# Patient Record
Sex: FEMALE | Race: WHITE | Employment: OTHER | URBAN - METROPOLITAN AREA
[De-identification: names, ages, dates, MRNs, and addresses within clinical notes are randomized per-mention and may not be internally consistent; named-entity substitution may affect disease eponyms.]

---

## 2018-06-23 ENCOUNTER — HOSPITAL ENCOUNTER (INPATIENT)
Age: 73
LOS: 3 days | Discharge: HOME | DRG: 243 | End: 2018-06-26
Attending: EMERGENCY MEDICINE | Admitting: INTERNAL MEDICINE
Payer: MEDICARE

## 2018-06-23 DIAGNOSIS — R00.1 SYMPTOMATIC BRADYCARDIA: Primary | ICD-10-CM

## 2018-06-23 PROBLEM — I49.5 SSS (SICK SINUS SYNDROME) (HCC): Status: ACTIVE | Noted: 2018-06-23

## 2018-06-23 LAB
GLUCOSE BLD STRIP.AUTO-MCNC: 46 MG/DL (ref 65–100)
GLUCOSE BLD STRIP.AUTO-MCNC: 46 MG/DL (ref 65–100)
GLUCOSE BLD STRIP.AUTO-MCNC: 48 MG/DL (ref 65–100)
GLUCOSE BLD STRIP.AUTO-MCNC: 95 MG/DL (ref 65–100)
SERVICE CMNT-IMP: ABNORMAL
SERVICE CMNT-IMP: NORMAL

## 2018-06-23 PROCEDURE — 99285 EMERGENCY DEPT VISIT HI MDM: CPT

## 2018-06-23 PROCEDURE — 82962 GLUCOSE BLOOD TEST: CPT

## 2018-06-23 PROCEDURE — 65660000000 HC RM CCU STEPDOWN

## 2018-06-23 PROCEDURE — 74011250637 HC RX REV CODE- 250/637: Performed by: INTERNAL MEDICINE

## 2018-06-23 RX ORDER — MAGNESIUM SULFATE 100 %
CRYSTALS MISCELLANEOUS
Status: DISPENSED
Start: 2018-06-23 | End: 2018-06-24

## 2018-06-23 RX ORDER — INSULIN LISPRO 100 [IU]/ML
INJECTION, SOLUTION INTRAVENOUS; SUBCUTANEOUS
Status: DISCONTINUED | OUTPATIENT
Start: 2018-06-23 | End: 2018-06-26 | Stop reason: HOSPADM

## 2018-06-23 RX ORDER — AZELASTINE 1 MG/ML
2 SPRAY, METERED NASAL
Status: DISCONTINUED | OUTPATIENT
Start: 2018-06-23 | End: 2018-06-26 | Stop reason: HOSPADM

## 2018-06-23 RX ORDER — MAGNESIUM SULFATE 100 %
4 CRYSTALS MISCELLANEOUS AS NEEDED
Status: DISCONTINUED | OUTPATIENT
Start: 2018-06-23 | End: 2018-06-26 | Stop reason: HOSPADM

## 2018-06-23 RX ORDER — PREGABALIN 100 MG/1
100 CAPSULE ORAL 2 TIMES DAILY
Status: DISCONTINUED | OUTPATIENT
Start: 2018-06-24 | End: 2018-06-24

## 2018-06-23 RX ORDER — ACETAMINOPHEN 325 MG/1
650 TABLET ORAL
Status: DISCONTINUED | OUTPATIENT
Start: 2018-06-23 | End: 2018-06-26 | Stop reason: HOSPADM

## 2018-06-23 RX ORDER — PREGABALIN 75 MG/1
300 CAPSULE ORAL
Status: COMPLETED | OUTPATIENT
Start: 2018-06-23 | End: 2018-06-24

## 2018-06-23 RX ORDER — DEXTROSE 50 % IN WATER (D50W) INTRAVENOUS SYRINGE
12.5-25 AS NEEDED
Status: DISCONTINUED | OUTPATIENT
Start: 2018-06-23 | End: 2018-06-26 | Stop reason: HOSPADM

## 2018-06-23 RX ORDER — ATORVASTATIN CALCIUM 10 MG/1
10 TABLET, FILM COATED ORAL DAILY
Status: DISCONTINUED | OUTPATIENT
Start: 2018-06-24 | End: 2018-06-26 | Stop reason: HOSPADM

## 2018-06-23 RX ORDER — FUROSEMIDE 40 MG/1
40 TABLET ORAL DAILY
Status: DISCONTINUED | OUTPATIENT
Start: 2018-06-24 | End: 2018-06-24

## 2018-06-23 RX ORDER — SODIUM CHLORIDE 0.9 % (FLUSH) 0.9 %
5-10 SYRINGE (ML) INJECTION AS NEEDED
Status: DISCONTINUED | OUTPATIENT
Start: 2018-06-23 | End: 2018-06-26 | Stop reason: HOSPADM

## 2018-06-23 RX ORDER — PANTOPRAZOLE SODIUM 40 MG/1
40 TABLET, DELAYED RELEASE ORAL
Status: DISCONTINUED | OUTPATIENT
Start: 2018-06-24 | End: 2018-06-23

## 2018-06-23 RX ORDER — SODIUM CHLORIDE 0.9 % (FLUSH) 0.9 %
5-10 SYRINGE (ML) INJECTION EVERY 8 HOURS
Status: DISCONTINUED | OUTPATIENT
Start: 2018-06-23 | End: 2018-06-26 | Stop reason: HOSPADM

## 2018-06-23 RX ORDER — SOTALOL HYDROCHLORIDE 80 MG/1
40 TABLET ORAL EVERY 12 HOURS
Status: DISCONTINUED | OUTPATIENT
Start: 2018-06-24 | End: 2018-06-26 | Stop reason: HOSPADM

## 2018-06-23 RX ORDER — PANTOPRAZOLE SODIUM 40 MG/1
40 TABLET, DELAYED RELEASE ORAL
Status: DISCONTINUED | OUTPATIENT
Start: 2018-06-24 | End: 2018-06-26 | Stop reason: HOSPADM

## 2018-06-23 RX ADMIN — Medication 10 ML: at 22:19

## 2018-06-23 NOTE — IP AVS SNAPSHOT
[address] [facility] 
[phone] Patient: Geno Velez MRN: [id number] A check lexi indicates which time of day the medication should be taken. My Medications START taking these medications Instructions Each Dose to Equal  
 Morning Noon Evening Bedtime  
 clindamycin 300 mg capsule Commonly known as:  CLEOCIN Your last dose was: Your next dose is: Take 1 Cap by mouth three (3) times daily for 3 days. 300 mg CONTINUE taking these medications Instructions Each Dose to Equal  
 Morning Noon Evening Bedtime  
 apixaban 5 mg tablet Commonly known as:  Chris Pierson Start taking on:  2018 Your last dose was: Your next dose is: Take 1 Tab by mouth two (2) times a day. 5 mg ASK your doctor about these medications Instructions Each Dose to Equal  
 Morning Noon Evening Bedtime  
 aspirin delayed-release 81 mg tablet Your last dose was: Your next dose is: Take 81 mg by mouth daily. 81 mg  
    
   
   
   
  
 atorvastatin 10 mg tablet Commonly known as:  LIPITOR Your last dose was: Your next dose is: Take 10 mg by mouth daily. 10 mg  
    
   
   
   
  
 azelastine 137 mcg (0.1 %) nasal spray Commonly known as:  ASTELIN Your last dose was: Your next dose is: 2 Sprays by Both Nostrils route nightly. Use in each nostril as directed 2 Spray LANTUS U-100 INSULIN 100 unit/mL injection Generic drug:  insulin glargine Your last dose was: Your next dose is:    
   
   
 22 Units by SubCUTAneous route two (2) times a day. 22 Units LASIX 80 mg tablet Generic drug:  furosemide Your last dose was: Your next dose is: Take 40 mg by mouth daily. Indications: Edema, patient alternates her dose from 40 to 80mg 40 mg  
    
   
   
   
  
 * LYRICA 100 mg capsule Generic drug:  pregabalin Your last dose was: Your next dose is: Take 100 mg by mouth daily. Pregabalin Dosing Instructions: 100 mg  mg QHS  Indications: one pill inthe am and x3 at hs  
 100 mg  
    
   
   
   
  
 * pregabalin 100 mg capsule Commonly known as:  Angeline Simmonds Your last dose was: Your next dose is: Take 300 mg by mouth nightly. Pregabalin Dosing Instructions: 100 mg  mg QHS  
 300 mg NovoLOG Flexpen U-100 Insulin 100 unit/mL Inpn Generic drug:  insulin aspart U-100 Your last dose was: Your next dose is:    
   
   
 1 Units by SubCUTAneous route Before breakfast, lunch, and dinner. Indications: sliging scale 150-200 =1 unit. 1 unit for every 50 on BSFS  
 1 Units  
    
   
   
   
  
 omega 3-DHA-EPA-fish oil 1,000 mg (120 mg-180 mg) capsule Your last dose was: Your next dose is: Take 1 Cap by mouth daily. 1 Cap PriLOSEC 40 mg capsule Generic drug:  omeprazole Your last dose was: Your next dose is: Take 40 mg by mouth daily. 40 mg  
    
   
   
   
  
 sotalol 80 mg tablet Commonly known as:  Sammy Whitley Your last dose was: Your next dose is: Take 40 mg by mouth two (2) times a day. 40 mg  
    
   
   
   
  
 * Notice: This list has 2 medication(s) that are the same as other medications prescribed for you. Read the directions carefully, and ask your doctor or other care provider to review them with you. Where to Get Your Medications These medications were sent to [facility]/pharmacy #[id number]- [place], [address] [facility] [address] Phone:  [phone] apixaban 5 mg tablet Information on where to get these meds will be given to you by the nurse or doctor. ! Ask your nurse or doctor about these medications  
  clindamycin 300 mg capsule

## 2018-06-23 NOTE — IP AVS SNAPSHOT
[address] [facility] 
[phone] Patient: Emanuel Fuentes MRN: [id number] KNR:[id number] About your hospitalization You were admitted on:  June 23, 2018 You last received care in the:  MR[facility] 2 INTRVNTNL CARDIO You were discharged on:  June 26, 2018 Why you were hospitalized Your primary diagnosis was:  Not on File Your diagnoses also included:  Sss (Sick Sinus Syndrome) ([facility]) Follow-up Information Follow up With Details Comments Contact Info Juan F Franks MD   [address] [place] [address] [id number] [phone] Chris Lomas MD Schedule an appointment as soon as possible for a visit in 2 weeks  [id number] [facility] 
[phone] Your Scheduled Appointments Tuesday June 26, 2018  2:30 PM EDT  
[facility] REGINA [address] with [facility] MAMMO 1 [facility] Mammography SONOMA [facility]) [address] [id number] [phone] EXAM INSTRUCTIONS Do not wear deodorant, lotion, or powders to your appointment. GENERAL INSTRUCTIONS - Bring a list of all medications you are currently taking, including over the counter medications. - Only patients will be allowed in the exam room. This includes children. - Children under the age of 15 may not be left unattended. - [facility] patients must have an armband and be accompanied by a caregiver or family member. - If you have a hand-written prescription for this exam, you must bring it with you on the day of your exam. - Bring all relevant prior images from any facility outside of [facility] with you on the day of your exam.  Failure to provide images may delay reading by Radiologist.  If you have questions or need to reschedule or cancel your appointment, call [phone].   
  
    
ARRIVAL INSTRUCTIONS  Check into the Registration Department Desk at the front of the hospital. [address] [address] [address]  Please arrive 30 minutes prior to your scheduled appt. time. Tuesday June 26, 2018  3:00 PM EDT  
DEXA BONE DENS STUDY LINDEN with [facility] DEXA 1 [facility] RADIOLOGY [facility] CENTER) [address] [address] [phone] DIET RESTRICTIONS - DO NOT TAKE CALCIUM or MULTIVITAMIN SUPPLEMENTS 24 hours prior to your exam.  [address] will be required to change into a gown if you have metal in your clothing. - Only patients will be allowed in the exam room. This includes children. - Children under the age of 15 may not be left unattended. - [facility] patients must have an armband and be accompanied by a caregiver or family member. - If you have a hand-written prescription for this exam, you must bring it with you on the day of your exam. - Bring all relevant prior images from any facility outside of [facility] with you on the day of your exam.  Failure to provide images may delay reading by Radiologist.  If you have questions or need to reschedule or cancel your appointment, call [phone]. ARRIVAL INSTRUCTIONS  Check into registration at the main entrance of the hospital.  [address] [place] [address] Way  Please arrive 30 minutes prior to your scheduled appointment time. Thursday July 26, 2018  3:30 PM EDT New Patient with Yan Hewitt MD  
[address] ([address]) [address] [phone] Discharge Orders None A check lexi indicates which time of day the medication should be taken. My Medications START taking these medications Instructions Each Dose to Equal  
 Morning Noon Evening Bedtime  
 clindamycin 300 mg capsule Commonly known as:  CLEOCIN Your last dose was: Your next dose is: Take 1 Cap by mouth three (3) times daily for 3 days. 300 mg CONTINUE taking these medications Instructions Each Dose to Equal  
 Morning Noon Evening Bedtime  
 apixaban 5 mg tablet Commonly known as:  Latoya Khanna Start taking on:  6/28/2018 Your last dose was: Your next dose is: Take 1 Tab by mouth two (2) times a day. 5 mg ASK your doctor about these medications Instructions Each Dose to Equal  
 Morning Noon Evening Bedtime  
 aspirin delayed-release 81 mg tablet Your last dose was: Your next dose is: Take 81 mg by mouth daily. 81 mg  
    
   
   
   
  
 atorvastatin 10 mg tablet Commonly known as:  LIPITOR Your last dose was: Your next dose is: Take 10 mg by mouth daily. 10 mg  
    
   
   
   
  
 azelastine 137 mcg (0.1 %) nasal spray Commonly known as:  ASTELIN Your last dose was: Your next dose is: 2 Sprays by Both Nostrils route nightly. Use in each nostril as directed 2 Spray LANTUS U-100 INSULIN 100 unit/mL injection Generic drug:  insulin glargine Your last dose was: Your next dose is:    
   
   
 22 Units by SubCUTAneous route two (2) times a day. 22 Units LASIX 80 mg tablet Generic drug:  furosemide Your last dose was: Your next dose is: Take 40 mg by mouth daily. Indications: Edema, patient alternates her dose from 40 to 80mg 40 mg  
    
   
   
   
  
 * LYRICA 100 mg capsule Generic drug:  pregabalin Your last dose was: Your next dose is: Take 100 mg by mouth daily. Pregabalin Dosing Instructions: 100 mg  mg QHS  Indications: one pill inthe am and x3 at hs  
 100 mg  
    
   
   
   
  
 * pregabalin 100 mg capsule Commonly known as:  Jovita Catalan Your last dose was: Your next dose is: Take 300 mg by mouth nightly. Pregabalin Dosing Instructions: 100 mg  mg QHS  
 300 mg NovoLOG Flexpen U-100 Insulin 100 unit/mL Inpn Generic drug:  insulin aspart U-100 Your last dose was: Your next dose is:    
   
   
 1 Units by SubCUTAneous route Before breakfast, lunch, and dinner. Indications: sliging scale 150-200 =1 unit. 1 unit for every 50 on BSFS  
 1 Units  
    
   
   
   
  
 omega 3-DHA-EPA-fish oil 1,000 mg (120 mg-180 mg) capsule Your last dose was: Your next dose is: Take 1 Cap by mouth daily. 1 Cap PriLOSEC 40 mg capsule Generic drug:  omeprazole Your last dose was: Your next dose is: Take 40 mg by mouth daily. 40 mg  
    
   
   
   
  
 sotalol 80 mg tablet Commonly known as:  Bette Chandra Your last dose was: Your next dose is: Take 40 mg by mouth two (2) times a day. 40 mg  
    
   
   
   
  
 * Notice: This list has 2 medication(s) that are the same as other medications prescribed for you. Read the directions carefully, and ask your doctor or other care provider to review them with you. Where to Get Your Medications These medications were sent to [facility]/pharmacy #[id number] [address], [address] [address] Phone:  [phone]  
  apixaban 5 mg tablet Information on where to get these meds will be given to you by the nurse or doctor. ! Ask your nurse or doctor about these medications  
  clindamycin 300 mg capsule Discharge Instructions Cardiology Discharge Summary Patient ID: 
Johnathan Oliva 
[id number] 
12 y.o. 
1945 Admit Date: 6/23/2018 Discharge Date: 6/26/2018 Admitting Physician: Geneva Franz MD  
 
Discharge Physician: Geneva Franz MD 
 
 Admission Diagnoses: SSS (sick sinus syndrome) ([facility] [address] Patient Instructions:  
 
Resume Eliquis at prior dose on Thursday PM  
 
 
Referenced discharge instructions provided by nursing for diet and activity. Follow-up with  Dr Maik Santoyo 10-14 days for pacer check [phone] Signed: 
Ryne Berrios MD 
6/26/2018 
10:02 AM 
DISCHARGE INSTRUCTIONS FOR PATIENTS WITH PACEMAKERS 1. Remember to call for an appointment in 2  weeks [phone] to check healing and implant programming. 2. Medic Alert Bracelets are available from your pharmacist to wear at all times if you choose to wear one. 3. Carry your ID card for pacemaker with you at all times. This card will be given to you in the hospital or mailed to you. 4. The pacemaker will bulge slightly under your skin. The bulge will decrease in size over the next few weeks. Please notify the doctor's office if you notice any of the following around your site: A.  A bruise that does not go away. B.  Soreness or yellow, green, or brown drainage from the site. C. Any swelling from the site. D. If you have a fever of 100 degrees or higher that lasts for a few days. INCISION CARE 1.  Leave Steri Strips  over your site until it starts to fall off, usually in a few weeks. 2.  You may shower after 3 days as long as your incision isnt submerged or directly sprayed upon until well healed. 3.  For comfort, wear loose fitting clothing. 4.  Report any signs of infection, fever, pain, swelling, redness, oozing, or heat at site especially if these symptoms increase after the first 3 to 4 days. ACTIVITY PRECAUTIONS 1. Avoid rough contact with the implant site. 2. No driving for 14 days. 3. Avoid lifting your arm over your head, carrying anything on the affected side, or lifting over 10 pounds for 30 days. For the first 2 days only bend your arm at the elbow.  
4. Any extreme activity such as golf, weight lifting or exercise biking should be restricted for 60 days. 5. Do not carry objects by holding them against your implant site. 6.  No shooting rifles or any type of gun with the affected shoulder permanently. SPECIAL PRECAUTIONS 1. You should avoid all strong magnetic fields, such as arc welding, large transformers, large motors. 2.  You may not have an MRI which uses a strong magnet to take pictures. 3.  Treatments or surgery that requires diathermy or electrocautery should be discussed with your doctor before scheduled. 4. Avoid radio frequency transmitters, including radar. 5. Advise dentist or other medical personnel you see that you have a pacemaker. 6.  Cell phones and microwave oven use is okay. 7.  If you plan to move or take a trip to a new area, the doctor's office will give you a name of a doctor to contact for any problems. ANTIBIOTIC THERAPY During the first 8 weeks after your pacemaker insertion, you may need antibiotics before any dental work or certain tests or operations. Let the dentist or doctor who is caring for you know that you have had an implanted device. You will be given a prescription for a prophylactic antibiotic called clindamycin  to take for a few days after your procedure. [employer] Announcement We are excited to announce that we are making your provider's discharge notes available to you in [employer]. You will see these notes when they are completed and signed by the physician that discharged you from your recent hospital stay. If you have any questions or concerns about any information you see in [employer], please call the Health Information Department where you were seen or reach out to your Primary Care Provider for more information about your plan of care. Introducing \[facility]\"" & HEALTH SERVICES! [employer] introduces [employer] patient portal. Now you can access parts of your medical record, email your doctor's office, and request medication refills online. 1. In your internet browser, go to [email or web address] 2. Click on the First Time User? Click Here link in the Sign In box. You will see the New Member Sign Up page. 3. Enter your [employer] Access Code exactly as it appears below. You will not need to use this code after youve completed the sign-up process. If you do not sign up before the expiration date, you must request a new code. · [employer] Access Code: [email or web address] Expires: 8/29/2018  2:42 PM 
 
4. Enter the last four digits of your Social Security Number (xxxx) and Date of Birth (mm/dd/yyyy) as indicated and click Submit. You will be taken to the next sign-up page. 5. Create a Greatistt ID. This will be your [employer] login ID and cannot be changed, so think of one that is secure and easy to remember. 6. Create a [employer] password. You can change your password at any time. 7. Enter your Password Reset Question and Answer. This can be used at a later time if you forget your password. 8. Enter your e-mail address. You will receive e-mail notification when new information is available in [address] 9. Click Sign Up. You can now view and download portions of your medical record. 10. Click the Download Summary menu link to download a portable copy of your medical information. If you have questions, please visit the Frequently Asked Questions section of the [employer] website. Remember, [employer] is NOT to be used for urgent needs. For medical emergencies, dial 911. Now available from your iPhone and Android! Introducing Abiel Cooper As a [employer] patient, I wanted to make you aware of our electronic visit tool called Abiel Cooper. So [address] 24/7 allows you to connect within minutes with a medical provider 24 hours a day, seven days a week via a mobile device or tablet or logging into a secure website from your computer. You can access Abiel Cooper from anywhere in the [place]. A virtual visit might be right for you when you have a simple condition and feel like you just dont want to get out of bed, or cant get away from work for an appointment, when your [facility] provider is not available (evenings, weekends or holidays), or when youre out of town and need minor care. Electronic visits cost only $49 and if the [facility] 24/7 provider determines a prescription is needed to treat your condition, one can be electronically transmitted to a nearby pharmacy*. Please take a moment to enroll today if you have not already done so. The enrollment process is free and takes just a few minutes. To enroll, please download the Romayne Duster 24/7 phil to your tablet or phone, or visit [email or web address] to enroll on your computer. And, as an [address] [place] [address] patient with a [employer] account, the results of your visits will be scanned into your electronic medical record and your primary care provider will be able to view the scanned results. We urge you to continue to see your regular Romayne Duster provider for your ongoing medical care. And while your primary care provider may not be the one available when you seek a Store[employer][email or web address] virtual visit, the peace of mind you get from getting a real diagnosis real time can be priceless. For more information on [facility], view our Frequently Asked Questions (FAQs) at [email or web address]. Sincerely, 
 
Porsche Redmond MD 
Chief Medical Officer [facility] Tarah Fernando *:  certain medications cannot be prescribed via StoreFront.[email or web address] Unresulted tests-please follow up with your PCP on these results Procedure/Test Authorizing Provider  2D ECHO COMPLETE ADULT (TTE) W OR WO CONTR Jay Chaudhary MD  
 EKG, 12 LEAD, INITIAL Jay Chaudhary MD  
 MAGNESIUM MD Wesley Conway MD Chris Man, MD  
 METABOLIC PANEL, BASIC Elige Cooks, MD  
 METABOLIC PANEL, Saud Azar MD  
 METABOLIC PANEL, MD Linette Mcmillan MD  
 XR Dejon Machado MD  
  
Providers Seen During Your Hospitalization Provider Specialty Primary office phone Yang Stephenson MD Emergency Medicine [phone] Elige Cooks, MD Cardiology [phone] Your Primary Care Physician (PCP) Primary Care Physician Office Phone Office Fax [facility], [address] [place] [address] [phone] You are allergic to the following Allergen Reactions Amoxicillin Other (comments) Bactrim (Sulfamethoprim) Other (comments) Clindamycin Other (comments) Recent Documentation Height Weight BMI OB Status Smoking Status 1.638 m 96.6 kg 36 kg/m2 Postmenopausal Former Smoker Emergency Contacts Name Discharge Info Relation Home Work Mobile [employer] [facility] DISCHARGE CAREGIVER [3] Spouse [3] [phone] Patient Belongings The following personal items are in your possession at time of discharge: 
  Dental Appliances: None  Visual Aid: Glasses      Home Medications: None   Jewelry: None  Clothing: None    Other Valuables: Purse Please provide this summary of care documentation to your next provider. Signatures-by signing, you are acknowledging that this After Visit Summary has been reviewed with you and you have received a copy. Patient Signature:  ____________________________________________________________ Date:  ____________________________________________________________  
  
Pedro Guo Provider Signature:  ____________________________________________________________ Date:  ____________________________________________________________

## 2018-06-24 LAB
ANION GAP SERPL CALC-SCNC: 6 MMOL/L (ref 5–15)
BUN SERPL-MCNC: 15 MG/DL (ref 6–20)
BUN/CREAT SERPL: 15 (ref 12–20)
CALCIUM SERPL-MCNC: 8.2 MG/DL (ref 8.5–10.1)
CHLORIDE SERPL-SCNC: 107 MMOL/L (ref 97–108)
CO2 SERPL-SCNC: 28 MMOL/L (ref 21–32)
CREAT SERPL-MCNC: 1.01 MG/DL (ref 0.55–1.02)
GLUCOSE BLD STRIP.AUTO-MCNC: 132 MG/DL (ref 65–100)
GLUCOSE BLD STRIP.AUTO-MCNC: 175 MG/DL (ref 65–100)
GLUCOSE BLD STRIP.AUTO-MCNC: 225 MG/DL (ref 65–100)
GLUCOSE BLD STRIP.AUTO-MCNC: 242 MG/DL (ref 65–100)
GLUCOSE SERPL-MCNC: 309 MG/DL (ref 65–100)
MAGNESIUM SERPL-MCNC: 2 MG/DL (ref 1.6–2.4)
PHOSPHATE SERPL-MCNC: 3.3 MG/DL (ref 2.6–4.7)
POTASSIUM SERPL-SCNC: 4.7 MMOL/L (ref 3.5–5.1)
SERVICE CMNT-IMP: ABNORMAL
SODIUM SERPL-SCNC: 141 MMOL/L (ref 136–145)

## 2018-06-24 PROCEDURE — 83735 ASSAY OF MAGNESIUM: CPT | Performed by: INTERNAL MEDICINE

## 2018-06-24 PROCEDURE — 74011250636 HC RX REV CODE- 250/636: Performed by: INTERNAL MEDICINE

## 2018-06-24 PROCEDURE — 36415 COLL VENOUS BLD VENIPUNCTURE: CPT | Performed by: INTERNAL MEDICINE

## 2018-06-24 PROCEDURE — 74011250637 HC RX REV CODE- 250/637: Performed by: INTERNAL MEDICINE

## 2018-06-24 PROCEDURE — C8929 TTE W OR WO FOL WCON,DOPPLER: HCPCS

## 2018-06-24 PROCEDURE — 82962 GLUCOSE BLOOD TEST: CPT

## 2018-06-24 PROCEDURE — 65660000000 HC RM CCU STEPDOWN

## 2018-06-24 PROCEDURE — 80048 BASIC METABOLIC PNL TOTAL CA: CPT | Performed by: INTERNAL MEDICINE

## 2018-06-24 PROCEDURE — 84100 ASSAY OF PHOSPHORUS: CPT | Performed by: INTERNAL MEDICINE

## 2018-06-24 PROCEDURE — 74011636637 HC RX REV CODE- 636/637: Performed by: INTERNAL MEDICINE

## 2018-06-24 RX ORDER — PREGABALIN 100 MG/1
300 CAPSULE ORAL
Status: DISCONTINUED | OUTPATIENT
Start: 2018-06-24 | End: 2018-06-26 | Stop reason: HOSPADM

## 2018-06-24 RX ORDER — PREGABALIN 100 MG/1
100 CAPSULE ORAL DAILY
Status: DISCONTINUED | OUTPATIENT
Start: 2018-06-25 | End: 2018-06-26 | Stop reason: HOSPADM

## 2018-06-24 RX ORDER — PREGABALIN 100 MG/1
300 CAPSULE ORAL
COMMUNITY
End: 2022-04-27

## 2018-06-24 RX ORDER — SODIUM CHLORIDE 9 MG/ML
100 INJECTION, SOLUTION INTRAVENOUS CONTINUOUS
Status: DISCONTINUED | OUTPATIENT
Start: 2018-06-25 | End: 2018-06-26 | Stop reason: HOSPADM

## 2018-06-24 RX ADMIN — PERFLUTREN 2 ML: 6.52 INJECTION, SUSPENSION INTRAVENOUS at 11:07

## 2018-06-24 RX ADMIN — SOTALOL HYDROCHLORIDE 40 MG: 80 TABLET ORAL at 09:38

## 2018-06-24 RX ADMIN — INSULIN LISPRO 2 UNITS: 100 INJECTION, SOLUTION INTRAVENOUS; SUBCUTANEOUS at 12:33

## 2018-06-24 RX ADMIN — PREGABALIN 100 MG: 100 CAPSULE ORAL at 09:37

## 2018-06-24 RX ADMIN — ATORVASTATIN CALCIUM 10 MG: 10 TABLET, FILM COATED ORAL at 09:37

## 2018-06-24 RX ADMIN — AZELASTINE HYDROCHLORIDE 2 SPRAY: 137 SPRAY, METERED NASAL at 20:59

## 2018-06-24 RX ADMIN — PANTOPRAZOLE SODIUM 40 MG: 40 TABLET, DELAYED RELEASE ORAL at 09:37

## 2018-06-24 RX ADMIN — FUROSEMIDE 40 MG: 40 TABLET ORAL at 09:37

## 2018-06-24 RX ADMIN — PREGABALIN 300 MG: 100 CAPSULE ORAL at 20:58

## 2018-06-24 RX ADMIN — AZELASTINE HYDROCHLORIDE 2 SPRAY: 137 SPRAY, METERED NASAL at 00:11

## 2018-06-24 RX ADMIN — PREGABALIN 300 MG: 75 CAPSULE ORAL at 00:10

## 2018-06-24 RX ADMIN — INSULIN LISPRO 3 UNITS: 100 INJECTION, SOLUTION INTRAVENOUS; SUBCUTANEOUS at 09:34

## 2018-06-24 RX ADMIN — Medication 10 ML: at 14:11

## 2018-06-24 RX ADMIN — INSULIN LISPRO 2 UNITS: 100 INJECTION, SOLUTION INTRAVENOUS; SUBCUTANEOUS at 21:27

## 2018-06-24 RX ADMIN — SOTALOL HYDROCHLORIDE 40 MG: 80 TABLET ORAL at 20:57

## 2018-06-24 NOTE — ED NOTES
TRANSFER - OUT REPORT:    Verbal report given to Fran Gunter RN (name) on Danny Armendariz  [facility] of care       Report consisted of patients Situation, Background, Assessment and   Recommendations(SBAR). Information from the following report(s) SBAR, Kardex, ED Summary, Intake/Output, MAR, Accordion and Recent Results was reviewed with the receiving nurse. Lines:   Peripheral IV 06/23/18 Left Hand (Active)   Site Assessment Clean, dry, & intact 6/23/2018 11:00 PM   Phlebitis Assessment 0 6/23/2018 11:00 PM   Infiltration Assessment 0 6/23/2018 11:00 PM   Dressing Status Clean, dry, & intact 6/23/2018 11:00 PM   Dressing Type Tape;Transparent 6/23/2018 11:00 PM   Hub Color/Line Status Pink;Flushed;Patent 6/23/2018 11:00 PM        Opportunity for questions and clarification was provided.       Patient transported with:   Monitor  Registered Nurse   Patient has frida pack  Chart

## 2018-06-24 NOTE — ED PROVIDER NOTES
EMERGENCY DEPARTMENT HISTORY AND PHYSICAL EXAM      Date: 6/23/2018  Patient Name: July Sorenson    History of Presenting Illness     Chief Complaint   Patient presents with    Slow Heart Rate    Other     \"possible pacemaker placement\"       History Provided By: Patient and EMS    HPI: July Sorenson, 68 y.o. female with PMHx significant for CAD, arthritis, DM, heart failure, presents via EMS from [facility] to the ED with cc of severe, intermittent dizziness and lightheadedness, along with associated SOB x today. She describes feeling her symptoms on setting this morning which then ceased as the morning progressed; she then adds her symptoms on setting again this afternoon with no relief. She reports driving herself to the ED, however felt severely dizzy and pulled over to the side of the road. The police then stopped for her and helped her call EMS. On scene, pt was awake and alert with a heart rate in the 30s. Per EMS, the pt was shown to be in profound bradycardia with her SBP dropping to the 90s. She was given a fluid bolus in route which increased her heart rate to the 50s at [facility] ED. Of note, she is on Sotalol for her afib and sees a Cardiologist in [address] [place] [address]. she specifically denies any chest pain, or recent illnesses. There are no other complaints, changes, or physical findings at this time.     PCP: Teodoro Mckeon MD    Current Facility-Administered Medications   Medication Dose Route Frequency Provider Last Rate Last Dose    glucose 4 gram chewable tablet             [START ON 6/24/2018] atorvastatin (LIPITOR) tablet 10 mg  10 mg Oral DAILY Ryne Berrios MD        azelastine (ASTELIN) 137mcg/spray nasal spray  2 Spray Both Nostrils QHS Ryne Berrios MD        [START ON 6/24/2018] furosemide (LASIX) tablet 40 mg  40 mg Oral DAILY Ryne Berrios MD        [START ON 6/24/2018] pregabalin (LYRICA) capsule 100 mg  100 mg Oral BID Ryne Berrios MD        [START ON 6/24/2018] sotalol (BETAPACE) tablet 40 mg  40 mg Oral Q12H Bud Silva MD        sodium chloride (NS) flush 5-10 mL  5-10 mL IntraVENous Theo Thornton MD   10 mL at 06/23/18 2219    sodium chloride (NS) flush 5-10 mL  5-10 mL IntraVENous PRDB Silva MD        acetaminophen (TYLENOL) tablet 650 mg  650 mg Oral Q4H HALI Silva MD        insulin lispro (HUMALOG) injection   SubCUTAneous AC&HS Bud Silva MD   Stopped at 06/23/18 2200    glucose chewable tablet 16 g  4 Tab Oral PRDB Silva MD        dextrose (D50W) injection syrg 12.5-25 g  12.5-25 g IntraVENous HALI Silva MD        glucagon (GLUCAGEN) injection 1 mg  1 mg IntraMUSCular HALI Silav MD        [START ON 6/24/2018] pantoprazole (PROTONIX) tablet 40 mg  40 mg Oral ACB Bud Silva MD        pregabalin (LYRICA) capsule 300 mg  300 mg Oral NOW Bud Silva MD         Current Outpatient Prescriptions   Medication Sig Dispense Refill    atorvastatin (LIPITOR) 10 mg tablet Take 10 mg by mouth daily.  sotalol (BETAPACE) 80 mg tablet Take 40 mg by mouth two (2) times a day.  aspirin delayed-release 81 mg tablet Take 81 mg by mouth daily.  apixaban (ELIQUIS) 5 mg tablet Take 5 mg by mouth two (2) times a day.  insulin aspart U-100 (NOVOLOG FLEXPEN U-100 INSULIN) 100 unit/mL inpn 1 Units by SubCUTAneous route Before breakfast, lunch, and dinner. Indications: sliging scale 150-200 =1 unit. 1 unit for every 50 on BSFS      insulin glargine (LANTUS U-100 INSULIN) 100 unit/mL injection 22 Units by SubCUTAneous route two (2) times a day.  omeprazole (PRILOSEC) 40 mg capsule Take 40 mg by mouth daily.  pregabalin (LYRICA) 100 mg capsule Take 100 mg by mouth two (2) times a day. Indications: one pill inthe am and x3 at hs      omega 3-DHA-EPA-fish oil 1,000 mg (120 mg-180 mg) capsule Take 1 Cap by mouth daily.       furosemide (LASIX) 80 mg tablet Take 40 mg by mouth daily. Indications: Edema, patient alternates her dose from 40 to 80mg      azelastine (ASTELIN) 137 mcg (0.1 %) nasal spray 2 Sprays by Both Nostrils route nightly. Use in each nostril as directed         Past History     Past Medical History:  Past Medical History:   Diagnosis Date    Arthritis     CAD (coronary artery disease)     afib and chf    Diabetes (HCC)     type 2    Gastrointestinal disorder     reflux and gastro paresis    Heart failure (HCC)     Infectious disease     MRSA? in right big toe    Psychiatric disorder     PTSD from 911       Past Surgical History:  Past Surgical History:   Procedure Laterality Date    HX APPENDECTOMY      HX GI      colonscopy and endo    HX GYN      cyst on right ovary removed    HX HEENT      T&A Bilateral Cataract Surgery with coptic lenses       Family History:  History reviewed. No pertinent family history. Social History:  Social History   Substance Use Topics    Smoking status: Former Smoker    Smokeless tobacco: Never Used    Alcohol use 0.6 oz/week     1 Glasses of wine per week      Comment: occ       Allergies: Allergies   Allergen Reactions    Amoxicillin Other (comments)    Bactrim [Sulfamethoprim] Other (comments)    Clindamycin Other (comments)         Review of Systems   Review of Systems   Constitutional: Negative for chills and fever. HENT: Negative for congestion, ear pain, rhinorrhea, sore throat and trouble swallowing. Eyes: Negative for visual disturbance. Respiratory: Positive for shortness of breath. Negative for cough and chest tightness. Cardiovascular: Negative for chest pain and palpitations. Gastrointestinal: Negative for abdominal pain, blood in stool, constipation, diarrhea, nausea and vomiting. Genitourinary: Negative for decreased urine volume, difficulty urinating, dysuria and frequency. Musculoskeletal: Negative for back pain and neck pain. Skin: Negative for color change and rash. Neurological: Positive for dizziness and light-headedness. Negative for weakness and headaches. Physical Exam   Physical Exam   Constitutional: She is oriented to person, place, and time. Vital signs are normal. She appears well-developed and well-nourished. She does not appear ill. No distress. HENT:   Mouth/Throat: Oropharynx is clear and moist.   Eyes: Conjunctivae are normal.   Neck: Neck supple. Cardiovascular: Regular rhythm. Bradycardia present. Pulmonary/Chest: Effort normal and breath sounds normal. No accessory muscle usage. No respiratory distress. Abdominal: Soft. She exhibits no distension. There is no tenderness. Lymphadenopathy:     She has no cervical adenopathy. Neurological: She is alert and oriented to person, place, and time. She has normal strength. No cranial nerve deficit or sensory deficit. Skin: Skin is warm and dry. Nursing note and vitals reviewed. Diagnostic Study Results     Labs -     Recent Results (from the past 12 hour(s))   CBC WITH AUTOMATED DIFF    Collection Time: 06/23/18  2:00 PM   Result Value Ref Range    WBC 6.6 3.6 - 11.0 K/uL    RBC 3.85 3.80 - 5.20 M/uL    HGB 11.3 (L) 11.5 - 16.0 g/dL    HCT 33.8 (L) 35.0 - 47.0 %    MCV 87.8 80.0 - 99.0 FL    MCH 29.4 26.0 - 34.0 PG    MCHC 33.4 30.0 - 36.5 g/dL    RDW 13.4 11.5 - 14.5 %    PLATELET 067 728 - 223 K/uL    MPV 11.0 8.9 - 12.9 FL    NRBC 0.0 0  WBC    ABSOLUTE NRBC 0.00 0.00 - 0.01 K/uL    NEUTROPHILS 59 32 - 75 %    LYMPHOCYTES 27 12 - 49 %    MONOCYTES 11 5 - 13 %    EOSINOPHILS 2 0 - 7 %    BASOPHILS 1 0 - 1 %    IMMATURE GRANULOCYTES 0 0.0 - 0.5 %    ABS. NEUTROPHILS 3.9 1.8 - 8.0 K/UL    ABS. LYMPHOCYTES 1.8 0.8 - 3.5 K/UL    ABS. MONOCYTES 0.7 0.0 - 1.0 K/UL    ABS. EOSINOPHILS 0.1 0.0 - 0.4 K/UL    ABS. BASOPHILS 0.1 0.0 - 0.1 K/UL    ABS. IMM.  GRANS. 0.0 0.00 - 0.04 K/UL    DF AUTOMATED     METABOLIC PANEL, COMPREHENSIVE    Collection Time: 06/23/18  2:00 PM   Result Value Ref Range    Sodium 139 136 - 145 mmol/L    Potassium 5.0 3.5 - 5.1 mmol/L    Chloride 105 97 - 108 mmol/L    CO2 26 21 - 32 mmol/L    Anion gap 8 5 - 15 mmol/L    Glucose 181 (H) 65 - 100 mg/dL    BUN 21 (H) 7.0 - 18.0 MG/DL    Creatinine 1.10 (H) 0.55 - 1.02 MG/DL    BUN/Creatinine ratio 19 12 - 20      GFR est AA 59 (L) >60 ml/min/1.73m2    GFR est non-AA 49 (L) >60 ml/min/1.73m2    Calcium 8.2 (L) 8.5 - 10.1 MG/DL    Bilirubin, total 0.3 0.2 - 1.0 MG/DL    ALT (SGPT) 60 14 - 63 U/L    AST (SGOT) 78 (H) 15 - 37 U/L    Alk. phosphatase 107 45 - 117 U/L    Protein, total 7.0 6.4 - 8.2 g/dL    Albumin 3.1 (L) 3.5 - 5.0 g/dL    Globulin 3.9 2.0 - 4.0 g/dL    A-G Ratio 0.8 (L) 1.1 - 2.2     CK W/ REFLX CKMB    Collection Time: 06/23/18  2:00 PM   Result Value Ref Range    CK 67 26 - 192 U/L   TROPONIN I    Collection Time: 06/23/18  2:00 PM   Result Value Ref Range    Troponin-I, Qt. <0.04 <0.08 ng/mL   PTT    Collection Time: 06/23/18  2:00 PM   Result Value Ref Range    aPTT 24.7 22.1 - 32.0 sec    aPTT, therapeutic range     58.0 - 77.0 SECS   GLUCOSE, POC    Collection Time: 06/23/18  9:30 PM   Result Value Ref Range    Glucose (POC) 46 (LL) 65 - 100 mg/dL    Performed by Shanae Hernández, POC    Collection Time: 06/23/18  9:33 PM   Result Value Ref Range    Glucose (POC) 46 (LL) 65 - 100 mg/dL    Performed by Shanae Hernández, POC    Collection Time: 06/23/18  9:50 PM   Result Value Ref Range    Glucose (POC) 48 (LL) 65 - 100 mg/dL    Performed by Umm Rubi    GLUCOSE, POC    Collection Time: 06/23/18 10:14 PM   Result Value Ref Range    Glucose (POC) 95 65 - 100 mg/dL    Performed by Umm Rubi        Radiologic Studies -   CXR Results  (Last 48 hours)               06/23/18 1412  XR CHEST PORT Final result    Impression:  IMPRESSION: Normal chest.           Narrative:  EXAM:  XR CHEST PORT. INDICATION: Chest pain. COMPARISON: None.        FINDINGS:    A portable AP radiograph of the chest was obtained at 1407 hours. Lines and tubes: The patient is on a cardiac monitor. Lungs: The lungs are clear. Pleura: There is no pneumothorax or pleural effusion. Mediastinum: The cardiac and mediastinal contours and pulmonary vascularity are   normal.   Bones and soft tissues: The bones and soft tissues are grossly within normal   limits. Medical Decision Making   I am the first provider for this patient. I reviewed the vital signs, available nursing notes, past medical history, past surgical history, family history and social history. Vital Signs-Reviewed the patient's vital signs. Patient Vitals for the past 12 hrs:   Temp Pulse Resp BP SpO2   06/23/18 2200 - (!) 51 14 135/48 96 %   06/23/18 2100 - (!) 55 15 154/57 95 %   06/23/18 2012 - (!) 56 15 - 99 %   06/23/18 2010 - - - 173/54 -   06/23/18 2005 97.9 °F (36.6 °C) (!) 56 16 173/54 99 %     Records Reviewed: Old Medical Records    Provider Notes (Medical Decision Making):     MDM: pt transferred from [facility] for asymptomatic bradycardia. Refer to the ED note from [facility] for further detail. Stable on arrival. She is a diabetic and has not eaten. Feeling lightheaded and blood sugar was found to be low; was treated with PO carbohydrates and simple sugars. Further management per cardiology is expecting the patient. ED Course:   Initial assessment performed. The patients presenting problems have been discussed, and they are in agreement with the care plan formulated and outlined with them. I have encouraged them to ask questions as they arise throughout their visit. Consult Note:  8:33 Dela Romberg, MD spoke with Dr. Hanna Sparrow,  Specialty: Cardiology  Discussed pt's hx, disposition, and available diagnostic and imaging results. Reviewed care plans. Consultant agrees with plans as outlined. Dr. Hanna Sparrow will evaluate the pt.    8:43 PM  Dr. Hanna Sparrow will admit the pt.   Written by Donte [email or web address], as dictated by Sahil Garduno MD    Disposition:  Admit Note:  8:43 PM  Pt is being admitted by Dr. Preston Alexander, Cardiology. The results of their tests and reason(s) for their admission have been discussed with pt and/or available family. They convey agreement and understanding for the need to be admitted and for admission diagnosis. Diagnosis     Clinical Impression:   1. Symptomatic bradycardia        Attestations: This note is prepared by Chantale Lucio, acting as Scribe for Sahil Garduno MD.      The scribe's documentation has been prepared under my direction and personally reviewed by me in its entirety. I confirm that the note above accurately reflects all work, treatment, procedures, and medical decision making performed by me. Sahil Garduno MD    This note will not be viewable in [address]

## 2018-06-24 NOTE — PROGRESS NOTES
SHIFT SUMMARY:    4653: Patient c/o weakness, diaphoresis, and dizziness, denied chest pain, n/v, or sob, vital signs and blood sugar wnl, however was sinus asmita on the monitor in the 50's, cardiologist notified, nurse will continue to monitor patient. 1900: Bedside shift change report given to LARS Chambers (oncoming nurse) by Jacqueline Harrison (offgoing nurse). Report included the following information SBAR, Kardex, Intake/Output, MAR and Recent Results.

## 2018-06-24 NOTE — H&P
[address]  HISTORY AND PHYSICAL      Kym Rios  MR#: [id number]  : 1945  ACCOUNT #: [de-identified]   ADMIT DATE: 2018    CHIEF COMPLAINT:  Dizziness. HISTORY OF PRESENT ILLNESS:  The patient is a 71-year-old female with a history of paroxysmal atrial fibrillation, type 2 diabetes, and congestive heart failure with unknown ejection fraction. She has no prior history of coronary artery disease or myocardial infarction. She lives part of the year in the [facility] and part of the year in [place]. She is followed by a cardiologist in [place] and has been on sotalol for AFib. She states that she felt somewhat dizzy yesterday and became very dizzy earlier today. She tried to drive herself to the emergency room at [address] [place] [address], but had to stop and pull over to the side of the road. A  assisted to her and she was taken to the ER by ambulance and was found to have a heart rate in the 30s. The patient's heart rate increased after she got to the ER, and she has had no further episodes of bradycardia. We were contacted and the patient was transferred for possible permanent pacemaker placement. The patient states that she was admitted to a hospital in [place] in April of this year with congestive heart failure. Ejection fraction is unknown. She is not very physically active. She denies complete syncope. She denies chest pain. PAST MEDICAL HISTORY:  As noted above, DJD, gastroesophageal reflux, type 2 diabetes mellitus, dyslipidemia. PAST SURGICAL HISTORY:  Prior appendectomy, prior tonsillectomy and adenoidectomy. CURRENT MEDICATIONS:  Lipitor, sotalol, aspirin, Eliquis, Lantus, Prilosec, Lasix, Astelin. SOCIAL HISTORY:  The patient lives in [place] and part of the year in the [facility]. She is a former smoker, and occasionally drinks alcohol.     FAMILY HISTORY:  Negative for heart disease. REVIEW OF SYSTEMS:  As noted above, otherwise noncontributory. PHYSICAL EXAMINATION:  GENERAL:  Reveals an elderly white female in no acute distress. VITAL SIGNS:  Blood pressure 173/54, pulse 56, respirations 16, temperature 97.9. HEENT:  Pupils are equal.  Oropharynx showed moist oral mucosa. NECK:  Supple. No masses or thyromegaly. No cervical or supraclavicular adenopathy. No carotid bruits or JVD. CHEST:  Clear. BACK:  No scoliosis. SKIN:  Warm and dry. CARDIAC:  Regular rate and rhythm, normal S1, S2. No obvious murmurs or gallops. ABDOMEN:  Soft, nontender, no masses or organomegaly. Bowel sounds positive. EXTREMITIES:  No cyanosis or clubbing. Trace pedal edema. Distal pulses not well palpated. NEUROLOGIC:  No obvious gross motor deficits. LABORATORY DATA:  Hemoglobin 11.3. BUN 21, creatinine 1.1. Troponin negative. Chest x-ray negative. EKG showed sinus rhythm. No obvious ischemic changes, no Q waves seen. IMPRESSION:   1. Sick sinus syndrome with associated dizziness. 2.  History of paroxysmal atrial fibrillation, on sotalol. 3.  History of congestive heart failure with unknown ejection fraction. 4.  Insulin-requiring type 2 diabetes. 5.  Dyslipidemia. 6.  Degenerative joint disease of the back. PLAN:  The patient will be admitted to telemetry. We will hold Eliquis and continue low-dose sotalol for now as the patient's heart rate has come up into the 50s and 60s. We will continue to monitor closely. I think the patient needs a permanent pacemaker at this point, and she agrees. We will plan on this for Monday and we will hold Eliquis. We will also obtain an echocardiogram to get an assessment of ejection fraction. If ejection fraction is less than 35%, we will need to consider a defibrillator.       MD MILLI iRggs/PATRICIA  D: 06/23/2018 21:58     T: 06/23/2018 22:29  JOB #: [id number]  CC: Arlin Ibrahim MD

## 2018-06-24 NOTE — ED NOTES
HYPOGLYCEMIC EPISODE DOCUMENTATION    Patient with hypoglycemic episode(s) at 2130 (time) on 6/23/18(date). BG value(s) pre-treatment 55    Was patient symptomatic? [x] yes, [] no  Patient was treated with the following rescue medications/treatments: [] D50                [] Glucose tablets                [] Glucagon                [x] 4oz juice                [] 6oz reg soda                [] 8oz low fat milk  BG value post-treatment: 48 (given 4 oz.  Juice)    6/23/18 @85692 Blood Sugar is 95     Once BG treated and value greater than 80mg/dl, pt was provided with the following:  [] snack  [x] meal  Name of MD notified:Dr. Claudene Franks  The following orders were received: Treat with juice and then meal

## 2018-06-24 NOTE — ED TRIAGE NOTES
Patient arrives to ED via [facility] from [facility] with a report of dizziness since this morning and worse around noon; bradycardia (HR 20s at [facility]) and now in 46s. Per [facility] patient had history of CHF and A Fib. Patient reports that she took Eliquis this morning.

## 2018-06-24 NOTE — PROGRESS NOTES
Cardiology Progress Note      6/24/2018 12:09 PM    Admit Date: 6/23/2018          Subjective:  Stable overnight. No now c/o. Echo done- results pending          Visit Vitals    BP (!) 118/102 (BP 1 Location: Left arm, BP Patient Position: At rest)    Pulse (!) 58    Temp 98.1 °F (36.7 °C)    Resp 18    Ht 5' 4.5\" (1.638 m)    Wt 97.9 kg (215 lb 12.8 oz)    SpO2 99%    BMI 36.47 kg/m2              Objective:      Physical Exam:  VS as above  Chest CTA  Card RRR no gallop     Data Review:   Labs:    BUN 15  Creat 1.0     Telemetry: SB, one 3 sec pause. No afib       Assessment:     1. Sick sinus syndrome with associated dizziness. 2.  History of paroxysmal atrial fibrillation, on sotalol. 3.  History of congestive heart failure with unknown ejection fraction. 4.  Insulin-requiring type 2 diabetes. 5.  Dyslipidemia. 6.  Degenerative joint disease of the back. Plan: Will review echo. NPO and hydrate for pacer aarti

## 2018-06-24 NOTE — PROGRESS NOTES
Pharmacy Clarification of Prior to Admission Pregabalin Regimen     Clarified with Nurse/Patient that patient takes pregabalin 100 mg orally QAm and 300 mg QHS PTA. Pharmacy updated this regimen only on the PTA med list and changed currently ordered dose to reflect this.        Thank you,    Farzad Dang, PharmD, [address] [facility] Pharm[facility] Specialist

## 2018-06-25 ENCOUNTER — APPOINTMENT (OUTPATIENT)
Dept: GENERAL RADIOLOGY | Age: 73
DRG: 243 | End: 2018-06-25
Attending: INTERNAL MEDICINE
Payer: MEDICARE

## 2018-06-25 LAB
ANION GAP SERPL CALC-SCNC: 8 MMOL/L (ref 5–15)
BUN SERPL-MCNC: 20 MG/DL (ref 6–20)
BUN/CREAT SERPL: 21 (ref 12–20)
CALCIUM SERPL-MCNC: 9.1 MG/DL (ref 8.5–10.1)
CHLORIDE SERPL-SCNC: 109 MMOL/L (ref 97–108)
CO2 SERPL-SCNC: 26 MMOL/L (ref 21–32)
CREAT SERPL-MCNC: 0.96 MG/DL (ref 0.55–1.02)
GLUCOSE BLD STRIP.AUTO-MCNC: 141 MG/DL (ref 65–100)
GLUCOSE BLD STRIP.AUTO-MCNC: 161 MG/DL (ref 65–100)
GLUCOSE BLD STRIP.AUTO-MCNC: 168 MG/DL (ref 65–100)
GLUCOSE BLD STRIP.AUTO-MCNC: 201 MG/DL (ref 65–100)
GLUCOSE SERPL-MCNC: 146 MG/DL (ref 65–100)
MAGNESIUM SERPL-MCNC: 2 MG/DL (ref 1.6–2.4)
POTASSIUM SERPL-SCNC: 4.7 MMOL/L (ref 3.5–5.1)
SERVICE CMNT-IMP: ABNORMAL
SODIUM SERPL-SCNC: 143 MMOL/L (ref 136–145)

## 2018-06-25 PROCEDURE — C1894 INTRO/SHEATH, NON-LASER: HCPCS

## 2018-06-25 PROCEDURE — 77030018836 HC SOL IRR NACL ICUM -A

## 2018-06-25 PROCEDURE — C1898 LEAD, PMKR, OTHER THAN TRANS: HCPCS

## 2018-06-25 PROCEDURE — 77030031139 HC SUT VCRL2 J&J -A

## 2018-06-25 PROCEDURE — 74011636320 HC RX REV CODE- 636/320

## 2018-06-25 PROCEDURE — 74011636637 HC RX REV CODE- 636/637: Performed by: INTERNAL MEDICINE

## 2018-06-25 PROCEDURE — 02HK3JZ INSERTION OF PACEMAKER LEAD INTO RIGHT VENTRICLE, PERCUTANEOUS APPROACH: ICD-10-PCS | Performed by: INTERNAL MEDICINE

## 2018-06-25 PROCEDURE — 74011250636 HC RX REV CODE- 250/636

## 2018-06-25 PROCEDURE — 36415 COLL VENOUS BLD VENIPUNCTURE: CPT | Performed by: INTERNAL MEDICINE

## 2018-06-25 PROCEDURE — 77030002996 HC SUT SLK J&J -A

## 2018-06-25 PROCEDURE — 74011000250 HC RX REV CODE- 250

## 2018-06-25 PROCEDURE — 83735 ASSAY OF MAGNESIUM: CPT | Performed by: INTERNAL MEDICINE

## 2018-06-25 PROCEDURE — 74011250636 HC RX REV CODE- 250/636: Performed by: INTERNAL MEDICINE

## 2018-06-25 PROCEDURE — 0JH606Z INSERTION OF PACEMAKER, DUAL CHAMBER INTO CHEST SUBCUTANEOUS TISSUE AND FASCIA, OPEN APPROACH: ICD-10-PCS | Performed by: INTERNAL MEDICINE

## 2018-06-25 PROCEDURE — C1785 PMKR, DUAL, RATE-RESP: HCPCS

## 2018-06-25 PROCEDURE — 65660000000 HC RM CCU STEPDOWN

## 2018-06-25 PROCEDURE — 80048 BASIC METABOLIC PNL TOTAL CA: CPT | Performed by: INTERNAL MEDICINE

## 2018-06-25 PROCEDURE — 74011250637 HC RX REV CODE- 250/637: Performed by: INTERNAL MEDICINE

## 2018-06-25 PROCEDURE — C1892 INTRO/SHEATH,FIXED,PEEL-AWAY: HCPCS

## 2018-06-25 PROCEDURE — 71045 X-RAY EXAM CHEST 1 VIEW: CPT

## 2018-06-25 PROCEDURE — B5171ZZ FLUOROSCOPY OF LEFT SUBCLAVIAN VEIN USING LOW OSMOLAR CONTRAST: ICD-10-PCS | Performed by: INTERNAL MEDICINE

## 2018-06-25 PROCEDURE — 02H63JZ INSERTION OF PACEMAKER LEAD INTO RIGHT ATRIUM, PERCUTANEOUS APPROACH: ICD-10-PCS | Performed by: INTERNAL MEDICINE

## 2018-06-25 PROCEDURE — A4565 SLINGS: HCPCS

## 2018-06-25 PROCEDURE — 77030018729 HC ELECTRD DEFIB PAD CARD -B

## 2018-06-25 PROCEDURE — 36005 INJECTION EXT VENOGRAPHY: CPT

## 2018-06-25 PROCEDURE — 82962 GLUCOSE BLOOD TEST: CPT

## 2018-06-25 PROCEDURE — 77030011640 HC PAD GRND REM COVD -A

## 2018-06-25 RX ORDER — SODIUM CHLORIDE 0.9 % (FLUSH) 0.9 %
5-10 SYRINGE (ML) INJECTION AS NEEDED
Status: DISCONTINUED | OUTPATIENT
Start: 2018-06-25 | End: 2018-06-26 | Stop reason: HOSPADM

## 2018-06-25 RX ORDER — FENTANYL CITRATE 50 UG/ML
25-50 INJECTION, SOLUTION INTRAMUSCULAR; INTRAVENOUS
Status: DISCONTINUED | OUTPATIENT
Start: 2018-06-25 | End: 2018-06-25 | Stop reason: HOSPADM

## 2018-06-25 RX ORDER — VANCOMYCIN HYDROCHLORIDE 1 G/20ML
INJECTION, POWDER, LYOPHILIZED, FOR SOLUTION INTRAVENOUS
Status: DISCONTINUED
Start: 2018-06-25 | End: 2018-06-26 | Stop reason: HOSPADM

## 2018-06-25 RX ORDER — ACETAMINOPHEN 325 MG/1
650 TABLET ORAL
Status: DISCONTINUED | OUTPATIENT
Start: 2018-06-25 | End: 2018-06-26 | Stop reason: HOSPADM

## 2018-06-25 RX ORDER — MIDAZOLAM HYDROCHLORIDE 1 MG/ML
INJECTION, SOLUTION INTRAMUSCULAR; INTRAVENOUS
Status: COMPLETED
Start: 2018-06-25 | End: 2018-06-25

## 2018-06-25 RX ORDER — BACITRACIN 50000 [IU]/1
INJECTION, POWDER, FOR SOLUTION INTRAMUSCULAR
Status: COMPLETED
Start: 2018-06-25 | End: 2018-06-25

## 2018-06-25 RX ORDER — HYDROCODONE BITARTRATE AND ACETAMINOPHEN 5; 325 MG/1; MG/1
1 TABLET ORAL
Status: DISCONTINUED | OUTPATIENT
Start: 2018-06-25 | End: 2018-06-26 | Stop reason: HOSPADM

## 2018-06-25 RX ORDER — SODIUM CHLORIDE 0.9 % (FLUSH) 0.9 %
5-10 SYRINGE (ML) INJECTION EVERY 8 HOURS
Status: DISCONTINUED | OUTPATIENT
Start: 2018-06-25 | End: 2018-06-26 | Stop reason: HOSPADM

## 2018-06-25 RX ORDER — SODIUM CHLORIDE 900 MG/100ML
INJECTION INTRAVENOUS
Status: DISCONTINUED
Start: 2018-06-25 | End: 2018-06-26 | Stop reason: HOSPADM

## 2018-06-25 RX ORDER — LIDOCAINE HYDROCHLORIDE AND EPINEPHRINE 10; 10 MG/ML; UG/ML
INJECTION, SOLUTION INFILTRATION; PERINEURAL
Status: DISCONTINUED
Start: 2018-06-25 | End: 2018-06-26 | Stop reason: HOSPADM

## 2018-06-25 RX ORDER — HEPARIN SODIUM 200 [USP'U]/100ML
500 INJECTION, SOLUTION INTRAVENOUS ONCE
Status: COMPLETED | OUTPATIENT
Start: 2018-06-25 | End: 2018-06-25

## 2018-06-25 RX ORDER — HEPARIN SODIUM 200 [USP'U]/100ML
INJECTION, SOLUTION INTRAVENOUS
Status: COMPLETED
Start: 2018-06-25 | End: 2018-06-25

## 2018-06-25 RX ORDER — MIDAZOLAM HYDROCHLORIDE 1 MG/ML
.5-2 INJECTION, SOLUTION INTRAMUSCULAR; INTRAVENOUS
Status: DISCONTINUED | OUTPATIENT
Start: 2018-06-25 | End: 2018-06-25 | Stop reason: HOSPADM

## 2018-06-25 RX ORDER — LIDOCAINE HYDROCHLORIDE AND EPINEPHRINE 10; 10 MG/ML; UG/ML
0-30 INJECTION, SOLUTION INFILTRATION; PERINEURAL
Status: DISCONTINUED | OUTPATIENT
Start: 2018-06-25 | End: 2018-06-26 | Stop reason: HOSPADM

## 2018-06-25 RX ORDER — LIDOCAINE HYDROCHLORIDE AND EPINEPHRINE 10; 10 MG/ML; UG/ML
INJECTION, SOLUTION INFILTRATION; PERINEURAL
Status: COMPLETED
Start: 2018-06-25 | End: 2018-06-25

## 2018-06-25 RX ORDER — LIDOCAINE HYDROCHLORIDE AND EPINEPHRINE 10; 10 MG/ML; UG/ML
0-20 INJECTION, SOLUTION INFILTRATION; PERINEURAL
Status: DISCONTINUED | OUTPATIENT
Start: 2018-06-25 | End: 2018-06-25

## 2018-06-25 RX ORDER — BACITRACIN 50000 [IU]/1
50000 INJECTION, POWDER, FOR SOLUTION INTRAMUSCULAR ONCE
Status: COMPLETED | OUTPATIENT
Start: 2018-06-25 | End: 2018-06-25

## 2018-06-25 RX ADMIN — VANCOMYCIN HYDROCHLORIDE 1000 MG: 1 INJECTION, POWDER, LYOPHILIZED, FOR SOLUTION INTRAVENOUS at 15:10

## 2018-06-25 RX ADMIN — ATORVASTATIN CALCIUM 10 MG: 10 TABLET, FILM COATED ORAL at 13:06

## 2018-06-25 RX ADMIN — FENTANYL CITRATE 25 MCG: 50 INJECTION, SOLUTION INTRAMUSCULAR; INTRAVENOUS at 16:00

## 2018-06-25 RX ADMIN — PREGABALIN 100 MG: 100 CAPSULE ORAL at 13:06

## 2018-06-25 RX ADMIN — PANTOPRAZOLE SODIUM 40 MG: 40 TABLET, DELAYED RELEASE ORAL at 13:10

## 2018-06-25 RX ADMIN — INSULIN LISPRO 2 UNITS: 100 INJECTION, SOLUTION INTRAVENOUS; SUBCUTANEOUS at 09:06

## 2018-06-25 RX ADMIN — INSULIN LISPRO 2 UNITS: 100 INJECTION, SOLUTION INTRAVENOUS; SUBCUTANEOUS at 22:05

## 2018-06-25 RX ADMIN — MIDAZOLAM HYDROCHLORIDE 1 MG: 1 INJECTION, SOLUTION INTRAMUSCULAR; INTRAVENOUS at 15:31

## 2018-06-25 RX ADMIN — Medication 10 ML: at 13:11

## 2018-06-25 RX ADMIN — INSULIN LISPRO 2 UNITS: 100 INJECTION, SOLUTION INTRAVENOUS; SUBCUTANEOUS at 18:36

## 2018-06-25 RX ADMIN — PREGABALIN 300 MG: 100 CAPSULE ORAL at 22:10

## 2018-06-25 RX ADMIN — MIDAZOLAM HYDROCHLORIDE 1 MG: 1 INJECTION, SOLUTION INTRAMUSCULAR; INTRAVENOUS at 16:12

## 2018-06-25 RX ADMIN — FENTANYL CITRATE 25 MCG: 50 INJECTION, SOLUTION INTRAMUSCULAR; INTRAVENOUS at 15:51

## 2018-06-25 RX ADMIN — BACITRACIN 50000 UNITS: 5000 INJECTION, POWDER, FOR SOLUTION INTRAMUSCULAR at 16:28

## 2018-06-25 RX ADMIN — HEPARIN SODIUM 1000 UNITS: 200 INJECTION, SOLUTION INTRAVENOUS at 15:38

## 2018-06-25 RX ADMIN — LIDOCAINE HYDROCHLORIDE,EPINEPHRINE BITARTRATE 30 ML: 10; .01 INJECTION, SOLUTION INFILTRATION; PERINEURAL at 15:40

## 2018-06-25 RX ADMIN — SODIUM CHLORIDE 100 ML/HR: 900 INJECTION, SOLUTION INTRAVENOUS at 09:08

## 2018-06-25 RX ADMIN — FENTANYL CITRATE 50 MCG: 50 INJECTION, SOLUTION INTRAMUSCULAR; INTRAVENOUS at 15:31

## 2018-06-25 RX ADMIN — HYDROCODONE BITARTRATE AND ACETAMINOPHEN 1 TABLET: 5; 325 TABLET ORAL at 23:05

## 2018-06-25 RX ADMIN — Medication 10 ML: at 22:07

## 2018-06-25 RX ADMIN — AZELASTINE HYDROCHLORIDE 2 SPRAY: 137 SPRAY, METERED NASAL at 21:56

## 2018-06-25 RX ADMIN — SOTALOL HYDROCHLORIDE 40 MG: 80 TABLET ORAL at 13:06

## 2018-06-25 RX ADMIN — ACETAMINOPHEN 650 MG: 325 TABLET ORAL at 18:14

## 2018-06-25 RX ADMIN — IOPAMIDOL 15 ML: 755 INJECTION, SOLUTION INTRAVENOUS at 15:35

## 2018-06-25 RX ADMIN — MIDAZOLAM 1 MG: 1 INJECTION INTRAMUSCULAR; INTRAVENOUS at 15:31

## 2018-06-25 RX ADMIN — LIDOCAINE HYDROCHLORIDE AND EPINEPHRINE 30 ML: 10; 10 INJECTION, SOLUTION INFILTRATION; PERINEURAL at 15:40

## 2018-06-25 RX ADMIN — SOTALOL HYDROCHLORIDE 40 MG: 80 TABLET ORAL at 22:09

## 2018-06-25 RX ADMIN — FENTANYL CITRATE 25 MCG: 50 INJECTION, SOLUTION INTRAMUSCULAR; INTRAVENOUS at 16:13

## 2018-06-25 RX ADMIN — MIDAZOLAM HYDROCHLORIDE 1 MG: 1 INJECTION, SOLUTION INTRAMUSCULAR; INTRAVENOUS at 15:52

## 2018-06-25 RX ADMIN — BACITRACIN 50000 UNITS: 50000 INJECTION, POWDER, FOR SOLUTION INTRAMUSCULAR at 16:28

## 2018-06-25 NOTE — CDMP QUERY
Patient is noted to have a BMI of  36  Please clarify if this patient is: The [address] [place] [address] has issued a statement indicating that, \"Individuals who are overweight, obese, or morbidly obese are at an increased risk for certain medical conditions when compared to persons of normal weight. Therefore, these conditions are always clinically significant and reportable when documented by the provider. \"    =>Obesity (BMI of 30-39.9)  => Morbid Obesity  (BMI 40 or greater)  =>Overweight (BMI 25-29.9)  => Other weight status (specify  status)  => Unable to determine        Presentation: ht 5' 4\" Wt 223lbs BMI 36          Please clarify and document your clinical opinion in the progress notes and discharge summary, including the definitive and or presumptive diagnosis, (suspected or probable), related to the above clinical findings. Please include clinical findings supporting your diagnosis.    Thank [address] [place] [address], [address] [place] [address] Rd     [phone]

## 2018-06-25 NOTE — PROGRESS NOTES
Bedside shift change report given to Lashanda Curiel RN (oncoming nurse) by Ryan Martínez RN (offgoing nurse). Report included the following information SBAR, Kardex, OR Summary, Procedure Summary, Intake/Output, MAR, Accordion, Recent Results, Med Rec Status, Cardiac Rhythm Paced and Alarm Parameters .

## 2018-06-25 NOTE — PROGRESS NOTES
0700: Bedside shift change report given to Cathleen Negron RN (oncoming nurse) by Robert Sofia RN (offgoing nurse). Report included the following information SBAR, Kardex, ED Summary, Intake/Output, MAR and Recent Results. 0920: Dr. Philip Medina pageyolie in regards to pt medication administration      [phone]: Consent for pacemaker placement reviewed with patient. Patient has not questions at this time. Signed consent placed on patient chart. 1400:TRANSFER - OUT REPORT:    Verbal report given on Olam Houston  being transferred to [facility] for scheduled procedure. Report consisted of patients Situation, Background, Assessment and Recommendations(SBAR). Information from the following report(s) SBAR, Kardex, ED Summary, Procedure Summary, Intake/Output, MAR and Recent Results was reviewed with the receiving nurse. Lines:   Peripheral IV 06/23/18 Left Hand (Active)   Site Assessment Clean, dry, & intact 6/25/2018  7:06 AM   Phlebitis Assessment 0 6/25/2018  7:06 AM   Infiltration Assessment 0 6/25/2018  7:06 AM   Dressing Status Clean, dry, & intact 6/25/2018  7:06 AM   Dressing Type Transparent;Tape 6/25/2018  7:06 AM   Hub Color/Line Status Pink;Patent 6/25/2018  7:06 AM   Action Taken Open ports on tubing capped 6/25/2018  7:06 AM       Peripheral IV 06/25/18 Left; Lower Forearm (Active)   Site Assessment Clean, dry, & intact 6/25/2018  7:13 AM   Phlebitis Assessment 0 6/25/2018  7:13 AM   Infiltration Assessment 0 6/25/2018  7:13 AM   Dressing Status Clean, dry, & intact 6/25/2018  7:13 AM   Dressing Type Tape;Transparent 6/25/2018  7:13 AM   Hub Color/Line Status Pink;Flushed 6/25/2018  7:13 AM     Opportunity for questions and clarification was provided. Patient transported with: Monitor  Registered Nurse  Tech      Assessment completed upon patients arrival to unit and care assumed.

## 2018-06-25 NOTE — PROGRESS NOTES
Problem: Falls - Risk of  Goal: *Absence of Falls  Document Chantel Fall Risk and appropriate interventions in the flowsheet. Outcome: Progressing Towards Goal  Fall Risk Interventions:  Mobility Interventions: Assess mobility with egress test, Patient to call before getting OOB, Communicate number of staff needed for ambulation/transfer, Utilize walker, cane, or other assitive device         Medication Interventions: Assess postural VS orthostatic hypotension, Evaluate medications/consider consulting pharmacy, Patient to call before getting OOB    Elimination Interventions: Call light in reach, Patient to call for help with toileting needs, Toileting schedule/hourly rounds             Problem: Pressure Injury - Risk of  Goal: *Prevention of pressure injury  Document Dayo Scale and appropriate interventions in the flowsheet. Outcome: Progressing Towards Goal  Pressure Injury Interventions:             Activity Interventions: Increase time out of bed, Pressure redistribution bed/mattress(bed type)    Mobility Interventions: Assess need for specialty bed, Pressure redistribution bed/mattress (bed type), Trapeze to reposition    Nutrition Interventions: Document food/fluid/supplement intake

## 2018-06-25 NOTE — PROGRESS NOTES
Cardiology Progress Note      6/25/2018 2:57 PM    Admit Date: 6/23/2018          Subjective: Stable overnight. No new c/o. Echo showed nl LVEF but with signif pulm htn          Visit Vitals    /77 (BP 1 Location: Right arm, BP Patient Position: At rest)    Pulse (!) 59    Temp 97.6 °F (36.4 °C)    Resp 20    Ht 5' 4.5\" (1.638 m)    Wt 96.6 kg (213 lb)    SpO2 100%    BMI 36 kg/m2     06/23 1901 - 06/25 0700  In: 440 [P.O.:440]  Out: -         Objective:      Physical Exam:  VS as above  Chest CTA  Card RRR no gallop     Data Review:   Labs:    BUN 20  Creat 1.0     Telemetry: SR/ sinus asmita       Assessment:           1.  Sick sinus syndrome with associated dizziness. Nl LVEF by echo   2.  History of paroxysmal atrial fibrillation, on sotalol. 3.  Diastolic  congestive heart failure   4.  Insulin-requiring type 2 diabetes. 5.  Dyslipidemia. 6.  Degenerative joint disease of the back. 7. Severe pulm htn by echo     Plan: For pacer later today.  Otherwise cont current Rx

## 2018-06-26 VITALS
TEMPERATURE: 97.5 F | BODY MASS INDEX: 35.49 KG/M2 | RESPIRATION RATE: 14 BRPM | WEIGHT: 213 LBS | OXYGEN SATURATION: 99 % | SYSTOLIC BLOOD PRESSURE: 104 MMHG | HEART RATE: 60 BPM | DIASTOLIC BLOOD PRESSURE: 56 MMHG | HEIGHT: 65 IN

## 2018-06-26 LAB
ANION GAP SERPL CALC-SCNC: 9 MMOL/L (ref 5–15)
ATRIAL RATE: 61 BPM
BUN SERPL-MCNC: 19 MG/DL (ref 6–20)
BUN/CREAT SERPL: 21 (ref 12–20)
CALCIUM SERPL-MCNC: 8.7 MG/DL (ref 8.5–10.1)
CALCULATED P AXIS, ECG09: 111 DEGREES
CALCULATED R AXIS, ECG10: -2 DEGREES
CALCULATED T AXIS, ECG11: 68 DEGREES
CHLORIDE SERPL-SCNC: 107 MMOL/L (ref 97–108)
CO2 SERPL-SCNC: 24 MMOL/L (ref 21–32)
CREAT SERPL-MCNC: 0.9 MG/DL (ref 0.55–1.02)
DIAGNOSIS, 93000: NORMAL
GLUCOSE BLD STRIP.AUTO-MCNC: 224 MG/DL (ref 65–100)
GLUCOSE SERPL-MCNC: 205 MG/DL (ref 65–100)
MAGNESIUM SERPL-MCNC: 1.9 MG/DL (ref 1.6–2.4)
P-R INTERVAL, ECG05: 200 MS
POTASSIUM SERPL-SCNC: 4.2 MMOL/L (ref 3.5–5.1)
Q-T INTERVAL, ECG07: 462 MS
QRS DURATION, ECG06: 72 MS
QTC CALCULATION (BEZET), ECG08: 465 MS
SERVICE CMNT-IMP: ABNORMAL
SODIUM SERPL-SCNC: 140 MMOL/L (ref 136–145)
VENTRICULAR RATE, ECG03: 61 BPM

## 2018-06-26 PROCEDURE — 74011250636 HC RX REV CODE- 250/636: Performed by: INTERNAL MEDICINE

## 2018-06-26 PROCEDURE — 74011636637 HC RX REV CODE- 636/637: Performed by: INTERNAL MEDICINE

## 2018-06-26 PROCEDURE — 83735 ASSAY OF MAGNESIUM: CPT | Performed by: INTERNAL MEDICINE

## 2018-06-26 PROCEDURE — 74011250637 HC RX REV CODE- 250/637: Performed by: INTERNAL MEDICINE

## 2018-06-26 PROCEDURE — 36415 COLL VENOUS BLD VENIPUNCTURE: CPT | Performed by: INTERNAL MEDICINE

## 2018-06-26 PROCEDURE — 93005 ELECTROCARDIOGRAM TRACING: CPT

## 2018-06-26 PROCEDURE — 82962 GLUCOSE BLOOD TEST: CPT

## 2018-06-26 PROCEDURE — 80048 BASIC METABOLIC PNL TOTAL CA: CPT | Performed by: INTERNAL MEDICINE

## 2018-06-26 RX ORDER — CLINDAMYCIN HYDROCHLORIDE 300 MG/1
300 CAPSULE ORAL 3 TIMES DAILY
Qty: 9 CAP | Refills: 0 | OUTPATIENT
Start: 2018-06-26 | End: 2018-06-29

## 2018-06-26 RX ADMIN — INSULIN LISPRO 3 UNITS: 100 INJECTION, SOLUTION INTRAVENOUS; SUBCUTANEOUS at 08:07

## 2018-06-26 RX ADMIN — VANCOMYCIN HYDROCHLORIDE 1000 MG: 1 INJECTION, POWDER, LYOPHILIZED, FOR SOLUTION INTRAVENOUS at 03:52

## 2018-06-26 RX ADMIN — PREGABALIN 100 MG: 100 CAPSULE ORAL at 08:07

## 2018-06-26 RX ADMIN — ATORVASTATIN CALCIUM 10 MG: 10 TABLET, FILM COATED ORAL at 08:07

## 2018-06-26 RX ADMIN — SOTALOL HYDROCHLORIDE 40 MG: 80 TABLET ORAL at 08:07

## 2018-06-26 RX ADMIN — Medication 10 ML: at 03:53

## 2018-06-26 RX ADMIN — PANTOPRAZOLE SODIUM 40 MG: 40 TABLET, DELAYED RELEASE ORAL at 08:07

## 2018-06-26 RX ADMIN — ACETAMINOPHEN 650 MG: 325 TABLET ORAL at 03:51

## 2018-06-26 NOTE — ROUTINE PROCESS
1900:  Bedside and Verbal shift change report received from [place], RN (offgoing nurse) to Colby Coronado RN (oncoming nurse). Report included the following information SBAR, Kardex, Intake/Output, MAR, Recent Results and Cardiac Rhythm Afib. Pt resting in bed. Appears to be in no distress at this time. Call bell within reach. Zone phone number given to pt. Pt instructed to call zone phone or call bell if needed. Pt instructed to call for assistance before ambulating.  at bedside and sternly stated that his wife, \"will NOT be discharged tomorrow that Administration told me that is a mistake! \"    7713:  Pt requested to stop BIPAP for the night. PCP ESTELLA apt scheduled with Dr. Troy Haider on 7/2/18 at 2:30PM.  Eual  added to Bradly Thakur Craig [facility]ila Specialist

## 2018-06-26 NOTE — ROUTINE PROCESS
Pt and  educated on discharge info. Opportunity for questions provided. Both verbalized understanding. Pt has ambulated and site is [facility].

## 2018-06-26 NOTE — PROGRESS NOTES
The patient was assisted with the bedpan, chang care done, bed pads and gown changed, ice replenished, sling in place and she was tucked in again. Family member in room      2130  The patient ambulated in the hallway without complaints.     2305  Patient requested pain med for incisional pain

## 2018-06-26 NOTE — ROUTINE PROCESS
Pt called around 1420. States she is allergic to clindamycin and wants to know if she should take another abx. I called Dr Aminta Vargas who said no abx was needed. I called pt back to inform her.

## 2018-06-26 NOTE — PROGRESS NOTES
Per patients request. CM provided pt with a list private [facility] agencies in the area.     Mercedes Zarate  Ext [phone]

## 2018-06-26 NOTE — DISCHARGE SUMMARY
[facility] [address]    Veronica Hses  MR#: [id number]  : 1945  ACCOUNT #: [de-identified]   ADMIT DATE: 2018  DISCHARGE DATE: 2018    DISCHARGE DIAGNOSES:  1. Sick sinus syndrome with associated dizziness, status post dual-chamber pacemaker placement on 2018. 2.  History of paroxysmal atrial fibrillation on sotalol, normal EF by echo. 3.  History of diastolic congestive heart failure, compensated. 4.  Insulin-requiring type 2 diabetes. 5.  Dyslipidemia. 6.  DJD of the back. 7.  Pulmonary hypertension on echocardiogram.      ADMISSION HISTORY AND PHYSICAL:  Please see the separately dictated note by Dr. Faye Brown. HOSPITAL COURSE:  The patient presented to the emergency room at [facility] after being seen at the [facility] ER. The patient was noted to be markedly bradycardic and was felt to have symptomatic sick sinus syndrome causing her symptoms. She was transferred for possible pacemaker placement. On arrival, the patient was clinically and hemodynamically stable. She was seen and admitted to telemetry. Eliquis was held and the patient was continued on the prior dose of sotalol. Blood pressure remained reasonably well controlled. Laboratories were otherwise basically unremarkable. Troponin at [facility] was normal, as was renal function. The patient remained clinically stable. She had no further episodes of symptomatic bradycardia. An echocardiogram was done which revealed normal LV systolic function with an EF of 50% to 55%. Mild left atrial enlargement and estimated PA systolic pressures of around 80-90. There was moderate tricuspid regurgitation. The pulmonary hypertension was felt to probably be an overestimate of actual pulmonary pressure and probably due to underlying diastolic dysfunction. The patient remained clinically stable without evidence of heart failure.   On  she was taken to the catheterization lab where dual-chamber permanent pacemaker placement was performed without complications. The patient received a [employer] device with adequate testing. Post-procedure the patient did well. The following day her activity level was increased and she was felt to be ready for discharge with close outpatient followup arranged. DISCHARGE MEDICATIONS:  Atorvastatin 10 mg daily, sotalol 40 mg twice daily, aspirin 81 mg daily, NovoLog insulin FlexPen as directed, glargine insulin 22 units daily, Prilosec 40 mg daily, Lyrica at the prior dose, furosemide 40 mg daily, Astelin nasal spray, clindamycin 300 mg t.i.d. for 3 days, and Eliquis at the prior dose. Followup was to be in Dr. Debby Hogan office in 10-14 days for pacemaker check and general followup.     DISPOSITION:  MD MILLI Ramirez/DIALLO  D: 06/26/2018 10:11     T: 06/26/2018 17:42  JOB #: [id number]  CC: Marc Borja MD  CC: Qian Mesa MD

## 2018-06-26 NOTE — DISCHARGE INSTRUCTIONS
Cardiology Discharge Summary     Patient ID:  Nicholas Olivarez  [id number]  67 y.o.  1945    Admit Date: 6/23/2018    Discharge Date: 6/26/2018     Admitting Physician: Yanira Ann MD     Discharge Physician: Yanira Ann MD    Admission Diagnoses: SSS (sick sinus syndrome) [facility])        Patient Instructions:     Resume Eliquis at prior dose on Thursday PM       Referenced discharge instructions provided by nursing for diet and activity. Follow-up with  Dr Catherine Chong 10-14 days for pacer check [phone]     Signed:  Yanira Ann MD  6/26/2018  10:02 AM  DISCHARGE INSTRUCTIONS FOR PATIENTS WITH PACEMAKERS    1. Remember to call for an appointment in 2  weeks [phone] to check healing and implant programming. 2. Medic Alert Bracelets are available from your pharmacist to wear at all times if you choose to wear one. 3. Carry your ID card for pacemaker with you at all times. This card will be given to you in the hospital or mailed to you. 4. The pacemaker will bulge slightly under your skin. The bulge will decrease in size over the next few weeks. Please notify the doctor's office if you notice any of the following around your site:   A.  A bruise that does not go away. B.  Soreness or yellow, green, or brown drainage from the site. C. Any swelling from the site. D. If you have a fever of 100 degrees or higher that lasts for a few days. INCISION CARE       1.  Leave Steri Strips  over your site until it starts to fall off, usually in a few weeks. 2.  You may shower after 3 days as long as your incision isnt submerged or directly sprayed upon until well healed. 3.  For comfort, wear loose fitting clothing. 4.  Report any signs of infection, fever, pain, swelling, redness, oozing, or heat at site especially if these symptoms increase after the first 3 to 4 days. ACTIVITY PRECAUTIONS     1. Avoid rough contact with the implant site. 2. No driving for 14 days.   3. Avoid lifting your arm over your head, carrying anything on the affected side, or lifting over 10 pounds for 30 days. For the first 2 days only bend your arm at the elbow. 4. Any extreme activity such as golf, weight lifting or exercise biking should be restricted for 60 days. 5. Do not carry objects by holding them against your implant site. 6.  No shooting rifles or any type of gun with the affected shoulder permanently. SPECIAL PRECAUTIONS     1. You should avoid all strong magnetic fields, such as arc welding, large transformers, large motors. 2.  You may not have an MRI which uses a strong magnet to take pictures. 3.  Treatments or surgery that requires diathermy or electrocautery should be discussed with your doctor before scheduled. 4. Avoid radio frequency transmitters, including radar. 5. Advise dentist or other medical personnel you see that you have a pacemaker. 6.  Cell phones and microwave oven use is okay. 7.  If you plan to move or take a trip to a new area, the doctor's office will give you a name of a doctor to contact for any problems. ANTIBIOTIC THERAPY    During the first 8 weeks after your pacemaker insertion, you may need antibiotics before any dental work or certain tests or operations. Let the dentist or doctor who is caring for you know that you have had an implanted device. You will be given a prescription for a prophylactic antibiotic called clindamycin  to take for a few days after your procedure.

## 2018-06-26 NOTE — PROGRESS NOTES
Spiritual Care Partner Volunteer visited patient in [place] on 6/26/18. Documented by:  To Pineda M.Div.    Paging Service [phone])

## 2018-06-27 NOTE — OP NOTES
[facility]  OPERATIVE REPORT    Paul Espinosa  MR#: [id number]  : 1945  ACCOUNT #: [de-identified]   DATE OF SERVICE: 2018        PROCEDURES:  1. Left subclavian venogram.  2.  Dual-chamber permanent pacemaker placement. SURGEON:  Giselle Akhtar MD    ESTIMATED BLOOD LOSS:  < 20 cc     SPECIMENS REMOVED: none     COMPLICATIONS:  None     IMPLANTS:  Right ventricular lead, [employer] Tendril MRI, model [id number], 58 cm lead, serial number [id number]. Atrial lead, Tendril MRI, model [id number], 52 cm lead, serial number [id number]. Pulse generator, [employer] Assurity MRI, model [id number] device, serial number [id number]. INDICATION:  Nonreversible symptomatic sick sinus syndrome and AV node disease. DESCRIPTION OF PROCEDURE:  The patient was brought to the cardiac catheterization lab in a fasting state and after informed consent was obtained. Sedation was performed by the nurse from [phone] until [phone] under the constant supervision of the attending physician. Lidocaine 1% with epinephrine was used to anesthetize the left chest wall implant site. The pocket was formed in the usual fashion and axillary venous access was obtained using 2 micropuncture needles. Two 8-[place] Peel-Away sheaths were placed in the left subclavian vein. The right ventricular lead was subsequently advanced to the right ventricular apex under fluoroscopic guidance. After appropriate parameters were obtained, the lead was screwed in place in the usual fashion. This was a [address] [place], [place] [address] [facility] MRI, model [id number], 58 cm lead, serial number [id number]. Parameters for this lead included an R-wave of 10.6 millivolts, impedance of 1200 ohms, and pacing threshold of 1.0 volts at 0.4 milliseconds pulse width. Following this, the atrial lead was placed. It was advanced and placed in a similar fashion in the area of the lateral right atrial wall.   This was a Tendril MRI, model [id number], 52 cm lead, serial number [id number]. Parameters for this lead included a P-wave of 1.5 millivolts, impedance of 601 ohms, and pacing threshold of 1.0 volts at 0.4 milliseconds pulse width. The leads were subsequently anchored to the pocket floor using 2-0 silk sutures at each anchor sleeve. The pulse generator was then connected to the leads and placed in the pocket after hemostasis was obtained. This was a [employer] MRI, model [id number] device, serial number [id number]. Vigorous irrigation with antibiotic solution was then performed in the pacemaker pocket and the pocket was closed using 2 rows of running 2-0 Vicryl followed by a more superficial layer of running 4-0 Vicryl in a subcuticular fashion. Final fluoroscopic check revealed adequate redundancy of the leads and absence of a pneumothorax. Final settings were in the DDDR mode with a lower rate limit of 60 and an upper rate limit of 130 beats per minute. The patient was to have a followup portable chest x-ray immediately postprocedure and a pacemaker check the morning after the procedure. The patient was also to receive IV antibiotics post-procedure. Post-discharge followup was to be in approximately 10 days for routine wound and device check.       MD Lissa Perez / Amberly Harris  D: 06/27/2018 10:40     T: 06/27/2018 14:34  JOB #: [id number]  CC: Carolynn Goins MD

## 2018-07-26 ENCOUNTER — OFFICE VISIT (OUTPATIENT)
Dept: SURGERY | Age: 73
End: 2018-07-26

## 2018-07-26 VITALS
WEIGHT: 213.6 LBS | BODY MASS INDEX: 35.59 KG/M2 | HEIGHT: 65 IN | DIASTOLIC BLOOD PRESSURE: 68 MMHG | SYSTOLIC BLOOD PRESSURE: 138 MMHG | HEART RATE: 70 BPM

## 2018-07-26 DIAGNOSIS — K22.70 BARRETT'S ESOPHAGUS WITHOUT DYSPLASIA: ICD-10-CM

## 2018-07-26 DIAGNOSIS — K92.1 HEMATOCHEZIA: Primary | ICD-10-CM

## 2018-07-26 RX ORDER — POLYETHYLENE GLYCOL 3350, SODIUM CHLORIDE, SODIUM BICARBONATE, POTASSIUM CHLORIDE 420; 11.2; 5.72; 1.48 G/4L; G/4L; G/4L; G/4L
POWDER, FOR SOLUTION ORAL
Qty: 1 BOTTLE | Refills: 0 | Status: SHIPPED | OUTPATIENT
Start: 2018-07-26 | End: 2018-08-20

## 2018-07-26 RX ORDER — SODIUM CHLORIDE, SODIUM LACTATE, POTASSIUM CHLORIDE, CALCIUM CHLORIDE 600; 310; 30; 20 MG/100ML; MG/100ML; MG/100ML; MG/100ML
200 INJECTION, SOLUTION INTRAVENOUS CONTINUOUS
Status: CANCELLED | OUTPATIENT
Start: 2018-07-26 | End: 2018-07-27

## 2018-07-26 RX ORDER — BISACODYL 5 MG
TABLET, DELAYED RELEASE (ENTERIC COATED) ORAL
Qty: 1 TAB | Refills: 0 | Status: SHIPPED | OUTPATIENT
Start: 2018-07-26 | End: 2018-08-20

## 2018-07-26 NOTE — PROGRESS NOTES
[address] [facility]  Phone: [phone] Fax: [phone]                                              HISTORY AND PHYSICAL EXAM    NAME: Harvey Teague  : 1945    Chief Complaint:   Hematochezia  History of Wilson's esophagitis    History: Harvey Teague is a 68 y.o. WHITE OR  female referred for EGD and colonoscopy. This is  the patient's second colonoscopy. The previous one was normal.  The last colonoscopy was in . The bowel movements are changing in size and vary from dry to loose. She does take fiber on a regular basis for her bowels. She does see blood in stools both with diarrhea and hard stools. Family history is negative for colon cancer or colon polyps. She has GERD that is controlled on omeprazole. She has proven gastroparesis and gets full easily and vomits at least once a week. This is not associated with any food or time of day. Her last EGD was 5 years ago and she has a history of Wilson's esophagitis. Past Medical History:   Diagnosis Date    Arthritis     CAD (coronary artery disease)     afib and chf    Congestive heart failure (HCC)     Depression     Diabetes (HCC)     type 2    Fibromyalgia     Gastrointestinal disorder     reflux and gastro paresis    Heart failure (HCC)     Infectious disease     MRSA?  in right big toe    Psychiatric disorder     PTSD from 911     Past Surgical History:   Procedure Laterality Date    HX APPENDECTOMY      HX GI      colonscopy and endo    HX GYN      cyst on right ovary removed    HX HEENT      T&A Bilateral Cataract Surgery with coptic lenses        Current Outpatient Prescriptions   Medication Sig Dispense Refill    peg-electrolyte soln (GAVILYTE-N) 420 gram solution Take as directed  Indications: BOWEL EVACUATION 1 Bottle 0    bisacodyl (DULCOLAX, BISACODYL,) 5 mg EC tablet Take as directed  Indications: BOWEL EVACUATION 1 Tab 0    apixaban (ELIQUIS) 5 mg tablet Take 1 Tab by mouth two (2) times a day. 60 Tab 6    pregabalin (LYRICA) 100 mg capsule Take 300 mg by mouth nightly. Pregabalin Dosing Instructions:  100 mg QAM  300 mg QHS      atorvastatin (LIPITOR) 10 mg tablet Take 10 mg by mouth daily.  sotalol (BETAPACE) 80 mg tablet Take 40 mg by mouth two (2) times a day.  aspirin delayed-release 81 mg tablet Take 81 mg by mouth daily.  insulin aspart U-100 (NOVOLOG FLEXPEN U-100 INSULIN) 100 unit/mL inpn 1 Units by SubCUTAneous route Before breakfast, lunch, and dinner. Indications: sliging scale 150-200 =1 unit. 1 unit for every 50 on BSFS      insulin glargine (LANTUS U-100 INSULIN) 100 unit/mL injection 22 Units by SubCUTAneous route two (2) times a day.  omeprazole (PRILOSEC) 40 mg capsule Take 40 mg by mouth daily.  pregabalin (LYRICA) 100 mg capsule Take 100 mg by mouth daily. Pregabalin Dosing Instructions:  100 mg QAM  300 mg QHS  Indications: one pill inthe am and x3 at hs      omega 3-DHA-EPA-fish oil 1,000 mg (120 mg-180 mg) capsule Take 1 Cap by mouth daily.  furosemide (LASIX) 80 mg tablet Take 40 mg by mouth daily. Indications: Edema, patient alternates her dose from 40 to 80mg      azelastine (ASTELIN) 137 mcg (0.1 %) nasal spray 2 Sprays by Both Nostrils route nightly. Use in each nostril as directed       Allergies   Allergen Reactions    Amoxicillin Other (comments)    Bactrim [Sulfamethoprim] Other (comments)    Clindamycin Other (comments)     Social History     Social History    Marital status:      Spouse name: N/A    Number of children: N/A    Years of education: N/A     Occupational History    Not on file.      Social History Main Topics    Smoking status: Former Smoker    Smokeless tobacco: Never Used    Alcohol use 0.6 oz/week     1 Glasses of wine per week      Comment: occ    Drug use: No    Sexual activity: Not on file     Other Topics Concern    Not on file     Social History Narrative     Family History   Problem Relation Age of Onset    No Known Problems Mother        Patient Active Problem List   Diagnosis Code    SSS (sick sinus syndrome) ([facility] I49.5       Review of Systems:  Review of Systems   Constitutional: Negative for chills, fever, malaise/fatigue and weight loss. HENT: Positive for hearing loss. Negative for congestion, ear discharge, ear pain, nosebleeds, sore throat and tinnitus. Eyes: Negative for blurred vision, double vision, pain, discharge and redness. Respiratory: Negative for cough, sputum production, shortness of breath and wheezing. Cardiovascular: Positive for palpitations, claudication and leg swelling. Negative for chest pain. Gastrointestinal: Positive for blood in stool, constipation, diarrhea, heartburn, nausea and vomiting. Negative for abdominal pain and melena. Genitourinary: Positive for frequency. Negative for hematuria and urgency. Musculoskeletal: Positive for back pain. Negative for joint pain and neck pain. Skin: Negative for itching and rash. Neurological: Negative for dizziness, tremors, focal weakness, seizures, weakness and headaches. Endo/Heme/Allergies: Does not bruise/bleed easily. Psychiatric/Behavioral: Positive for depression. Negative for memory loss. The patient is not nervous/anxious and does not have insomnia. Physical Exam:  Visit Vitals    /68 (BP 1 Location: Left arm, BP Patient Position: Sitting)    Pulse 70    Ht 5' 4.5\" (1.638 m)    Wt 213 lb 9.6 oz (96.9 kg)    BMI 36.1 kg/m2       Physical Exam   Constitutional: She is oriented to person, place, and time. She appears well-developed and well-nourished. HENT:   Head: Normocephalic and atraumatic. Right Ear: External ear normal.   Left Ear: External ear normal.   Nose: Nose normal.   Mouth/Throat: Oropharynx is clear and moist. No oropharyngeal exudate. Eyes: Conjunctivae and EOM are normal. Pupils are equal, round, and reactive to light.  Right eye exhibits no discharge. Left eye exhibits no discharge. No scleral icterus. Neck: Neck supple. No JVD present. No tracheal deviation present. No thyromegaly present. Cardiovascular: Normal rate, regular rhythm and normal heart sounds. Exam reveals no gallop and no friction rub. No murmur heard. Pulmonary/Chest: Effort normal and breath sounds normal. She has no wheezes. She has no rales. Abdominal: Soft. Bowel sounds are normal. She exhibits no distension and no mass. There is no tenderness. There is no rebound. Musculoskeletal: Normal range of motion. Lymphadenopathy:     She has no cervical adenopathy. Neurological: She is alert and oriented to person, place, and time. Skin: Skin is warm and dry. No rash noted. No erythema. Psychiatric: She has a normal mood and affect. Her behavior is normal. Judgment and thought content normal.       Impression:  Hematochezia  History of Wilson's esophagitis    Plan:  EGD/colonoscopy on 6/54/38  Risks and complications were explained to patient and they voiced understanding.     Taylor Anderson M.D.

## 2018-08-20 PROBLEM — K57.30 DIVERTICULA OF COLON: Status: ACTIVE | Noted: 2018-08-20

## 2018-08-20 PROBLEM — K63.5 COLON POLYP: Status: RESOLVED | Noted: 2018-08-20 | Resolved: 2018-08-20

## 2018-08-20 PROBLEM — K63.5 COLON POLYP: Status: ACTIVE | Noted: 2018-08-20

## 2018-08-20 PROBLEM — Z87.19 HISTORY OF BARRETT'S ESOPHAGUS: Status: ACTIVE | Noted: 2018-08-20

## 2018-08-20 PROBLEM — K29.70 GASTRITIS: Status: ACTIVE | Noted: 2018-08-20

## 2018-08-20 PROBLEM — K92.1 HEMATOCHEZIA: Status: ACTIVE | Noted: 2018-08-20

## 2018-08-20 PROBLEM — K92.1 HEMATOCHEZIA: Status: RESOLVED | Noted: 2018-08-20 | Resolved: 2018-08-20

## 2018-08-28 ENCOUNTER — OFFICE VISIT (OUTPATIENT)
Dept: SURGERY | Age: 73
End: 2018-08-28

## 2018-08-28 VITALS
HEART RATE: 78 BPM | HEIGHT: 65 IN | BODY MASS INDEX: 35.49 KG/M2 | SYSTOLIC BLOOD PRESSURE: 140 MMHG | DIASTOLIC BLOOD PRESSURE: 52 MMHG | WEIGHT: 213 LBS

## 2018-08-28 DIAGNOSIS — K29.90 GASTRITIS AND DUODENITIS: ICD-10-CM

## 2018-08-28 DIAGNOSIS — K57.30 DIVERTICULA OF COLON: ICD-10-CM

## 2018-08-28 DIAGNOSIS — D12.3 ADENOMATOUS POLYP OF TRANSVERSE COLON: ICD-10-CM

## 2018-08-28 DIAGNOSIS — K22.70 BARRETT'S ESOPHAGUS WITHOUT DYSPLASIA: Primary | ICD-10-CM

## 2018-08-28 DIAGNOSIS — D12.2 ADENOMATOUS POLYP OF ASCENDING COLON: ICD-10-CM

## 2018-08-28 NOTE — PROGRESS NOTES
Follow up from colonoscopy/EGD  No c/o    EGD: mild gastritis and duodenitis, no hiatal hernia  Wilson's esophagitis without dysplasia  Pathology:  Adenomatous polyp x 2  diverticula    Visit Vitals    /52 (BP 1 Location: Left arm, BP Patient Position: Sitting)    Pulse 78    Ht 5' 4.5\" (1.638 m)    Wt 213 lb (96.6 kg)    BMI 36 kg/m2     WDWN patient in no acute distress. Pathophysiology and malignancy potential discussed. Pictures viewed and questions answered. High fiber diet suggested. Continue Omeprazole as long as it controls symptoms  Benefiber    Time spent 15 minutes in discussion, over half the visit was in care coordination and patient counseling. Repeat upper and lower scopes in 3 years.

## 2021-12-21 ENCOUNTER — TRANSCRIBE ORDER (OUTPATIENT)
Dept: SCHEDULING | Age: 76
End: 2021-12-21

## 2021-12-21 DIAGNOSIS — Z78.0 MENOPAUSE: Primary | ICD-10-CM

## 2021-12-21 DIAGNOSIS — Z00.00 ROUTINE GENERAL MEDICAL EXAMINATION AT A HEALTH CARE FACILITY: Primary | ICD-10-CM

## 2021-12-21 DIAGNOSIS — Z12.31 VISIT FOR SCREENING MAMMOGRAM: ICD-10-CM

## 2022-01-18 ENCOUNTER — HOSPITAL ENCOUNTER (OUTPATIENT)
Dept: GENERAL RADIOLOGY | Age: 77
Discharge: HOME | End: 2022-01-18
Attending: INTERNAL MEDICINE
Payer: COMMERCIAL

## 2022-01-18 ENCOUNTER — HOSPITAL ENCOUNTER (OUTPATIENT)
Dept: MAMMOGRAPHY | Age: 77
Discharge: HOME | End: 2022-01-18
Attending: INTERNAL MEDICINE
Payer: COMMERCIAL

## 2022-01-18 VITALS — BODY MASS INDEX: 37.93 KG/M2 | WEIGHT: 221 LBS

## 2022-01-18 DIAGNOSIS — Z78.0 MENOPAUSE: ICD-10-CM

## 2022-01-18 DIAGNOSIS — Z12.31 VISIT FOR SCREENING MAMMOGRAM: ICD-10-CM

## 2022-01-18 PROCEDURE — 77080 DXA BONE DENSITY AXIAL: CPT

## 2022-01-18 PROCEDURE — 77067 SCR MAMMO BI INCL CAD: CPT

## 2022-02-21 ENCOUNTER — OFFICE VISIT (OUTPATIENT)
Dept: SURGERY | Age: 77
End: 2022-02-21
Payer: OTHER GOVERNMENT

## 2022-02-21 VITALS
BODY MASS INDEX: 40.4 KG/M2 | HEART RATE: 63 BPM | WEIGHT: 228 LBS | HEIGHT: 63 IN | DIASTOLIC BLOOD PRESSURE: 56 MMHG | SYSTOLIC BLOOD PRESSURE: 134 MMHG

## 2022-02-21 DIAGNOSIS — K22.70 BARRETT'S ESOPHAGUS WITHOUT DYSPLASIA: Primary | ICD-10-CM

## 2022-02-21 PROCEDURE — 99203 OFFICE O/P NEW LOW 30 MIN: CPT | Performed by: SURGERY

## 2022-02-21 NOTE — LETTER
2/21/2022    Patient: Saul Grey   YOB: 1945   Date of Visit: 2/21/2022     Giselle Blount MD  [facility] [phone]  Via Fax: [phone]    Dear Giselle Blount MD,      Thank you for referring Ms. Catarino Ritter to [address] [place] [address] for evaluation. My notes for this consultation are attached. If you have questions, please do not hesitate to call me. I look forward to following your patient along with you.       Sincerely,    Donnie Sofia MD

## 2022-02-21 NOTE — PROGRESS NOTES
Javier Harvey is a 68 y.o. female who presents today with the following:  Chief Complaint   Patient presents with    New Patient     EGD       HPI    41-year-old female who presents to me as a referral by Dr. Miguel Bruner for possible surveillance upper endoscopy for personal history of Wilson's esophagus. She believes about 8 years ago she was diagnosed with Wilson's esophagus and gastroparesis. Her last EGD for surveillance was performed in 2018 by Dr. Christina De La Paz.  Wilson's esophagus was identified without dysplasia. She has been on twice daily famotidine. About a year ago she was still taking a PPI but she discontinued this when she had heard about the possible links associated with cancer. She has had no breakthrough episodes of reflux. She denies any abdominal pain. She does have a history of A. fib and CHF. She is on twice daily Eliquis. She follows up with her cardiologist and has an appointment in early March. She states that she has had no problems discontinuing her Eliquis for surgical procedures. She has significant degenerative spine disease for which she is in a wheelchair and has very limited ambulation. She is a diabetic who states that her last A1c is somewhere in the 6.9-7 range. She has had an appendectomy and a right ovarian cyst removed. She has been vaccinated for Covid and has not contracted Covid. Past Medical History:   Diagnosis Date    Arthritis     CAD (coronary artery disease)     afib and chf    Congestive heart failure (HCC)     Depression     Diabetes (HCC)     type 2    Fibromyalgia     Gastrointestinal disorder     reflux and gastro paresis    Heart failure ([facility] [address]     Infectious disease 2010    MRSA?  in right big toe    Menopause     Psychiatric disorder     PTSD from 911       Past Surgical History:   Procedure Laterality Date    COLONOSCOPY N/A 8/20/2018    COLONOSCOPY performed by Zachery Rodriguez MD at [facility] [address]27 HX APPENDECTOMY      HX BREAST BIOPSY Left     STbb  benign    HX COLONOSCOPY  08/20/2018    polyps    HX GI      colonscopy and endo    HX GYN      cyst on right ovary removed    HX HEENT      T&A Bilateral Cataract Surgery with coptic lenses       Social History     Socioeconomic History    Marital status:      Spouse name: Not on file    Number of children: Not on file    Years of education: Not on file    Highest education level: Not on file   Occupational History    Not on file   Tobacco Use    Smoking status: Former Smoker    Smokeless tobacco: Never Used   Substance and Sexual Activity    Alcohol use: Yes     Alcohol/week: 1.0 standard drink     Types: 1 Glasses of wine per week     Comment: occ    Drug use: No    Sexual activity: Not on file   Other Topics Concern    Not on file   Social History Narrative    Not on file     Social Determinants of Health     Financial Resource Strain:     Difficulty of Paying Living Expenses: Not on file   Food Insecurity:     Worried About Running Out of Food in the Last Year: Not on file    Phani of Food in the Last Year: Not on file   Transportation Needs:     Lack of Transportation (Medical): Not on file    Lack of Transportation (Non-Medical):  Not on file   Physical Activity:     Days of Exercise per Week: Not on file    Minutes of Exercise per Session: Not on file   Stress:     Feeling of Stress : Not on file   Social Connections:     Frequency of Communication with Friends and Family: Not on file    Frequency of Social Gatherings with Friends and Family: Not on file    Attends Mormonism Services: Not on file    Active Member of Clubs or Organizations: Not on file    Attends Club or Organization Meetings: Not on file    Marital Status: Not on file   Intimate Partner Violence:     Fear of Current or Ex-Partner: Not on file    Emotionally Abused: Not on file    Physically Abused: Not on file    Sexually Abused: Not on file   Housing Stability:     Unable to Pay for Housing in the Last Year: Not on file    Number of Places Lived in the Last Year: Not on file    Unstable Housing in the Last Year: Not on file       Family History   Problem Relation Age of Onset    No Known Problems Mother        Allergies   Allergen Reactions    Amoxicillin Other (comments)    Augmentin [Amoxicillin-Pot Clavulanate] Other (comments)    Bactrim [Sulfamethoprim] Other (comments)    Clindamycin Other (comments)       Current Outpatient Medications   Medication Sig    insulin glargine (LANTUS,BASAGLAR) 100 unit/mL (3 mL) inpn 22 Units by SubCUTAneous route two (2) times a day. Indications: type 2 diabetes mellitus    apixaban (ELIQUIS) 5 mg tablet Take 1 Tab by mouth two (2) times a day.  pregabalin (LYRICA) 100 mg capsule Take 300 mg by mouth nightly. Pregabalin Dosing Instructions:  100 mg QAM  300 mg QHS    atorvastatin (LIPITOR) 10 mg tablet Take 10 mg by mouth daily.  sotalol (BETAPACE) 80 mg tablet Take 40 mg by mouth two (2) times a day.  aspirin delayed-release 81 mg tablet Take 81 mg by mouth daily.  insulin aspart U-100 (NOVOLOG FLEXPEN U-100 INSULIN) 100 unit/mL inpn 1 Units by SubCUTAneous route Before breakfast, lunch, and dinner. Indications: sliging scale 150-200 =1 unit. 1 unit for every 50 on BSFS    pregabalin (LYRICA) 100 mg capsule Take 100 mg by mouth daily. Pregabalin Dosing Instructions:  100 mg QAM  300 mg QHS  Indications: one pill inthe am and x3 at hs    omega 3-DHA-EPA-fish oil 1,000 mg (120 mg-180 mg) capsule Take 1 Cap by mouth daily.  furosemide (LASIX) 80 mg tablet Take 40 mg by mouth daily. Indications: Edema, patient alternates her dose from 40 to 80mg    azelastine (ASTELIN) 137 mcg (0.1 %) nasal spray 2 Sprays by Both Nostrils route nightly. Use in each nostril as directed    omeprazole (PRILOSEC) 40 mg capsule Take 40 mg by mouth daily.  (Patient not taking: Reported on 2/21/2022)     No current facility-administered medications for this visit. The above histories, medications and allergies have been reviewed. Review of Systems   HENT: Positive for hearing loss. Cardiovascular: Positive for leg swelling. Gastrointestinal: Positive for constipation. Musculoskeletal: Positive for back pain. Psychiatric/Behavioral: Positive for depression. The patient is nervous/anxious. Visit Vitals  BP (!) 134/56   Pulse 63   Ht 5' 2.5\" (1.588 m)   Wt 228 lb (103.4 kg)   BMI 41.04 kg/m²     Physical Exam  Constitutional:       Comments: Wheelchair-bound   Cardiovascular:      Rate and Rhythm: Rhythm irregular. Pulmonary:      Breath sounds: Normal breath sounds. Abdominal:      General: There is no distension. Palpations: Abdomen is soft. There is no mass. Tenderness: There is no abdominal tenderness. 1. Wilson's esophagus without dysplasia  Recommend EGD. Risks of the procedure were shared with the patient including the risks of aspiration or esophageal or gastric perforation. All questions were answered to the patient's satisfaction. We will schedule. The patient was counseled at length about the risks of aria Covid-19 during their perioperative period and any recovery window from their procedure. The patient was made aware that aria Covid-19  may worsen their prognosis for recovering from their procedure and lend to a higher morbidity and/or mortality risk. All material risks, benefits, and reasonable alternatives including postponing the procedure were discussed. The patient does  wish to proceed with the procedure at this time. Follow-up and Dispositions    · Return for post procedure.          Evelio Erwin MD

## 2022-02-21 NOTE — PATIENT INSTRUCTIONS
Upper GI Endoscopy: Before Your Procedure  What is an upper GI endoscopy? An upper gastrointestinal (or GI) endoscopy is a test that allows your doctor to look at the inside of your esophagus, stomach, and the first part of your small intestine, called the duodenum. The esophagus is the tube that carries food to your stomach. The doctor uses a thin, lighted tube that bends. It is called an endoscope, or scope. The doctor puts the tip of the scope in your mouth and gently moves it down your throat. The scope is a flexible video camera. The doctor looks at a monitor (like a TV set or a computer screen) as he or she moves the scope. A doctor may do this procedure to look for ulcers, tumors, infection, or bleeding. It also can be used to look for signs of acid backing up into your esophagus. This is called gastroesophageal reflux disease, or GERD. The doctor can use the scope to take a sample of tissue for study (a biopsy). The doctor also can use the scope to take out growths or stop bleeding. Follow-up care is a key part of your treatment and safety. Be sure to make and go to all appointments, and call your doctor if you are having problems. It's also a good idea to know your test results and keep a list of the medicines you take. How do you prepare for the procedure? Procedures can be stressful. This information will help you understand what you can expect. And it will help you safely prepare for your procedure. Preparing for the procedure    · Do not eat or drink anything for 6 to 8 hours before the test. An empty stomach helps your doctor see your stomach clearly during the test. It also reduces your chances of vomiting. If you vomit, there is a small risk that the vomit could enter your lungs.  (This is called aspiration.) If the test is done in an emergency, a tube may be inserted through your nose or mouth to empty your stomach.     · Do not take sucralfate (Carafate) or antacids on the day of the test. These medicines can make it hard for your doctor to see your upper GI tract.     · If your doctor tells you to, stop taking iron supplements 7 to 14 days before the test.     · Be sure you have someone to take you home. Anesthesia and pain medicine will make it unsafe for you to drive or get home on your own.     · Understand exactly what procedure is planned, along with the risks, benefits, and other options. · Tell your doctor ALL the medicines, vitamins, supplements, and herbal remedies you take. Some may increase the risk of problems during your procedure. Your doctor will tell you if you should stop taking any of them before the procedure and how soon to do it.     · If you take aspirin or some other blood thinner, ask your doctor if you should stop taking it before your procedure. Make sure that you understand exactly what your doctor wants you to do. These medicines increase the risk of bleeding.     · Make sure your doctor and the hospital have a copy of your advance directive. If you don't have one, you may want to prepare one. It lets others know your health care wishes. It's a good thing to have before any type of surgery or procedure. What happens on the day of the procedure? · Follow the instructions exactly about when to stop eating and drinking. If you don't, your procedure may be canceled. If your doctor told you to take your medicines on the day of the procedure, take them with only a sip of water.     · Take a bath or shower before you come in for your procedure. Do not apply lotions, perfumes, deodorants, or nail polish.     · Take off all jewelry and piercings. And take out contact lenses, if you wear them. At the hospital or surgery center   · Bring a picture ID.     · The test may take 15 to 30 minutes.     · The doctor may spray medicine on the back of your throat to numb it. You also will get medicine to prevent pain and to relax you.     · You will lie on your left side.  The doctor will put the scope in your mouth and toward the back of your throat. The doctor will tell you when to swallow. This helps the scope move down your throat. You will be able to breathe normally. The doctor will move the scope down your esophagus into your stomach. The doctor also may look at the duodenum.     · If your doctor wants to take a sample of tissue for a biopsy, he or she may use small surgical tools, which are put into the scope, to cut off some tissue. You will not feel a biopsy, if one is taken. The doctor also can use the tools to stop bleeding or to do other treatments, if needed.     · You will stay at the hospital or surgery center for 1 to 2 hours until the medicine you were given wears off. What happens after an upper GI endoscopy? · After the test, you may belch and feel bloated for a while.     · You may have a tickling, dry throat or mouth. You may feel a bit hoarse, and you may have a mild sore throat. These symptoms may last several days. Throat lozenges and warm saltwater gargles can help relieve the throat symptoms.     · Don't drive or operate machinery for 12 hours after the test.     · Your doctor will tell you when you can go back to your usual diet and activities.     · Don't drink alcohol for 12 to 24 hours after the test.   When should you call your doctor? · You have questions or concerns.     · You don't understand how to prepare for your procedure.     · You become ill before the procedure (such as fever, flu, or a cold).     · You need to reschedule or have changed your mind about having the procedure. Where can you learn more? Go to http://www.gray.com/  Enter P790 in the search box to learn more about \"Upper GI Endoscopy: Before Your Procedure. \"  Current as of: February 10, 2021               Content Version: 13.0  © 6236-9138 [employer].    Care instructions adapted under license by [employer] (which disclaims liability or warranty for this information). If you have questions about a medical condition or this instruction, always ask your healthcare professional. [place] [address] any warranty or liability for your use of this information.

## 2022-03-19 PROBLEM — Z87.19 HISTORY OF BARRETT'S ESOPHAGUS: Status: ACTIVE | Noted: 2018-08-20

## 2022-03-19 PROBLEM — I49.5 SSS (SICK SINUS SYNDROME) (HCC): Status: ACTIVE | Noted: 2018-06-23

## 2022-03-19 PROBLEM — K57.30 DIVERTICULA OF COLON: Status: ACTIVE | Noted: 2018-08-20

## 2022-03-19 PROBLEM — K29.70 GASTRITIS: Status: ACTIVE | Noted: 2018-08-20

## 2022-03-22 ENCOUNTER — TRANSCRIBE ORDER (OUTPATIENT)
Dept: SCHEDULING | Age: 77
End: 2022-03-22

## 2022-03-22 DIAGNOSIS — I65.23 BILATERAL CAROTID ARTERY OCCLUSION: Primary | ICD-10-CM

## 2022-04-14 ENCOUNTER — PREPPED CHART (OUTPATIENT)
Dept: URBAN - METROPOLITAN AREA CLINIC 98 | Facility: CLINIC | Age: 77
End: 2022-04-14

## 2022-04-14 ASSESSMENT — TONOMETRY
OD_IOP_MMHG: 15
OS_IOP_MMHG: 15

## 2022-04-14 ASSESSMENT — VISUAL ACUITY
OD_CC: 20/25-1
OS_CC: 20/100

## 2022-04-20 ENCOUNTER — ANESTHESIA EVENT (OUTPATIENT)
Dept: SURGERY | Age: 77
End: 2022-04-20
Payer: COMMERCIAL

## 2022-04-20 ENCOUNTER — FOLLOW UP (OUTPATIENT)
Dept: URBAN - METROPOLITAN AREA CLINIC 98 | Facility: CLINIC | Age: 77
End: 2022-04-20

## 2022-04-20 DIAGNOSIS — E10.3513: ICD-10-CM

## 2022-04-20 PROCEDURE — 92014 COMPRE OPH EXAM EST PT 1/>: CPT

## 2022-04-20 PROCEDURE — 92134 CPTRZ OPH DX IMG PST SGM RTA: CPT

## 2022-04-20 PROCEDURE — 92202 OPSCPY EXTND ON/MAC DRAW: CPT

## 2022-04-20 ASSESSMENT — VISUAL ACUITY
OS_SC: 20/60-1
OD_PH: 20/30+2
OD_SC: 20/40+2

## 2022-04-20 ASSESSMENT — TONOMETRY
OD_IOP_MMHG: 18
OS_IOP_MMHG: 16

## 2022-04-20 NOTE — ANESTHESIA PREPROCEDURE EVALUATION
Relevant Problems   CARDIOVASCULAR   (+) SSS (sick sinus syndrome) (HCC)       Anesthetic History   No history of anesthetic complications            Review of Systems / Medical History  Patient summary reviewed, nursing notes reviewed and pertinent labs reviewed    Pulmonary  Within defined limits                 Neuro/Psych         Psychiatric history (depression)     Cardiovascular            Dysrhythmias (SSS) : atrial fibrillation  Pacemaker and CAD         GI/Hepatic/Renal     GERD (Wilson's)           Endo/Other    Diabetes: type 2         Other Findings            Physical Exam    Airway  Mallampati: II  TM Distance: 4 - 6 cm  Neck ROM: decreased range of motion   Mouth opening: Normal     Cardiovascular    Rhythm: regular  Rate: normal         Dental    Dentition: Lower partial plate     Pulmonary  Breath sounds clear to auscultation               Abdominal  GI exam deferred      Comments: Extensive dental caps Other Findings            Anesthetic Plan    ASA: 3  Anesthesia type: MAC          Induction: Intravenous  Anesthetic plan and risks discussed with: Patient

## 2022-04-27 RX ORDER — FAMOTIDINE 40 MG/1
40 TABLET, FILM COATED ORAL DAILY
COMMUNITY

## 2022-04-27 RX ORDER — INSULIN DEGLUDEC INJECTION 100 U/ML
34 INJECTION, SOLUTION SUBCUTANEOUS
COMMUNITY

## 2022-04-27 RX ORDER — CETIRIZINE HYDROCHLORIDE 10 MG/1
CAPSULE, LIQUID FILLED ORAL
COMMUNITY

## 2022-04-27 NOTE — H&P
History and Physical  HPI    72-year-old female who presents to me as a referral by Dr. Stephanie De Anda for possible surveillance upper endoscopy for personal history of Wilson's esophagus. She believes about 8 years ago she was diagnosed with Wilson's esophagus and gastroparesis. Her last EGD for surveillance was performed in 2018 by Dr. Maxi Dorado.  Wilson's esophagus was identified without dysplasia. She has been on twice daily famotidine. About a year ago she was still taking a PPI but she discontinued this when she had heard about the possible links associated with cancer. She has had no breakthrough episodes of reflux. She denies any abdominal pain. She does have a history of A. fib and CHF. She is on twice daily Eliquis. She follows up with her cardiologist and has an appointment in early March. She states that she has had no problems discontinuing her Eliquis for surgical procedures. She has significant degenerative spine disease for which she is in a wheelchair and has very limited ambulation. She is a diabetic who states that her last A1c is somewhere in the 6.9-7 range. She has had an appendectomy and a right ovarian cyst removed. She has been vaccinated for Covid and has not contracted Covid. Past Medical History:   Diagnosis Date    Arthritis     CAD (coronary artery disease)     afib and chf    Congestive heart failure (HCC)     Depression     Diabetes (HCC)     type 2    Fibromyalgia     Gastrointestinal disorder     reflux and gastro paresis    Heart failure ([facility] [address]     Infectious disease 2010    MRSA?  in right big toe    Menopause     Psychiatric disorder     PTSD from 911       Past Surgical History:   Procedure Laterality Date    COLONOSCOPY N/A 8/20/2018    COLONOSCOPY performed by Mitchell Ochoa MD at \[facility]"" 1827 HX APPENDECTOMY      HX BREAST BIOPSY Left     STbb  benign    HX COLONOSCOPY  08/20/2018    polyps    HX GI      colonscopy and endo    HX GYN cyst on right ovary removed    HX HEENT      T&A Bilateral Cataract Surgery with coptic lenses       Social History     Socioeconomic History    Marital status:      Spouse name: Not on file    Number of children: Not on file    Years of education: Not on file    Highest education level: Not on file   Occupational History    Not on file   Tobacco Use    Smoking status: Former Smoker    Smokeless tobacco: Never Used   Substance and Sexual Activity    Alcohol use: Yes     Alcohol/week: 1.0 standard drink     Types: 1 Glasses of wine per week     Comment: occ    Drug use: No    Sexual activity: Not on file   Other Topics Concern    Not on file   Social History Narrative    Not on file     Social Determinants of Health     Financial Resource Strain:     Difficulty of Paying Living Expenses: Not on file   Food Insecurity:     Worried About Running Out of Food in the Last Year: Not on file    Phani of Food in the Last Year: Not on file   Transportation Needs:     Lack of Transportation (Medical): Not on file    Lack of Transportation (Non-Medical):  Not on file   Physical Activity:     Days of Exercise per Week: Not on file    Minutes of Exercise per Session: Not on file   Stress:     Feeling of Stress : Not on file   Social Connections:     Frequency of Communication with Friends and Family: Not on file    Frequency of Social Gatherings with Friends and Family: Not on file    Attends Spiritism Services: Not on file    Active Member of Clubs or Organizations: Not on file    Attends Club or Organization Meetings: Not on file    Marital Status: Not on file   Intimate Partner Violence:     Fear of Current or Ex-Partner: Not on file    Emotionally Abused: Not on file    Physically Abused: Not on file    Sexually Abused: Not on file   Housing Stability:     Unable to Pay for Housing in the Last Year: Not on file    Number of Places Lived in the Last Year: Not on file    Unstable Housing in the Last Year: Not on file       Family History   Problem Relation Age of Onset    No Known Problems Mother        Allergies   Allergen Reactions    Amoxicillin Other (comments)    Augmentin [Amoxicillin-Pot Clavulanate] Other (comments)    Bactrim [Sulfamethoprim] Other (comments)    Clindamycin Other (comments)       No current facility-administered medications for this encounter. Current Outpatient Medications   Medication Sig    insulin glargine (LANTUS,BASAGLAR) 100 unit/mL (3 mL) inpn 22 Units by SubCUTAneous route two (2) times a day. Indications: type 2 diabetes mellitus    apixaban (ELIQUIS) 5 mg tablet Take 1 Tab by mouth two (2) times a day.  pregabalin (LYRICA) 100 mg capsule Take 300 mg by mouth nightly. Pregabalin Dosing Instructions:  100 mg QAM  300 mg QHS    atorvastatin (LIPITOR) 10 mg tablet Take 10 mg by mouth daily.  sotalol (BETAPACE) 80 mg tablet Take 40 mg by mouth two (2) times a day.  insulin aspart U-100 (NOVOLOG FLEXPEN U-100 INSULIN) 100 unit/mL inpn 1 Units by SubCUTAneous route Before breakfast, lunch, and dinner. Indications: sliging scale 150-200 =1 unit. 1 unit for every 50 on BSFS    omeprazole (PRILOSEC) 40 mg capsule Take 40 mg by mouth daily. (Patient not taking: Reported on 2/21/2022)    pregabalin (LYRICA) 100 mg capsule Take 100 mg by mouth daily. Pregabalin Dosing Instructions:  100 mg QAM  300 mg QHS  Indications: one pill inthe am and x3 at hs    omega 3-DHA-EPA-fish oil 1,000 mg (120 mg-180 mg) capsule Take 1 Cap by mouth daily.  furosemide (LASIX) 80 mg tablet Take 40 mg by mouth daily. Indications: Edema, patient alternates her dose from 40 to 80mg    azelastine (ASTELIN) 137 mcg (0.1 %) nasal spray 2 Sprays by Both Nostrils route nightly. Use in each nostril as directed       The above histories, medications and allergies have been reviewed. Review of Systems   HENT: Positive for hearing loss.     Cardiovascular: Positive for leg swelling. Gastrointestinal: Positive for constipation. Musculoskeletal: Positive for back pain. Psychiatric/Behavioral: Positive for depression. The patient is nervous/anxious. There were no vitals taken for this visit. Physical Exam  Constitutional:       Comments: Wheelchair-bound   Cardiovascular:      Rate and Rhythm: Rhythm irregular. Pulmonary:      Breath sounds: Normal breath sounds. Abdominal:      General: There is no distension. Palpations: Abdomen is soft. There is no mass. Tenderness: There is no abdominal tenderness. 1. Wilson's esophagus without dysplasia  Recommend EGD. Risks of the procedure were shared with the patient including the risks of aspiration or esophageal or gastric perforation. All questions were answered to the patient's satisfaction. We will schedule. The patient was counseled at length about the risks of aria Covid-19 during their perioperative period and any recovery window from their procedure. The patient was made aware that aria Covid-19  may worsen their prognosis for recovering from their procedure and lend to a higher morbidity and/or mortality risk. All material risks, benefits, and reasonable alternatives including postponing the procedure were discussed. The patient does  wish to proceed with the procedure at this time.               Zhao Bunch MD

## 2022-04-27 NOTE — PERIOP NOTES
[address] [place] [address]  SURGICAL PRE-ADMISSION INSTRUCTIONS    ARRIVAL  · You will be called the day before your surgery with your expected arrival time. · Sign in at the  of the hospital.  You will be directed to the Surgical Waiting Room. · Please arrive at your scheduled appointment time. You have been scheduled to arrive for your procedure one or two hours prior to the expected start time of your procedure. · Every effort will be made to minimize your wait but please be aware that unforeseen circumstances may affect our schedule. EATING  · DO NOT EAT OR DRINK ANYTHING AFTER MIDNIGHT ON THE EVENING BEFORE YOUR SURGERY OR ON THE DAY OF YOUR SURGERY except for your medications (as instructed) with a sip of water. · Do not use gum, mints or lozenges on the morning of your surgery. · Please do not smoke or chew tobacco before your surgery. MEDICATIONS   · Take the following medications on the morning of your surgery with the smallest amount of water possible : Sotalol, Famotidine    STOP THESE MEDICATIONS AT THE TIMES LISTED BELOW  Blood thinner(s) : Eliquis ; Consult with medical doctor     DRIVING/TRANSPORATION  · Have a responsible adult to drive you home from the hospital and to stay with you over night. Please have them plan to remain in the hospital during your surgery. Your surgery will not be done if you do not have a responsible adult to take you home and to stay with you. · If you have arranged for public transport, you must have a responsible adult to ride with you (who is not the ). · You may not drive for 24 hours after anesthesia. PREPARATION  · If you have a Living WiIl/Advance Directive, please bring a copy with you to scan into your chart. · Please DO NOT wear makeup or nail polish  · Please leave valuables at home,  DO NOT wear jewelry. · Wear loose, comfortable clothing that is large enough to cover a bulky dressing.     SPECIAL INSTRUCTIONS:  · none    Reviewed above preoperative instructions and answered questions by phone interview    Patient:  Prem AlexanderZuleyka   Date:     April 27, 2022  Time:   10:08 AM    RN:  Chris Cervantes RN    Date:     April 27, 2022  Time:   10:08 AM

## 2022-04-28 ENCOUNTER — HOSPITAL ENCOUNTER (OUTPATIENT)
Age: 77
Setting detail: OUTPATIENT SURGERY
Discharge: HOME | End: 2022-04-28
Attending: SURGERY | Admitting: SURGERY
Payer: COMMERCIAL

## 2022-04-28 ENCOUNTER — ANESTHESIA (OUTPATIENT)
Dept: SURGERY | Age: 77
End: 2022-04-28
Payer: COMMERCIAL

## 2022-04-28 VITALS
DIASTOLIC BLOOD PRESSURE: 76 MMHG | BODY MASS INDEX: 39.16 KG/M2 | TEMPERATURE: 97.7 F | SYSTOLIC BLOOD PRESSURE: 119 MMHG | OXYGEN SATURATION: 99 % | HEIGHT: 63 IN | WEIGHT: 221 LBS | RESPIRATION RATE: 16 BRPM | HEART RATE: 60 BPM

## 2022-04-28 DIAGNOSIS — Z87.19 HISTORY OF BARRETT'S ESOPHAGUS: ICD-10-CM

## 2022-04-28 LAB
GLUCOSE BLD STRIP.AUTO-MCNC: 103 MG/DL (ref 65–117)
GLUCOSE BLD STRIP.AUTO-MCNC: 63 MG/DL (ref 65–117)
SERVICE CMNT-IMP: ABNORMAL
SERVICE CMNT-IMP: NORMAL

## 2022-04-28 PROCEDURE — 82962 GLUCOSE BLOOD TEST: CPT

## 2022-04-28 PROCEDURE — 74011000250 HC RX REV CODE- 250: Performed by: ANESTHESIOLOGY

## 2022-04-28 PROCEDURE — 74011250636 HC RX REV CODE- 250/636: Performed by: SURGERY

## 2022-04-28 PROCEDURE — 76210000063 HC OR PH I REC FIRST 0.5 HR: Performed by: SURGERY

## 2022-04-28 PROCEDURE — 74011000250 HC RX REV CODE- 250: Performed by: SURGERY

## 2022-04-28 PROCEDURE — 43239 EGD BIOPSY SINGLE/MULTIPLE: CPT | Performed by: SURGERY

## 2022-04-28 PROCEDURE — 76010000154 HC OR TIME FIRST 0.5 HR: Performed by: SURGERY

## 2022-04-28 PROCEDURE — 88342 IMHCHEM/IMCYTCHM 1ST ANTB: CPT

## 2022-04-28 PROCEDURE — 88305 TISSUE EXAM BY PATHOLOGIST: CPT

## 2022-04-28 PROCEDURE — 74011250636 HC RX REV CODE- 250/636: Performed by: ANESTHESIOLOGY

## 2022-04-28 PROCEDURE — 76060000031 HC ANESTHESIA FIRST 0.5 HR: Performed by: SURGERY

## 2022-04-28 PROCEDURE — 77030037041 HC FCPS HOT BIOP ENDOSC RAD JAW DISP BSC -B: Performed by: SURGERY

## 2022-04-28 PROCEDURE — 2709999900 HC NON-CHARGEABLE SUPPLY: Performed by: SURGERY

## 2022-04-28 RX ORDER — FLUMAZENIL 0.1 MG/ML
INJECTION INTRAVENOUS AS NEEDED
Status: DISCONTINUED | OUTPATIENT
Start: 2022-04-28 | End: 2022-04-28 | Stop reason: HOSPADM

## 2022-04-28 RX ORDER — MIDAZOLAM HYDROCHLORIDE 1 MG/ML
INJECTION, SOLUTION INTRAMUSCULAR; INTRAVENOUS AS NEEDED
Status: DISCONTINUED | OUTPATIENT
Start: 2022-04-28 | End: 2022-04-28 | Stop reason: HOSPADM

## 2022-04-28 RX ORDER — PROPOFOL 10 MG/ML
INJECTION, EMULSION INTRAVENOUS AS NEEDED
Status: DISCONTINUED | OUTPATIENT
Start: 2022-04-28 | End: 2022-04-28

## 2022-04-28 RX ORDER — DEXTROSE MONOHYDRATE 50 MG/ML
125 INJECTION, SOLUTION INTRAVENOUS CONTINUOUS
Status: DISCONTINUED | OUTPATIENT
Start: 2022-04-28 | End: 2022-04-28 | Stop reason: HOSPADM

## 2022-04-28 RX ORDER — SODIUM CHLORIDE 0.9 % (FLUSH) 0.9 %
5-40 SYRINGE (ML) INJECTION EVERY 8 HOURS
Status: DISCONTINUED | OUTPATIENT
Start: 2022-04-28 | End: 2022-04-28 | Stop reason: HOSPADM

## 2022-04-28 RX ORDER — GLYCOPYRROLATE 0.2 MG/ML
INJECTION INTRAMUSCULAR; INTRAVENOUS AS NEEDED
Status: DISCONTINUED | OUTPATIENT
Start: 2022-04-28 | End: 2022-04-28 | Stop reason: HOSPADM

## 2022-04-28 RX ORDER — SODIUM CHLORIDE 0.9 % (FLUSH) 0.9 %
5-40 SYRINGE (ML) INJECTION AS NEEDED
Status: DISCONTINUED | OUTPATIENT
Start: 2022-04-28 | End: 2022-04-28 | Stop reason: HOSPADM

## 2022-04-28 RX ORDER — LIDOCAINE HYDROCHLORIDE 20 MG/ML
INJECTION, SOLUTION EPIDURAL; INFILTRATION; INTRACAUDAL; PERINEURAL AS NEEDED
Status: DISCONTINUED | OUTPATIENT
Start: 2022-04-28 | End: 2022-04-28 | Stop reason: HOSPADM

## 2022-04-28 RX ORDER — PROPOFOL 10 MG/ML
INJECTION, EMULSION INTRAVENOUS AS NEEDED
Status: DISCONTINUED | OUTPATIENT
Start: 2022-04-28 | End: 2022-04-28 | Stop reason: HOSPADM

## 2022-04-28 RX ORDER — SODIUM CHLORIDE, SODIUM LACTATE, POTASSIUM CHLORIDE, CALCIUM CHLORIDE 600; 310; 30; 20 MG/100ML; MG/100ML; MG/100ML; MG/100ML
125 INJECTION, SOLUTION INTRAVENOUS CONTINUOUS
Status: DISCONTINUED | OUTPATIENT
Start: 2022-04-28 | End: 2022-04-28 | Stop reason: HOSPADM

## 2022-04-28 RX ADMIN — DEXTROSE MONOHYDRATE: 50 INJECTION, SOLUTION INTRAVENOUS at 07:35

## 2022-04-28 RX ADMIN — GLYCOPYRROLATE 0.2 MG: 0.2 INJECTION, SOLUTION INTRAMUSCULAR; INTRAVENOUS at 07:36

## 2022-04-28 RX ADMIN — LIDOCAINE HYDROCHLORIDE 20 MG: 20 INJECTION, SOLUTION EPIDURAL; INFILTRATION; INTRACAUDAL; PERINEURAL at 07:47

## 2022-04-28 RX ADMIN — BENZOCAINE, BUTAMBEN, AND TETRACAINE HYDROCHLORIDE 1 SPRAY: .028; .004; .004 AEROSOL, SPRAY TOPICAL at 07:46

## 2022-04-28 RX ADMIN — PROPOFOL 30 MG: 10 INJECTION, EMULSION INTRAVENOUS at 07:50

## 2022-04-28 RX ADMIN — MIDAZOLAM 2 MG: 1 INJECTION INTRAMUSCULAR; INTRAVENOUS at 07:46

## 2022-04-28 RX ADMIN — SODIUM CHLORIDE, POTASSIUM CHLORIDE, SODIUM LACTATE AND CALCIUM CHLORIDE 125 ML/HR: 600; 310; 30; 20 INJECTION, SOLUTION INTRAVENOUS at 07:28

## 2022-04-28 RX ADMIN — FLUMAZENIL 0.3 MG: 0.1 INJECTION INTRAVENOUS at 07:55

## 2022-04-28 RX ADMIN — PROPOFOL 30 MG: 10 INJECTION, EMULSION INTRAVENOUS at 07:47

## 2022-04-28 NOTE — OP NOTES
[facility]  OPERATIVE REPORT    Name:  Kelsy Liao  MR#:  [id number]  :  1945  ACCOUNT #:  [de-identified]  DATE OF SERVICE:  2022    PREOPERATIVE DIAGNOSIS:  Personal history of Wilson's esophagus. POSTOPERATIVE DIAGNOSIS:  Personal history of Wilson's esophagus. PROCEDURE PERFORMED:  EGD with biopsies. SURGEON:  Coco Plummer MD    ASSISTANT: None. ANESTHESIA:  MAC.    COMPLICATIONS:  None. SPECIMENS REMOVED:  None. IMPLANTS:  None. DRAINS:  None. ESTIMATED BLOOD LOSS:  Minimal.    FINDINGS:  1.  Large amount of retained food in the stomach. 2.  Antral gastritis. 3.  Gastroparesis. 4.  EG junction at 39 cm with minimal changes. DISPOSITION:  Stable. PROCEDURE:  The patient was brought to the operative theater. Monitoring devices and nasal cannula O2 were placed per Anesthesia and the patient was placed in the left side down decubitus position after her posterior pharynx had been sprayed with Cetacaine spray per Anesthesia and a bite block had been inserted in her mouth. Once adequate sedation was administered, a time-out was performed. A well-lubricated Olympus gastroscope was introduced through the bite block down the posterior pharynx, through the EG junction and into the stomach. Immediately, we identified a large amount of retained food consistent with her history of known gastroparesis. This did limit our exam.  The scope was advanced to the pylorus and the duodenum was intubated. No large masses were identified, but again, there was a large amount of retained food and the exam was significantly limited. As the scope was pulled back into the antral region, there was moderate antral gastritis. This was photographed and then biopsied. The scope was retroflexed. We did limit insufflation of the stomach because of the amount of food that was present and the concern of the risks of aspiration.   Within these limitations, I did not identify any significant lesions. The scope was then straightened out and pulled back through the EG junction, which was at 39 cm. There was mild changes for approximately a centimeter near the EG junction that would be consistent with a history of Wilson's esophagus. Representative biopsy was obtained. There was no suspicious ulcerations and no islands of abnormal cells above the EG junction, which was at 39 cm. The stomach was further desufflated, and the scope was withdrawn. The length of the esophagus without any additional abnormalities noted. The patient tolerated the procedure well and was transferred to the PACU in stable condition.       Mena Villalpando MD      [id number]  D:  04/28/2022 8:01  T:  04/28/2022 9:19  JOB #:  [id number]  CC:  Frank David MD

## 2022-04-28 NOTE — BRIEF OP NOTE
Brief Postoperative Note    Patient: Demetria Espinoza  YOB: 1945  MRN: [id number]    Date of Procedure: 4/28/2022     Pre-Op Diagnosis: PERSONAL HISTORY OF BARRETS ESHOPAGUS WITHOUT DYSPLASIA    Post-Op Diagnosis: Same as preoperative diagnosis. Procedure(s):  ESOPHAGOGASTRODUODENOSCOPY (EGD)    Surgeon(s):  Luis Doherty MD    Surgical Assistant: None    Anesthesia: MAC     Estimated Blood Loss (mL): Minimal    Complications: None    Specimens: * No specimens in log *     Implants: * No implants in log *    Drains: * No LDAs found *    Findings: 1. Large amount of retained food 2. Antral gastritis  3. Gastroparesis  4.  EG junction at 39 cm     Electronically Signed by Ar Linares MD on 4/28/2022 at 7:56 AM

## 2022-04-28 NOTE — DISCHARGE INSTRUCTIONS
Patient Education        Upper GI Endoscopy: What to Expect at [address] had an upper GI endoscopy. Your doctor used a thin, lighted tube that bends to look at the inside of your esophagus, your stomach, and the first part of the small intestine, called the duodenum. After you have an endoscopy, you will stay at the hospital or clinic for 1 to 2 hours. This will allow the medicine to wear off. You will be able to go home after your doctor or nurse checks to make sure that you're not having any problems. You may have to stay overnight if you had treatment during the test. You may have a sore throat for a day or two after the test.  This care sheet gives you a general idea about what to expect after the test.  How can you care for yourself at home? Activity   · Rest as much as you need to after you go home. · You should be able to go back to your usual activities the day after the test.  Diet   · Follow your doctor's directions for eating after the test.  · Drink plenty of fluids (unless your doctor has told you not to). Medications   · If you have a sore throat the day after the test, use an over-the-counter spray to numb your throat. Follow-up care is a key part of your treatment and safety. Be sure to make and go to all appointments, and call your doctor if you are having problems. It's also a good idea to know your test results and keep a list of the medicines you take. When should you call for help? Call 911 anytime you think you may need emergency care. For example, call if:    · You passed out (lost consciousness).     · You have trouble breathing.     · You pass maroon or bloody stools.    Call your doctor now or seek immediate medical care if:    · You have pain that does not get better after your take pain medicine.     · You have new or worse belly pain.     · You have blood in your stools.     · You are sick to your stomach and cannot keep fluids down.     · You have a fever.     · You cannot pass stools or gas. Watch closely for changes in your health, and be sure to contact your doctor if:    · Your throat still hurts after a day or two.     · You do not get better as expected. Where can you learn more? Go to http://www.cannon.com/  Enter J454 in the search box to learn more about \"Upper GI Endoscopy: What to Expect at Home. \"  Current as of: September 8, 2021               Content Version: 13.2  © 2006-2022 [employer] Care instructions adapted under license by [employer] (which disclaims liability or warranty for this information). If you have questions about a medical condition or this instruction, always ask your healthcare professional. [address] any warranty or liability for your use of this information. May restart blood thinners in 2 days.

## 2022-04-28 NOTE — INTERVAL H&P NOTE
Update History & Physical    The Patient's History and Physical of April 27, 2022 was reviewed with the patient and I examined the patient. There was no change. The surgical site was confirmed by the patient and me. Plan:  The risk, benefits, expected outcome, and alternative to the recommended procedure have been discussed with the patient. Patient understands and wants to proceed with the procedure.     Electronically signed by Isadora La MD on 4/28/2022 at 7:30 AM

## 2022-04-28 NOTE — ANESTHESIA POSTPROCEDURE EVALUATION
Procedure(s):  ESOPHAGOGASTRODUODENOSCOPY (EGD) WITH COLD FORCEP BIOPSIES X 2. MAC    Anesthesia Post Evaluation      Multimodal analgesia: multimodal analgesia not used between 6 hours prior to anesthesia start to PACU discharge  Patient location during evaluation: bedside  Patient participation: complete - patient participated  Level of consciousness: awake and alert  Pain score: 0  Pain management: adequate  Airway patency: patent  Anesthetic complications: no  Cardiovascular status: acceptable  Respiratory status: acceptable  Hydration status: acceptable  Post anesthesia nausea and vomiting:  none  Final Post Anesthesia Temperature Assessment:  Normothermia (36.0-37.5 degrees C)      INITIAL Post-op Vital signs:   Vitals Value Taken Time   /76 04/28/22 0815   Temp 36.5 °C (97.7 °F) 04/28/22 0758   Pulse 0 04/28/22 0817   Resp 20 04/28/22 0817   SpO2 97 % 04/28/22 0817   Vitals shown include unvalidated device data.

## 2022-05-06 ENCOUNTER — TELEPHONE (OUTPATIENT)
Dept: SURGERY | Age: 77
End: 2022-05-06

## 2022-05-09 ENCOUNTER — OFFICE VISIT (OUTPATIENT)
Dept: SURGERY | Age: 77
End: 2022-05-09
Payer: OTHER GOVERNMENT

## 2022-05-09 VITALS
BODY MASS INDEX: 39.69 KG/M2 | WEIGHT: 224 LBS | HEART RATE: 78 BPM | SYSTOLIC BLOOD PRESSURE: 104 MMHG | DIASTOLIC BLOOD PRESSURE: 50 MMHG | HEIGHT: 63 IN

## 2022-05-09 DIAGNOSIS — Z87.19 HISTORY OF BARRETT'S ESOPHAGUS: Primary | ICD-10-CM

## 2022-05-09 DIAGNOSIS — K31.84 GASTROPARESIS: ICD-10-CM

## 2022-05-09 PROCEDURE — 99213 OFFICE O/P EST LOW 20 MIN: CPT | Performed by: SURGERY

## 2022-05-09 NOTE — PROGRESS NOTES
[id number] [facility] Surgery      Clinic Note - Follow up    Subjective     Belinda Guillory returns for scheduled follow up today. She is status post EGD that was performed back on April 28. She has been doing well. She has had no new problems. She shares that she knows she has a diagnosis of gastroparesis and has known so for about 8 years. EGD biopsies showed mild inflammation but no intestinal metaplasia or dysplasia. Objective     There were no vitals taken for this visit. PE  GEN - Awake, alert, communicating appropriately. NAD      Assessment     Belinda Guillory is a 68 y. o.yr old female status post surveillance EGD for history of Wilson's esophagus. No evidence of Wilson's esophagus at time of this exam.    Plan     Recommend she continue to take the famotidine. Repeat EGD in 2 years if she remains in reasonable health.     Linden Nieto MD    CC: Dr. Gabo Berrios

## 2022-05-09 NOTE — PATIENT INSTRUCTIONS
Wilson's Esophagus: Care Instructions  Your Care Instructions     The esophagus is the tube that connects the throat to the stomach. Food and liquids go through this tube. In Wilson's esophagus, the cells that line the tube change. This is usually because of gastroesophageal reflux disease (GERD). GERD causes acid from your stomach to back up into the esophagus. When you have Wilson's esophagus, you are slightly more likely to get cancer of the esophagus. So regular testing is important to watch for signs of this cancer. You can treat GERD to control your symptoms and feel better. Follow-up care is a key part of your treatment and safety. Be sure to make and go to all appointments, and call your doctor if you are having problems. It's also a good idea to know your test results and keep a list of the medicines you take. How can you care for yourself at home? · Take your medicines exactly as prescribed. Call your doctor if you think you are having a problem with your medicine. · If you take over-the-counter medicine, such as antacids or acid reducers, follow all instructions on the label. If you use these medicines often, talk with your doctor. Be careful when you take over-the-counter antacid medicines. Many of these medicines have aspirin in them. Read the label to make sure that you are not taking more than the recommended dose. Too much aspirin can be harmful. · Do not smoke or chew tobacco. Smoking can make GERD worse. If you need help quitting, talk to your doctor about stop-smoking programs and medicines. These can increase your chances of quitting for good. · Avoid foods that make your symptoms worse. These may include chocolate, mint, alcohol, pepper, spicy foods, high-fat foods, or drinks with caffeine in them, such as tea, coffee, milton, or energy drinks. If your symptoms are worse after you eat a certain food, you may want to stop eating it to see if your symptoms get better.   · Eat smaller meals, and more often. After eating, wait 2 to 3 hours before you lie down. · Raise the head of your bed 6 to 8 inches by putting blocks under the frame or a foam wedge under the head of the mattress. · Do not wear tight clothing around your midsection. · If you are overweight, lose weight. Even losing 5 to 10 pounds can help. When should you call for help? Call your doctor now or seek immediate medical care if:    · You have new or worse belly pain.     · You are vomiting. Watch closely for changes in your health, and be sure to contact your doctor if:    · You have any pain or difficulty swallowing.     · You are losing weight.     · You have new or worse symptoms, such as heartburn.     · You do not get better as expected. Where can you learn more? Go to http://www.gray.com/  Enter L146 in the search box to learn more about \"Wilson's Esophagus: Care Instructions. \"  Current as of: September 8, 2021               Content Version: 13.2  © 2006-2022 [employer]. Care instructions adapted under license by [employer] (which disclaims liability or warranty for this information). If you have questions about a medical condition or this instruction, always ask your healthcare professional. [address] any warranty or liability for your use of this information.

## 2022-05-09 NOTE — LETTER
5/9/2022    Patient: Suleiman Roberts   YOB: 1945   Date of Visit: 5/9/2022     Hernandez Sheridan MD  [facility] [id number]  Via Fax: [phone]    Dear Hernandez Sheridan MD,      Thank you for referring Ms. Beau Harris to [address] [place] [address] [employer] [facility] for evaluation. My notes for this consultation are attached. If you have questions, please do not hesitate to call me. I look forward to following your patient along with you.       Sincerely,    Isamar Gentile MD

## 2022-11-04 ENCOUNTER — PROBLEM (OUTPATIENT)
Dept: URBAN - METROPOLITAN AREA CLINIC 98 | Facility: CLINIC | Age: 77
End: 2022-11-04

## 2022-11-04 DIAGNOSIS — H43.11: ICD-10-CM

## 2022-11-04 DIAGNOSIS — E10.3513: ICD-10-CM

## 2022-11-04 PROCEDURE — 92202 OPSCPY EXTND ON/MAC DRAW: CPT

## 2022-11-04 PROCEDURE — 92014 COMPRE OPH EXAM EST PT 1/>: CPT

## 2022-11-04 PROCEDURE — 92134 CPTRZ OPH DX IMG PST SGM RTA: CPT

## 2022-11-04 ASSESSMENT — TONOMETRY
OS_IOP_MMHG: 17
OD_IOP_MMHG: 19

## 2022-11-04 ASSESSMENT — VISUAL ACUITY
OD_CC: 20/50-2
OS_CC: 20/150

## 2022-11-15 ENCOUNTER — FOLLOW UP (OUTPATIENT)
Dept: URBAN - METROPOLITAN AREA CLINIC 98 | Facility: CLINIC | Age: 77
End: 2022-11-15

## 2022-11-15 DIAGNOSIS — E11.3513: ICD-10-CM

## 2022-11-15 DIAGNOSIS — H43.11: ICD-10-CM

## 2022-11-15 DIAGNOSIS — Z79.4: ICD-10-CM

## 2022-11-15 PROCEDURE — 92014 COMPRE OPH EXAM EST PT 1/>: CPT | Mod: 25

## 2022-11-15 PROCEDURE — 67028 INJECTION EYE DRUG: CPT

## 2022-11-15 PROCEDURE — 92202 OPSCPY EXTND ON/MAC DRAW: CPT | Mod: 59

## 2022-11-15 PROCEDURE — 92134 CPTRZ OPH DX IMG PST SGM RTA: CPT

## 2022-11-15 ASSESSMENT — TONOMETRY
OD_IOP_MMHG: 20
OS_IOP_MMHG: 17

## 2022-11-15 ASSESSMENT — VISUAL ACUITY
OS_CC: 20/80
OD_SC: 20/50
OS_SC: 20/100+2

## 2022-12-13 ENCOUNTER — FOLLOW UP (OUTPATIENT)
Dept: URBAN - METROPOLITAN AREA CLINIC 98 | Facility: CLINIC | Age: 77
End: 2022-12-13

## 2022-12-13 DIAGNOSIS — H43.11: ICD-10-CM

## 2022-12-13 DIAGNOSIS — E11.3513: ICD-10-CM

## 2022-12-13 DIAGNOSIS — Z79.4: ICD-10-CM

## 2022-12-13 PROCEDURE — 92134 CPTRZ OPH DX IMG PST SGM RTA: CPT

## 2022-12-13 PROCEDURE — 92014 COMPRE OPH EXAM EST PT 1/>: CPT | Mod: 25

## 2022-12-13 PROCEDURE — 92202 OPSCPY EXTND ON/MAC DRAW: CPT | Mod: 59

## 2022-12-13 PROCEDURE — 67028 INJECTION EYE DRUG: CPT

## 2022-12-13 ASSESSMENT — TONOMETRY
OS_IOP_MMHG: 20
OD_IOP_MMHG: 20

## 2022-12-13 ASSESSMENT — VISUAL ACUITY
OD_SC: 20/40-1
OS_SC: 20/100-1
OS_PH: 20/80

## 2023-01-18 ENCOUNTER — FOLLOW UP (OUTPATIENT)
Dept: URBAN - METROPOLITAN AREA CLINIC 98 | Facility: CLINIC | Age: 78
End: 2023-01-18

## 2023-01-18 DIAGNOSIS — H43.11: ICD-10-CM

## 2023-01-18 DIAGNOSIS — Z79.4: ICD-10-CM

## 2023-01-18 DIAGNOSIS — E11.3513: ICD-10-CM

## 2023-01-18 PROCEDURE — 92134 CPTRZ OPH DX IMG PST SGM RTA: CPT

## 2023-01-18 PROCEDURE — 92014 COMPRE OPH EXAM EST PT 1/>: CPT

## 2023-01-18 PROCEDURE — 92202 OPSCPY EXTND ON/MAC DRAW: CPT

## 2023-01-18 ASSESSMENT — VISUAL ACUITY
OS_PH: 20/80-2
OD_SC: 20/40-1
OS_SC: 20/100-1

## 2023-01-18 ASSESSMENT — TONOMETRY
OD_IOP_MMHG: 21
OS_IOP_MMHG: 15

## 2023-03-01 ENCOUNTER — FOLLOW UP (OUTPATIENT)
Dept: URBAN - METROPOLITAN AREA CLINIC 98 | Facility: CLINIC | Age: 78
End: 2023-03-01

## 2023-03-01 DIAGNOSIS — E11.3513: ICD-10-CM

## 2023-03-01 DIAGNOSIS — Z79.4: ICD-10-CM

## 2023-03-01 DIAGNOSIS — H43.11: ICD-10-CM

## 2023-03-01 PROCEDURE — 92134 CPTRZ OPH DX IMG PST SGM RTA: CPT

## 2023-03-01 PROCEDURE — 92202 OPSCPY EXTND ON/MAC DRAW: CPT

## 2023-03-01 PROCEDURE — 92014 COMPRE OPH EXAM EST PT 1/>: CPT

## 2023-03-01 ASSESSMENT — VISUAL ACUITY
OD_SC: 20/40-2
OS_PH: 20/80-1
OS_SC: 20/150

## 2023-03-01 ASSESSMENT — TONOMETRY
OD_IOP_MMHG: 20
OS_IOP_MMHG: 20

## 2023-03-27 ENCOUNTER — TRANSCRIBE ORDER (OUTPATIENT)
Dept: SCHEDULING | Age: 78
End: 2023-03-27

## 2023-03-27 DIAGNOSIS — I50.32 CHRONIC DIASTOLIC HEART FAILURE (HCC): ICD-10-CM

## 2023-03-27 DIAGNOSIS — I48.0 PAROXYSMAL ATRIAL FIBRILLATION (HCC): Primary | ICD-10-CM

## 2023-04-05 ENCOUNTER — PROBLEM (OUTPATIENT)
Dept: URBAN - METROPOLITAN AREA CLINIC 98 | Facility: CLINIC | Age: 78
End: 2023-04-05

## 2023-04-05 DIAGNOSIS — H43.812: ICD-10-CM

## 2023-04-05 DIAGNOSIS — E11.3513: ICD-10-CM

## 2023-04-05 DIAGNOSIS — H43.13: ICD-10-CM

## 2023-04-05 PROCEDURE — 92201 OPSCPY EXTND RTA DRAW UNI/BI: CPT | Mod: NC

## 2023-04-05 PROCEDURE — 92014 COMPRE OPH EXAM EST PT 1/>: CPT | Mod: 25

## 2023-04-05 PROCEDURE — 67028 INJECTION EYE DRUG: CPT

## 2023-04-05 ASSESSMENT — VISUAL ACUITY
OS_CC: 20/150
OD_CC: 20/50+1
OD_PH: 20/40

## 2023-04-05 ASSESSMENT — TONOMETRY
OD_IOP_MMHG: 16
OS_IOP_MMHG: 15

## 2023-04-20 ENCOUNTER — HOSPITAL ENCOUNTER (EMERGENCY)
Age: 78
Discharge: HOME | End: 2023-04-20
Attending: FAMILY MEDICINE
Payer: COMMERCIAL

## 2023-04-20 ENCOUNTER — APPOINTMENT (OUTPATIENT)
Dept: CT IMAGING | Age: 78
End: 2023-04-20
Attending: FAMILY MEDICINE
Payer: COMMERCIAL

## 2023-04-20 VITALS
OXYGEN SATURATION: 97 % | BODY MASS INDEX: 41.12 KG/M2 | RESPIRATION RATE: 17 BRPM | TEMPERATURE: 97.5 F | HEART RATE: 64 BPM | HEIGHT: 63 IN | SYSTOLIC BLOOD PRESSURE: 124 MMHG | DIASTOLIC BLOOD PRESSURE: 70 MMHG | WEIGHT: 232.1 LBS

## 2023-04-20 DIAGNOSIS — S51.811A SKIN TEAR OF FOREARM WITHOUT COMPLICATION, RIGHT, INITIAL ENCOUNTER: ICD-10-CM

## 2023-04-20 DIAGNOSIS — S09.90XA CLOSED HEAD INJURY, INITIAL ENCOUNTER: Primary | ICD-10-CM

## 2023-04-20 DIAGNOSIS — S02.2XXB FRACTURE OF NASAL BONES, INITIAL ENCOUNTER FOR OPEN FRACTURE: ICD-10-CM

## 2023-04-20 PROCEDURE — 74011250636 HC RX REV CODE- 250/636: Performed by: FAMILY MEDICINE

## 2023-04-20 PROCEDURE — 72125 CT NECK SPINE W/O DYE: CPT

## 2023-04-20 PROCEDURE — 74011000250 HC RX REV CODE- 250: Performed by: FAMILY MEDICINE

## 2023-04-20 PROCEDURE — 99284 EMERGENCY DEPT VISIT MOD MDM: CPT

## 2023-04-20 PROCEDURE — 90715 TDAP VACCINE 7 YRS/> IM: CPT | Performed by: FAMILY MEDICINE

## 2023-04-20 PROCEDURE — 75810000293 HC SIMP/SUPERF WND  RPR

## 2023-04-20 PROCEDURE — 70450 CT HEAD/BRAIN W/O DYE: CPT

## 2023-04-20 PROCEDURE — 74011250637 HC RX REV CODE- 250/637: Performed by: FAMILY MEDICINE

## 2023-04-20 PROCEDURE — 90471 IMMUNIZATION ADMIN: CPT

## 2023-04-20 PROCEDURE — 77030018842 HC SOL IRR SOD CL 9% BAXT -A

## 2023-04-20 PROCEDURE — 70486 CT MAXILLOFACIAL W/O DYE: CPT

## 2023-04-20 PROCEDURE — 77030010507 HC ADH SKN DERMBND J&J -B

## 2023-04-20 RX ORDER — CEPHALEXIN 500 MG/1
500 CAPSULE ORAL 3 TIMES DAILY
Qty: 15 CAPSULE | Refills: 0 | Status: SHIPPED | OUTPATIENT
Start: 2023-04-20 | End: 2023-04-25

## 2023-04-20 RX ORDER — CEPHALEXIN 250 MG/1
500 CAPSULE ORAL
Status: COMPLETED | OUTPATIENT
Start: 2023-04-20 | End: 2023-04-20

## 2023-04-20 RX ORDER — BUPIVACAINE HYDROCHLORIDE 5 MG/ML
2 INJECTION, SOLUTION EPIDURAL; INTRACAUDAL ONCE
Status: COMPLETED | OUTPATIENT
Start: 2023-04-20 | End: 2023-04-20

## 2023-04-20 RX ADMIN — BUPIVACAINE HYDROCHLORIDE 10 MG: 5 INJECTION, SOLUTION EPIDURAL; INTRACAUDAL; PERINEURAL at 02:13

## 2023-04-20 RX ADMIN — TETANUS TOXOID, REDUCED DIPHTHERIA TOXOID AND ACELLULAR PERTUSSIS VACCINE, ADSORBED 0.5 ML: 5; 2.5; 8; 8; 2.5 SUSPENSION INTRAMUSCULAR at 02:06

## 2023-04-20 RX ADMIN — CEPHALEXIN 500 MG: 250 CAPSULE ORAL at 03:23

## 2023-04-20 NOTE — ED NOTES
Pt's parker draining cloudy dark blood with small clots. MD informed and to bedside. Urine specimen collected and pt then placed back on continuous bladder irrigation per MD. Pt remains painfree.

## 2023-04-20 NOTE — ED NOTES
Wound care:  Steri stripped skin tear to RFA dressing with a sterile nonadherent dressing and secured with Miraplex. Pt tolerated without incidence.

## 2023-04-20 NOTE — ED PROVIDER NOTES
[address] [place] [address]   EMERGENCY DEPARTMENT          Date: 4/20/2023  Patient: Tony Jeffrey MRN: [id number]  SSN: [id number]    YOB: 1945  Age: 68 y.o. Sex: female      PCP: Cheryle Rud, MD  The patient arrived by ambulance and is accompanied by spouse. History of Presenting Illness     Chief Complaint   Patient presents with    Fall       History Provided By: Patient and Patient's     HPI: Tony Jeffrey is a 68 y.o. female, pmhx A-fib, CHF, adult-onset diabetes, fibromyalgia, PTSD, who presents via ambulance to the ED c/o fall. Patient was at home tonight celebrating her 's birthday. She was getting ready to go to bed and was using a motorized scooter. She attempted to transfer from the scooter, tripped and fell with her head and face landing on a table. There was glass in years on the table which were broken. There is no loss of consciousness. She has had no bleeding or discharge in the years she did have bleeding to the left nostril which is resolved. She has pain in her face, bruising and superficial laceration over the bridge of her nose, pain over both maxillary bones left greater than right. No visual difficulty or eye pain noted. She is hard of hearing which is stable. She has no oral lesions noted. She denies pain in her neck. She has a superficial skin tear to her right forearm laterally. Patient's had no chest pain heaviness or pressure. She has pain in her upper extremities. She has chronic back pain which is unchanged. She has no abdominal pain. No pain in her hips knees or lower extremities. She complains of dry mouth which is been present several days since increasing her diuretics. She also has bilateral pedal edema from chronic ingestive heart failure which is unchanged.       Past History     Past Medical History:   Diagnosis Date    Arthritis     CAD (coronary artery disease)     afib and chf    Congestive heart failure ([facility]     Depression     Diabetes ([facility]     type 2    Fibromyalgia     Gastrointestinal disorder     reflux and gastro paresis    Heart failure ([facility] [address]     Infectious disease 2010    MRSA? in right big toe    Menopause     Psychiatric disorder     PTSD from 911       Past Surgical History:   Procedure Laterality Date    COLONOSCOPY N/A 8/20/2018    COLONOSCOPY performed by Santana Yeung MD at [facility]    HX APPENDECTOMY      HX BREAST BIOPSY Left     STbb  benign    HX COLONOSCOPY  08/20/2018    polyps    HX GI      colonscopy and endo    HX GYN      cyst on right ovary removed    HX HEENT      T&A Bilateral Cataract Surgery with coptic lenses       Family History   Problem Relation Age of Onset    No Known Problems Mother        Social History     Tobacco Use    Smoking status: Former    Smokeless tobacco: Never   Substance Use Topics    Alcohol use: Yes     Alcohol/week: 1.0 standard drink     Types: 1 Glasses of wine per week     Comment: occasionally    Drug use: No       Allergies   Allergen Reactions    Amoxicillin Other (comments)    Augmentin [Amoxicillin-Pot Clavulanate] Other (comments)    Bactrim [Sulfamethoprim] Other (comments)    Clindamycin Other (comments)       Current Outpatient Medications   Medication Sig Dispense Refill    cephALEXin (Keflex) 500 mg capsule Take 1 Capsule by mouth three (3) times daily for 5 days. 15 Capsule 0    insulin degludec Palmira Elza FlexTouch U-100) 100 unit/mL (3 mL) inpn 34 Units by SubCUTAneous route. Cetirizine (ZyrTEC) 10 mg cap Take  by mouth. famotidine (PEPCID) 40 mg tablet Take 40 mg by mouth daily. dapagliflozin propanediol (FARXIGA PO) Take  by mouth. apixaban (ELIQUIS) 5 mg tablet Take 1 Tab by mouth two (2) times a day. 60 Tab 6    atorvastatin (LIPITOR) 10 mg tablet Take 10 mg by mouth daily. sotalol (BETAPACE) 80 mg tablet Take 40 mg by mouth two (2) times a day.       insulin aspart U-100 (NOVOLOG FLEXPEN U-100 INSULIN) 100 unit/mL inpn 1 Units by SubCUTAneous route Before breakfast, lunch, and dinner. Indications: sliging scale 150-200 =1 unit. 1 unit for every 50 on BSFS      pregabalin (LYRICA) 100 mg capsule Take 150 mg by mouth two (2) times a day. Pregabalin Dosing Inst  Indications: one pill inthe am and x3 at hs      omega 3-DHA-EPA-fish oil 1,000 mg (120 mg-180 mg) capsule Take 1 Cap by mouth daily. furosemide (LASIX) 80 mg tablet Take 40 mg by mouth daily. Indications: Edema, patient alternates her dose from 40 to 80mg      azelastine (ASTELIN) 137 mcg (0.1 %) nasal spray 2 Sprays by Both Nostrils route nightly. Use in each nostril as directed (Patient not taking: Reported on 5/9/2022)           Review of Systems     Review of Systems   All other systems reviewed and are negative. Physical Exam     Physical Exam  Vitals and nursing note reviewed. Constitutional:       General: She is not in acute distress. Appearance: She is well-developed. She is obese. She is not ill-appearing, toxic-appearing or diaphoretic. Comments: Patient was uncomfortable appearing   HENT:      Head: Normocephalic. Comments: Nasal bridge with swollen with approximate 1 cm laceration over the bridge. This area is tender and bruised bleeding was controlled prior to arrival.  She had bruises over both axilla. No deformity was noted noted facial bones to palpation. Occlusion appeared to be normal     Right Ear: Tympanic membrane, ear canal and external ear normal.      Left Ear: Tympanic membrane, ear canal and external ear normal.      Nose: Nose normal.      Comments: Dried blood noted in left nostril, no active bleeding noted, no clear discharge noted no septal hematoma noted     Mouth/Throat:      Mouth: Mucous membranes are dry. Eyes:      Extraocular Movements: Extraocular movements intact. Conjunctiva/sclera: Conjunctivae normal.      Pupils: Pupils are equal, round, and reactive to light.    Neck:      Vascular: No JVD.   Cardiovascular:      Rate and Rhythm: Normal rate and regular rhythm. Heart sounds: Normal heart sounds. Heart sounds not distant. No systolic murmur is present. No diastolic murmur is present. Pulmonary:      Effort: Pulmonary effort is normal. No tachypnea, accessory muscle usage or respiratory distress. Breath sounds: Normal breath sounds. Abdominal:      General: Bowel sounds are normal.      Palpations: Abdomen is soft. There is no hepatomegaly or splenomegaly. Tenderness: There is no abdominal tenderness. Musculoskeletal:         General: No signs of injury. Normal range of motion. Cervical back: Normal range of motion and neck supple. No rigidity or tenderness. Right lower leg: No tenderness. Edema present. Left lower leg: No tenderness. Edema present. Comments: Pain to palpation of facial muscles was noted, shoulders good range of motion arms and range of motion elbows and hands and wrists chest wall nontender x-ray with some tenderness throughout patient states is chronic and unchanged, hips are stable and nontender knees are stable and nontender ankles and feet are stable nontender. long bones range of motion without problem. Bilateral 2+ pedal edema left greater than right was noted which patient states is stable. Approximately 3 cm skin tear noted to right forearm, bleeding controlled prior to arrival no underlying bony tenderness was noted. Skin:     General: Skin is warm and dry. Capillary Refill: Capillary refill takes less than 2 seconds. Coloration: Skin is not pale. Findings: No rash. Neurological:      General: No focal deficit present. Mental Status: She is alert and oriented to person, place, and time. Mental status is at baseline. Cranial Nerves: No cranial nerve deficit. Sensory: No sensory deficit. Motor: No weakness.       Coordination: Coordination normal.   Psychiatric:         Mood and Affect: Mood normal.         Behavior: Behavior normal.         Thought Content: Thought content normal.         Judgment: Judgment normal.         Diagnostic Study Results     Labs -   No results found for this or any previous visit (from the past 48 hour(s)). Radiologic Studies -   CT Results  (Last 48 hours)                 04/20/23 0127  CT HEAD WO CONT Final result    Impression:  No acute intracranial process. Mildly displaced nasal fracture. Narrative:  EXAM: CT HEAD WO CONT       INDICATION: fall, head injury, on eliquis       COMPARISON: None. CONTRAST: None. TECHNIQUE: Unenhanced CT of the head was performed using 5 mm images. Brain and   bone windows were generated. Coronal and sagittal reformats. CT dose reduction   was achieved through use of a standardized protocol tailored for this   examination and automatic exposure control for dose modulation. FINDINGS:   The ventricles and sulci are normal in size, shape and configuration. There is   no significant white matter disease. There is no intracranial hemorrhage,   extra-axial collection, or mass effect. The basilar cisterns are open. No CT   evidence of acute infarct. The bone windows demonstrate a mildly splays nasal fracture. . The visualized   portions of the paranasal sinuses and mastoid air cells are clear. 04/20/23 0127  CT SPINE CERV WO CONT Final result    Impression:  No acute fracture or subluxation. Multilevel degenerative changes. Narrative:  EXAM:  CT CERVICAL SPINE WITHOUT CONTRAST       INDICATION: fall, head injury. COMPARISON: None. CONTRAST:  None. TECHNIQUE: Multislice helical CT of the cervical spine was performed without   intravenous contrast administration. Sagittal and coronal reformats were   generated. CT dose reduction was achieved through use of a standardized   protocol tailored for this examination and automatic exposure control for dose   modulation. FINDINGS:       The alignment is within normal limits. There is no fracture or compression   deformity. The odontoid process is intact. The craniocervical junction is within   normal limits. There is multilevel bilateral facet arthropathy and mild   multilevel degenerative disc disease. No significant spinal canal stenosis is   evident. The incidentally imaged soft tissues are within normal limits. 04/20/23 0127  CT MAXILLOFACIAL WO CONT Final result    Impression:  Mildly displaced left nasal bone fracture with overlying soft tissue swelling. Narrative:  EXAM: CT MAXILLOFACIAL WO CONT       INDICATION: fall, nose and maxillary pain, bruising, swelling       COMPARISON: None. CONTRAST:   None. TECHNIQUE:  Multislice helical CT of the facial bones was performed in the axial   plane without intravenous contrast administration. Coronal and sagittal   reformations were generated. CT dose reduction was achieved through use of a   standardized protocol tailored for this examination and automatic exposure   control for dose modulation. FINDINGS:       Bones: There is a mildly displaced left nasal bone fracture with overlying soft   tissue swelling. No other maxillofacial fracture is seen. Paranasal sinuses: Minimal mucoperiosteal thickening. Orbits: The globes, optic nerves, and extraocular muscles are within normal   limits. Miscellaneous: N/A                  CT HEAD WO CONT   Final Result   No acute intracranial process. Mildly displaced nasal fracture. CT SPINE CERV WO CONT   Final Result   No acute fracture or subluxation. Multilevel degenerative changes. CT MAXILLOFACIAL WO CONT   Final Result   Mildly displaced left nasal bone fracture with overlying soft tissue swelling.             Procedures     Wound Closure by Adhesive    Date/Time: 4/20/2023 6:05 AM  Performed by: Maik Norton MD  Authorized by: Maik Norton MD     Consent: Consent obtained:  Verbal    Consent given by:  Patient    Risks, benefits, and alternatives were discussed: yes      Risks discussed:  Infection and pain  Universal protocol:     Procedure explained and questions answered to patient or proxy's satisfaction: yes      Imaging studies available: yes    Anesthesia:     Anesthesia method:  Local infiltration    Local anesthetic:  Bupivacaine 0.5% w/o epi  Laceration details:     Location:  Face    Face location:  Nose    Length (cm):  1    Depth (mm):  0.5  Pre-procedure details:     Preparation:  Imaging obtained to evaluate for foreign bodies and patient was prepped and draped in usual sterile fashion  Exploration:     Limited defect created (wound extended): no      Hemostasis achieved with:  Direct pressure    Imaging obtained: x-ray      Imaging outcome: foreign body not noted      Wound exploration: wound explored through full range of motion      Wound extent: underlying fracture      Wound extent: no foreign bodies/material noted, no nerve damage noted and no vascular damage noted      Contaminated: no    Treatment:     Area cleansed with:  Povidone-iodine    Amount of cleaning:  Standard    Irrigation solution:  Sterile saline    Irrigation volume:  100    Irrigation method:  Syringe    Debridement:  None    Undermining:  None    Scar revision: no    Skin repair:     Repair method:  Tissue adhesive  Approximation:     Approximation:  Close  Repair type:     Repair type:  Simple  Post-procedure details:     Dressing:  Open (no dressing)    Procedure completion:  Tolerated well, no immediate complications      Medical Decision Making     Records Reviewed: Prior medical records and Nursing notes    Vital Signs  Patient Vitals for the past 24 hrs:   Temp Pulse Resp BP SpO2   04/20/23 0345 -- 64 17 124/70 97 %   04/20/23 0224 -- -- 17 127/68 98 %   04/20/23 0040 -- -- -- -- 97 %   04/20/23 0032 97.5 °F (36.4 °C) 69 20 129/72 97 %       Pulse Oximetry Analysis - 97% on RA    I reviewed the vital signs, available nursing notes, past medical history, past surgical history, family history and social history, performed a physical exam and reviewed xrays from this visit    MDM  Number of Diagnoses or Management Options  Closed head injury, initial encounter: new, needed workup  Fracture of nasal bones, initial encounter for open fracture: new, needed workup  Skin tear of forearm without complication, right, initial encounter: new, needed workup     Amount and/or Complexity of Data Reviewed  Tests in the radiology section of CPT®: ordered and reviewed  Review and summarize past medical records: yes    Risk of Complications, Morbidity, and/or Mortality  Presenting problems: moderate  Diagnostic procedures: high  Management options: moderate  General comments: Patient is a 71-year-old female who had slipped and fallen while attempting to transfer from her scooter prior to arrival.  She struck a table with a glass on it and sustained laceration bruising to her face. She also sustained a skin tear to the right arm. On physical exam otherwise vital signs appear to be within normal limits lungs are clear card exam is normal neurologically she was diffusely weak which is her baseline. No focal new neurologic findings. CT of the maxillofacial bones head and neck were obtained revealing presence of DJD of the neck and a nasal fracture without foreign body. The nasal fracture at no point laceration subjectively was an open fracture, this was discussed in detail with the patient and the need for antibiotics were stressed. 2 lacerations were closed with adhesive. Differential included laceration, fracture, open fracture, sprain, strain, bruising, maxillary fracture, periorbital hematoma. Patient Progress  Patient progress: improved        ED Course     Initial assessment performed.  The patients presenting problems have been discussed, and they are in agreement with the care plan formulated and outlined with them. I have encouraged them to ask questions as they arise throughout their visit. ED Course as of 04/20/23 0610   Thu Apr 20, 2023   0147 Cranial CT reviewed results indicate that she has a mildly displaced nasal bone fracture but no intracranial injury or other fractures identified on cranial CT. [PS]   0230 Mildly displaced left nasal bone fracture with overlying soft tissue swelling noted on maxillofacial bones CT, cervical spine without acute fracture or subluxation, multilevel degenerative changes noted. [PS]   0603 Patient was comfortable after Marcaine injection of her nasal bone fracture. We discussed the possibility of infection of the bone as this is an open fracture and patient was placed on antibiotics. We discussed possibility of delayed intracranial bleeding patient is return for any subtle neurologic changes over the next few months. [PS]   0607 Skin tear right arm was cleaned with Betadine, rinsed with saline solution and edges were approximated using Steri-Strips [PS]      ED Course User Index  [PS] Yamilex Ruiz MD        Orders Placed This Encounter    WOUND CLOSURE BY ADHESIVE    CT HEAD WO CONT    CT SPINE CERV WO CONT    CT MAXILLOFACIAL WO CONT    diph,Pertuss(AC),Tet Vac-PF (BOOSTRIX) suspension 0.5 mL    BUPivacaine (PF) (MARCAINE) 0.5 % (5 mg/mL) injection 10 mg    cephALEXin (KEFLEX) capsule 500 mg    cephALEXin (Keflex) 500 mg capsule       MEDICATIONS GIVEN:    Medications   diph,Pertuss(AC),Tet Vac-PF (BOOSTRIX) suspension 0.5 mL (0.5 mL IntraMUSCular Given 4/20/23 0206)   BUPivacaine (PF) (MARCAINE) 0.5 % (5 mg/mL) injection 10 mg (10 mg SubCUTAneous Given by Provider 4/20/23 0213)   cephALEXin (KEFLEX) capsule 500 mg (500 mg Oral Given 4/20/23 0323)         Diagnosis     Clinical Impression:   1. Closed head injury, initial encounter    2. Fracture of nasal bones, initial encounter for open fracture    3.  Skin tear of forearm without complication, right, initial encounter            Disposition     Discharge note:    I have reviewed all pertinent and currently available diagnostic test results for this visit including, but not limited to, labs, xrays, and EKGs. I have reviewed all pertinent and currently available medical records. My plan of care and further evaluation and/or disposition is based on these results, as well as the initial, and subsequent, history and physical exam, as well as any additional complaints during the visit. Pt has been re-examined, appears to be stable states that they are feeling better and have no new complaints. Patient has nontoxic appearance and condition is stable for discharge. medications, x-rays, diagnosis, follow up plan and return instructions have been reviewed and discussed with the patient and/or family. Pt and/or family were instructed on symptoms that may arise after discharge requiring re-evaluation by a physician. Pt and/or family have had the opportunity to ask questions about their care. Patient and/or family verbalized understanding and agreement with care plan, follow up and return instructions. Patient and/or family agree to return if their symptoms are not improving or immediately if they have any change in their condition. I have also put together some discharge instructions for the patient that include: 1) educational information regarding their diagnosis, 2) how to care for their diagnosis at home, as well a 3) list of reasons why they would want to return to the ED prior to their follow-up appointment, should their condition change as well as instructions to return to the ED should sx worsen at any time. Vital signs stable for discharge. PLAN:   Discharge home in stable condition  2.    Discharge Medication List as of 4/20/2023  3:08 AM        START taking these medications    Details   cephALEXin (Keflex) 500 mg capsule Take 1 Capsule by mouth three (3) times daily for 5 days., Normal, Disp-15 Capsule, R-0           CONTINUE these medications which have NOT CHANGED    Details   insulin degludec Nargis Lean FlexTouch U-100) 100 unit/mL (3 mL) inpn 34 Units by SubCUTAneous route., Historical Med      Cetirizine (ZyrTEC) 10 mg cap Take  by mouth., Historical Med      famotidine (PEPCID) 40 mg tablet Take 40 mg by mouth daily. , Historical Med      dapagliflozin propanediol (FARXIGA PO) Take  by mouth., Historical Med      apixaban (ELIQUIS) 5 mg tablet Take 1 Tab by mouth two (2) times a day., Normal, Disp-60 Tab, R-6      atorvastatin (LIPITOR) 10 mg tablet Take 10 mg by mouth daily. , Historical Med      sotalol (BETAPACE) 80 mg tablet Take 40 mg by mouth two (2) times a day., Historical Med      insulin aspart U-100 (NOVOLOG FLEXPEN U-100 INSULIN) 100 unit/mL inpn 1 Units by SubCUTAneous route Before breakfast, lunch, and dinner. Indications: sliging scale 150-200 =1 unit. 1 unit for every 50 on BSFS, Historical Med      pregabalin (LYRICA) 100 mg capsule Take 150 mg by mouth two (2) times a day. Pregabalin Dosing Inst  Indications: one pill inthe am and x3 at hs, Historical Med      omega 3-DHA-EPA-fish oil 1,000 mg (120 mg-180 mg) capsule Take 1 Cap by mouth daily. , Historical Med      furosemide (LASIX) 80 mg tablet Take 40 mg by mouth daily. Indications: Edema, patient alternates her dose from 40 to 80mg, Historical Med      azelastine (ASTELIN) 137 mcg (0.1 %) nasal spray 2 Sprays by Both Nostrils route nightly. Use in each nostril as directed, Historical Med           3. Follow-up Information       Follow up With Specialties Details Why Contact Salvatore Herr MD Internal Medicine Physician In 2 days Follow up todays symptoms. [address] [facility]  [phone]            4. Discharged    Return to ED if worse    Please note, this dictation was completed with [employer] the [employer] voice recognition software.  Quite often unanticipated grammatical, syntax, homophones, and other interpretive errors are inadvertently transcribed by the computer software. Please disregard these errors. Please excuse any errors that have escaped final proof reading.       Archie Pineda MD

## 2023-04-20 NOTE — ED TRIAGE NOTES
Unwitnessed fall 1 hr PTA- pt attempting to transfer to toilet from [facility] (nonambulatory at baseline d/t back issue per pt), pt hit her head and cannot remember if she lost consciousness (hit wooden table, table had glass bottles, some of which broke). PERRL at 3mm. On Eliquis. Per EMS, nose bleed on arrival, hemostatic at this time. Nose is noted to be swollen, with small laceration to bridge, mild ecchymosis under right eye, skin tear to RFA, hemostatic. Per , pt had one glass of wine (if that) in celebration of his birthday this evening.      Unknown last tetanus

## 2023-04-20 NOTE — DISCHARGE INSTRUCTIONS
Ice to face 20 minutes the hour while awake for 1 day then heat, sleep with head of bed elevated. Return for problems as noted above, uncontrolled pain swelling redness or drainage from lacerations. Follow-up wound check with your primary doctor within the next 2 to 3 days. We discussed the fact that nasal bone fractures underneath the laceration is basically an open fracture which patient reports appears to be infections. She can still get infection even if on antibiotics so be wary without symptoms if they recur. There is any new neurologic symptoms within the next 6 months follow-up with ER or MD and discussed possible need for repeat head CT due to being on anticoagulant. Hold Eliquis for 1 day.

## 2023-04-25 ENCOUNTER — TRANSCRIBE ORDERS (OUTPATIENT)
Facility: HOSPITAL | Age: 78
End: 2023-04-25

## 2023-04-25 DIAGNOSIS — R55 SYNCOPE AND COLLAPSE: Primary | ICD-10-CM

## 2023-04-26 ENCOUNTER — HOSPITAL ENCOUNTER (OUTPATIENT)
Dept: ULTRASOUND IMAGING | Age: 78
Discharge: HOME | End: 2023-04-26
Attending: INTERNAL MEDICINE
Payer: COMMERCIAL

## 2023-04-26 DIAGNOSIS — R55 SYNCOPE AND COLLAPSE: ICD-10-CM

## 2023-04-26 PROCEDURE — 93880 EXTRACRANIAL BILAT STUDY: CPT

## 2023-04-27 LAB
LEFT CCA DIST DIAS: 14.4 CM/S
LEFT CCA DIST SYS: 64.4 CM/S
LEFT CCA PROX DIAS: 12.8 CM/S
LEFT CCA PROX SYS: 95.1 CM/S
LEFT ECA DIAS: 7.91 CM/S
LEFT ECA SYS: 122.5 CM/S
LEFT ICA DIST DIAS: 14.4 CM/S
LEFT ICA DIST SYS: 66 CM/S
LEFT ICA MID DIAS: 14.4 CM/S
LEFT ICA MID SYS: 75.7 CM/S
LEFT ICA PROX DIAS: 12.8 CM/S
LEFT ICA PROX SYS: 67.6 CM/S
LEFT ICA/CCA SYS: 1.18 NO UNITS
LEFT VERTEBRAL DIAS: 11.14 CM/S
LEFT VERTEBRAL SYS: 54.7 CM/S
RIGHT CCA DIST DIAS: 12.9 CM/S
RIGHT CCA DIST SYS: 59.9 CM/S
RIGHT CCA PROX DIAS: 9 CM/S
RIGHT CCA PROX SYS: 50.7 CM/S
RIGHT ECA DIAS: 4.68 CM/S
RIGHT ECA SYS: 93.5 CM/S
RIGHT ICA DIST DIAS: 20.8 CM/S
RIGHT ICA DIST SYS: 83.8 CM/S
RIGHT ICA MID DIAS: 17.6 CM/S
RIGHT ICA MID SYS: 67.7 CM/S
RIGHT ICA PROX DIAS: 12.8 CM/S
RIGHT ICA PROX SYS: 66.1 CM/S
RIGHT ICA/CCA SYS: 1.4 NO UNITS
RIGHT VERTEBRAL DIAS: 12.91 CM/S
RIGHT VERTEBRAL SYS: 62.5 CM/S

## 2023-05-03 ENCOUNTER — FOLLOW UP (OUTPATIENT)
Dept: URBAN - METROPOLITAN AREA CLINIC 98 | Facility: CLINIC | Age: 78
End: 2023-05-03

## 2023-05-03 DIAGNOSIS — H43.812: ICD-10-CM

## 2023-05-03 DIAGNOSIS — E11.3513: ICD-10-CM

## 2023-05-03 DIAGNOSIS — H43.13: ICD-10-CM

## 2023-05-03 PROCEDURE — 92134 CPTRZ OPH DX IMG PST SGM RTA: CPT

## 2023-05-03 PROCEDURE — 67028 INJECTION EYE DRUG: CPT

## 2023-05-03 PROCEDURE — 92202 OPSCPY EXTND ON/MAC DRAW: CPT | Mod: 59

## 2023-05-03 PROCEDURE — PF5 LUCENTIS PREFILLED SYRINGE

## 2023-05-03 PROCEDURE — 92014 COMPRE OPH EXAM EST PT 1/>: CPT | Mod: 25

## 2023-05-03 ASSESSMENT — VISUAL ACUITY
OS_SC: 20/100
OS_PH: 20/60-1
OD_SC: 20/30

## 2023-05-03 ASSESSMENT — TONOMETRY
OS_IOP_MMHG: 14
OD_IOP_MMHG: 17

## 2023-06-07 ENCOUNTER — FOLLOW UP (OUTPATIENT)
Dept: URBAN - METROPOLITAN AREA CLINIC 98 | Facility: CLINIC | Age: 78
End: 2023-06-07

## 2023-06-07 DIAGNOSIS — H43.13: ICD-10-CM

## 2023-06-07 DIAGNOSIS — Z79.4: ICD-10-CM

## 2023-06-07 DIAGNOSIS — H43.812: ICD-10-CM

## 2023-06-07 DIAGNOSIS — E11.3551: ICD-10-CM

## 2023-06-07 DIAGNOSIS — H35.373: ICD-10-CM

## 2023-06-07 DIAGNOSIS — E11.3512: ICD-10-CM

## 2023-06-07 DIAGNOSIS — E11.3511: ICD-10-CM

## 2023-06-07 PROCEDURE — 92134 CPTRZ OPH DX IMG PST SGM RTA: CPT

## 2023-06-07 PROCEDURE — 67028 INJECTION EYE DRUG: CPT

## 2023-06-07 PROCEDURE — 92014 COMPRE OPH EXAM EST PT 1/>: CPT | Mod: 25

## 2023-06-07 PROCEDURE — 92202 OPSCPY EXTND ON/MAC DRAW: CPT | Mod: NC

## 2023-06-07 ASSESSMENT — VISUAL ACUITY
OD_SC: 20/40
OS_PH: 20/80+2
OS_SC: 20/100

## 2023-06-07 ASSESSMENT — TONOMETRY
OS_IOP_MMHG: 15
OD_IOP_MMHG: 171

## 2023-07-13 ENCOUNTER — HOSPITAL ENCOUNTER (EMERGENCY)
Facility: HOSPITAL | Age: 78
Discharge: HOME | End: 2023-07-13
Attending: EMERGENCY MEDICINE
Payer: OTHER GOVERNMENT

## 2023-07-13 VITALS
DIASTOLIC BLOOD PRESSURE: 63 MMHG | BODY MASS INDEX: 40.48 KG/M2 | HEART RATE: 71 BPM | OXYGEN SATURATION: 97 % | SYSTOLIC BLOOD PRESSURE: 120 MMHG | WEIGHT: 220 LBS | TEMPERATURE: 98.4 F | HEIGHT: 62 IN | RESPIRATION RATE: 17 BRPM

## 2023-07-13 DIAGNOSIS — R73.9 HYPERGLYCEMIA: Primary | ICD-10-CM

## 2023-07-13 LAB
ALBUMIN SERPL-MCNC: 3.3 G/DL (ref 3.5–5)
ALBUMIN/GLOB SERPL: 0.8 (ref 1.1–2.2)
ALP SERPL-CCNC: 115 U/L (ref 45–117)
ALT SERPL-CCNC: 26 U/L (ref 12–78)
ANION GAP SERPL CALC-SCNC: 11 MMOL/L (ref 5–15)
APPEARANCE UR: CLEAR
AST SERPL-CCNC: 24 U/L (ref 15–37)
BACTERIA URNS QL MICRO: NEGATIVE /HPF
BASOPHILS # BLD: 0.1 K/UL (ref 0–0.1)
BASOPHILS NFR BLD: 1 % (ref 0–1)
BILIRUB SERPL-MCNC: 0.6 MG/DL (ref 0.2–1)
BILIRUB UR QL: NEGATIVE
BUN SERPL-MCNC: 26 MG/DL (ref 6–20)
BUN/CREAT SERPL: 17 (ref 12–20)
CALCIUM SERPL-MCNC: 9.1 MG/DL (ref 8.5–10.1)
CHLORIDE SERPL-SCNC: 100 MMOL/L (ref 97–108)
CO2 SERPL-SCNC: 29 MMOL/L (ref 21–32)
COLOR UR: ABNORMAL
CREAT SERPL-MCNC: 1.49 MG/DL (ref 0.55–1.02)
DIFFERENTIAL METHOD BLD: NORMAL
EOSINOPHIL # BLD: 0.1 K/UL (ref 0–0.4)
EOSINOPHIL NFR BLD: 2 % (ref 0–7)
EPITH CASTS URNS QL MICRO: ABNORMAL /LPF
ERYTHROCYTE [DISTWIDTH] IN BLOOD BY AUTOMATED COUNT: 13.9 % (ref 11.5–14.5)
GLOBULIN SER CALC-MCNC: 4.2 G/DL (ref 2–4)
GLUCOSE BLD STRIP.AUTO-MCNC: 209 MG/DL (ref 65–117)
GLUCOSE BLD STRIP.AUTO-MCNC: 412 MG/DL (ref 65–117)
GLUCOSE SERPL-MCNC: 395 MG/DL (ref 65–100)
GLUCOSE UR STRIP.AUTO-MCNC: >1000 MG/DL
HCT VFR BLD AUTO: 43.6 % (ref 35–47)
HGB BLD-MCNC: 14.4 G/DL (ref 11.5–16)
HGB UR QL STRIP: NEGATIVE
IMM GRANULOCYTES # BLD AUTO: 0 K/UL (ref 0–0.04)
IMM GRANULOCYTES NFR BLD AUTO: 0 % (ref 0–0.5)
KETONES UR QL STRIP.AUTO: NEGATIVE MG/DL
LEUKOCYTE ESTERASE UR QL STRIP.AUTO: NEGATIVE
LYMPHOCYTES # BLD: 1.5 K/UL (ref 0.8–3.5)
LYMPHOCYTES NFR BLD: 26 % (ref 12–49)
MCH RBC QN AUTO: 28.9 PG (ref 26–34)
MCHC RBC AUTO-ENTMCNC: 33 G/DL (ref 30–36.5)
MCV RBC AUTO: 87.4 FL (ref 80–99)
MONOCYTES # BLD: 0.8 K/UL (ref 0–1)
MONOCYTES NFR BLD: 13 % (ref 5–13)
NEUTS SEG # BLD: 3.3 K/UL (ref 1.8–8)
NEUTS SEG NFR BLD: 58 % (ref 32–75)
NITRITE UR QL STRIP.AUTO: NEGATIVE
NRBC # BLD: 0 K/UL (ref 0–0.01)
NRBC BLD-RTO: 0 PER 100 WBC
PH UR STRIP: 6.5 (ref 5–8)
PLATELET # BLD AUTO: 231 K/UL (ref 150–400)
PMV BLD AUTO: 11.1 FL (ref 8.9–12.9)
POTASSIUM SERPL-SCNC: 3.7 MMOL/L (ref 3.5–5.1)
PROT SERPL-MCNC: 7.5 G/DL (ref 6.4–8.2)
PROT UR STRIP-MCNC: NEGATIVE MG/DL
RBC # BLD AUTO: 4.99 M/UL (ref 3.8–5.2)
RBC #/AREA URNS HPF: ABNORMAL /HPF (ref 0–5)
SERVICE CMNT-IMP: ABNORMAL
SERVICE CMNT-IMP: ABNORMAL
SODIUM SERPL-SCNC: 140 MMOL/L (ref 136–145)
SP GR UR REFRACTOMETRY: 1.01 (ref 1–1.03)
URINE CULTURE IF INDICATED: ABNORMAL
UROBILINOGEN UR QL STRIP.AUTO: 0.2 EU/DL (ref 0.2–1)
WBC # BLD AUTO: 5.8 K/UL (ref 3.6–11)
WBC URNS QL MICRO: ABNORMAL /HPF (ref 0–4)

## 2023-07-13 PROCEDURE — 82962 GLUCOSE BLOOD TEST: CPT

## 2023-07-13 PROCEDURE — 81001 URINALYSIS AUTO W/SCOPE: CPT

## 2023-07-13 PROCEDURE — 36415 COLL VENOUS BLD VENIPUNCTURE: CPT

## 2023-07-13 PROCEDURE — 80053 COMPREHEN METABOLIC PANEL: CPT

## 2023-07-13 PROCEDURE — 6370000000 HC RX 637 (ALT 250 FOR IP): Performed by: EMERGENCY MEDICINE

## 2023-07-13 PROCEDURE — 99284 EMERGENCY DEPT VISIT MOD MDM: CPT

## 2023-07-13 PROCEDURE — 85025 COMPLETE CBC W/AUTO DIFF WBC: CPT

## 2023-07-13 RX ADMIN — INSULIN HUMAN 12 UNITS: 100 INJECTION, SOLUTION PARENTERAL at 13:12

## 2023-07-13 ASSESSMENT — LIFESTYLE VARIABLES
HOW MANY STANDARD DRINKS CONTAINING ALCOHOL DO YOU HAVE ON A TYPICAL DAY: PATIENT DOES NOT DRINK
HOW OFTEN DO YOU HAVE A DRINK CONTAINING ALCOHOL: NEVER

## 2023-07-13 ASSESSMENT — PAIN SCALES - GENERAL
PAINLEVEL_OUTOF10: 0
PAINLEVEL_OUTOF10: 0

## 2023-07-13 ASSESSMENT — PAIN - FUNCTIONAL ASSESSMENT: PAIN_FUNCTIONAL_ASSESSMENT: 0-10

## 2023-07-13 NOTE — ED NOTES
D/C instructions provided and reviewed with patient. Opportunity provided for discussion. All questions answered nothing further at this time.        Stiven Hector RN  07/13/23 [id number]

## 2023-07-13 NOTE — ED TRIAGE NOTES
Pt arrived POV with c/o blood sugar \"going up and down\" since Saturday. Pt states blood sugar was 471 approx 30 minutes PTA. Pt reports eating cereal before checking BS and denies any recent illness. Pt states she used insulin pen of 18 units of Novalog PTA as well. Pt's BS = 412 in triage.

## 2023-07-19 ENCOUNTER — FOLLOW UP (OUTPATIENT)
Dept: URBAN - METROPOLITAN AREA CLINIC 98 | Facility: CLINIC | Age: 78
End: 2023-07-19

## 2023-07-19 DIAGNOSIS — E11.3511: ICD-10-CM

## 2023-07-19 DIAGNOSIS — E11.3551: ICD-10-CM

## 2023-07-19 DIAGNOSIS — H43.13: ICD-10-CM

## 2023-07-19 DIAGNOSIS — H35.373: ICD-10-CM

## 2023-07-19 DIAGNOSIS — Z79.4: ICD-10-CM

## 2023-07-19 DIAGNOSIS — E11.3512: ICD-10-CM

## 2023-07-19 DIAGNOSIS — H43.812: ICD-10-CM

## 2023-07-19 PROCEDURE — 92202 OPSCPY EXTND ON/MAC DRAW: CPT | Mod: 59

## 2023-07-19 PROCEDURE — 92014 COMPRE OPH EXAM EST PT 1/>: CPT | Mod: 25

## 2023-07-19 PROCEDURE — 92134 CPTRZ OPH DX IMG PST SGM RTA: CPT

## 2023-07-19 PROCEDURE — 67028 INJECTION EYE DRUG: CPT

## 2023-07-19 ASSESSMENT — VISUAL ACUITY
OS_SC: 20/70
OD_SC: 20/40

## 2023-07-19 ASSESSMENT — TONOMETRY
OD_IOP_MMHG: 16
OS_IOP_MMHG: 14

## 2023-09-06 ENCOUNTER — FOLLOW UP (OUTPATIENT)
Dept: URBAN - METROPOLITAN AREA CLINIC 98 | Facility: CLINIC | Age: 78
End: 2023-09-06

## 2023-09-06 DIAGNOSIS — H43.13: ICD-10-CM

## 2023-09-06 DIAGNOSIS — H35.373: ICD-10-CM

## 2023-09-06 DIAGNOSIS — E11.3551: ICD-10-CM

## 2023-09-06 DIAGNOSIS — E11.3511: ICD-10-CM

## 2023-09-06 DIAGNOSIS — H43.812: ICD-10-CM

## 2023-09-06 DIAGNOSIS — E11.3512: ICD-10-CM

## 2023-09-06 PROCEDURE — 92014 COMPRE OPH EXAM EST PT 1/>: CPT | Mod: 25

## 2023-09-06 PROCEDURE — 67028 INJECTION EYE DRUG: CPT

## 2023-09-06 PROCEDURE — 92134 CPTRZ OPH DX IMG PST SGM RTA: CPT

## 2023-09-06 PROCEDURE — 92202 OPSCPY EXTND ON/MAC DRAW: CPT | Mod: 59

## 2023-09-06 ASSESSMENT — TONOMETRY
OS_IOP_MMHG: 17
OD_IOP_MMHG: 19

## 2023-09-06 ASSESSMENT — VISUAL ACUITY
OD_SC: 20/40-2
OS_SC: 20/70

## 2023-10-18 ENCOUNTER — FOLLOW UP (OUTPATIENT)
Dept: URBAN - METROPOLITAN AREA CLINIC 98 | Facility: CLINIC | Age: 78
End: 2023-10-18

## 2023-10-18 DIAGNOSIS — E11.3511: ICD-10-CM

## 2023-10-18 DIAGNOSIS — Z79.4: ICD-10-CM

## 2023-10-18 DIAGNOSIS — H43.812: ICD-10-CM

## 2023-10-18 DIAGNOSIS — H35.373: ICD-10-CM

## 2023-10-18 DIAGNOSIS — E11.3551: ICD-10-CM

## 2023-10-18 DIAGNOSIS — E11.3512: ICD-10-CM

## 2023-10-18 DIAGNOSIS — H43.13: ICD-10-CM

## 2023-10-18 PROCEDURE — 92134 CPTRZ OPH DX IMG PST SGM RTA: CPT

## 2023-10-18 PROCEDURE — 67028 INJECTION EYE DRUG: CPT

## 2023-10-18 PROCEDURE — 92014 COMPRE OPH EXAM EST PT 1/>: CPT | Mod: 25

## 2023-10-18 PROCEDURE — 92202 OPSCPY EXTND ON/MAC DRAW: CPT | Mod: NC

## 2023-10-18 PROCEDURE — PFS EYLEA PFS: Mod: JZ

## 2023-10-18 ASSESSMENT — VISUAL ACUITY
OD_SC: 20/30-2
OS_SC: 20/70+2

## 2023-10-18 ASSESSMENT — TONOMETRY
OD_IOP_MMHG: 18
OS_IOP_MMHG: 18

## 2023-11-26 ENCOUNTER — APPOINTMENT (OUTPATIENT)
Facility: HOSPITAL | Age: 78
End: 2023-11-26
Payer: OTHER GOVERNMENT

## 2023-11-26 ENCOUNTER — HOSPITAL ENCOUNTER (EMERGENCY)
Facility: HOSPITAL | Age: 78
Discharge: HOME | End: 2023-11-26
Attending: FAMILY MEDICINE | Admitting: FAMILY MEDICINE
Payer: OTHER GOVERNMENT

## 2023-11-26 VITALS
SYSTOLIC BLOOD PRESSURE: 150 MMHG | DIASTOLIC BLOOD PRESSURE: 60 MMHG | BODY MASS INDEX: 38.78 KG/M2 | WEIGHT: 212 LBS | RESPIRATION RATE: 17 BRPM | HEART RATE: 74 BPM | TEMPERATURE: 98.7 F | OXYGEN SATURATION: 98 %

## 2023-11-26 DIAGNOSIS — B34.9 ACUTE VIRAL SYNDROME: Primary | ICD-10-CM

## 2023-11-26 LAB
ALBUMIN SERPL-MCNC: 3.6 G/DL (ref 3.5–5)
ALBUMIN/GLOB SERPL: 0.9 (ref 1.1–2.2)
ALP SERPL-CCNC: 125 U/L (ref 45–117)
ALT SERPL-CCNC: 19 U/L (ref 12–78)
ANION GAP SERPL CALC-SCNC: 14 MMOL/L (ref 5–15)
APPEARANCE UR: CLEAR
AST SERPL-CCNC: 22 U/L (ref 15–37)
BACTERIA URNS QL MICRO: NEGATIVE /HPF
BASOPHILS # BLD: 0.1 K/UL (ref 0–0.1)
BASOPHILS NFR BLD: 1 % (ref 0–1)
BILIRUB SERPL-MCNC: 0.7 MG/DL (ref 0.2–1)
BILIRUB UR QL CFM: NEGATIVE
BUN SERPL-MCNC: 17 MG/DL (ref 6–20)
BUN/CREAT SERPL: 15 (ref 12–20)
CALCIUM SERPL-MCNC: 9.2 MG/DL (ref 8.5–10.1)
CHLORIDE SERPL-SCNC: 102 MMOL/L (ref 97–108)
CO2 SERPL-SCNC: 26 MMOL/L (ref 21–32)
COLOR UR: ABNORMAL
CREAT SERPL-MCNC: 1.11 MG/DL (ref 0.55–1.02)
DIFFERENTIAL METHOD BLD: NORMAL
EOSINOPHIL # BLD: 0.1 K/UL (ref 0–0.4)
EOSINOPHIL NFR BLD: 1 % (ref 0–7)
EPITH CASTS URNS QL MICRO: ABNORMAL /LPF
ERYTHROCYTE [DISTWIDTH] IN BLOOD BY AUTOMATED COUNT: 14 % (ref 11.5–14.5)
FLUAV RNA SPEC QL NAA+PROBE: NOT DETECTED
FLUBV RNA SPEC QL NAA+PROBE: NOT DETECTED
GLOBULIN SER CALC-MCNC: 3.8 G/DL (ref 2–4)
GLUCOSE BLD STRIP.AUTO-MCNC: 105 MG/DL (ref 65–117)
GLUCOSE SERPL-MCNC: 116 MG/DL (ref 65–100)
GLUCOSE UR STRIP.AUTO-MCNC: >1000 MG/DL
HCT VFR BLD AUTO: 38.3 % (ref 35–47)
HGB BLD-MCNC: 12.5 G/DL (ref 11.5–16)
HGB UR QL STRIP: NEGATIVE
IMM GRANULOCYTES # BLD AUTO: 0 K/UL (ref 0–0.04)
IMM GRANULOCYTES NFR BLD AUTO: 0 % (ref 0–0.5)
KETONES UR QL STRIP.AUTO: 40 MG/DL
LEUKOCYTE ESTERASE UR QL STRIP.AUTO: NEGATIVE
LIPASE SERPL-CCNC: 18 U/L (ref 13–75)
LYMPHOCYTES # BLD: 1.5 K/UL (ref 0.8–3.5)
LYMPHOCYTES NFR BLD: 25 % (ref 12–49)
MAGNESIUM SERPL-MCNC: 1.9 MG/DL (ref 1.6–2.4)
MCH RBC QN AUTO: 29.1 PG (ref 26–34)
MCHC RBC AUTO-ENTMCNC: 32.6 G/DL (ref 30–36.5)
MCV RBC AUTO: 89.1 FL (ref 80–99)
MONOCYTES # BLD: 0.7 K/UL (ref 0–1)
MONOCYTES NFR BLD: 11 % (ref 5–13)
NEUTS SEG # BLD: 3.7 K/UL (ref 1.8–8)
NEUTS SEG NFR BLD: 62 % (ref 32–75)
NITRITE UR QL STRIP.AUTO: NEGATIVE
NRBC # BLD: 0 K/UL (ref 0–0.01)
NRBC BLD-RTO: 0 PER 100 WBC
PH UR STRIP: 6.5 (ref 5–8)
PLATELET # BLD AUTO: 237 K/UL (ref 150–400)
PMV BLD AUTO: 11.5 FL (ref 8.9–12.9)
POTASSIUM SERPL-SCNC: 3.5 MMOL/L (ref 3.5–5.1)
PROT SERPL-MCNC: 7.4 G/DL (ref 6.4–8.2)
PROT UR STRIP-MCNC: NEGATIVE MG/DL
RBC # BLD AUTO: 4.3 M/UL (ref 3.8–5.2)
RBC #/AREA URNS HPF: ABNORMAL /HPF (ref 0–5)
SARS-COV-2 RNA RESP QL NAA+PROBE: NOT DETECTED
SERVICE CMNT-IMP: NORMAL
SODIUM SERPL-SCNC: 142 MMOL/L (ref 136–145)
SP GR UR REFRACTOMETRY: 1.01 (ref 1–1.03)
TROPONIN I SERPL HS-MCNC: 11 NG/L (ref 0–51)
URINE CULTURE IF INDICATED: ABNORMAL
UROBILINOGEN UR QL STRIP.AUTO: 0.2 EU/DL (ref 0.2–1)
WBC # BLD AUTO: 6 K/UL (ref 3.6–11)
WBC URNS QL MICRO: ABNORMAL /HPF (ref 0–4)

## 2023-11-26 PROCEDURE — 99285 EMERGENCY DEPT VISIT HI MDM: CPT

## 2023-11-26 PROCEDURE — 36415 COLL VENOUS BLD VENIPUNCTURE: CPT

## 2023-11-26 PROCEDURE — 6360000002 HC RX W HCPCS: Performed by: FAMILY MEDICINE

## 2023-11-26 PROCEDURE — 93005 ELECTROCARDIOGRAM TRACING: CPT | Performed by: FAMILY MEDICINE

## 2023-11-26 PROCEDURE — 87636 SARSCOV2 & INF A&B AMP PRB: CPT

## 2023-11-26 PROCEDURE — 83735 ASSAY OF MAGNESIUM: CPT

## 2023-11-26 PROCEDURE — 96374 THER/PROPH/DIAG INJ IV PUSH: CPT

## 2023-11-26 PROCEDURE — 82962 GLUCOSE BLOOD TEST: CPT

## 2023-11-26 PROCEDURE — 83690 ASSAY OF LIPASE: CPT

## 2023-11-26 PROCEDURE — 71045 X-RAY EXAM CHEST 1 VIEW: CPT

## 2023-11-26 PROCEDURE — 94761 N-INVAS EAR/PLS OXIMETRY MLT: CPT

## 2023-11-26 PROCEDURE — 85025 COMPLETE CBC W/AUTO DIFF WBC: CPT

## 2023-11-26 PROCEDURE — 81001 URINALYSIS AUTO W/SCOPE: CPT

## 2023-11-26 PROCEDURE — 80053 COMPREHEN METABOLIC PANEL: CPT

## 2023-11-26 PROCEDURE — 84484 ASSAY OF TROPONIN QUANT: CPT

## 2023-11-26 RX ORDER — ONDANSETRON 4 MG/1
4 TABLET, ORALLY DISINTEGRATING ORAL 3 TIMES DAILY PRN
Qty: 21 TABLET | Refills: 0 | Status: SHIPPED | OUTPATIENT
Start: 2023-11-26

## 2023-11-26 RX ORDER — PREGABALIN 75 MG/1
150 CAPSULE ORAL 2 TIMES DAILY
Qty: 120 CAPSULE | Refills: 0 | Status: SHIPPED | OUTPATIENT
Start: 2023-11-26 | End: 2023-12-26

## 2023-11-26 RX ORDER — ONDANSETRON 2 MG/ML
4 INJECTION INTRAMUSCULAR; INTRAVENOUS ONCE
Status: COMPLETED | OUTPATIENT
Start: 2023-11-26 | End: 2023-11-26

## 2023-11-26 RX ADMIN — ONDANSETRON 4 MG: 2 INJECTION INTRAMUSCULAR; INTRAVENOUS at 14:19

## 2023-11-26 ASSESSMENT — PAIN - FUNCTIONAL ASSESSMENT: PAIN_FUNCTIONAL_ASSESSMENT: NONE - DENIES PAIN

## 2023-11-26 NOTE — ED NOTES
Written and verbal discharge instructions reviewed with patient. All discharge medications reviewed and explained. Understanding verbalized, all questions answered. Ambulated out, gait steady.        Ninoska Duvall RN  11/26/23 [id number]

## 2023-11-26 NOTE — DISCHARGE INSTRUCTIONS
--Pregabalin 150 mg twice daily as you have been taking. --Ondansetron 4 mg every 6 hours as needed for nausea. --Follow up with your doctor or NP this week.

## 2023-11-26 NOTE — ED TRIAGE NOTES
N/v staring yesterday morning with increased SOB and pedal edema and intermittent left sided chest pain.  Skin warm and dry , no pain at this time

## 2023-11-28 LAB
EKG ATRIAL RATE: 394 BPM
EKG DIAGNOSIS: NORMAL
EKG Q-T INTERVAL: 422 MS
EKG QRS DURATION: 78 MS
EKG QTC CALCULATION (BAZETT): 474 MS
EKG R AXIS: 72 DEGREES
EKG T AXIS: 179 DEGREES
EKG VENTRICULAR RATE: 76 BPM

## 2023-12-13 ENCOUNTER — FOLLOW UP (OUTPATIENT)
Dept: URBAN - METROPOLITAN AREA CLINIC 98 | Facility: CLINIC | Age: 78
End: 2023-12-13

## 2023-12-13 DIAGNOSIS — E11.3511: ICD-10-CM

## 2023-12-13 DIAGNOSIS — E11.3512: ICD-10-CM

## 2023-12-13 DIAGNOSIS — Z79.4: ICD-10-CM

## 2023-12-13 DIAGNOSIS — E11.3551: ICD-10-CM

## 2023-12-13 DIAGNOSIS — H35.373: ICD-10-CM

## 2023-12-13 DIAGNOSIS — H43.812: ICD-10-CM

## 2023-12-13 DIAGNOSIS — H43.13: ICD-10-CM

## 2023-12-13 PROCEDURE — 92014 COMPRE OPH EXAM EST PT 1/>: CPT

## 2023-12-13 PROCEDURE — 92202 OPSCPY EXTND ON/MAC DRAW: CPT

## 2023-12-13 PROCEDURE — 92134 CPTRZ OPH DX IMG PST SGM RTA: CPT

## 2023-12-13 ASSESSMENT — VISUAL ACUITY
OS_SC: 20/100
OD_SC: 20/30-1
OS_PH: 20/80

## 2023-12-13 ASSESSMENT — TONOMETRY
OS_IOP_MMHG: 18
OD_IOP_MMHG: 16

## 2024-02-20 ENCOUNTER — FOLLOW UP (OUTPATIENT)
Dept: URBAN - METROPOLITAN AREA CLINIC 98 | Facility: CLINIC | Age: 79
End: 2024-02-20

## 2024-02-20 DIAGNOSIS — E11.3551: ICD-10-CM

## 2024-02-20 DIAGNOSIS — H43.13: ICD-10-CM

## 2024-02-20 DIAGNOSIS — H35.373: ICD-10-CM

## 2024-02-20 DIAGNOSIS — E11.3512: ICD-10-CM

## 2024-02-20 DIAGNOSIS — H43.812: ICD-10-CM

## 2024-02-20 DIAGNOSIS — Z79.4: ICD-10-CM

## 2024-02-20 DIAGNOSIS — E11.3511: ICD-10-CM

## 2024-02-20 PROCEDURE — 92014 COMPRE OPH EXAM EST PT 1/>: CPT

## 2024-02-20 PROCEDURE — 92134 CPTRZ OPH DX IMG PST SGM RTA: CPT

## 2024-02-20 PROCEDURE — 92202 OPSCPY EXTND ON/MAC DRAW: CPT

## 2024-02-20 ASSESSMENT — VISUAL ACUITY
OD_SC: 20/40+2
OS_SC: 20/100
OS_PH: 20/70-1

## 2024-02-20 ASSESSMENT — TONOMETRY
OS_IOP_MMHG: 16
OD_IOP_MMHG: 15

## 2024-05-21 ENCOUNTER — FOLLOW UP (OUTPATIENT)
Dept: URBAN - METROPOLITAN AREA CLINIC 98 | Facility: CLINIC | Age: 79
End: 2024-05-21

## 2024-05-21 DIAGNOSIS — H35.373: ICD-10-CM

## 2024-05-21 DIAGNOSIS — H43.812: ICD-10-CM

## 2024-05-21 DIAGNOSIS — E11.3512: ICD-10-CM

## 2024-05-21 DIAGNOSIS — H43.13: ICD-10-CM

## 2024-05-21 DIAGNOSIS — E11.3511: ICD-10-CM

## 2024-05-21 DIAGNOSIS — Z79.4: ICD-10-CM

## 2024-05-21 DIAGNOSIS — E11.3551: ICD-10-CM

## 2024-05-21 PROCEDURE — 92202 OPSCPY EXTND ON/MAC DRAW: CPT

## 2024-05-21 PROCEDURE — 92014 COMPRE OPH EXAM EST PT 1/>: CPT

## 2024-05-21 PROCEDURE — 92134 CPTRZ OPH DX IMG PST SGM RTA: CPT

## 2024-05-21 ASSESSMENT — VISUAL ACUITY
OD_SC: 20/30-2
OS_SC: 20/100+2
OD_PH: 20/25
OS_PH: 20/60-2

## 2024-05-21 ASSESSMENT — TONOMETRY
OD_IOP_MMHG: 20
OS_IOP_MMHG: 15

## 2024-06-21 ENCOUNTER — HOSPITAL ENCOUNTER (OUTPATIENT)
Facility: HOSPITAL | Age: 79
End: 2024-06-21
Payer: MEDICARE

## 2024-06-21 LAB
ANION GAP SERPL CALC-SCNC: 8 MMOL/L (ref 5–15)
BASOPHILS # BLD: 0 K/UL (ref 0–0.1)
BASOPHILS NFR BLD: 1 % (ref 0–1)
BUN SERPL-MCNC: 25 MG/DL (ref 6–20)
BUN/CREAT SERPL: 18 (ref 12–20)
CALCIUM SERPL-MCNC: 8.6 MG/DL (ref 8.5–10.1)
CHLORIDE SERPL-SCNC: 102 MMOL/L (ref 97–108)
CO2 SERPL-SCNC: 31 MMOL/L (ref 21–32)
CREAT SERPL-MCNC: 1.37 MG/DL (ref 0.55–1.02)
DIFFERENTIAL METHOD BLD: ABNORMAL
EOSINOPHIL # BLD: 0.1 K/UL (ref 0–0.4)
EOSINOPHIL NFR BLD: 1 % (ref 0–7)
ERYTHROCYTE [DISTWIDTH] IN BLOOD BY AUTOMATED COUNT: 16.2 % (ref 11.5–14.5)
GLUCOSE SERPL-MCNC: 177 MG/DL (ref 65–100)
HCT VFR BLD AUTO: 37.9 % (ref 35–47)
HGB BLD-MCNC: 12.4 G/DL (ref 11.5–16)
IMM GRANULOCYTES # BLD AUTO: 0 K/UL (ref 0–0.04)
IMM GRANULOCYTES NFR BLD AUTO: 0 % (ref 0–0.5)
LYMPHOCYTES # BLD: 1.4 K/UL (ref 0.8–3.5)
LYMPHOCYTES NFR BLD: 26 % (ref 12–49)
MAGNESIUM SERPL-MCNC: 2 MG/DL (ref 1.6–2.4)
MCH RBC QN AUTO: 27.1 PG (ref 26–34)
MCHC RBC AUTO-ENTMCNC: 32.7 G/DL (ref 30–36.5)
MCV RBC AUTO: 82.8 FL (ref 80–99)
MONOCYTES # BLD: 0.7 K/UL (ref 0–1)
MONOCYTES NFR BLD: 13 % (ref 5–13)
NEUTS SEG # BLD: 3.2 K/UL (ref 1.8–8)
NEUTS SEG NFR BLD: 59 % (ref 32–75)
NRBC # BLD: 0 K/UL (ref 0–0.01)
NRBC BLD-RTO: 0 PER 100 WBC
PLATELET # BLD AUTO: 204 K/UL (ref 150–400)
PMV BLD AUTO: 10.8 FL (ref 8.9–12.9)
POTASSIUM SERPL-SCNC: 3.7 MMOL/L (ref 3.5–5.1)
RBC # BLD AUTO: 4.58 M/UL (ref 3.8–5.2)
SODIUM SERPL-SCNC: 141 MMOL/L (ref 136–145)
WBC # BLD AUTO: 5.4 K/UL (ref 3.6–11)

## 2024-06-21 PROCEDURE — 85025 COMPLETE CBC W/AUTO DIFF WBC: CPT

## 2024-06-21 PROCEDURE — 36415 COLL VENOUS BLD VENIPUNCTURE: CPT

## 2024-06-21 PROCEDURE — 83735 ASSAY OF MAGNESIUM: CPT

## 2024-06-21 PROCEDURE — 80048 BASIC METABOLIC PNL TOTAL CA: CPT

## 2024-07-01 ENCOUNTER — APPOINTMENT (OUTPATIENT)
Facility: HOSPITAL | Age: 79
End: 2024-07-01
Payer: OTHER GOVERNMENT

## 2024-07-01 ENCOUNTER — HOSPITAL ENCOUNTER (EMERGENCY)
Facility: HOSPITAL | Age: 79
Discharge: HOME | End: 2024-07-01
Attending: EMERGENCY MEDICINE
Payer: OTHER GOVERNMENT

## 2024-07-01 VITALS
OXYGEN SATURATION: 95 % | HEIGHT: 62 IN | HEART RATE: 70 BPM | TEMPERATURE: 98.2 F | WEIGHT: 233 LBS | RESPIRATION RATE: 16 BRPM | BODY MASS INDEX: 42.88 KG/M2 | DIASTOLIC BLOOD PRESSURE: 76 MMHG | SYSTOLIC BLOOD PRESSURE: 128 MMHG

## 2024-07-01 DIAGNOSIS — J18.9 COMMUNITY ACQUIRED PNEUMONIA OF LEFT LUNG, UNSPECIFIED PART OF LUNG: Primary | ICD-10-CM

## 2024-07-01 DIAGNOSIS — R07.9 CHEST PAIN, UNSPECIFIED TYPE: ICD-10-CM

## 2024-07-01 DIAGNOSIS — R42 LIGHTHEADED: ICD-10-CM

## 2024-07-01 LAB
ALBUMIN SERPL-MCNC: 3.5 G/DL (ref 3.5–5)
ALBUMIN/GLOB SERPL: 0.9 (ref 1.1–2.2)
ALP SERPL-CCNC: 172 U/L (ref 45–117)
ALT SERPL-CCNC: 18 U/L (ref 12–78)
ANION GAP SERPL CALC-SCNC: 12 MMOL/L (ref 5–15)
APPEARANCE UR: CLEAR
AST SERPL-CCNC: 25 U/L (ref 15–37)
BACTERIA URNS QL MICRO: NEGATIVE /HPF
BASOPHILS # BLD: 0 K/UL (ref 0–0.1)
BASOPHILS NFR BLD: 1 % (ref 0–1)
BILIRUB SERPL-MCNC: 0.8 MG/DL (ref 0.2–1)
BILIRUB UR QL: NEGATIVE
BUN SERPL-MCNC: 20 MG/DL (ref 6–20)
BUN/CREAT SERPL: 18 (ref 12–20)
CALCIUM SERPL-MCNC: 9 MG/DL (ref 8.5–10.1)
CHLORIDE SERPL-SCNC: 101 MMOL/L (ref 97–108)
CO2 SERPL-SCNC: 27 MMOL/L (ref 21–32)
COLOR UR: ABNORMAL
CREAT SERPL-MCNC: 1.13 MG/DL (ref 0.55–1.02)
DIFFERENTIAL METHOD BLD: ABNORMAL
EOSINOPHIL # BLD: 0.1 K/UL (ref 0–0.4)
EOSINOPHIL NFR BLD: 1 % (ref 0–7)
EPITH CASTS URNS QL MICRO: ABNORMAL /LPF
ERYTHROCYTE [DISTWIDTH] IN BLOOD BY AUTOMATED COUNT: 16.2 % (ref 11.5–14.5)
FLUAV RNA SPEC QL NAA+PROBE: NOT DETECTED
FLUBV RNA SPEC QL NAA+PROBE: NOT DETECTED
GLOBULIN SER CALC-MCNC: 3.8 G/DL (ref 2–4)
GLUCOSE SERPL-MCNC: 165 MG/DL (ref 65–100)
GLUCOSE UR STRIP.AUTO-MCNC: >1000 MG/DL
HCT VFR BLD AUTO: 40.6 % (ref 35–47)
HGB BLD-MCNC: 13.2 G/DL (ref 11.5–16)
HGB UR QL STRIP: NEGATIVE
IMM GRANULOCYTES # BLD AUTO: 0 K/UL (ref 0–0.04)
IMM GRANULOCYTES NFR BLD AUTO: 0 % (ref 0–0.5)
KETONES UR QL STRIP.AUTO: 15 MG/DL
LEUKOCYTE ESTERASE UR QL STRIP.AUTO: NEGATIVE
LYMPHOCYTES # BLD: 1.4 K/UL (ref 0.8–3.5)
LYMPHOCYTES NFR BLD: 22 % (ref 12–49)
MCH RBC QN AUTO: 27.1 PG (ref 26–34)
MCHC RBC AUTO-ENTMCNC: 32.5 G/DL (ref 30–36.5)
MCV RBC AUTO: 83.4 FL (ref 80–99)
MONOCYTES # BLD: 0.7 K/UL (ref 0–1)
MONOCYTES NFR BLD: 10 % (ref 5–13)
NEUTS SEG # BLD: 4.3 K/UL (ref 1.8–8)
NEUTS SEG NFR BLD: 66 % (ref 32–75)
NITRITE UR QL STRIP.AUTO: NEGATIVE
NRBC # BLD: 0 K/UL (ref 0–0.01)
NRBC BLD-RTO: 0 PER 100 WBC
PH UR STRIP: 6 (ref 5–8)
PLATELET # BLD AUTO: 209 K/UL (ref 150–400)
PMV BLD AUTO: 11.4 FL (ref 8.9–12.9)
POTASSIUM SERPL-SCNC: 4.8 MMOL/L (ref 3.5–5.1)
PROT SERPL-MCNC: 7.3 G/DL (ref 6.4–8.2)
PROT UR STRIP-MCNC: NEGATIVE MG/DL
RBC # BLD AUTO: 4.87 M/UL (ref 3.8–5.2)
RBC #/AREA URNS HPF: ABNORMAL /HPF (ref 0–5)
SARS-COV-2 RNA RESP QL NAA+PROBE: NOT DETECTED
SODIUM SERPL-SCNC: 140 MMOL/L (ref 136–145)
SP GR UR REFRACTOMETRY: 1.01 (ref 1–1.03)
TROPONIN I SERPL HS-MCNC: 8 NG/L (ref 0–51)
TROPONIN I SERPL HS-MCNC: 9 NG/L (ref 0–51)
URINE CULTURE IF INDICATED: ABNORMAL
UROBILINOGEN UR QL STRIP.AUTO: 0.2 EU/DL (ref 0.2–1)
WBC # BLD AUTO: 6.5 K/UL (ref 3.6–11)
WBC URNS QL MICRO: ABNORMAL /HPF (ref 0–4)

## 2024-07-01 PROCEDURE — 36415 COLL VENOUS BLD VENIPUNCTURE: CPT

## 2024-07-01 PROCEDURE — 87636 SARSCOV2 & INF A&B AMP PRB: CPT

## 2024-07-01 PROCEDURE — 93005 ELECTROCARDIOGRAM TRACING: CPT | Performed by: EMERGENCY MEDICINE

## 2024-07-01 PROCEDURE — 81001 URINALYSIS AUTO W/SCOPE: CPT

## 2024-07-01 PROCEDURE — 71045 X-RAY EXAM CHEST 1 VIEW: CPT

## 2024-07-01 PROCEDURE — 84484 ASSAY OF TROPONIN QUANT: CPT

## 2024-07-01 PROCEDURE — 85025 COMPLETE CBC W/AUTO DIFF WBC: CPT

## 2024-07-01 PROCEDURE — 80053 COMPREHEN METABOLIC PANEL: CPT

## 2024-07-01 PROCEDURE — 99285 EMERGENCY DEPT VISIT HI MDM: CPT

## 2024-07-01 RX ORDER — AZITHROMYCIN 250 MG/1
TABLET, FILM COATED ORAL
Qty: 6 TABLET | Refills: 0 | Status: SHIPPED | OUTPATIENT
Start: 2024-07-01 | End: 2024-07-11

## 2024-07-01 RX ORDER — CEFDINIR 300 MG/1
300 CAPSULE ORAL 2 TIMES DAILY
Qty: 10 CAPSULE | Refills: 0 | Status: SHIPPED | OUTPATIENT
Start: 2024-07-01 | End: 2024-07-06

## 2024-07-01 ASSESSMENT — PAIN SCALES - GENERAL: PAINLEVEL_OUTOF10: 2

## 2024-07-01 ASSESSMENT — PAIN - FUNCTIONAL ASSESSMENT
PAIN_FUNCTIONAL_ASSESSMENT: 0-10
PAIN_FUNCTIONAL_ASSESSMENT: NONE - DENIES PAIN

## 2024-07-01 NOTE — ED TRIAGE NOTES
Pt presents via EMS for left sided chest pain x 1 day that is worse with movement and cough. Pt also reports generalized weakness x 3 days. Pt was given 324mg of aspirin PTA. Pt has hx of afib and will have an ablation later this month. Pt also has CHF and reports increased swelling to bilateral LE and shortness of breath that started this morning.

## 2024-07-01 NOTE — ED NOTES
reports that she was unsuccessful at collecting blood via straight stick x 2 attempts.  reports that she is going to get another  to attempt specimen collection

## 2024-07-04 LAB
EKG ATRIAL RATE: 66 BPM
EKG DIAGNOSIS: NORMAL
EKG Q-T INTERVAL: 472 MS
EKG QRS DURATION: 114 MS
EKG QTC CALCULATION (BAZETT): 509 MS
EKG R AXIS: -88 DEGREES
EKG T AXIS: 85 DEGREES
EKG VENTRICULAR RATE: 70 BPM

## 2024-07-04 NOTE — ED PROVIDER NOTES
EMERGENCY DEPARTMENT HISTORY AND PHYSICAL EXAM      Date: 7/1/2024  Patient Name: Rosita Morillo    History of Presenting Illness     Chief Complaint   Patient presents with    Chest Pain    Fatigue       History Provided By: Patient    HPI: Rosita Morillo, 79 y.o. female with PMHx as noted below presetns to the ED wioht cc of CP.  Reperots some left sided chest pain, fatigue and productive cough for  3 days along with some SOB    Pt denies any other alleviating or exacerbating factors. Additionally, pt specifically denies any recent fever, chills, headache, nausea, vomiting, abdominal pain,B, lightheadedness, dizziness, numbness, weakness, heart palpitations, urinary sxs, diarrhea, constipation, melena, hematochezia, cough, or congestion.    PCP: None, None    No current facility-administered medications for this encounter.     Current Outpatient Medications   Medication Sig Dispense Refill    cefdinir (OMNICEF) 300 MG capsule Take 1 capsule by mouth 2 times daily for 5 days 10 capsule 0    azithromycin (ZITHROMAX) 250 MG tablet 500mg on day 1 followed by 250mg on days 2 - 5 6 tablet 0    pregabalin (LYRICA) 75 MG capsule Take 2 capsules by mouth 2 times daily for 30 days. Max Daily Amount: 300 mg 120 capsule 0    ondansetron (ZOFRAN-ODT) 4 MG disintegrating tablet Take 1 tablet by mouth 3 times daily as needed for Nausea or Vomiting 21 tablet 0    apixaban (ELIQUIS) 5 MG TABS tablet Take 5 mg by mouth 2 times daily      atorvastatin (LIPITOR) 10 MG tablet Take 10 mg by mouth daily      azelastine (ASTELIN) 0.1 % nasal spray 2 sprays by Nasal route      Cetirizine HCl (ZYRTEC ALLERGY) 10 MG CAPS Take by mouth      famotidine (PEPCID) 40 MG tablet Take 40 mg by mouth daily      furosemide (LASIX) 80 MG tablet Take 40 mg by mouth daily      insulin aspart (NOVOLOG) 100 UNIT/ML injection pen Inject 1 Units into the skin 3 times daily (before meals)      Insulin Degludec (TRESIBA FLEXTOUCH) 100 UNIT/ML SOPN Inject 34  recognition software.  Quite often unanticipated grammatical, syntax, homophones, and other interpretive errors are inadvertently transcribed by the computer software.  Please disregard these errors.  Additionally, please excuse any errors that have escaped final proofreading.          Edgard Duarte MD  07/03/24 9288

## 2024-07-06 ENCOUNTER — HOSPITAL ENCOUNTER (EMERGENCY)
Facility: HOSPITAL | Age: 79
Discharge: HOME | End: 2024-07-06
Attending: EMERGENCY MEDICINE
Payer: OTHER GOVERNMENT

## 2024-07-06 ENCOUNTER — APPOINTMENT (OUTPATIENT)
Facility: HOSPITAL | Age: 79
End: 2024-07-06
Payer: OTHER GOVERNMENT

## 2024-07-06 VITALS
WEIGHT: 233 LBS | RESPIRATION RATE: 21 BRPM | TEMPERATURE: 98.6 F | OXYGEN SATURATION: 95 % | BODY MASS INDEX: 42.88 KG/M2 | HEIGHT: 62 IN | HEART RATE: 70 BPM | DIASTOLIC BLOOD PRESSURE: 64 MMHG | SYSTOLIC BLOOD PRESSURE: 133 MMHG

## 2024-07-06 DIAGNOSIS — G62.9 NEUROPATHY: ICD-10-CM

## 2024-07-06 DIAGNOSIS — R07.9 CHEST PAIN, UNSPECIFIED TYPE: Primary | ICD-10-CM

## 2024-07-06 LAB
ALBUMIN SERPL-MCNC: 3.6 G/DL (ref 3.5–5)
ALBUMIN/GLOB SERPL: 0.7 (ref 1.1–2.2)
ALP SERPL-CCNC: 156 U/L (ref 45–117)
ALT SERPL-CCNC: 20 U/L (ref 12–78)
ANION GAP SERPL CALC-SCNC: 12 MMOL/L (ref 5–15)
APPEARANCE UR: CLEAR
AST SERPL-CCNC: ABNORMAL U/L (ref 15–37)
BACTERIA URNS QL MICRO: NEGATIVE /HPF
BASOPHILS # BLD: 0.1 K/UL (ref 0–0.1)
BASOPHILS NFR BLD: 1 % (ref 0–1)
BILIRUB SERPL-MCNC: 1 MG/DL (ref 0.2–1)
BILIRUB UR QL: NEGATIVE
BUN SERPL-MCNC: 22 MG/DL (ref 6–20)
BUN/CREAT SERPL: 18 (ref 12–20)
CALCIUM SERPL-MCNC: 9.2 MG/DL (ref 8.5–10.1)
CHLORIDE SERPL-SCNC: 100 MMOL/L (ref 97–108)
CO2 SERPL-SCNC: 27 MMOL/L (ref 21–32)
COLOR UR: ABNORMAL
CREAT SERPL-MCNC: 1.22 MG/DL (ref 0.55–1.02)
DIFFERENTIAL METHOD BLD: ABNORMAL
EOSINOPHIL # BLD: 0.1 K/UL (ref 0–0.4)
EOSINOPHIL NFR BLD: 1 % (ref 0–7)
EPITH CASTS URNS QL MICRO: ABNORMAL /LPF
ERYTHROCYTE [DISTWIDTH] IN BLOOD BY AUTOMATED COUNT: 16.2 % (ref 11.5–14.5)
GLOBULIN SER CALC-MCNC: 5 G/DL (ref 2–4)
GLUCOSE SERPL-MCNC: 199 MG/DL (ref 65–100)
GLUCOSE UR STRIP.AUTO-MCNC: >1000 MG/DL
HCT VFR BLD AUTO: 41.3 % (ref 35–47)
HGB BLD-MCNC: 13.6 G/DL (ref 11.5–16)
HGB UR QL STRIP: NEGATIVE
IMM GRANULOCYTES # BLD AUTO: 0 K/UL (ref 0–0.04)
IMM GRANULOCYTES NFR BLD AUTO: 0 % (ref 0–0.5)
KETONES UR QL STRIP.AUTO: ABNORMAL MG/DL
LEUKOCYTE ESTERASE UR QL STRIP.AUTO: NEGATIVE
LYMPHOCYTES # BLD: 1.5 K/UL (ref 0.8–3.5)
LYMPHOCYTES NFR BLD: 26 % (ref 12–49)
MCH RBC QN AUTO: 26.6 PG (ref 26–34)
MCHC RBC AUTO-ENTMCNC: 32.9 G/DL (ref 30–36.5)
MCV RBC AUTO: 80.8 FL (ref 80–99)
MONOCYTES # BLD: 0.5 K/UL (ref 0–1)
MONOCYTES NFR BLD: 9 % (ref 5–13)
NEUTS SEG # BLD: 3.8 K/UL (ref 1.8–8)
NEUTS SEG NFR BLD: 63 % (ref 32–75)
NITRITE UR QL STRIP.AUTO: NEGATIVE
NRBC # BLD: 0 K/UL (ref 0–0.01)
NRBC BLD-RTO: 0 PER 100 WBC
NT PRO BNP: 1706 PG/ML (ref 0–450)
PH UR STRIP: 7 (ref 5–8)
PLATELET # BLD AUTO: 223 K/UL (ref 150–400)
PMV BLD AUTO: 11.3 FL (ref 8.9–12.9)
POTASSIUM SERPL-SCNC: ABNORMAL MMOL/L (ref 3.5–5.1)
PROT SERPL-MCNC: 8.6 G/DL (ref 6.4–8.2)
PROT UR STRIP-MCNC: NEGATIVE MG/DL
RBC # BLD AUTO: 5.11 M/UL (ref 3.8–5.2)
RBC #/AREA URNS HPF: ABNORMAL /HPF (ref 0–5)
SODIUM SERPL-SCNC: 139 MMOL/L (ref 136–145)
SP GR UR REFRACTOMETRY: 1.01 (ref 1–1.03)
TROPONIN I SERPL HS-MCNC: 10 NG/L (ref 0–51)
TROPONIN I SERPL HS-MCNC: 10 NG/L (ref 0–51)
URINE CULTURE IF INDICATED: ABNORMAL
UROBILINOGEN UR QL STRIP.AUTO: 0.2 EU/DL (ref 0.2–1)
WBC # BLD AUTO: 5.9 K/UL (ref 3.6–11)
WBC URNS QL MICRO: ABNORMAL /HPF (ref 0–4)

## 2024-07-06 PROCEDURE — 87040 BLOOD CULTURE FOR BACTERIA: CPT

## 2024-07-06 PROCEDURE — 93005 ELECTROCARDIOGRAM TRACING: CPT | Performed by: EMERGENCY MEDICINE

## 2024-07-06 PROCEDURE — 96374 THER/PROPH/DIAG INJ IV PUSH: CPT

## 2024-07-06 PROCEDURE — 99285 EMERGENCY DEPT VISIT HI MDM: CPT

## 2024-07-06 PROCEDURE — 6370000000 HC RX 637 (ALT 250 FOR IP): Performed by: EMERGENCY MEDICINE

## 2024-07-06 PROCEDURE — 71045 X-RAY EXAM CHEST 1 VIEW: CPT

## 2024-07-06 PROCEDURE — 85025 COMPLETE CBC W/AUTO DIFF WBC: CPT

## 2024-07-06 PROCEDURE — 36415 COLL VENOUS BLD VENIPUNCTURE: CPT

## 2024-07-06 PROCEDURE — 6360000002 HC RX W HCPCS: Performed by: EMERGENCY MEDICINE

## 2024-07-06 PROCEDURE — 84484 ASSAY OF TROPONIN QUANT: CPT

## 2024-07-06 PROCEDURE — 80053 COMPREHEN METABOLIC PANEL: CPT

## 2024-07-06 PROCEDURE — 81001 URINALYSIS AUTO W/SCOPE: CPT

## 2024-07-06 PROCEDURE — 83880 ASSAY OF NATRIURETIC PEPTIDE: CPT

## 2024-07-06 RX ORDER — FUROSEMIDE 10 MG/ML
20 INJECTION INTRAMUSCULAR; INTRAVENOUS
Status: COMPLETED | OUTPATIENT
Start: 2024-07-06 | End: 2024-07-06

## 2024-07-06 RX ORDER — PREGABALIN 150 MG/1
150 CAPSULE ORAL 2 TIMES DAILY
Qty: 60 CAPSULE | Refills: 0 | Status: SHIPPED | OUTPATIENT
Start: 2024-07-06 | End: 2024-08-05

## 2024-07-06 RX ORDER — PREGABALIN 100 MG/1
100 CAPSULE ORAL ONCE
Status: COMPLETED | OUTPATIENT
Start: 2024-07-06 | End: 2024-07-06

## 2024-07-06 RX ADMIN — FUROSEMIDE 20 MG: 10 INJECTION, SOLUTION INTRAMUSCULAR; INTRAVENOUS at 15:49

## 2024-07-06 RX ADMIN — PREGABALIN 100 MG: 100 CAPSULE ORAL at 16:05

## 2024-07-06 ASSESSMENT — LIFESTYLE VARIABLES
HOW OFTEN DO YOU HAVE A DRINK CONTAINING ALCOHOL: NEVER
HOW MANY STANDARD DRINKS CONTAINING ALCOHOL DO YOU HAVE ON A TYPICAL DAY: PATIENT DOES NOT DRINK

## 2024-07-06 ASSESSMENT — PAIN - FUNCTIONAL ASSESSMENT: PAIN_FUNCTIONAL_ASSESSMENT: 0-10

## 2024-07-06 NOTE — ED PROVIDER NOTES
[facility] EMERGENCY DEP  EMERGENCY DEPARTMENT ENCOUNTER       Pt Name: Rosita Morillo  MRN: [id number]  Birthdate 1945  Date of evaluation: 7/6/2024  Provider: Ana Maria Hutchinson MD   PCP: None, None  Note Started: 2:56 PM EDT 7/6/24     CHIEF COMPLAINT       Chief Complaint   Patient presents with    Chest Pain        HISTORY OF PRESENT ILLNESS: 1 or more elements      History From: patient, History limited by: none     Rosita Morillo is a 79 y.o. female presents to the emergency department complaining of chest pain and shortness of breath.  Also complaining of leg pain.  Please See MDM for Additional Details of the HPI/PMH  Nursing Notes were all reviewed and agreed with or any disagreements were addressed in the HPI.     REVIEW OF SYSTEMS        Positives and Pertinent negatives as per HPI.    PAST HISTORY     Past Medical History:  Past Medical History:   Diagnosis Date    Arthritis     CAD (coronary artery disease)     afib and chf    Congestive heart failure (HCC)     Depression     Diabetes (HCC)     type 2    Fibromyalgia     Gastrointestinal disorder     reflux and gastro paresis    Heart failure (HCC)     Infectious disease 2010    MRSA? in right big toe    Menopause     Psychiatric disorder     PTSD from 911       Past Surgical History:  Past Surgical History:   Procedure Laterality Date    APPENDECTOMY      BREAST BIOPSY Left     STbb  benign    COLONOSCOPY  08/20/2018    polyps    COLONOSCOPY N/A 8/20/2018    COLONOSCOPY performed by Nayely Corcoran MD at [facility] MAIN OR    GI      colonscopy and endo    GYN      cyst on right ovary removed    HEENT      T&A Bilateral Cataract Surgery with coptic lenses       Family History:  Family History   Problem Relation Age of Onset    No Known Problems Mother        Social History:  Social History     Tobacco Use    Smoking status: Former    Smokeless tobacco: Never   Substance Use Topics    Alcohol use: Yes     Alcohol/week: 1.0 standard drink of alcohol    Drug use: No  as possible for a visit   Please follow-up with your Cardiologist or this local doctor       DISCHARGE MEDICATIONS:     Medication List        CHANGE how you take these medications      pregabalin 150 MG capsule  Commonly known as: LYRICA  Take 1 capsule by mouth 2 times daily for 30 days. Max Daily Amount: 300 mg  What changed: medication strength            ASK your doctor about these medications      apixaban 5 MG Tabs tablet  Commonly known as: ELIQUIS     atorvastatin 10 MG tablet  Commonly known as: LIPITOR     azelastine 0.1 % nasal spray  Commonly known as: ASTELIN     azithromycin 250 MG tablet  Commonly known as: ZITHROMAX  500mg on day 1 followed by 250mg on days 2 - 5     cefdinir 300 MG capsule  Commonly known as: OMNICEF  Take 1 capsule by mouth 2 times daily for 5 days     famotidine 40 MG tablet  Commonly known as: PEPCID     furosemide 80 MG tablet  Commonly known as: LASIX     insulin aspart 100 UNIT/ML injection pen  Commonly known as: NovoLOG     ondansetron 4 MG disintegrating tablet  Commonly known as: ZOFRAN-ODT  Take 1 tablet by mouth 3 times daily as needed for Nausea or Vomiting     sotalol 80 MG tablet  Commonly known as: BETAPACE     Tresiba FlexTouch 100 UNIT/ML Sopn  Generic drug: Insulin Degludec     ZyrTEC Allergy 10 MG Caps  Generic drug: Cetirizine HCl               Where to Get Your Medications        These medications were sent to [facility]/pharmacy #[id number] [place], [place] - [address] [facility] - P [phone] - F [phone]  [address] [facility] [address]      Phone: [phone]   pregabalin 150 MG capsule           DISCONTINUED MEDICATIONS:  Discharge Medication List as of 7/6/2024  4:26 PM          I am the Primary Clinician of Record.   Ana Maria Hutchinson MD (electronically signed)    (Please note that parts of this dictation were completed with voice recognition software. Quite often unanticipated grammatical, syntax, homophones, and other

## 2024-07-07 LAB
BACTERIA SPEC CULT: NORMAL
BACTERIA SPEC CULT: NORMAL
EKG ATRIAL RATE: 86 BPM
EKG DIAGNOSIS: NORMAL
EKG Q-T INTERVAL: 480 MS
EKG QRS DURATION: 138 MS
EKG QTC CALCULATION (BAZETT): 518 MS
EKG R AXIS: 269 DEGREES
EKG T AXIS: 103 DEGREES
EKG VENTRICULAR RATE: 70 BPM
SERVICE CMNT-IMP: NORMAL
SERVICE CMNT-IMP: NORMAL

## 2024-07-13 LAB
BACTERIA SPEC CULT: NORMAL
BACTERIA SPEC CULT: NORMAL
SERVICE CMNT-IMP: NORMAL
SERVICE CMNT-IMP: NORMAL

## 2024-07-23 ENCOUNTER — HOSPITAL ENCOUNTER (OUTPATIENT)
Facility: HOSPITAL | Age: 79
Discharge: HOME | End: 2024-07-25
Attending: INTERNAL MEDICINE
Payer: OTHER GOVERNMENT

## 2024-07-23 VITALS
OXYGEN SATURATION: 95 % | HEIGHT: 62 IN | BODY MASS INDEX: 40.48 KG/M2 | DIASTOLIC BLOOD PRESSURE: 88 MMHG | SYSTOLIC BLOOD PRESSURE: 129 MMHG | HEART RATE: 80 BPM | RESPIRATION RATE: 14 BRPM | WEIGHT: 220 LBS

## 2024-07-23 DIAGNOSIS — I48.91 ATRIAL FIBRILLATION (HCC): ICD-10-CM

## 2024-07-23 LAB — ECHO BSA: 2.09 M2

## 2024-07-23 PROCEDURE — 99152 MOD SED SAME PHYS/QHP 5/>YRS: CPT

## 2024-07-23 PROCEDURE — 92960 CARDIOVERSION ELECTRIC EXT: CPT

## 2024-07-23 PROCEDURE — 93005 ELECTROCARDIOGRAM TRACING: CPT | Performed by: INTERNAL MEDICINE

## 2024-07-23 PROCEDURE — 6360000002 HC RX W HCPCS: Performed by: INTERNAL MEDICINE

## 2024-07-23 RX ORDER — TORSEMIDE 20 MG/1
20 TABLET ORAL DAILY
COMMUNITY

## 2024-07-23 RX ORDER — MIDAZOLAM HYDROCHLORIDE 1 MG/ML
INJECTION INTRAMUSCULAR; INTRAVENOUS PRN
Status: DISCONTINUED | OUTPATIENT
Start: 2024-07-23 | End: 2024-07-26 | Stop reason: HOSPADM

## 2024-07-23 RX ORDER — POTASSIUM CHLORIDE 750 MG/1
10 CAPSULE, EXTENDED RELEASE ORAL 2 TIMES DAILY
COMMUNITY

## 2024-07-23 RX ORDER — FENTANYL CITRATE 50 UG/ML
INJECTION, SOLUTION INTRAMUSCULAR; INTRAVENOUS PRN
Status: DISCONTINUED | OUTPATIENT
Start: 2024-07-23 | End: 2024-07-26 | Stop reason: HOSPADM

## 2024-07-23 RX ADMIN — FENTANYL CITRATE 50 MCG: 50 INJECTION, SOLUTION INTRAMUSCULAR; INTRAVENOUS at 11:34

## 2024-07-23 RX ADMIN — MIDAZOLAM HYDROCHLORIDE 2 MG: 1 INJECTION, SOLUTION INTRAMUSCULAR; INTRAVENOUS at 11:35

## 2024-07-23 NOTE — PROGRESS NOTES
Patient arrived to Non-Invasive Cardiology Lab for Out Patient DCC Procedure. Staff introduced to patient. Patient identifiers verified with Name and Date of Birth. Procedure verified with patient. Consent forms reviewed and signed by patient or authorized representative and verified. Allergies verified. Patient informed of procedure and plan of care. Questions answered with review.     Patient on cardiac monitor, non-invasive blood pressure, SPO2 monitor. On RA. Patient is A&Ox3. Patient reports no complaints.    Patient on stretcher, in low position, with side rails up. Patient instructed to call for assistance as needed.    Family in waiting room.

## 2024-07-23 NOTE — PROGRESS NOTES
I have reviewed discharge instructions with the patient.  The patient verbalized understanding.    Discharge medications reviewed with patient and appropriate educational materials regarding medications and side effects teaching were provided.    Site care instructions reviewed with patient. Pain management teaching completed.    Patient instructed to make follow up appointments per discharge instructions.     Patient belongings packed up and accounted for with patient and family. All patient belongings sent home with patient.    Telemetry monitor and wires removed      Patient signed discharge instructions after reviewing them, and duplicate copy placed in chart.

## 2024-07-23 NOTE — PROGRESS NOTES
Pt sedated with 2mg Versed and 50mcg Fentanyl, given 2 synchronized shock(s) at 300 and 360 Joules, AFib converted to Atrial paced.(monitored sedation from 1130 to 1145)

## 2024-07-23 NOTE — DISCHARGE INSTRUCTIONS
DISCHARGE SUMMARY       The following personal items collected during your admission are returned to you:   Dental Appliance:    Vision:    Hearing Aid:    Jewelry:    Clothing:          PATIENT INSTRUCTIONS: Continue taking all the same medications as directed.    The cardioversion procedure can cause redness to the skin where the patches were placed. You may treat this with Aloe or Cortisone cream over the counter lotion. If the skin appears very reddened or blistered contact your physician      What to do at Home:  Recommended activity: No driving today      The discharge information has been reviewed with the PATIENT .  The PATIENT  verbalized understanding.

## 2024-07-24 LAB
EKG ATRIAL RATE: 80 BPM
EKG DIAGNOSIS: NORMAL
EKG P-R INTERVAL: 212 MS
EKG Q-T INTERVAL: 468 MS
EKG QRS DURATION: 126 MS
EKG QTC CALCULATION (BAZETT): 539 MS
EKG R AXIS: -74 DEGREES
EKG T AXIS: 98 DEGREES
EKG VENTRICULAR RATE: 80 BPM

## 2024-07-30 ENCOUNTER — ANESTHESIA EVENT (OUTPATIENT)
Facility: HOSPITAL | Age: 79
End: 2024-07-30
Payer: OTHER GOVERNMENT

## 2024-07-30 ENCOUNTER — HOSPITAL ENCOUNTER (OUTPATIENT)
Facility: HOSPITAL | Age: 79
Discharge: HOME | End: 2024-07-30
Attending: INTERNAL MEDICINE | Admitting: INTERNAL MEDICINE
Payer: OTHER GOVERNMENT

## 2024-07-30 ENCOUNTER — ANESTHESIA (OUTPATIENT)
Facility: HOSPITAL | Age: 79
End: 2024-07-30
Payer: OTHER GOVERNMENT

## 2024-07-30 VITALS
HEART RATE: 80 BPM | HEIGHT: 62 IN | WEIGHT: 228 LBS | RESPIRATION RATE: 15 BRPM | BODY MASS INDEX: 41.96 KG/M2 | TEMPERATURE: 97.6 F | DIASTOLIC BLOOD PRESSURE: 71 MMHG | SYSTOLIC BLOOD PRESSURE: 121 MMHG | OXYGEN SATURATION: 94 %

## 2024-07-30 DIAGNOSIS — I48.91 A-FIB (HCC): ICD-10-CM

## 2024-07-30 PROBLEM — I48.19 PERSISTENT ATRIAL FIBRILLATION (HCC): Status: ACTIVE | Noted: 2024-07-30

## 2024-07-30 LAB
ANION GAP SERPL CALC-SCNC: 8 MMOL/L (ref 5–15)
BASOPHILS # BLD: 0.1 K/UL (ref 0–0.1)
BASOPHILS NFR BLD: 1 % (ref 0–1)
BUN SERPL-MCNC: 21 MG/DL (ref 6–20)
BUN/CREAT SERPL: 19 (ref 12–20)
CALCIUM SERPL-MCNC: 9.2 MG/DL (ref 8.5–10.1)
CHLORIDE SERPL-SCNC: 102 MMOL/L (ref 97–108)
CO2 SERPL-SCNC: 27 MMOL/L (ref 21–32)
CREAT SERPL-MCNC: 1.09 MG/DL (ref 0.55–1.02)
DIFFERENTIAL METHOD BLD: ABNORMAL
ECHO BSA: 2.13 M2
EOSINOPHIL # BLD: 0.1 K/UL (ref 0–0.4)
EOSINOPHIL NFR BLD: 2 % (ref 0–7)
ERYTHROCYTE [DISTWIDTH] IN BLOOD BY AUTOMATED COUNT: 16.7 % (ref 11.5–14.5)
GLUCOSE BLD STRIP.AUTO-MCNC: 168 MG/DL (ref 65–117)
GLUCOSE BLD STRIP.AUTO-MCNC: 169 MG/DL (ref 65–117)
GLUCOSE BLD STRIP.AUTO-MCNC: 204 MG/DL (ref 65–117)
GLUCOSE SERPL-MCNC: 209 MG/DL (ref 65–100)
HCT VFR BLD AUTO: 43.4 % (ref 35–47)
HGB BLD-MCNC: 13.9 G/DL (ref 11.5–16)
IMM GRANULOCYTES # BLD AUTO: 0 K/UL (ref 0–0.04)
IMM GRANULOCYTES NFR BLD AUTO: 0 % (ref 0–0.5)
LYMPHOCYTES # BLD: 1.7 K/UL (ref 0.8–3.5)
LYMPHOCYTES NFR BLD: 30 % (ref 12–49)
MCH RBC QN AUTO: 26.6 PG (ref 26–34)
MCHC RBC AUTO-ENTMCNC: 32 G/DL (ref 30–36.5)
MCV RBC AUTO: 83.1 FL (ref 80–99)
MONOCYTES # BLD: 1 K/UL (ref 0–1)
MONOCYTES NFR BLD: 17 % (ref 5–13)
NEUTS SEG # BLD: 2.9 K/UL (ref 1.8–8)
NEUTS SEG NFR BLD: 50 % (ref 32–75)
NRBC # BLD: 0 K/UL (ref 0–0.01)
NRBC BLD-RTO: 0 PER 100 WBC
PLATELET # BLD AUTO: 203 K/UL (ref 150–400)
PMV BLD AUTO: 11.5 FL (ref 8.9–12.9)
POTASSIUM SERPL-SCNC: 4 MMOL/L (ref 3.5–5.1)
RBC # BLD AUTO: 5.22 M/UL (ref 3.8–5.2)
SERVICE CMNT-IMP: ABNORMAL
SODIUM SERPL-SCNC: 137 MMOL/L (ref 136–145)
WBC # BLD AUTO: 5.8 K/UL (ref 3.6–11)

## 2024-07-30 PROCEDURE — 7100000000 HC PACU RECOVERY - FIRST 15 MIN: Performed by: INTERNAL MEDICINE

## 2024-07-30 PROCEDURE — 7100000001 HC PACU RECOVERY - ADDTL 15 MIN: Performed by: INTERNAL MEDICINE

## 2024-07-30 PROCEDURE — 2580000003 HC RX 258

## 2024-07-30 PROCEDURE — 82962 GLUCOSE BLOOD TEST: CPT

## 2024-07-30 PROCEDURE — C1769 GUIDE WIRE: HCPCS | Performed by: INTERNAL MEDICINE

## 2024-07-30 PROCEDURE — C1731 CATH, EP, 20 OR MORE ELEC: HCPCS | Performed by: INTERNAL MEDICINE

## 2024-07-30 PROCEDURE — 6360000004 HC RX CONTRAST MEDICATION: Performed by: INTERNAL MEDICINE

## 2024-07-30 PROCEDURE — 6370000000 HC RX 637 (ALT 250 FOR IP): Performed by: NURSE PRACTITIONER

## 2024-07-30 PROCEDURE — 2500000003 HC RX 250 WO HCPCS

## 2024-07-30 PROCEDURE — C1759 CATH, INTRA ECHOCARDIOGRAPHY: HCPCS | Performed by: INTERNAL MEDICINE

## 2024-07-30 PROCEDURE — 2709999900 HC NON-CHARGEABLE SUPPLY: Performed by: INTERNAL MEDICINE

## 2024-07-30 PROCEDURE — 6360000002 HC RX W HCPCS

## 2024-07-30 PROCEDURE — 80048 BASIC METABOLIC PNL TOTAL CA: CPT

## 2024-07-30 PROCEDURE — C1893 INTRO/SHEATH, FIXED,NON-PEEL: HCPCS | Performed by: INTERNAL MEDICINE

## 2024-07-30 PROCEDURE — C1894 INTRO/SHEATH, NON-LASER: HCPCS | Performed by: INTERNAL MEDICINE

## 2024-07-30 PROCEDURE — 85025 COMPLETE CBC W/AUTO DIFF WBC: CPT

## 2024-07-30 PROCEDURE — 3700000001 HC ADD 15 MINUTES (ANESTHESIA): Performed by: INTERNAL MEDICINE

## 2024-07-30 PROCEDURE — 93656 COMPRE EP EVAL ABLTJ ATR FIB: CPT | Performed by: INTERNAL MEDICINE

## 2024-07-30 PROCEDURE — 36415 COLL VENOUS BLD VENIPUNCTURE: CPT

## 2024-07-30 PROCEDURE — C1766 INTRO/SHEATH,STRBLE,NON-PEEL: HCPCS | Performed by: INTERNAL MEDICINE

## 2024-07-30 PROCEDURE — 3700000000 HC ANESTHESIA ATTENDED CARE: Performed by: INTERNAL MEDICINE

## 2024-07-30 RX ORDER — DAPAGLIFLOZIN 10 MG/1
10 TABLET, FILM COATED ORAL EVERY MORNING
COMMUNITY

## 2024-07-30 RX ORDER — PHENYLEPHRINE HCL IN 0.9% NACL 0.4MG/10ML
SYRINGE (ML) INTRAVENOUS PRN
Status: DISCONTINUED | OUTPATIENT
Start: 2024-07-30 | End: 2024-07-30 | Stop reason: SDUPTHER

## 2024-07-30 RX ORDER — ONDANSETRON 2 MG/ML
INJECTION INTRAMUSCULAR; INTRAVENOUS PRN
Status: DISCONTINUED | OUTPATIENT
Start: 2024-07-30 | End: 2024-07-30 | Stop reason: SDUPTHER

## 2024-07-30 RX ORDER — HYDROCODONE BITARTRATE AND ACETAMINOPHEN 5; 325 MG/1; MG/1
1 TABLET ORAL EVERY 8 HOURS PRN
Status: DISCONTINUED | OUTPATIENT
Start: 2024-07-30 | End: 2024-07-30 | Stop reason: HOSPADM

## 2024-07-30 RX ORDER — SUCCINYLCHOLINE CHLORIDE 20 MG/ML
INJECTION INTRAMUSCULAR; INTRAVENOUS PRN
Status: DISCONTINUED | OUTPATIENT
Start: 2024-07-30 | End: 2024-07-30 | Stop reason: SDUPTHER

## 2024-07-30 RX ORDER — FENTANYL CITRATE 50 UG/ML
25 INJECTION, SOLUTION INTRAMUSCULAR; INTRAVENOUS EVERY 5 MIN PRN
Status: DISCONTINUED | OUTPATIENT
Start: 2024-07-30 | End: 2024-07-30 | Stop reason: HOSPADM

## 2024-07-30 RX ORDER — SODIUM CHLORIDE 9 MG/ML
INJECTION, SOLUTION INTRAVENOUS CONTINUOUS PRN
Status: DISCONTINUED | OUTPATIENT
Start: 2024-07-30 | End: 2024-07-30 | Stop reason: SDUPTHER

## 2024-07-30 RX ORDER — ACETAMINOPHEN 325 MG/1
650 TABLET ORAL EVERY 6 HOURS PRN
Status: DISCONTINUED | OUTPATIENT
Start: 2024-07-30 | End: 2024-07-30 | Stop reason: HOSPADM

## 2024-07-30 RX ORDER — SODIUM CHLORIDE 0.9 % (FLUSH) 0.9 %
5-40 SYRINGE (ML) INJECTION EVERY 12 HOURS SCHEDULED
Status: DISCONTINUED | OUTPATIENT
Start: 2024-07-30 | End: 2024-07-30 | Stop reason: HOSPADM

## 2024-07-30 RX ORDER — ONDANSETRON 2 MG/ML
4 INJECTION INTRAMUSCULAR; INTRAVENOUS
Status: DISCONTINUED | OUTPATIENT
Start: 2024-07-30 | End: 2024-07-30 | Stop reason: HOSPADM

## 2024-07-30 RX ORDER — GLUCAGON 1 MG/ML
1 KIT INJECTION PRN
Status: DISCONTINUED | OUTPATIENT
Start: 2024-07-30 | End: 2024-07-30 | Stop reason: HOSPADM

## 2024-07-30 RX ORDER — INSULIN LISPRO 100 [IU]/ML
0-4 INJECTION, SOLUTION INTRAVENOUS; SUBCUTANEOUS NIGHTLY
Status: DISCONTINUED | OUTPATIENT
Start: 2024-07-30 | End: 2024-07-30 | Stop reason: HOSPADM

## 2024-07-30 RX ORDER — PROTAMINE SULFATE 10 MG/ML
INJECTION, SOLUTION INTRAVENOUS PRN
Status: DISCONTINUED | OUTPATIENT
Start: 2024-07-30 | End: 2024-07-30 | Stop reason: SDUPTHER

## 2024-07-30 RX ORDER — FENTANYL CITRATE 50 UG/ML
INJECTION, SOLUTION INTRAMUSCULAR; INTRAVENOUS PRN
Status: DISCONTINUED | OUTPATIENT
Start: 2024-07-30 | End: 2024-07-30 | Stop reason: SDUPTHER

## 2024-07-30 RX ORDER — ROCURONIUM BROMIDE 10 MG/ML
INJECTION, SOLUTION INTRAVENOUS PRN
Status: DISCONTINUED | OUTPATIENT
Start: 2024-07-30 | End: 2024-07-30 | Stop reason: SDUPTHER

## 2024-07-30 RX ORDER — SODIUM CHLORIDE 0.9 % (FLUSH) 0.9 %
5-40 SYRINGE (ML) INJECTION PRN
Status: DISCONTINUED | OUTPATIENT
Start: 2024-07-30 | End: 2024-07-30 | Stop reason: HOSPADM

## 2024-07-30 RX ORDER — DEXAMETHASONE SODIUM PHOSPHATE 4 MG/ML
INJECTION, SOLUTION INTRA-ARTICULAR; INTRALESIONAL; INTRAMUSCULAR; INTRAVENOUS; SOFT TISSUE PRN
Status: DISCONTINUED | OUTPATIENT
Start: 2024-07-30 | End: 2024-07-30 | Stop reason: SDUPTHER

## 2024-07-30 RX ORDER — OXYCODONE HYDROCHLORIDE 5 MG/1
5 TABLET ORAL
Status: DISCONTINUED | OUTPATIENT
Start: 2024-07-30 | End: 2024-07-30 | Stop reason: HOSPADM

## 2024-07-30 RX ORDER — DEXTROSE MONOHYDRATE 100 MG/ML
INJECTION, SOLUTION INTRAVENOUS CONTINUOUS PRN
Status: DISCONTINUED | OUTPATIENT
Start: 2024-07-30 | End: 2024-07-30 | Stop reason: HOSPADM

## 2024-07-30 RX ORDER — INSULIN LISPRO 100 [IU]/ML
0-4 INJECTION, SOLUTION INTRAVENOUS; SUBCUTANEOUS
Status: DISCONTINUED | OUTPATIENT
Start: 2024-07-30 | End: 2024-07-30 | Stop reason: HOSPADM

## 2024-07-30 RX ORDER — HEPARIN SODIUM 1000 [USP'U]/ML
INJECTION, SOLUTION INTRAVENOUS; SUBCUTANEOUS PRN
Status: DISCONTINUED | OUTPATIENT
Start: 2024-07-30 | End: 2024-07-30 | Stop reason: SDUPTHER

## 2024-07-30 RX ORDER — HYDROMORPHONE HYDROCHLORIDE 1 MG/ML
0.5 INJECTION, SOLUTION INTRAMUSCULAR; INTRAVENOUS; SUBCUTANEOUS EVERY 5 MIN PRN
Status: DISCONTINUED | OUTPATIENT
Start: 2024-07-30 | End: 2024-07-30 | Stop reason: HOSPADM

## 2024-07-30 RX ORDER — GLIMEPIRIDE 2 MG/1
1 TABLET ORAL 2 TIMES DAILY
COMMUNITY

## 2024-07-30 RX ORDER — NALOXONE HYDROCHLORIDE 0.4 MG/ML
INJECTION, SOLUTION INTRAMUSCULAR; INTRAVENOUS; SUBCUTANEOUS PRN
Status: DISCONTINUED | OUTPATIENT
Start: 2024-07-30 | End: 2024-07-30 | Stop reason: HOSPADM

## 2024-07-30 RX ADMIN — PROTAMINE SULFATE 70 MG: 10 INJECTION, SOLUTION INTRAVENOUS at 12:50

## 2024-07-30 RX ADMIN — PROPOFOL 100 MG: 10 INJECTION, EMULSION INTRAVENOUS at 12:22

## 2024-07-30 RX ADMIN — DEXAMETHASONE SODIUM PHOSPHATE 4 MG: 4 INJECTION, SOLUTION INTRAMUSCULAR; INTRAVENOUS at 12:36

## 2024-07-30 RX ADMIN — SODIUM CHLORIDE: 9 INJECTION, SOLUTION INTRAVENOUS at 12:06

## 2024-07-30 RX ADMIN — HYDROCODONE BITARTRATE AND ACETAMINOPHEN 1 TABLET: 5; 325 TABLET ORAL at 15:25

## 2024-07-30 RX ADMIN — SUCCINYLCHOLINE CHLORIDE 120 MG: 20 INJECTION, SOLUTION INTRAMUSCULAR; INTRAVENOUS at 12:22

## 2024-07-30 RX ADMIN — Medication 40 MCG: at 13:13

## 2024-07-30 RX ADMIN — INSULIN LISPRO 1 UNITS: 100 INJECTION, SOLUTION INTRAVENOUS; SUBCUTANEOUS at 16:27

## 2024-07-30 RX ADMIN — Medication 40 MCG: at 12:22

## 2024-07-30 RX ADMIN — PHENYLEPHRINE HYDROCHLORIDE 40 MCG/MIN: 10 INJECTION INTRAVENOUS at 12:34

## 2024-07-30 RX ADMIN — ROCURONIUM BROMIDE 5 MG: 10 INJECTION INTRAVENOUS at 12:22

## 2024-07-30 RX ADMIN — FENTANYL CITRATE 25 MCG: 50 INJECTION, SOLUTION INTRAMUSCULAR; INTRAVENOUS at 12:22

## 2024-07-30 RX ADMIN — ONDANSETRON HYDROCHLORIDE 4 MG: 2 INJECTION, SOLUTION INTRAMUSCULAR; INTRAVENOUS at 12:36

## 2024-07-30 RX ADMIN — FENTANYL CITRATE 25 MCG: 50 INJECTION, SOLUTION INTRAMUSCULAR; INTRAVENOUS at 12:28

## 2024-07-30 RX ADMIN — HEPARIN SODIUM 12000 UNITS: 1000 INJECTION, SOLUTION INTRAVENOUS; SUBCUTANEOUS at 12:28

## 2024-07-30 ASSESSMENT — PAIN SCALES - GENERAL: PAINLEVEL_OUTOF10: 8

## 2024-07-30 ASSESSMENT — PAIN DESCRIPTION - LOCATION: LOCATION: BACK

## 2024-07-30 NOTE — DISCHARGE INSTRUCTIONS
DISCHARGE INSTRUCTIONS:    You had an attempted atrial fibrillation ablation on 7/30 with Dr. Allen.  Due to the preexisting left iliac VEIN stent that was blocking passage of equipment up into the inferior vena cava (IVC), the procedure was aborted.    Dr. Pereira will be contacted with the hope of giving guidance on whether a repeat attempt to pass equipment into the IVC is feasible.    NO change to medications.    Do not drive, operate any machinery, or sign any legal documents for 24 hours after your procedure.  You must have someone to drive you home.    You may take a shower 24 hours after your cardiac procedure.  Be sure to get the dressing wet and then remove it; gently wash the area with warm soapy water.  Pat dry and leave open to air.  To help prevent infections, be sure to keep the cath site clean and dry.  No lotions, creams, powders, ointments, etc. in the cath site for approximately 1 week.    Do not take a tub bath, get in a hot tub or swimming pool for approximately 5 days or until the cath site is completely healed.      No strenuous activity or heavy lifting over 20 lbs. for 7 days.     After your procedure, some bruising or discomfort is common during the healing process.  Tylenol, 1-2 tablets every 6 hours as needed, is recommended if you experience any discomfort.  If you experience any signs or symptoms of infection such as fever, chills, or poorly healing incision, persistent tenderness or swelling in the groin, redness and/or warmth to the touch, numbness, significant tingling or pain at the groin site or affected extremity, rash, drainage from the site, or if the leg feels tight or swollen, call your physician right away.    If bleeding at the site occurs, take a clean gauze pad and apply direct pressure to the groin just above the puncture site, and call your physician right away.    If your procedure involved ablation therapy, you may feel some mild or vague chest discomfort due to  delivery of heat therapy to the heart muscle.  This should resolve in 1-2 days.  If it gets worse or is associated with shortness of breath, dizziness, loss of consciousness, call your physician right away or call 911 if emergency medical care is needed.    Anthony Allen MD

## 2024-07-30 NOTE — PROGRESS NOTES
Cardiac Cath Lab Recovery Arrival Note:      Rosita Morillo arrived to Cardiac Cath Lab, Recovery Area. Staff introduced to patient. Patient identifiers verified with NAME and DATE OF BIRTH. Procedure verified with patient. Consent forms reviewed and signed by patient or authorized representative and verified. Allergies verified.     Patient and family oriented to department. Patient and family informed of procedure and plan of care.     Questions answered with review. Patient prepped for procedure, per orders from physician, prior to arrival.    Patient on cardiac monitor, non-invasive blood pressure, SPO2 monitor. On RA. Patient is A&Ox 4. Patient reports NO PAIN.     Patient in stretcher, in low position, with side rails up, call bell within reach, patient instructed to call if assistance as needed.    Patient prep in: [facility] Recovery Area, [place] [address].   Patient family has pager # N/A  Family in: .   Prep by: KELLEY

## 2024-07-30 NOTE — PROGRESS NOTES
Shift worked: Day     Shift summary and any significant changes:    1400 pt arrived from EP lab after an attempted afib ablation with Richard SANABRIA. Rt and left groin sites. Bedrest until 1730 and can discharge this afternoon.     Pt discharging at 1845   Concerns for physician to address:       Zone phone for oncoming shift:          Activity:     Number times ambulated in hallways past shift: 0  Number of times OOB to chair past shift: 0    Cardiac:   Cardiac Monitoring: Yes           Access:  Current line(s): PIV     Genitourinary:   Urinary status: voiding and external catheter    Respiratory:      Chronic home O2 use?: NO  Incentive spirometer at bedside: NO       GI:     Current diet:  No diet orders on file  Passing flatus: YES  Tolerating current diet: YES       Pain Management:   Patient states pain is manageable on current regimen: NO    Skin:     Interventions: float heels, increase time out of bed, and internal/external urinary devices    Patient Safety:  Fall Score:    Interventions: bed/chair alarm, assistive device (walker, cane. etc), gripper socks, pt to call before getting OOB, and stay with me (per policy)       Length of Stay:  Expected LOS:   Actual LOS: 0      Krissy Nayak RN

## 2024-07-30 NOTE — ANESTHESIA POSTPROCEDURE EVALUATION
Department of Anesthesiology  Postprocedure Note    Patient: Rosita Morillo  MRN: [id number]  YOB: 1945  Date of evaluation: 7/30/2024    Procedure Summary       Date: 07/30/24 Room / Location: [facility] EP LAB / [facility] CARDIAC CATH LAB    Anesthesia Start: 1206 Anesthesia Stop: 1327    Procedure: Procedure cancelled Diagnosis: A-fib (HCC)    Providers: Anthony Allen MD Responsible Provider: Jun Steel MD    Anesthesia Type: general ASA Status: 3            Anesthesia Type: No value filed.    Sarah Phase I: Sarah Score: 9    Sarah Phase II:      Anesthesia Post Evaluation    Patient location during evaluation: PACU  Patient participation: complete - patient participated  Level of consciousness: awake  Airway patency: patent  Nausea & Vomiting: no vomiting  Cardiovascular status: hemodynamically stable  Respiratory status: acceptable  Hydration status: euvolemic    No notable events documented.

## 2024-07-30 NOTE — ANESTHESIA PRE PROCEDURE
Department of Anesthesiology  Preprocedure Note       Name:  Rosita Morillo   Age:  79 y.o.  :  1945                                          MRN:  [id number]         Date:  2024      Surgeon: Surgeon(s):  Anthony Allen MD    Procedure: Procedure(s):  Ablation A-fib w complete ep study    Medications prior to admission:   Prior to Admission medications    Medication Sig Start Date End Date Taking? Authorizing Provider   dapagliflozin (FARXIGA) 10 MG tablet Take 1 tablet by mouth every morning   Yes Rachel Reno MD   timolol (TIMOPTIC) 0.25 % ophthalmic solution Place 1 drop into both eyes 2 times daily   Yes Rachel Reno MD   torsemide (DEMADEX) 20 MG tablet Take 1 tablet by mouth daily    Rachel Reno MD   potassium chloride (MICRO-K) 10 MEQ extended release capsule Take 1 capsule by mouth 2 times daily    Rachel Reno MD   pregabalin (LYRICA) 150 MG capsule Take 1 capsule by mouth 2 times daily for 30 days. Max Daily Amount: 300 mg  Patient taking differently: Take 75 mg by mouth 2 times daily. 1 cap in am-3 cap at pm 24  Ana Maria Hutchinson MD   ondansetron (ZOFRAN-ODT) 4 MG disintegrating tablet Take 1 tablet by mouth 3 times daily as needed for Nausea or Vomiting  Patient not taking: Reported on 23   Nicky Robertson MD   apixaban (ELIQUIS) 5 MG TABS tablet Take 1 tablet by mouth 2 times daily 18   Automatic Reconciliation, Ar   atorvastatin (LIPITOR) 10 MG tablet Take 1 tablet by mouth daily    Automatic Reconciliation, Ar   azelastine (ASTELIN) 0.1 % nasal spray 2 sprays by Nasal route  Patient not taking: Reported on 2024    Automatic Reconciliation, Ar   Cetirizine HCl (ZYRTEC ALLERGY) 10 MG CAPS Take by mouth  Patient not taking: Reported on 2024    Automatic Reconciliation, Ar   furosemide (LASIX) 80 MG tablet Take 0.5 tablets by mouth daily  Patient not taking: Reported on 2024    Automatic Reconciliation,

## 2024-07-30 NOTE — PROGRESS NOTES
Patient is s/p aborted procedure and is having low back pain due to flat positioning. I examined both groins which are soft and no noted bleeding, dressings intact. Tylenol and Norco 5 prn were ordered. I also placed a SSI order for her elevated glucose. Of 212. I explained that she could take her long acting insulin this evening at home after she eats.

## 2024-07-30 NOTE — PROGRESS NOTES
R femoral venogram performed after two femoral venous accesses.  Left iliac stent jails the IVC.  There is patency of the flow across the stent.  Unable to pass duodecapolar catheter through this.  Able to pass a SR-0 over a wife through the stent with minimal resistance at the stent.  Ultrasound was performed of the L groin and there was poor visibility of the femoral artery and vein doppler flow.  Because of the need to pass higher diameter equipment, I decided to abort the procedure for safety reasons.  Sheaths were removed, manual pressure held for hemostasis.  No complications, full note to follow.    Anthony Aleln MD

## 2024-07-31 NOTE — PROCEDURES
[facility]  [address] [place] [address]  [phone]    Patient ID:  Patient: Rosita Morillo  MRN: [id number]  Age: 79 y.o.  : 1945  Gender: female  Study Date: 2024    History:  This is a female with symptomatic persistent atrial fibrillation despite drug therapy with sotalol in the past, here for atrial fibrillation ablation.    Procedures Performed:   1. Contrast venography of the right lower extremity, supervision and radiologic interpretation (04772)  2. Injection of contrast under fluoroscopy (98096)  3. Ultrasound-guided vascular access (78577)    Summary:   1. Venography revealed a left iliac vein stent that jailed the IVC.   2. After attempting to pass the stent unsuccessfully (see below for details), the procedure was aborted.    Plan:   1.  Continue anticoagulation.  2.  Will contact her vascular surgeon to see if it is reasonable to attempt an ablation from the femoral venous approach.     Follow up Plan:   1.  F/U in office in 2-6 weeks.     Procedure description:   After consent was obtained, the patient was brought to the EP lab and sedated by general anesthesia who remained in attendance throughout the case.  Two safe sheaths were inserted into the right femoral vein in the usual fashion.  Heparin bolus was given with a goal ACT of >=300 seconds.    A (known) left iliac stent was visualized and it coursed across the bifurcation to the wall of the IVC.  The J-wires were unable to pass through the stent.  A different wire was used and crossed the stent into the IVC and passed easily toward the heart.    A SR-0 long sheath was passed along this wire through the stent with minimal resistance up toward the heart.  The duodecapolar catheter would not pass through the stent.    Ultrasound-guided vascular access was attempted at the left groin after palpating the femoral pulse and attempting to access medially and finding venous flow but the J-wire would not

## 2024-07-31 NOTE — H&P
EP/ ARRHYTHMIA    Patient ID:  Patient: Rosita Morillo  MRN: [id number]  Age: 79 y.o.  : 1945    Date of  Admission: 2024  8:44 AM   PCP:  None, None    Assessment:   Persistent atrial fibrillation.    Plan:     Given the potential benefits, risks, and alternatives, she agrees to proceed with ablation.     Rosita Morillo is a 79 y.o. female with a history of the above here for her procedure.    Past Medical History:   Diagnosis Date    Arthritis     CAD (coronary artery disease)     afib and chf    Congestive heart failure (HCC)     Depression     Diabetes (HCC)     type 2    Fibromyalgia     Gastrointestinal disorder     reflux and gastro paresis    Heart failure (HCC)     Infectious disease     MRSA? in right big toe    Menopause     Psychiatric disorder     PTSD from         Past Surgical History:   Procedure Laterality Date    APPENDECTOMY      BREAST BIOPSY Left     STbb  benign    COLONOSCOPY  2018    polyps    COLONOSCOPY N/A 2018    COLONOSCOPY performed by Nayely Corcoran MD at [facility] MAIN OR    GI      colonscopy and endo    GYN      cyst on right ovary removed    HEENT      T&A Bilateral Cataract Surgery with coptic lenses       Social History     Tobacco Use    Smoking status: Former    Smokeless tobacco: Never   Substance Use Topics    Alcohol use: Yes     Alcohol/week: 1.0 standard drink of alcohol        Family History   Problem Relation Age of Onset    No Known Problems Mother         Allergies   Allergen Reactions    Amoxicillin Other (See Comments)    Amoxicillin-Pot Clavulanate Other (See Comments)    Clindamycin Other (See Comments)    Sulfamethoxazole-Trimethoprim Other (See Comments)          No current facility-administered medications for this encounter.     Current Outpatient Medications   Medication Sig    dapagliflozin (FARXIGA) 10 MG tablet Take 1 tablet by mouth every morning    timolol (TIMOPTIC) 0.25 % ophthalmic solution Place 1 drop

## 2024-09-18 ENCOUNTER — FOLLOW UP (OUTPATIENT)
Dept: URBAN - METROPOLITAN AREA CLINIC 98 | Facility: CLINIC | Age: 79
End: 2024-09-18

## 2024-09-18 DIAGNOSIS — E11.3551: ICD-10-CM

## 2024-09-18 DIAGNOSIS — E11.3512: ICD-10-CM

## 2024-09-18 DIAGNOSIS — H43.13: ICD-10-CM

## 2024-09-18 DIAGNOSIS — H35.373: ICD-10-CM

## 2024-09-18 DIAGNOSIS — H04.123: ICD-10-CM

## 2024-09-18 DIAGNOSIS — H43.812: ICD-10-CM

## 2024-09-18 DIAGNOSIS — Z79.4: ICD-10-CM

## 2024-09-18 DIAGNOSIS — E11.3511: ICD-10-CM

## 2024-09-18 PROCEDURE — 92014 COMPRE OPH EXAM EST PT 1/>: CPT

## 2024-09-18 PROCEDURE — 92134 CPTRZ OPH DX IMG PST SGM RTA: CPT

## 2024-09-18 PROCEDURE — 92202 OPSCPY EXTND ON/MAC DRAW: CPT

## 2024-09-18 ASSESSMENT — TONOMETRY
OD_IOP_MMHG: 20
OS_IOP_MMHG: 18

## 2024-09-18 ASSESSMENT — VISUAL ACUITY
OD_SC: 20/30-2
OS_PH: 20/60-2
OS_SC: 20/80

## 2025-01-08 ENCOUNTER — APPOINTMENT (OUTPATIENT)
Facility: HOSPITAL | Age: 80
End: 2025-01-08
Payer: OTHER GOVERNMENT

## 2025-01-08 ENCOUNTER — HOSPITAL ENCOUNTER (EMERGENCY)
Facility: HOSPITAL | Age: 80
Discharge: ANOTHER HOSPITAL | End: 2025-01-08
Attending: EMERGENCY MEDICINE
Payer: OTHER GOVERNMENT

## 2025-01-08 DIAGNOSIS — K92.2 UPPER GI BLEED: Primary | ICD-10-CM

## 2025-01-08 LAB
ALBUMIN SERPL-MCNC: 2.6 G/DL (ref 3.5–5)
ALBUMIN/GLOB SERPL: 0.8 (ref 1.1–2.2)
ALP SERPL-CCNC: 127 U/L (ref 45–117)
ALT SERPL-CCNC: 15 U/L (ref 12–78)
ANION GAP SERPL CALC-SCNC: 7 MMOL/L (ref 2–12)
ANION GAP SERPL CALC-SCNC: 7 MMOL/L (ref 2–12)
APPEARANCE UR: CLEAR
AST SERPL-CCNC: 20 U/L (ref 15–37)
BACTERIA URNS QL MICRO: NEGATIVE /HPF
BASOPHILS # BLD: 0.04 K/UL (ref 0–0.1)
BASOPHILS NFR BLD: 0.5 % (ref 0–1)
BILIRUB SERPL-MCNC: 0.8 MG/DL (ref 0.2–1)
BILIRUB UR QL: NEGATIVE
BUN SERPL-MCNC: 63 MG/DL (ref 6–20)
BUN SERPL-MCNC: 65 MG/DL (ref 6–20)
BUN/CREAT SERPL: 53 (ref 12–20)
BUN/CREAT SERPL: 54 (ref 12–20)
CALCIUM SERPL-MCNC: 8.8 MG/DL (ref 8.5–10.1)
CALCIUM SERPL-MCNC: 9 MG/DL (ref 8.5–10.1)
CHLORIDE SERPL-SCNC: 109 MMOL/L (ref 97–108)
CHLORIDE SERPL-SCNC: 111 MMOL/L (ref 97–108)
CO2 SERPL-SCNC: 32 MMOL/L (ref 21–32)
CO2 SERPL-SCNC: 32 MMOL/L (ref 21–32)
COLOR UR: ABNORMAL
CREAT SERPL-MCNC: 1.16 MG/DL (ref 0.55–1.02)
CREAT SERPL-MCNC: 1.23 MG/DL (ref 0.55–1.02)
DIFFERENTIAL METHOD BLD: ABNORMAL
EOSINOPHIL # BLD: 0.01 K/UL (ref 0–0.4)
EOSINOPHIL NFR BLD: 0.1 % (ref 0–0.7)
EPITH CASTS URNS QL MICRO: ABNORMAL /LPF
ERYTHROCYTE [DISTWIDTH] IN BLOOD BY AUTOMATED COUNT: 17.5 % (ref 11.5–14.5)
ERYTHROCYTE [DISTWIDTH] IN BLOOD BY AUTOMATED COUNT: 17.7 % (ref 11.5–14.5)
FLUAV RNA SPEC QL NAA+PROBE: NOT DETECTED
FLUBV RNA SPEC QL NAA+PROBE: NOT DETECTED
GLOBULIN SER CALC-MCNC: 3.4 G/DL (ref 2–4)
GLUCOSE BLD STRIP.AUTO-MCNC: 241 MG/DL (ref 65–117)
GLUCOSE SERPL-MCNC: 197 MG/DL (ref 65–100)
GLUCOSE SERPL-MCNC: 215 MG/DL (ref 65–100)
GLUCOSE UR STRIP.AUTO-MCNC: >1000 MG/DL
HCT VFR BLD AUTO: 22 % (ref 35–47)
HCT VFR BLD AUTO: 24.4 % (ref 35–47)
HCT VFR BLD AUTO: 25.7 % (ref 35–47)
HGB BLD-MCNC: 7.1 G/DL (ref 11.5–16)
HGB BLD-MCNC: 7.8 G/DL (ref 11.5–16)
HGB BLD-MCNC: 8.2 G/DL (ref 11.5–16)
HGB UR QL STRIP: NEGATIVE
HISTORY CHECK: NORMAL
HISTORY CHECK: NORMAL
IMM GRANULOCYTES # BLD AUTO: 0.04 K/UL (ref 0–0.04)
IMM GRANULOCYTES NFR BLD AUTO: 0.5 % (ref 0–0.5)
KETONES UR QL STRIP.AUTO: ABNORMAL MG/DL
LEUKOCYTE ESTERASE UR QL STRIP.AUTO: NEGATIVE
LIPASE SERPL-CCNC: 25 U/L (ref 13–75)
LYMPHOCYTES # BLD: 1.36 K/UL (ref 0.8–3.5)
LYMPHOCYTES NFR BLD: 16.8 % (ref 12–49)
MAGNESIUM SERPL-MCNC: 2.1 MG/DL (ref 1.6–2.4)
MCH RBC QN AUTO: 26.8 PG (ref 26–34)
MCH RBC QN AUTO: 26.9 PG (ref 26–34)
MCHC RBC AUTO-ENTMCNC: 31.9 G/DL (ref 30–36.5)
MCHC RBC AUTO-ENTMCNC: 32 G/DL (ref 30–36.5)
MCV RBC AUTO: 84 FL (ref 80–99)
MCV RBC AUTO: 84.1 FL (ref 80–99)
MONOCYTES # BLD: 0.73 K/UL (ref 0–1)
MONOCYTES NFR BLD: 9 % (ref 5–13)
NEUTS SEG # BLD: 5.91 K/UL (ref 1.8–8)
NEUTS SEG NFR BLD: 73.1 % (ref 32–75)
NITRITE UR QL STRIP.AUTO: NEGATIVE
NRBC # BLD: 0 K/UL (ref 0–0.01)
NRBC # BLD: 0 K/UL (ref 0–0.01)
NRBC BLD-RTO: 0 PER 100 WBC
NRBC BLD-RTO: 0 PER 100 WBC
PH UR STRIP: 6 (ref 5–8)
PLATELET # BLD AUTO: 182 K/UL (ref 150–400)
PLATELET # BLD AUTO: 215 K/UL (ref 150–400)
PMV BLD AUTO: 12 FL (ref 8.9–12.9)
PMV BLD AUTO: 12 FL (ref 8.9–12.9)
POTASSIUM SERPL-SCNC: 4.8 MMOL/L (ref 3.5–5.1)
POTASSIUM SERPL-SCNC: 5.2 MMOL/L (ref 3.5–5.1)
PROT SERPL-MCNC: 6 G/DL (ref 6.4–8.2)
PROT UR STRIP-MCNC: NEGATIVE MG/DL
RBC # BLD AUTO: 2.9 M/UL (ref 3.8–5.2)
RBC # BLD AUTO: 3.06 M/UL (ref 3.8–5.2)
RBC #/AREA URNS HPF: ABNORMAL /HPF (ref 0–5)
SARS-COV-2 RNA RESP QL NAA+PROBE: NOT DETECTED
SERVICE CMNT-IMP: ABNORMAL
SODIUM SERPL-SCNC: 148 MMOL/L (ref 136–145)
SODIUM SERPL-SCNC: 150 MMOL/L (ref 136–145)
SOURCE: NORMAL
SP GR UR REFRACTOMETRY: 1.03 (ref 1–1.03)
TROPONIN I SERPL HS-MCNC: 20 NG/L (ref 0–51)
URINE CULTURE IF INDICATED: ABNORMAL
UROBILINOGEN UR QL STRIP.AUTO: 0.2 EU/DL (ref 0.2–1)
WBC # BLD AUTO: 7.9 K/UL (ref 3.6–11)
WBC # BLD AUTO: 8.1 K/UL (ref 3.6–11)
WBC URNS QL MICRO: ABNORMAL /HPF (ref 0–4)

## 2025-01-08 PROCEDURE — 71045 X-RAY EXAM CHEST 1 VIEW: CPT

## 2025-01-08 PROCEDURE — 6360000002 HC RX W HCPCS: Performed by: EMERGENCY MEDICINE

## 2025-01-08 PROCEDURE — P9047 ALBUMIN (HUMAN), 25%, 50ML: HCPCS | Performed by: EMERGENCY MEDICINE

## 2025-01-08 PROCEDURE — 86923 COMPATIBILITY TEST ELECTRIC: CPT

## 2025-01-08 PROCEDURE — 96375 TX/PRO/DX INJ NEW DRUG ADDON: CPT

## 2025-01-08 PROCEDURE — 2580000003 HC RX 258: Performed by: EMERGENCY MEDICINE

## 2025-01-08 PROCEDURE — 96367 TX/PROPH/DG ADDL SEQ IV INF: CPT

## 2025-01-08 PROCEDURE — 80053 COMPREHEN METABOLIC PANEL: CPT

## 2025-01-08 PROCEDURE — 6360000004 HC RX CONTRAST MEDICATION: Performed by: EMERGENCY MEDICINE

## 2025-01-08 PROCEDURE — 36430 TRANSFUSION BLD/BLD COMPNT: CPT

## 2025-01-08 PROCEDURE — 87636 SARSCOV2 & INF A&B AMP PRB: CPT

## 2025-01-08 PROCEDURE — 83690 ASSAY OF LIPASE: CPT

## 2025-01-08 PROCEDURE — 86900 BLOOD TYPING SEROLOGIC ABO: CPT

## 2025-01-08 PROCEDURE — 93005 ELECTROCARDIOGRAM TRACING: CPT | Performed by: EMERGENCY MEDICINE

## 2025-01-08 PROCEDURE — 85014 HEMATOCRIT: CPT

## 2025-01-08 PROCEDURE — 85027 COMPLETE CBC AUTOMATED: CPT

## 2025-01-08 PROCEDURE — 83735 ASSAY OF MAGNESIUM: CPT

## 2025-01-08 PROCEDURE — 85025 COMPLETE CBC W/AUTO DIFF WBC: CPT

## 2025-01-08 PROCEDURE — 86901 BLOOD TYPING SEROLOGIC RH(D): CPT

## 2025-01-08 PROCEDURE — 74178 CT ABD&PLV WO CNTR FLWD CNTR: CPT

## 2025-01-08 PROCEDURE — 99285 EMERGENCY DEPT VISIT HI MDM: CPT

## 2025-01-08 PROCEDURE — 85018 HEMOGLOBIN: CPT

## 2025-01-08 PROCEDURE — 81001 URINALYSIS AUTO W/SCOPE: CPT

## 2025-01-08 PROCEDURE — P9016 RBC LEUKOCYTES REDUCED: HCPCS

## 2025-01-08 PROCEDURE — 36415 COLL VENOUS BLD VENIPUNCTURE: CPT

## 2025-01-08 PROCEDURE — 82962 GLUCOSE BLOOD TEST: CPT

## 2025-01-08 PROCEDURE — 96365 THER/PROPH/DIAG IV INF INIT: CPT

## 2025-01-08 PROCEDURE — 86850 RBC ANTIBODY SCREEN: CPT

## 2025-01-08 PROCEDURE — 84484 ASSAY OF TROPONIN QUANT: CPT

## 2025-01-08 RX ORDER — SODIUM CHLORIDE 9 MG/ML
INJECTION, SOLUTION INTRAVENOUS PRN
Status: COMPLETED | OUTPATIENT
Start: 2025-01-08 | End: 2025-01-08

## 2025-01-08 RX ORDER — SODIUM CHLORIDE 9 MG/ML
INJECTION, SOLUTION INTRAVENOUS PRN
Status: DISCONTINUED | OUTPATIENT
Start: 2025-01-08 | End: 2025-01-09 | Stop reason: HOSPADM

## 2025-01-08 RX ORDER — 0.9 % SODIUM CHLORIDE 0.9 %
500 INTRAVENOUS SOLUTION INTRAVENOUS ONCE
Status: COMPLETED | OUTPATIENT
Start: 2025-01-08 | End: 2025-01-08

## 2025-01-08 RX ORDER — SODIUM CHLORIDE 9 MG/ML
50 INJECTION, SOLUTION INTRAVENOUS ONCE
Status: DISCONTINUED | OUTPATIENT
Start: 2025-01-08 | End: 2025-01-08

## 2025-01-08 RX ORDER — METOCLOPRAMIDE HYDROCHLORIDE 5 MG/ML
10 INJECTION INTRAMUSCULAR; INTRAVENOUS ONCE
Status: COMPLETED | OUTPATIENT
Start: 2025-01-08 | End: 2025-01-08

## 2025-01-08 RX ORDER — ALBUMIN (HUMAN) 12.5 G/50ML
50 SOLUTION INTRAVENOUS
Status: COMPLETED | OUTPATIENT
Start: 2025-01-08 | End: 2025-01-08

## 2025-01-08 RX ORDER — ONDANSETRON 2 MG/ML
4 INJECTION INTRAMUSCULAR; INTRAVENOUS ONCE
Status: COMPLETED | OUTPATIENT
Start: 2025-01-08 | End: 2025-01-08

## 2025-01-08 RX ORDER — PROCHLORPERAZINE EDISYLATE 5 MG/ML
10 INJECTION INTRAMUSCULAR; INTRAVENOUS ONCE
Status: COMPLETED | OUTPATIENT
Start: 2025-01-08 | End: 2025-01-08

## 2025-01-08 RX ORDER — IOPAMIDOL 755 MG/ML
100 INJECTION, SOLUTION INTRAVASCULAR
Status: COMPLETED | OUTPATIENT
Start: 2025-01-08 | End: 2025-01-08

## 2025-01-08 RX ADMIN — PROTHROMBIN, COAGULATION FACTOR VII HUMAN, COAGULATION FACTOR IX HUMAN, COAGULATION FACTOR X HUMAN, PROTEIN C, PROTEIN S HUMAN, AND WATER 2000 UNITS: KIT at 14:40

## 2025-01-08 RX ADMIN — ALBUMIN (HUMAN) 50 G: 0.25 INJECTION, SOLUTION INTRAVENOUS at 21:17

## 2025-01-08 RX ADMIN — METOCLOPRAMIDE 10 MG: 5 INJECTION, SOLUTION INTRAMUSCULAR; INTRAVENOUS at 16:00

## 2025-01-08 RX ADMIN — IOPAMIDOL 100 ML: 755 INJECTION, SOLUTION INTRAVENOUS at 14:26

## 2025-01-08 RX ADMIN — SODIUM CHLORIDE: 9 INJECTION, SOLUTION INTRAVENOUS at 19:08

## 2025-01-08 RX ADMIN — ALBUMIN (HUMAN) 50 G: 0.25 INJECTION, SOLUTION INTRAVENOUS at 16:02

## 2025-01-08 RX ADMIN — SODIUM CHLORIDE 40 MG: 9 INJECTION, SOLUTION INTRAMUSCULAR; INTRAVENOUS; SUBCUTANEOUS at 13:23

## 2025-01-08 RX ADMIN — PROCHLORPERAZINE EDISYLATE 10 MG: 5 INJECTION INTRAMUSCULAR; INTRAVENOUS at 15:34

## 2025-01-08 RX ADMIN — SODIUM CHLORIDE 500 ML: 9 INJECTION, SOLUTION INTRAVENOUS at 13:22

## 2025-01-08 RX ADMIN — SODIUM CHLORIDE 500 ML: 9 INJECTION, SOLUTION INTRAVENOUS at 16:43

## 2025-01-08 RX ADMIN — SODIUM CHLORIDE 500 ML: 9 INJECTION, SOLUTION INTRAVENOUS at 21:16

## 2025-01-08 RX ADMIN — ONDANSETRON 4 MG: 2 INJECTION, SOLUTION INTRAMUSCULAR; INTRAVENOUS at 13:24

## 2025-01-08 ASSESSMENT — PAIN - FUNCTIONAL ASSESSMENT: PAIN_FUNCTIONAL_ASSESSMENT: 0-10

## 2025-01-08 ASSESSMENT — PAIN SCALES - GENERAL: PAINLEVEL_OUTOF10: 7

## 2025-01-08 ASSESSMENT — PAIN DESCRIPTION - LOCATION: LOCATION: ABDOMEN;CHEST

## 2025-01-08 NOTE — ED NOTES
Pt BP cuff checked, BP rechecked, ED Provider aware of current vitals, pt continued nausea. To order Albumin and Reglan for pt.

## 2025-01-08 NOTE — ED NOTES
This RN to stay with pt for first 15 minutes of transfusion. Pt reports some upset stomach before start, but this has been ongoing all day. Emesis bag available with pt.    Pt  also at bedside, aware of plan of care. This RN to call report when available, but currently at bedside with pt.

## 2025-01-08 NOTE — ED NOTES
Pt continues to rest comfortably, no episodes of emesis since administration of reglan earlier. Purewick placed for pt, output 250ml yellow clear urine.

## 2025-01-08 NOTE — CONSENT
Informed Consent for Blood Component Transfusion Note    I have discussed with the patient the rationale for blood component transfusion; its benefits in treating or preventing fatigue, organ damage, or death; and its risk which includes mild transfusion reactions, rare risk of blood borne infection, or more serious but rare reactions. I have discussed the alternatives to transfusion, including the risk and consequences of not receiving transfusion. The patient had an opportunity to ask questions and had agreed to proceed with transfusion of blood components.    Electronically signed by Aravind Hernández DO on 1/8/25 at 2:07 PM EST

## 2025-01-08 NOTE — ED PROVIDER NOTES
(0 mL/hr IntraVENous Stopped 1/8/25 1920)   prothrombin complex concentrate (human) (KCENTRA) infusion 2,000 Units (0 Units IntraVENous Stopped 1/8/25 1511)   prochlorperazine (COMPAZINE) injection 10 mg (10 mg IntraVENous Given 1/8/25 1534)   metoclopramide (REGLAN) injection 10 mg (10 mg IntraVENous Given 1/8/25 1600)   albumin human 25% IV solution 50 g ( IntraVENous Stopped 1/8/25 1712)   sodium chloride 0.9 % bolus 500 mL (0 mLs IntraVENous Stopped 1/8/25 1705)   sodium chloride 0.9 % bolus 500 mL (0 mLs IntraVENous Patient Transferred to Other Facility 1/8/25 2132)   albumin human 25% IV solution 50 g (0 g IntraVENous Patient Transferred to Other Facility 1/8/25 2132)       Medical Decision Making  No acute abdomen on exam, patient appears overall nontoxic, most likely viral in etiology, will check flu and COVID, will check labs for evidence of electrolyte derangement, does complain of some intermittent chest discomfort, likely from the vomiting.  Will start on Protonix, will check an EKG, cardiac biomarkers.  Will check a chest x-ray given the chest discomfort.  Disposition pending laboratory workup, imaging, symptomatic management.  Will provide antiemetics for the nausea and vomiting.  Will replete electrolytes as needed.      After initial evaluation patient began vomiting and was evidence that it was associate with an upper GI bleed as the emesis was coffee-ground's and highly concerning for blood.  Hemoglobin came back at 8.2.  Patient became unstable with hypotension.  Blood transfusion ordered, PCC given to reverse anticoagulation.  Patient will need transfer to higher level of care    Amount and/or Complexity of Data Reviewed  Labs: ordered.  Radiology: ordered.  ECG/medicine tests: ordered and independent interpretation performed.     Details: EKG shows what appears to be paced rhythm, no evidence of ST elevation myocardial infarction.  Interpreted by me    Risk  Prescription drug

## 2025-01-08 NOTE — ED NOTES
TRANSFER - OUT REPORT:    Verbal report given to Sheri Pack RN on Rosita Morillo  being transferred to [facility] Room 274 for routine progression of patient care       Report consisted of patient's Situation, Background, Assessment and   Recommendations(SBAR).     Information from the following report(s) Nurse Handoff Report, ED SBAR, Adult Overview, MAR, Cardiac Rhythm A-fib (baseline), Pre Procedure Checklist, Procedure Verification, and Quality Measures was reviewed with the receiving nurse.    [place] Fall Assessment:    Presents to emergency department  because of falls (Syncope, seizure, or loss of consciousness): No  Age > 70: Yes  Altered Mental Status, Intoxication with alcohol or substance confusion (Disorientation, impaired judgment, poor safety awaremess, or inability to follow instructions): No  Impaired Mobility: Ambulates or transfers with assistive devices or assistance; Unable to ambulate or transer.: Yes  Nursing Judgement: Yes          Lines:   Peripheral IV 01/08/25 Proximal;Right;Anterior Cephalic (Active)   Site Assessment Clean, dry & intact 01/08/25 1312   Line Status Blood return noted;Brisk blood return;Flushed;Normal saline locked 01/08/25 1312   Phlebitis Assessment No symptoms 01/08/25 1312   Infiltration Assessment 0 01/08/25 1312   Dressing Status New dressing applied;Clean, dry & intact 01/08/25 1312   Dressing Type Transparent 01/08/25 1312       Peripheral IV 01/08/25 Left;Posterior Forearm (Active)   Site Assessment Clean, dry & intact 01/08/25 1426   Line Status Blood return noted 01/08/25 1426   Line Care Cap changed 01/08/25 1426   Phlebitis Assessment No symptoms 01/08/25 1426   Infiltration Assessment 0 01/08/25 1426   Alcohol Cap Used Yes 01/08/25 1426   Dressing Status Clean, dry & intact 01/08/25 1426   Dressing Type Transparent 01/08/25 1426   Dressing Intervention New 01/08/25 1426        Opportunity for questions and clarification was

## 2025-01-08 NOTE — ED NOTES
Waiting for lab to call when blood is ready for pt. Pt Bps improving incrementally with albumin infusion. Pt currently receiving 3rd vial of albumin.

## 2025-01-08 NOTE — PROGRESS NOTES
arranged ALS transportation from [facility]#  ED 6 to [place] [address]  . Arranged ALS/transport w/[facility] -  advised to bring appropiate equipment - ETA of  5100  given - updated ED staff w/ETA

## 2025-01-09 ENCOUNTER — ANESTHESIA EVENT (OUTPATIENT)
Facility: HOSPITAL | Age: 80
DRG: 813 | End: 2025-01-09
Payer: MEDICARE

## 2025-01-09 ENCOUNTER — ANESTHESIA (OUTPATIENT)
Facility: HOSPITAL | Age: 80
DRG: 813 | End: 2025-01-09
Payer: MEDICARE

## 2025-01-09 ENCOUNTER — HOSPITAL ENCOUNTER (INPATIENT)
Facility: HOSPITAL | Age: 80
LOS: 2 days | Discharge: HOME | DRG: 813 | End: 2025-01-11
Attending: STUDENT IN AN ORGANIZED HEALTH CARE EDUCATION/TRAINING PROGRAM | Admitting: INTERNAL MEDICINE
Payer: MEDICARE

## 2025-01-09 VITALS
DIASTOLIC BLOOD PRESSURE: 56 MMHG | BODY MASS INDEX: 42.25 KG/M2 | WEIGHT: 231 LBS | TEMPERATURE: 97.8 F | OXYGEN SATURATION: 98 % | SYSTOLIC BLOOD PRESSURE: 129 MMHG | HEART RATE: 83 BPM | RESPIRATION RATE: 19 BRPM

## 2025-01-09 DIAGNOSIS — K92.2 GASTROINTESTINAL HEMORRHAGE, UNSPECIFIED GASTROINTESTINAL HEMORRHAGE TYPE: ICD-10-CM

## 2025-01-09 DIAGNOSIS — E11.9 TYPE 2 DIABETES MELLITUS WITHOUT COMPLICATION, WITHOUT LONG-TERM CURRENT USE OF INSULIN (HCC): ICD-10-CM

## 2025-01-09 DIAGNOSIS — R79.89 ELEVATED TROPONIN: Primary | ICD-10-CM

## 2025-01-09 LAB
ABO + RH BLD: NORMAL
ALBUMIN SERPL-MCNC: 4.1 G/DL (ref 3.5–5)
ALBUMIN/GLOB SERPL: 1.7 (ref 1.1–2.2)
ALP SERPL-CCNC: 77 U/L (ref 45–117)
ALT SERPL-CCNC: 11 U/L (ref 12–78)
ANION GAP SERPL CALC-SCNC: 10 MMOL/L (ref 2–12)
ANION GAP SERPL CALC-SCNC: 8 MMOL/L (ref 2–12)
AST SERPL W P-5'-P-CCNC: 20 U/L (ref 15–37)
BILIRUB SERPL-MCNC: 1.9 MG/DL (ref 0.2–1)
BLD PROD TYP BPU: NORMAL
BLD PROD TYP BPU: NORMAL
BLOOD BANK BLOOD PRODUCT EXPIRATION DATE: NORMAL
BLOOD BANK BLOOD PRODUCT EXPIRATION DATE: NORMAL
BLOOD BANK DISPENSE STATUS: NORMAL
BLOOD BANK DISPENSE STATUS: NORMAL
BLOOD BANK ISBT PRODUCT BLOOD TYPE: 6200
BLOOD BANK ISBT PRODUCT BLOOD TYPE: 6200
BLOOD BANK UNIT TYPE AND RH: NORMAL
BLOOD BANK UNIT TYPE AND RH: NORMAL
BLOOD GROUP ANTIBODIES SERPL: NORMAL
BPU ID: NORMAL
BPU ID: NORMAL
BUN SERPL-MCNC: 64 MG/DL (ref 6–20)
BUN SERPL-MCNC: 66 MG/DL (ref 6–20)
BUN/CREAT SERPL: 48 (ref 12–20)
BUN/CREAT SERPL: 51 (ref 12–20)
CA-I BLD-MCNC: 8.9 MG/DL (ref 8.5–10.1)
CA-I BLD-MCNC: 9.4 MG/DL (ref 8.5–10.1)
CHLORIDE SERPL-SCNC: 113 MMOL/L (ref 97–108)
CHLORIDE SERPL-SCNC: 116 MMOL/L (ref 97–108)
CO2 SERPL-SCNC: 26 MMOL/L (ref 21–32)
CO2 SERPL-SCNC: 28 MMOL/L (ref 21–32)
CREAT SERPL-MCNC: 1.29 MG/DL (ref 0.55–1.02)
CREAT SERPL-MCNC: 1.33 MG/DL (ref 0.55–1.02)
CROSSMATCH RESULT: NORMAL
CROSSMATCH RESULT: NORMAL
EKG ATRIAL RATE: 72 BPM
EKG ATRIAL RATE: 79 BPM
EKG DIAGNOSIS: NORMAL
EKG DIAGNOSIS: NORMAL
EKG Q-T INTERVAL: 412 MS
EKG Q-T INTERVAL: 420 MS
EKG QRS DURATION: 64 MS
EKG QRS DURATION: 74 MS
EKG QTC CALCULATION (BAZETT): 486 MS
EKG QTC CALCULATION (BAZETT): 496 MS
EKG R AXIS: 65 DEGREES
EKG R AXIS: 70 DEGREES
EKG T AXIS: 189 DEGREES
EKG T AXIS: 217 DEGREES
EKG VENTRICULAR RATE: 84 BPM
EKG VENTRICULAR RATE: 84 BPM
GLOBULIN SER CALC-MCNC: 2.4 G/DL (ref 2–4)
GLUCOSE BLD STRIP.AUTO-MCNC: 245 MG/DL (ref 65–100)
GLUCOSE BLD STRIP.AUTO-MCNC: 274 MG/DL (ref 65–100)
GLUCOSE BLD STRIP.AUTO-MCNC: 294 MG/DL (ref 65–100)
GLUCOSE SERPL-MCNC: 214 MG/DL (ref 65–100)
GLUCOSE SERPL-MCNC: 258 MG/DL (ref 65–100)
HCT VFR BLD AUTO: 18.9 % (ref 35–47)
HCT VFR BLD AUTO: 25.2 % (ref 35–47)
HCT VFR BLD AUTO: 33.1 % (ref 35–47)
HELIOBACTER PYLORI ID: NORMAL
HGB BLD-MCNC: 10.9 G/DL (ref 11.5–16)
HGB BLD-MCNC: 6 G/DL (ref 11.5–16)
HGB BLD-MCNC: 8.3 G/DL (ref 11.5–16)
MAGNESIUM SERPL-MCNC: 2.3 MG/DL (ref 1.6–2.4)
MRSA DNA SPEC QL NAA+PROBE: NOT DETECTED
PERFORMED BY:: ABNORMAL
PHOSPHATE SERPL-MCNC: 3.5 MG/DL (ref 2.6–4.7)
POTASSIUM SERPL-SCNC: 3.9 MMOL/L (ref 3.5–5.1)
POTASSIUM SERPL-SCNC: 4.1 MMOL/L (ref 3.5–5.1)
PROT SERPL-MCNC: 6.5 G/DL (ref 6.4–8.2)
SODIUM SERPL-SCNC: 149 MMOL/L (ref 136–145)
SODIUM SERPL-SCNC: 152 MMOL/L (ref 136–145)
SPECIMEN EXP DATE BLD: NORMAL
TROPONIN I SERPL HS-MCNC: 1362 NG/L (ref 0–51)
TROPONIN I SERPL HS-MCNC: 4679 NG/L (ref 0–51)
TROPONIN I SERPL HS-MCNC: 5622 NG/L (ref 0–51)
UNIT DIVISION: 0
UNIT DIVISION: 0
UNIT ISSUE DATE/TIME: NORMAL
UNIT ISSUE DATE/TIME: NORMAL

## 2025-01-09 PROCEDURE — 2709999900 HC NON-CHARGEABLE SUPPLY: Performed by: INTERNAL MEDICINE

## 2025-01-09 PROCEDURE — 85014 HEMATOCRIT: CPT

## 2025-01-09 PROCEDURE — 86901 BLOOD TYPING SEROLOGIC RH(D): CPT

## 2025-01-09 PROCEDURE — 2500000003 HC RX 250 WO HCPCS: Performed by: NURSE PRACTITIONER

## 2025-01-09 PROCEDURE — P9016 RBC LEUKOCYTES REDUCED: HCPCS

## 2025-01-09 PROCEDURE — 6360000002 HC RX W HCPCS: Performed by: NURSE PRACTITIONER

## 2025-01-09 PROCEDURE — 30233N1 TRANSFUSION OF NONAUTOLOGOUS RED BLOOD CELLS INTO PERIPHERAL VEIN, PERCUTANEOUS APPROACH: ICD-10-PCS | Performed by: NURSE PRACTITIONER

## 2025-01-09 PROCEDURE — 87641 MR-STAPH DNA AMP PROBE: CPT

## 2025-01-09 PROCEDURE — 0DB68ZX EXCISION OF STOMACH, VIA NATURAL OR ARTIFICIAL OPENING ENDOSCOPIC, DIAGNOSTIC: ICD-10-PCS | Performed by: INTERNAL MEDICINE

## 2025-01-09 PROCEDURE — 1100000000 HC RM PRIVATE

## 2025-01-09 PROCEDURE — 83735 ASSAY OF MAGNESIUM: CPT

## 2025-01-09 PROCEDURE — 84100 ASSAY OF PHOSPHORUS: CPT

## 2025-01-09 PROCEDURE — 86923 COMPATIBILITY TEST ELECTRIC: CPT

## 2025-01-09 PROCEDURE — 0W3P8ZZ CONTROL BLEEDING IN GASTROINTESTINAL TRACT, VIA NATURAL OR ARTIFICIAL OPENING ENDOSCOPIC: ICD-10-PCS | Performed by: INTERNAL MEDICINE

## 2025-01-09 PROCEDURE — 93005 ELECTROCARDIOGRAM TRACING: CPT | Performed by: NURSE PRACTITIONER

## 2025-01-09 PROCEDURE — 3700000001 HC ADD 15 MINUTES (ANESTHESIA): Performed by: INTERNAL MEDICINE

## 2025-01-09 PROCEDURE — 6370000000 HC RX 637 (ALT 250 FOR IP): Performed by: NURSE PRACTITIONER

## 2025-01-09 PROCEDURE — 2580000003 HC RX 258: Performed by: NURSE PRACTITIONER

## 2025-01-09 PROCEDURE — 84484 ASSAY OF TROPONIN QUANT: CPT

## 2025-01-09 PROCEDURE — 80048 BASIC METABOLIC PNL TOTAL CA: CPT

## 2025-01-09 PROCEDURE — 2500000003 HC RX 250 WO HCPCS: Performed by: NURSE ANESTHETIST, CERTIFIED REGISTERED

## 2025-01-09 PROCEDURE — 3700000000 HC ANESTHESIA ATTENDED CARE: Performed by: INTERNAL MEDICINE

## 2025-01-09 PROCEDURE — 3E0G8GC INTRODUCTION OF OTHER THERAPEUTIC SUBSTANCE INTO UPPER GI, VIA NATURAL OR ARTIFICIAL OPENING ENDOSCOPIC: ICD-10-PCS | Performed by: INTERNAL MEDICINE

## 2025-01-09 PROCEDURE — 86850 RBC ANTIBODY SCREEN: CPT

## 2025-01-09 PROCEDURE — 36430 TRANSFUSION BLD/BLD COMPNT: CPT

## 2025-01-09 PROCEDURE — 86900 BLOOD TYPING SEROLOGIC ABO: CPT

## 2025-01-09 PROCEDURE — 80053 COMPREHEN METABOLIC PANEL: CPT

## 2025-01-09 PROCEDURE — 85018 HEMOGLOBIN: CPT

## 2025-01-09 PROCEDURE — 3600007512: Performed by: INTERNAL MEDICINE

## 2025-01-09 PROCEDURE — 82962 GLUCOSE BLOOD TEST: CPT

## 2025-01-09 PROCEDURE — 3600007502: Performed by: INTERNAL MEDICINE

## 2025-01-09 PROCEDURE — 6360000002 HC RX W HCPCS: Performed by: NURSE ANESTHETIST, CERTIFIED REGISTERED

## 2025-01-09 PROCEDURE — 6360000002 HC RX W HCPCS: Performed by: INTERNAL MEDICINE

## 2025-01-09 RX ORDER — SODIUM CHLORIDE 0.9 % (FLUSH) 0.9 %
5-40 SYRINGE (ML) INJECTION EVERY 12 HOURS SCHEDULED
Status: DISCONTINUED | OUTPATIENT
Start: 2025-01-09 | End: 2025-01-11 | Stop reason: HOSPADM

## 2025-01-09 RX ORDER — ETOMIDATE 2 MG/ML
INJECTION INTRAVENOUS
Status: DISCONTINUED | OUTPATIENT
Start: 2025-01-09 | End: 2025-01-09 | Stop reason: SDUPTHER

## 2025-01-09 RX ORDER — SODIUM CHLORIDE 0.9 % (FLUSH) 0.9 %
5-40 SYRINGE (ML) INJECTION PRN
Status: DISCONTINUED | OUTPATIENT
Start: 2025-01-09 | End: 2025-01-11 | Stop reason: HOSPADM

## 2025-01-09 RX ORDER — EPINEPHRINE 1 MG/ML(1)
AMPUL (ML) INJECTION PRN
Status: DISCONTINUED | OUTPATIENT
Start: 2025-01-09 | End: 2025-01-09 | Stop reason: ALTCHOICE

## 2025-01-09 RX ORDER — ONDANSETRON 2 MG/ML
4 INJECTION INTRAMUSCULAR; INTRAVENOUS EVERY 6 HOURS PRN
Status: DISCONTINUED | OUTPATIENT
Start: 2025-01-09 | End: 2025-01-11 | Stop reason: HOSPADM

## 2025-01-09 RX ORDER — INSULIN GLARGINE 100 [IU]/ML
10 INJECTION, SOLUTION SUBCUTANEOUS DAILY
Status: DISCONTINUED | OUTPATIENT
Start: 2025-01-09 | End: 2025-01-11 | Stop reason: HOSPADM

## 2025-01-09 RX ORDER — DEXTROSE MONOHYDRATE 100 MG/ML
INJECTION, SOLUTION INTRAVENOUS CONTINUOUS PRN
Status: DISCONTINUED | OUTPATIENT
Start: 2025-01-09 | End: 2025-01-11 | Stop reason: HOSPADM

## 2025-01-09 RX ORDER — GLUCAGON 1 MG/ML
1 KIT INJECTION PRN
Status: DISCONTINUED | OUTPATIENT
Start: 2025-01-09 | End: 2025-01-11 | Stop reason: HOSPADM

## 2025-01-09 RX ORDER — ONDANSETRON 4 MG/1
4 TABLET, ORALLY DISINTEGRATING ORAL EVERY 8 HOURS PRN
Status: DISCONTINUED | OUTPATIENT
Start: 2025-01-09 | End: 2025-01-11 | Stop reason: HOSPADM

## 2025-01-09 RX ORDER — POTASSIUM CHLORIDE 7.45 MG/ML
10 INJECTION INTRAVENOUS PRN
Status: DISCONTINUED | OUTPATIENT
Start: 2025-01-09 | End: 2025-01-11 | Stop reason: HOSPADM

## 2025-01-09 RX ORDER — SODIUM CHLORIDE 9 MG/ML
INJECTION, SOLUTION INTRAVENOUS PRN
Status: DISCONTINUED | OUTPATIENT
Start: 2025-01-09 | End: 2025-01-11 | Stop reason: HOSPADM

## 2025-01-09 RX ORDER — ETOMIDATE 2 MG/ML
INJECTION INTRAVENOUS
Status: COMPLETED
Start: 2025-01-09 | End: 2025-01-09

## 2025-01-09 RX ORDER — ACETAMINOPHEN 325 MG/1
650 TABLET ORAL EVERY 6 HOURS PRN
Status: DISCONTINUED | OUTPATIENT
Start: 2025-01-09 | End: 2025-01-11 | Stop reason: HOSPADM

## 2025-01-09 RX ORDER — INSULIN LISPRO 100 [IU]/ML
0-4 INJECTION, SOLUTION INTRAVENOUS; SUBCUTANEOUS EVERY 6 HOURS SCHEDULED
Status: DISCONTINUED | OUTPATIENT
Start: 2025-01-09 | End: 2025-01-10

## 2025-01-09 RX ORDER — POTASSIUM CHLORIDE 29.8 MG/ML
20 INJECTION INTRAVENOUS PRN
Status: DISCONTINUED | OUTPATIENT
Start: 2025-01-09 | End: 2025-01-11 | Stop reason: HOSPADM

## 2025-01-09 RX ORDER — POLYETHYLENE GLYCOL 3350 17 G/17G
17 POWDER, FOR SOLUTION ORAL DAILY PRN
Status: DISCONTINUED | OUTPATIENT
Start: 2025-01-09 | End: 2025-01-11 | Stop reason: HOSPADM

## 2025-01-09 RX ORDER — ACETAMINOPHEN 650 MG/1
650 SUPPOSITORY RECTAL EVERY 6 HOURS PRN
Status: DISCONTINUED | OUTPATIENT
Start: 2025-01-09 | End: 2025-01-11 | Stop reason: HOSPADM

## 2025-01-09 RX ORDER — MAGNESIUM SULFATE IN WATER 40 MG/ML
2000 INJECTION, SOLUTION INTRAVENOUS PRN
Status: DISCONTINUED | OUTPATIENT
Start: 2025-01-09 | End: 2025-01-11 | Stop reason: HOSPADM

## 2025-01-09 RX ORDER — PHENYLEPHRINE HCL IN 0.9% NACL 0.4MG/10ML
SYRINGE (ML) INTRAVENOUS
Status: DISCONTINUED | OUTPATIENT
Start: 2025-01-09 | End: 2025-01-09 | Stop reason: SDUPTHER

## 2025-01-09 RX ADMIN — INSULIN LISPRO 1 UNITS: 100 INJECTION, SOLUTION INTRAVENOUS; SUBCUTANEOUS at 12:51

## 2025-01-09 RX ADMIN — INSULIN LISPRO 2 UNITS: 100 INJECTION, SOLUTION INTRAVENOUS; SUBCUTANEOUS at 16:55

## 2025-01-09 RX ADMIN — ONDANSETRON 4 MG: 2 INJECTION INTRAMUSCULAR; INTRAVENOUS at 04:29

## 2025-01-09 RX ADMIN — Medication 200 MCG: at 11:21

## 2025-01-09 RX ADMIN — ONDANSETRON 4 MG: 2 INJECTION INTRAMUSCULAR; INTRAVENOUS at 16:55

## 2025-01-09 RX ADMIN — INSULIN LISPRO 2 UNITS: 100 INJECTION, SOLUTION INTRAVENOUS; SUBCUTANEOUS at 05:59

## 2025-01-09 RX ADMIN — LIDOCAINE HYDROCHLORIDE 100 MG: 20 INJECTION, SOLUTION EPIDURAL; INFILTRATION; INTRACAUDAL; PERINEURAL at 11:17

## 2025-01-09 RX ADMIN — INSULIN GLARGINE 10 UNITS: 100 INJECTION, SOLUTION SUBCUTANEOUS at 08:13

## 2025-01-09 RX ADMIN — SODIUM CHLORIDE, PRESERVATIVE FREE 10 ML: 5 INJECTION INTRAVENOUS at 21:00

## 2025-01-09 RX ADMIN — SODIUM CHLORIDE, PRESERVATIVE FREE 40 MG: 5 INJECTION INTRAVENOUS at 12:49

## 2025-01-09 RX ADMIN — SODIUM CHLORIDE, PRESERVATIVE FREE 10 ML: 5 INJECTION INTRAVENOUS at 08:21

## 2025-01-09 RX ADMIN — SODIUM CHLORIDE, PRESERVATIVE FREE 40 MG: 5 INJECTION INTRAVENOUS at 01:18

## 2025-01-09 RX ADMIN — ONDANSETRON 4 MG: 2 INJECTION INTRAMUSCULAR; INTRAVENOUS at 10:37

## 2025-01-09 RX ADMIN — ETOMIDATE INJECTION 10 MG: 2 SOLUTION INTRAVENOUS at 11:17

## 2025-01-09 RX ADMIN — PROPOFOL 100 MG: 10 INJECTION, EMULSION INTRAVENOUS at 11:17

## 2025-01-09 ASSESSMENT — ENCOUNTER SYMPTOMS
ABDOMINAL PAIN: 0
DIARRHEA: 1
COUGH: 1
EYES NEGATIVE: 1
BLOOD IN STOOL: 1
NAUSEA: 1
SHORTNESS OF BREATH: 1
VOMITING: 1

## 2025-01-09 ASSESSMENT — PAIN SCALES - GENERAL: PAINLEVEL_OUTOF10: 0

## 2025-01-09 NOTE — ED NOTES
This RN staying at bedside for first 15 minutes of blood transfusion for pt. Pt  also sitting at bedside. Pt resting comfortably at this time.

## 2025-01-09 NOTE — CARE COORDINATION
01/09/25 0919   Service Assessment   Patient Orientation Alert and Oriented   Cognition Alert   History Provided By Patient   Primary Caregiver Self   Accompanied By/Relationship Pt alone.   Support Systems Spouse/Significant Other   Patient's Healthcare Decision Maker is: Legal Next of Kin   PCP Verified by CM Yes  (No PCP.)   Last Visit to PCP   (No PCP)   Prior Functional Level Assistance with the following:;Bathing;Dressing;Toileting;Cooking;Housework;Shopping;Mobility  (Uses w/c)   Current Functional Level Assistance with the following:;Bathing;Toileting;Dressing;Housework;Cooking;Mobility;Shopping  (Uses w/c)   Can patient return to prior living arrangement Yes   Ability to make needs known: Good   Family able to assist with home care needs: Yes   Would you like for me to discuss the discharge plan with any other family members/significant others, and if so, who? Yes  ( Nhan Morillo)   Financial Resources Medicare   Community Resources None   CM/SW Referral Other (see comment)  (None)   Social/Functional History   Lives With Spouse   Type of Home House   Home Layout One level   Home Access Stairs to enter with rails   Entrance Stairs - Number of Steps 4   Bathroom Shower/Tub Walk-in shower   Bathroom Toilet Handicap height   Bathroom Equipment Grab bars in shower;Grab bars around toilet   Bathroom Accessibility Accessible   Home Equipment Wheelchair - Manual  (Takes steps only/uses w/c.)   Prior Level of Assist for ADLs Needs assistance   Toileting Needs assistance   Prior Level of Assist for Homemaking Needs assistance   Homemaking Responsibilities Yes   Ambulation Assistance Needs assistance  (W/C/steps only.)   Prior Level of Assist for Transfers Needs assistance   Active  No   Occupation Retired   Discharge Planning   Type of Residence House   Living Arrangements Spouse/Significant Other   Current Services Prior To Admission None   Potential Assistance Needed N/A   DME Ordered? No   Potential

## 2025-01-09 NOTE — PROCEDURES
PROCEDURE NOTE  Date: 1/9/2025   Name: Rosita Morillo  YOB: 1945    Procedures    Please see the EGD report at chart review-notes-scanned EGD report for full report    2 large ulcers noted in the antrum, lesser curvature and anterior wall.  With deformity of the antrum. The largest ulcer was noted with recent evidence of bleeding with small vessel stumps, clots at the base of ulcer.  6 mL [id number] diluted epinephrine injection were performed, to achieved hemostasis.    Biopsy taken from nearby tissue for H. pylori  Test.    Recommendation with GI:      Continue IV Protonix for 24 hours then change to PO  Monitor hemoglobin, transfusion needed, keep hemoglobin around 8  Follow the biopsy report follow-up for H. pylori, if positive to treat H. pylori   No  NSAIDs and avoid anticoagulation ,antiplatelets, if we can.  Cardiology to follow, for the elevated troponin, possible catchment device assessment for patient's A-fib  Clear liquid diet today  Repeat EGD after 4 weeks to check the healing of the ulcer

## 2025-01-09 NOTE — H&P
CRITICAL CARE ADMISSION NOTE    Name: Rosita Morillo   : 1945   MRN: [id number]   Date: 2025        HISTORY OF PRESENT ILLNESS:   This is a 79-year-old female with a past medical history significant for CHF, A-fib, and diabetes who presented to an outside emergency department with complaints of hematemesis, fatigue, and nausea.  Patient reports approximately 5 days ago she began having nausea and coffee-ground emesis.  She reports not recognizing it was blood hence her delay in seeking evaluation.  She also reports melena/diarrhea that began approximately 2 days ago.  Patient does take Eliquis for A-fib.    In the ED she was found to have anemia with a hemoglobin down to 8, normal runs around 12-14.  She received PCC for anticoagulation reversal.  She received 2 units of packed red blood cells, 1 was on crossmatch due to hemodynamic instability.  CTA obtained, negative for active extravasation.  She was given 40 mg IV Protonix, and albumin.  She is transferred here for further evaluation admission and management.    ASSESSMENT/PLAN   Acute GI bleed suspect upper  -PPI, 40 mg twice daily IV  -GI consult  -N.p.o.  -Trend H&H, transfuse as necessary for hemoglobin less than 7  -Check fibrinogen    Atrial fibrillation on Eliquis  Elevated troponin/NSTEMI  CHF  -Will need to reevaluate risk versus benefit of anticoagulation  -Trend troponin until peak  -IV Lopressor if needed for rate control  -Obtain echocardiogram if no recent available    DM 2  -Basal/bolus insulin regimen        ICU DAILY CHECKLIST     Code Status: Full code  DVT Prophylaxis: SCDs  T/L/D: PIV  SUP: PPI  Diet: N.p.o.  Activity Level: Bedrest  ABCDEF Bundle/Checklist Completed:Yes  Disposition: ICU  Multidisciplinary Rounds Completed:  N/A  Patient/Family Updated: Yes           SUBJECTIVE:       Review of Systems:     Review of Systems   Constitutional:  Positive for fatigue.   Respiratory:  Positive for shortness of breath.   Amoxicillin-Pot Clavulanate Other (See Comments)    Clindamycin Other (See Comments)    Sulfamethoxazole-Trimethoprim Other (See Comments)      Social History     Tobacco Use    Smoking status: Former    Smokeless tobacco: Never   Substance Use Topics    Alcohol use: Yes     Alcohol/week: 1.0 standard drink of alcohol      Family History   Problem Relation Age of Onset    No Known Problems Mother          OBJECTIVE:     Labs and Data: Reviewed 25  Medications: Reviewed 25  Imaging: Reviewed 25    Physical Exam  Constitutional:       Appearance: She is ill-appearing. She is not toxic-appearing.   HENT:      Mouth/Throat:      Mouth: Mucous membranes are dry.   Eyes:      Pupils: Pupils are equal, round, and reactive to light.   Cardiovascular:      Rate and Rhythm: Regular rhythm. Tachycardia present.      Pulses: Normal pulses.   Pulmonary:      Effort: Pulmonary effort is normal.      Breath sounds: Normal breath sounds.   Abdominal:      General: Bowel sounds are normal.      Palpations: Abdomen is soft.   Skin:     General: Skin is warm and dry.      Capillary Refill: Capillary refill takes 2 to 3 seconds.      Coloration: Skin is pale.   Neurological:      General: No focal deficit present.      Mental Status: She is alert and oriented to person, place, and time.          BP (!) 114/59   Pulse 84   Temp 98.8 °F (37.1 °C) (Oral)   Resp 12   Ht 1.6 m (5' 3\")   Wt 103.5 kg (228 lb 2.8 oz)   SpO2 96%   BMI 40.42 kg/m²      Temp (24hrs), Av.9 °F (37.2 °C), Min:97.4 °F (36.3 °C), Max:99.3 °F (37.4 °C)           Intake/Output:   No intake or output data in the 24 hours ending 25 0646      Imaging    No valid procedures specified.         CRITICAL CARE DOCUMENTATION  I had a face to face encounter with the patient, reviewed and interpreted patient data including clinical events, labs, images, vital signs, I/O's, and examined patient.  I have discussed the case and the plan and

## 2025-01-09 NOTE — ANESTHESIA PRE PROCEDURE
Department of Anesthesiology  Preprocedure Note       Name:  Rosita Morillo   Age:  79 y.o.  :  1945                                          MRN:  [id number]         Date:  2025      Surgeon: Surgeon(s):  Sean Meza MD    Procedure: Procedure(s):  ESOPHAGOGASTRODUODENOSCOPY    Medications prior to admission:   Prior to Admission medications    Medication Sig Start Date End Date Taking? Authorizing Provider   dapagliflozin (FARXIGA) 10 MG tablet Take 1 tablet by mouth every morning    Rachel Reno MD   timolol (TIMOPTIC) 0.25 % ophthalmic solution Place 1 drop into both eyes 2 times daily    Rachel Reno MD   torsemide (DEMADEX) 20 MG tablet Take 1 tablet by mouth daily    Rachel Reno MD   potassium chloride (MICRO-K) 10 MEQ extended release capsule Take 1 capsule by mouth 2 times daily    Rachel Reno MD   pregabalin (LYRICA) 150 MG capsule Take 1 capsule by mouth 2 times daily for 30 days. Max Daily Amount: 300 mg  Patient taking differently: Take 75 mg by mouth 2 times daily. 1 cap in am-3 cap at pm 24  Ana Maria Hutchinson MD   apixaban (ELIQUIS) 5 MG TABS tablet Take 1 tablet by mouth 2 times daily 18   Automatic Reconciliation, Ar   atorvastatin (LIPITOR) 10 MG tablet Take 1 tablet by mouth daily    Automatic Reconciliation, Ar   insulin aspart (NOVOLOG) 100 UNIT/ML injection pen Inject 1 Units into the skin 3 times daily (before meals)    Automatic Reconciliation, Ar       Current medications:    Current Facility-Administered Medications   Medication Dose Route Frequency Provider Last Rate Last Admin    sodium chloride flush 0.9 % injection 5-40 mL  5-40 mL IntraVENous 2 times per day Aide Coker APRN - CNP   10 mL at 25 0821    sodium chloride flush 0.9 % injection 5-40 mL  5-40 mL IntraVENous PRN Aide Coker APRN - CNP        0.9 % sodium chloride infusion   IntraVENous PRN Aide Coker APRN - CNP         potassium chloride 20 mEq/50 mL IVPB (Central Line)  20 mEq IntraVENous PRN Aide Coker APRN - CNP        Or    potassium chloride 10 mEq/100 mL IVPB (Peripheral Line)  10 mEq IntraVENous PRN Aide Coker APRN - CNP        magnesium sulfate 2000 mg in 50 mL IVPB premix  2,000 mg IntraVENous PRN Aide Coker APRN - CNP        ondansetron (ZOFRAN-ODT) disintegrating tablet 4 mg  4 mg Oral Q8H PRN Aide Coker APRN - CNP        Or    ondansetron (ZOFRAN) injection 4 mg  4 mg IntraVENous Q6H PRN Aide Coker APRN - CNP   4 mg at 01/09/25 0429    polyethylene glycol (GLYCOLAX) packet 17 g  17 g Oral Daily PRN Aide Coker APRN - CNP        acetaminophen (TYLENOL) tablet 650 mg  650 mg Oral Q6H PRN Aide Coker APRN - CNP        Or    acetaminophen (TYLENOL) suppository 650 mg  650 mg Rectal Q6H PRN Aide Coker APRN - CNP        pantoprazole (PROTONIX) 40 mg in sodium chloride (PF) 0.9 % 10 mL injection  40 mg IntraVENous Q12H Aide Coker APRN - CNP   40 mg at 01/09/25 0118    ondansetron (ZOFRAN) injection 4 mg  4 mg IntraVENous Q6H PRN Aide Coker APRN - CNP   4 mg at 01/09/25 1037    0.9 % sodium chloride infusion   IntraVENous PRN Flavio Hardy R, DO        glucose chewable tablet 16 g  4 tablet Oral PRN Aide Coker APRN - CNP        dextrose bolus 10% 125 mL  125 mL IntraVENous PRN Aide Coker APRN - CNP        Or    dextrose bolus 10% 250 mL  250 mL IntraVENous PRN Aide Coker APRN - CNP        glucagon injection 1 mg  1 mg SubCUTAneous PRN Aide Coker APRN - CNP        dextrose 10 % infusion   IntraVENous Continuous PRN Aide Coker APRN - CNP        insulin glargine (LANTUS) injection vial 10 Units  10 Units SubCUTAneous Daily Aide Coker, ALEX - CNP   10 Units at 01/09/25 0813    insulin lispro (HUMALOG,ADMELOG) injection vial 0-4 Units  0-4 Units  3 FB   Dental:    (+) partials      Pulmonary:Negative Pulmonary ROS and normal exam  breath sounds clear to auscultation                             Cardiovascular:    (+) pacemaker: pacemaker, past MI:, CAD:, dysrhythmias: atrial fibrillation, CHF:, hyperlipidemia        Rhythm: regular  Rate: normal                 ROS comment: Echo:  · Left Ventricle: Normal cavity size, wall thickness and systolic function (ejection fraction normal). Estimated left ventricular ejection fraction is 56 - 60%. Visually measured ejection fraction. Mild (grade 1) left ventricular diastolic dysfunction.  · Left Atrium: Mildly dilated left atrium.  · Pulmonary Artery: Mild pulmonary hypertension.       Neuro/Psych:   (+) psychiatric history:depression/anxiety  Neuromuscular disease: Fibromyalgia.           GI/Hepatic/Renal:   (+) morbid obesity          Endo/Other:    (+) DiabetesType II DM, using insulin, blood dyscrasia: anemia:..                 Abdominal: normal exam            Vascular: negative vascular ROS.         Other Findings:             Anesthesia Plan      MAC and TIVA     ASA 4     (Standard ASA monitors: continuous EKG, BP, HR, pulse oximeter, and capnography.)  Induction: intravenous.    MIPS: Prophylactic antiemetics administered.  Anesthetic plan and risks discussed with patient (and family, if present.).                        Arnoldo Trevizo MD   1/9/2025

## 2025-01-09 NOTE — ED NOTES
Pt has had a bowel movement of liquid, dark tarry blood, approximately 50ml. Pt cleaned with 2 RN, assisted with bedpan again for additional BM of 150ml tarry blood stool. Pt would like additional nausea meds at this time. ED Provider aware.

## 2025-01-09 NOTE — PROGRESS NOTES
PCCM Brief Update    Patient admitted early a.m.  See initial H&P per Curly NP of the same calendar day.    Patient seen and examined.  Pale appearing, but without hemodynamic compromise.  Still with intermittent nausea and emesis of coffee-ground emesis.  Overall feels a little better.  1 melenic stool since arrival here so far.    Reportedly received PCC to reverse Eliquis for her atrial fibrillation as well as 2 units PRBC at OSH.  Further transfusion held as follow-up hemoglobin 8.3 on arrival.  Follow-up H&H pending.    Dr. Meza from GI aware, anticipate EGD later this morning.  N.p.o. for procedure.    Continue IV twice daily PPI.    Troponin elevated, likely due to demand due to bleed.  Unable to heparinize due to GI bleed.  Trend troponin.  Denies chest pain.  Without hemodynamic compromise.    Hollis Sparks,   Pulmonary, Critical Care Medicine  1/9/2025 11:15 AM

## 2025-01-09 NOTE — CARE COORDINATION
Per IDR    Pt admitted for GI bleed.  HX of Diabetic, CHF    A&Ox4    Pt may transfer to the floor, maybe later.

## 2025-01-09 NOTE — CONSULTS
Gastroenterology Consult Note        Patient: Rosita Morillo MRN: [id number]  SSN: [id number]    YOB: 1945  Age: 79 y.o.  Sex: female      Subjective:      Rosita Morillo is a 79 y.o. female who is being seen for anemia, GI bleeding.  79-year-old female with a past medical history significant for CHF, A-fib, on anticoagulation presented to an outside emergency department with complaints of hematemesis, melena fatigue, and nausea.  She reports melena/diarrhea that began approximately 2 days ago.  Patient does take Eliquis for A-fib.     In the ED she was found to have anemia with a hemoglobin down to 8 from baseline  12-14.  CTA obtained was negative for active bleeding.  She received PCC for anticoagulation reversal.  She received 2 units of packed red blood cells,  on  IV Protonix, and albumin.  She is transferred here for further GI intervention, today patient still have nausea coffee-ground emesis, melena, no severe abdominal pain no chest pain, vital signs stable  Past Medical History:   Diagnosis Date    Arthritis     CAD (coronary artery disease)     afib and chf    Congestive heart failure (HCC)     Depression     Diabetes (HCC)     type 2    Fibromyalgia     Gastrointestinal disorder     reflux and gastro paresis    Heart failure (HCC)     Infectious disease 2010    MRSA? in right big toe    Menopause     Psychiatric disorder     PTSD from 911     Past Surgical History:   Procedure Laterality Date    APPENDECTOMY      BREAST BIOPSY Left     STbb  benign    COLONOSCOPY  08/20/2018    polyps    COLONOSCOPY N/A 8/20/2018    COLONOSCOPY performed by Nayely Corcoran MD at [facility] ([place], [place]) MAIN OR    GI      colonscopy and endo    GYN      cyst on right ovary removed    HEENT      T&A Bilateral Cataract Surgery with coptic lenses      Family History   Problem Relation Age of Onset    No Known Problems Mother      Social History     Tobacco Use    Smoking status: Former    Smokeless tobacco: Never  follow-up plan after patient be discharged.    note to patient:  The 21 st century Cures Act make the medical notes like this available to patient in the interest of transparency, however please be advised this is a medical document.  It is intended  as peer to peer communication. It is written in the medical language and may contain abbreviation or verbiage that are unfamiliar. It may appear blunt or direct.  Medical documents are intended to carry relevant information, facts as evident.  And the clinic opinions of the practitioner.  This note maybe transcribed  using a voice dictation system, voice-recognition errors may occur, this should not be taken to alter the contents or meaning for this note.      Cc Referring Physician

## 2025-01-09 NOTE — ED NOTES
TRANSFER - OUT REPORT:    Verbal report given to Carlos Kelly EMT-P on Rosita Morillo  being transferred to [facility] [address] for routine progression of patient care       Report consisted of patient's Situation, Background, Assessment and   Recommendations(SBAR).     Information from the following report(s) Nurse Handoff Report, ED SBAR, Adult Overview, MAR, Recent Results, Med Rec Status, Cardiac Rhythm A-Fib, and Quality Measures was reviewed with the receiving nurse.    [place] Fall Assessment:    Presents to emergency department  because of falls (Syncope, seizure, or loss of consciousness): No  Age > 70: Yes  Altered Mental Status, Intoxication with alcohol or substance confusion (Disorientation, impaired judgment, poor safety awaremess, or inability to follow instructions): No  Impaired Mobility: Ambulates or transfers with assistive devices or assistance; Unable to ambulate or transer.: Yes  Nursing Judgement: Yes          Lines:   Peripheral IV 01/08/25 Proximal;Right;Anterior Cephalic (Active)   Site Assessment Clean, dry & intact 01/08/25 1312   Line Status Blood return noted;Brisk blood return;Flushed;Normal saline locked 01/08/25 1312   Phlebitis Assessment No symptoms 01/08/25 1312   Infiltration Assessment 0 01/08/25 1312   Dressing Status New dressing applied;Clean, dry & intact 01/08/25 1312   Dressing Type Transparent 01/08/25 1312       Peripheral IV 01/08/25 Left;Posterior Forearm (Active)   Site Assessment Clean, dry & intact 01/08/25 1426   Line Status Blood return noted 01/08/25 1426   Line Care Cap changed 01/08/25 1426   Phlebitis Assessment No symptoms 01/08/25 1426   Infiltration Assessment 0 01/08/25 1426   Alcohol Cap Used Yes 01/08/25 1426   Dressing Status Clean, dry & intact 01/08/25 1426   Dressing Type Transparent 01/08/25 1426   Dressing Intervention New 01/08/25 1426        Opportunity for questions and clarification was provided.      Patient to be

## 2025-01-09 NOTE — CONSENT
Informed Consent for Blood Component Transfusion Note    I have discussed with the patient the rationale for blood component transfusion; its benefits in treating or preventing fatigue, organ damage, or death; and its risk which includes mild transfusion reactions, rare risk of blood borne infection, or more serious but rare reactions. I have discussed the alternatives to transfusion, including the risk and consequences of not receiving transfusion. The patient had an opportunity to ask questions and had agreed to proceed with transfusion of blood components.    Electronically signed by ALEX Reich CNP on 1/9/25 at 1:22 AM EST

## 2025-01-10 ENCOUNTER — APPOINTMENT (OUTPATIENT)
Facility: HOSPITAL | Age: 80
DRG: 813 | End: 2025-01-10
Attending: STUDENT IN AN ORGANIZED HEALTH CARE EDUCATION/TRAINING PROGRAM
Payer: MEDICARE

## 2025-01-10 LAB
ABO + RH BLD: NORMAL
ANION GAP SERPL CALC-SCNC: 6 MMOL/L (ref 2–12)
BASOPHILS # BLD: 0.07 K/UL (ref 0–0.1)
BASOPHILS NFR BLD: 0.6 % (ref 0–1)
BLD PROD TYP BPU: NORMAL
BLD PROD TYP BPU: NORMAL
BLOOD BANK BLOOD PRODUCT EXPIRATION DATE: NORMAL
BLOOD BANK BLOOD PRODUCT EXPIRATION DATE: NORMAL
BLOOD BANK DISPENSE STATUS: NORMAL
BLOOD BANK DISPENSE STATUS: NORMAL
BLOOD BANK ISBT PRODUCT BLOOD TYPE: 6200
BLOOD BANK ISBT PRODUCT BLOOD TYPE: 6200
BLOOD BANK PRODUCT CODE: NORMAL
BLOOD BANK PRODUCT CODE: NORMAL
BLOOD BANK UNIT TYPE AND RH: NORMAL
BLOOD BANK UNIT TYPE AND RH: NORMAL
BLOOD GROUP ANTIBODIES SERPL: NEGATIVE
BPU ID: NORMAL
BPU ID: NORMAL
BUN SERPL-MCNC: 53 MG/DL (ref 6–20)
BUN/CREAT SERPL: 38 (ref 12–20)
CA-I BLD-MCNC: 9.1 MG/DL (ref 8.5–10.1)
CHLORIDE SERPL-SCNC: 116 MMOL/L (ref 97–108)
CO2 SERPL-SCNC: 26 MMOL/L (ref 21–32)
CREAT SERPL-MCNC: 1.39 MG/DL (ref 0.55–1.02)
CROSSMATCH RESULT: NORMAL
CROSSMATCH RESULT: NORMAL
DIFFERENTIAL METHOD BLD: ABNORMAL
ECHO AO ASC DIAM: 3.2 CM
ECHO AO ASCENDING AORTA INDEX: 1.52 CM/M2
ECHO AO ROOT DIAM: 2.8 CM
ECHO AO ROOT INDEX: 1.33 CM/M2
ECHO AV AREA PEAK VELOCITY: 1.3 CM2
ECHO AV AREA VTI: 1.1 CM2
ECHO AV AREA/BSA PEAK VELOCITY: 0.6 CM2/M2
ECHO AV AREA/BSA VTI: 0.5 CM2/M2
ECHO AV MEAN GRADIENT: 1 MMHG
ECHO AV MEAN VELOCITY: 0.5 M/S
ECHO AV PEAK GRADIENT: 4 MMHG
ECHO AV PEAK VELOCITY: 1 M/S
ECHO AV VELOCITY RATIO: 0.5
ECHO AV VTI: 13.6 CM
ECHO BSA: 2.14 M2
ECHO EST RA PRESSURE: 8 MMHG
ECHO LA AREA 4C: 12 CM2
ECHO LA DIAMETER INDEX: 1.66 CM/M2
ECHO LA DIAMETER: 3.5 CM
ECHO LA MAJOR AXIS: 5.2 CM
ECHO LA TO AORTIC ROOT RATIO: 1.25
ECHO LA VOL MOD A4C: 21 ML (ref 22–52)
ECHO LA VOLUME INDEX MOD A4C: 10 ML/M2 (ref 16–34)
ECHO LV E' SEPTAL VELOCITY: 9.08 CM/S
ECHO LV EF PHYSICIAN: 45 %
ECHO LV FRACTIONAL SHORTENING: 47 % (ref 28–44)
ECHO LV INTERNAL DIMENSION DIASTOLE INDEX: 2.13 CM/M2
ECHO LV INTERNAL DIMENSION DIASTOLIC: 4.5 CM (ref 3.9–5.3)
ECHO LV INTERNAL DIMENSION SYSTOLIC INDEX: 1.14 CM/M2
ECHO LV INTERNAL DIMENSION SYSTOLIC: 2.4 CM
ECHO LV IVSD: 0.9 CM (ref 0.6–0.9)
ECHO LV MASS 2D: 153.3 G (ref 67–162)
ECHO LV MASS INDEX 2D: 72.6 G/M2 (ref 43–95)
ECHO LV POSTERIOR WALL DIASTOLIC: 1.1 CM (ref 0.6–0.9)
ECHO LV RELATIVE WALL THICKNESS RATIO: 0.49
ECHO LVOT AREA: 2.8 CM2
ECHO LVOT AV VTI INDEX: 0.4
ECHO LVOT DIAM: 1.9 CM
ECHO LVOT MEAN GRADIENT: 1 MMHG
ECHO LVOT PEAK GRADIENT: 1 MMHG
ECHO LVOT PEAK VELOCITY: 0.5 M/S
ECHO LVOT STROKE VOLUME INDEX: 7.4 ML/M2
ECHO LVOT SV: 15.6 ML
ECHO LVOT VTI: 5.5 CM
ECHO MV E DECELERATION TIME (DT): 140 MS
ECHO MV E VELOCITY: 1.49 M/S
ECHO MV E/E' SEPTAL: 16.41
ECHO PV MAX VELOCITY: 0.3 M/S
ECHO PV MEAN GRADIENT: 1 MMHG
ECHO PV MEAN VELOCITY: 0.4 M/S
ECHO PV PEAK GRADIENT: 0 MMHG
ECHO PV VTI: 11.4 CM
ECHO RA AREA 4C: 14 CM2
ECHO RA END SYSTOLIC VOLUME APICAL 4 CHAMBER INDEX BSA: 18 ML/M2
ECHO RA VOLUME: 39 ML
ECHO RIGHT VENTRICULAR SYSTOLIC PRESSURE (RVSP): 18 MMHG
ECHO RV BASAL DIMENSION: 3.9 CM
ECHO RV FREE WALL PEAK S': 7.5 CM/S
ECHO RV MID DIMENSION: 3.7 CM
ECHO TV REGURGITANT MAX VELOCITY: 1.57 M/S
ECHO TV REGURGITANT PEAK GRADIENT: 10 MMHG
EKG ATRIAL RATE: 83 BPM
EKG DIAGNOSIS: NORMAL
EKG Q-T INTERVAL: 430 MS
EKG QRS DURATION: 74 MS
EKG QTC CALCULATION (BAZETT): 520 MS
EKG R AXIS: 101 DEGREES
EKG T AXIS: 193 DEGREES
EKG VENTRICULAR RATE: 88 BPM
EOSINOPHIL # BLD: 0 K/UL (ref 0–0.4)
EOSINOPHIL NFR BLD: 0 % (ref 0–7)
ERYTHROCYTE [DISTWIDTH] IN BLOOD BY AUTOMATED COUNT: 16.1 % (ref 11.5–14.5)
EST. AVERAGE GLUCOSE BLD GHB EST-MCNC: 120 MG/DL
GLUCOSE BLD STRIP.AUTO-MCNC: 131 MG/DL (ref 65–100)
GLUCOSE BLD STRIP.AUTO-MCNC: 265 MG/DL (ref 65–100)
GLUCOSE BLD STRIP.AUTO-MCNC: 293 MG/DL (ref 65–100)
GLUCOSE BLD STRIP.AUTO-MCNC: 319 MG/DL (ref 65–100)
GLUCOSE BLD STRIP.AUTO-MCNC: 365 MG/DL (ref 65–100)
GLUCOSE SERPL-MCNC: 301 MG/DL (ref 65–100)
HBA1C MFR BLD: 5.8 % (ref 4–5.6)
HCT VFR BLD AUTO: 33.5 % (ref 35–47)
HCT VFR BLD AUTO: 35 % (ref 35–47)
HGB BLD-MCNC: 11.1 G/DL (ref 11.5–16)
HGB BLD-MCNC: 11.4 G/DL (ref 11.5–16)
IMM GRANULOCYTES # BLD AUTO: 0.08 K/UL (ref 0–0.04)
IMM GRANULOCYTES NFR BLD AUTO: 0.7 % (ref 0–0.5)
LACTATE SERPL-SCNC: 2.4 MMOL/L (ref 0.4–2)
LYMPHOCYTES # BLD: 1.47 K/UL (ref 0.8–3.5)
LYMPHOCYTES NFR BLD: 13.4 % (ref 12–49)
MCH RBC QN AUTO: 28.9 PG (ref 26–34)
MCHC RBC AUTO-ENTMCNC: 32.6 G/DL (ref 30–36.5)
MCV RBC AUTO: 88.8 FL (ref 80–99)
MONOCYTES # BLD: 1.33 K/UL (ref 0–1)
MONOCYTES NFR BLD: 12.1 % (ref 5–13)
NEUTS SEG # BLD: 8.05 K/UL (ref 1.8–8)
NEUTS SEG NFR BLD: 73.2 % (ref 32–75)
NRBC # BLD: 0.13 K/UL (ref 0–0.01)
NRBC BLD-RTO: 1.2 PER 100 WBC
PERFORMED BY:: ABNORMAL
PLATELET # BLD AUTO: 192 K/UL (ref 150–400)
PMV BLD AUTO: 12.4 FL (ref 8.9–12.9)
POTASSIUM SERPL-SCNC: 3.7 MMOL/L (ref 3.5–5.1)
RBC # BLD AUTO: 3.94 M/UL (ref 3.8–5.2)
SODIUM SERPL-SCNC: 148 MMOL/L (ref 136–145)
SPECIMEN EXP DATE BLD: NORMAL
TRANSFUSION STATUS PATIENT QL: NORMAL
TRANSFUSION STATUS PATIENT QL: NORMAL
TROPONIN I SERPL HS-MCNC: 5084 NG/L (ref 0–51)
TROPONIN I SERPL HS-MCNC: 5582 NG/L (ref 0–51)
TROPONIN I SERPL HS-MCNC: 6508 NG/L (ref 0–51)
UNIT DIVISION: 0
UNIT DIVISION: 0
UNIT ISSUE DATE/TIME: NORMAL
UNIT ISSUE DATE/TIME: NORMAL
WBC # BLD AUTO: 11 K/UL (ref 3.6–11)

## 2025-01-10 PROCEDURE — 82962 GLUCOSE BLOOD TEST: CPT

## 2025-01-10 PROCEDURE — 2580000003 HC RX 258: Performed by: PHYSICIAN ASSISTANT

## 2025-01-10 PROCEDURE — 93005 ELECTROCARDIOGRAM TRACING: CPT | Performed by: PHYSICIAN ASSISTANT

## 2025-01-10 PROCEDURE — 6370000000 HC RX 637 (ALT 250 FOR IP): Performed by: PHYSICIAN ASSISTANT

## 2025-01-10 PROCEDURE — C8929 TTE W OR WO FOL WCON,DOPPLER: HCPCS

## 2025-01-10 PROCEDURE — 85018 HEMOGLOBIN: CPT

## 2025-01-10 PROCEDURE — 36415 COLL VENOUS BLD VENIPUNCTURE: CPT

## 2025-01-10 PROCEDURE — 83036 HEMOGLOBIN GLYCOSYLATED A1C: CPT

## 2025-01-10 PROCEDURE — 2500000003 HC RX 250 WO HCPCS: Performed by: NURSE PRACTITIONER

## 2025-01-10 PROCEDURE — 2580000003 HC RX 258: Performed by: NURSE PRACTITIONER

## 2025-01-10 PROCEDURE — 6360000002 HC RX W HCPCS: Performed by: NURSE PRACTITIONER

## 2025-01-10 PROCEDURE — 93970 EXTREMITY STUDY: CPT

## 2025-01-10 PROCEDURE — 85025 COMPLETE CBC W/AUTO DIFF WBC: CPT

## 2025-01-10 PROCEDURE — 83605 ASSAY OF LACTIC ACID: CPT

## 2025-01-10 PROCEDURE — 1100000000 HC RM PRIVATE

## 2025-01-10 PROCEDURE — 80048 BASIC METABOLIC PNL TOTAL CA: CPT

## 2025-01-10 PROCEDURE — 6360000004 HC RX CONTRAST MEDICATION: Performed by: PHYSICIAN ASSISTANT

## 2025-01-10 PROCEDURE — 6370000000 HC RX 637 (ALT 250 FOR IP): Performed by: NURSE PRACTITIONER

## 2025-01-10 PROCEDURE — 85014 HEMATOCRIT: CPT

## 2025-01-10 PROCEDURE — 84484 ASSAY OF TROPONIN QUANT: CPT

## 2025-01-10 RX ORDER — INSULIN LISPRO 100 [IU]/ML
8 INJECTION, SOLUTION INTRAVENOUS; SUBCUTANEOUS ONCE
Status: DISCONTINUED | OUTPATIENT
Start: 2025-01-10 | End: 2025-01-11 | Stop reason: HOSPADM

## 2025-01-10 RX ORDER — INSULIN LISPRO 100 [IU]/ML
0-8 INJECTION, SOLUTION INTRAVENOUS; SUBCUTANEOUS
Status: DISCONTINUED | OUTPATIENT
Start: 2025-01-10 | End: 2025-01-10

## 2025-01-10 RX ORDER — INSULIN LISPRO 100 [IU]/ML
0-16 INJECTION, SOLUTION INTRAVENOUS; SUBCUTANEOUS
Status: DISCONTINUED | OUTPATIENT
Start: 2025-01-10 | End: 2025-01-11 | Stop reason: HOSPADM

## 2025-01-10 RX ORDER — SODIUM CHLORIDE 450 MG/100ML
INJECTION, SOLUTION INTRAVENOUS CONTINUOUS
Status: DISCONTINUED | OUTPATIENT
Start: 2025-01-10 | End: 2025-01-11 | Stop reason: HOSPADM

## 2025-01-10 RX ORDER — PREGABALIN 75 MG/1
75 CAPSULE ORAL 2 TIMES DAILY
Status: DISCONTINUED | OUTPATIENT
Start: 2025-01-10 | End: 2025-01-11 | Stop reason: HOSPADM

## 2025-01-10 RX ORDER — LIDOCAINE 4 G/G
1 PATCH TOPICAL DAILY
Status: DISCONTINUED | OUTPATIENT
Start: 2025-01-10 | End: 2025-01-11 | Stop reason: HOSPADM

## 2025-01-10 RX ADMIN — INSULIN LISPRO 2 UNITS: 100 INJECTION, SOLUTION INTRAVENOUS; SUBCUTANEOUS at 06:09

## 2025-01-10 RX ADMIN — INSULIN LISPRO 3 UNITS: 100 INJECTION, SOLUTION INTRAVENOUS; SUBCUTANEOUS at 00:20

## 2025-01-10 RX ADMIN — PREGABALIN 75 MG: 75 CAPSULE ORAL at 21:49

## 2025-01-10 RX ADMIN — PREGABALIN 75 MG: 75 CAPSULE ORAL at 05:09

## 2025-01-10 RX ADMIN — SODIUM CHLORIDE: 4.5 INJECTION, SOLUTION INTRAVENOUS at 08:31

## 2025-01-10 RX ADMIN — SODIUM CHLORIDE, PRESERVATIVE FREE 10 ML: 5 INJECTION INTRAVENOUS at 08:27

## 2025-01-10 RX ADMIN — DICLOFENAC SODIUM 2 G: 10 GEL TOPICAL at 21:48

## 2025-01-10 RX ADMIN — ACETAMINOPHEN 650 MG: 325 TABLET ORAL at 00:48

## 2025-01-10 RX ADMIN — SODIUM CHLORIDE 10 ML: 9 INJECTION, SOLUTION INTRAMUSCULAR; INTRAVENOUS; SUBCUTANEOUS at 10:41

## 2025-01-10 RX ADMIN — ACETAMINOPHEN 650 MG: 325 TABLET ORAL at 18:25

## 2025-01-10 RX ADMIN — INSULIN GLARGINE 10 UNITS: 100 INJECTION, SOLUTION SUBCUTANEOUS at 08:33

## 2025-01-10 RX ADMIN — INSULIN LISPRO 8 UNITS: 100 INJECTION, SOLUTION INTRAVENOUS; SUBCUTANEOUS at 12:55

## 2025-01-10 RX ADMIN — SODIUM CHLORIDE, PRESERVATIVE FREE 40 MG: 5 INJECTION INTRAVENOUS at 13:29

## 2025-01-10 RX ADMIN — INSULIN LISPRO 8 UNITS: 100 INJECTION, SOLUTION INTRAVENOUS; SUBCUTANEOUS at 17:36

## 2025-01-10 RX ADMIN — SODIUM CHLORIDE: 4.5 INJECTION, SOLUTION INTRAVENOUS at 17:37

## 2025-01-10 RX ADMIN — SODIUM CHLORIDE, PRESERVATIVE FREE 40 MG: 5 INJECTION INTRAVENOUS at 00:26

## 2025-01-10 RX ADMIN — SODIUM CHLORIDE, PRESERVATIVE FREE 10 ML: 5 INJECTION INTRAVENOUS at 21:49

## 2025-01-10 ASSESSMENT — PAIN SCALES - GENERAL
PAINLEVEL_OUTOF10: 3
PAINLEVEL_OUTOF10: 9
PAINLEVEL_OUTOF10: 6
PAINLEVEL_OUTOF10: 10
PAINLEVEL_OUTOF10: 4
PAINLEVEL_OUTOF10: 4

## 2025-01-10 ASSESSMENT — PAIN DESCRIPTION - LOCATION
LOCATION: BACK

## 2025-01-10 ASSESSMENT — PAIN DESCRIPTION - ORIENTATION
ORIENTATION: POSTERIOR
ORIENTATION: LOWER
ORIENTATION: POSTERIOR

## 2025-01-10 ASSESSMENT — PAIN DESCRIPTION - DESCRIPTORS
DESCRIPTORS: ACHING
DESCRIPTORS: THROBBING
DESCRIPTORS: ACHING

## 2025-01-10 ASSESSMENT — ENCOUNTER SYMPTOMS
ABDOMINAL PAIN: 1
NAUSEA: 0
WHEEZING: 0
BACK PAIN: 0
COUGH: 0
SHORTNESS OF BREATH: 0
VOMITING: 0

## 2025-01-10 ASSESSMENT — PAIN - FUNCTIONAL ASSESSMENT
PAIN_FUNCTIONAL_ASSESSMENT: ACTIVITIES ARE NOT PREVENTED
PAIN_FUNCTIONAL_ASSESSMENT: ACTIVITIES ARE NOT PREVENTED

## 2025-01-10 NOTE — PROGRESS NOTES
Received Order for Telemetry     Rosita Morillo   1945   [id number]   GI bleed [K92.2]   Deisy Savage*     Tele Box # [phone] placed on patient at  2114 pm  ER Room #   Admitting to Room 509  Verified with Primary Nurse KAYLYN at  2200 pm

## 2025-01-10 NOTE — CARE COORDINATION
CM reviewed chart. Patient transfer from ICU last evening. PT /OT ordered, cardiology following.    Profile in belinda setup for possible needs at discharge.

## 2025-01-10 NOTE — PROGRESS NOTES
Gastroenterology Progress Note        Patient: Rosita Morillo MRN: [id number]  SSN: [id number]    YOB: 1945  Age: 79 y.o.  Sex: female      Admit Date: 1/9/2025    LOS: 1 day     Subjective:     Some nausea , no vomiting or melena  Past Medical History:   Diagnosis Date    Arthritis     CAD (coronary artery disease)     afib and chf    Congestive heart failure (HCC)     Depression     Diabetes (HCC)     type 2    Fibromyalgia     Gastrointestinal disorder     reflux and gastro paresis    Heart failure (HCC)     Infectious disease 2010    MRSA? in right big toe    Menopause     Psychiatric disorder     PTSD from 911        Current Facility-Administered Medications:     pregabalin (LYRICA) capsule 75 mg, 75 mg, Oral, BID, Abebe Guzman PA-C, 75 mg at 01/10/25 0509    0.45 % sodium chloride infusion, , IntraVENous, Continuous, Castillo Weiss PA-C, Last Rate: 75 mL/hr at 01/10/25 1737, New Bag at 01/10/25 1737    perflutren lipid microspheres (DEFINITY) injection, , , ,     insulin lispro (HUMALOG,ADMELOG) injection vial 0-16 Units, 0-16 Units, SubCUTAneous, 4x Daily AC & HS, Castillo Weiss PA-C, 8 Units at 01/10/25 1736    insulin lispro (HUMALOG,ADMELOG) injection vial 8 Units, 8 Units, SubCUTAneous, Once, Castillo Weiss PA-C    sodium chloride flush 0.9 % injection 5-40 mL, 5-40 mL, IntraVENous, 2 times per day, Aide Coker APRN - CNP, 10 mL at 01/10/25 0827    sodium chloride flush 0.9 % injection 5-40 mL, 5-40 mL, IntraVENous, PRN, Aide Coker APRN - CNP    0.9 % sodium chloride infusion, , IntraVENous, PRN, Aide Coker APRN - CNP    potassium chloride 20 mEq/50 mL IVPB (Central Line), 20 mEq, IntraVENous, PRN **OR** potassium chloride 10 mEq/100 mL IVPB (Peripheral Line), 10 mEq, IntraVENous, PRN, Aide Coker APRN - CNP    magnesium sulfate 2000 mg in 50 mL IVPB premix, 2,000 mg, IntraVENous, PRN, Aide Coker, APRN -  intended  as peer to peer communication. It is written in the medical language and may contain abbreviation or verbiage that are unfamiliar. It may appear blunt or direct.  Medical documents are intended to carry relevant information, facts as evident.  And the clinic opinions of the practitioner.  This note maybe transcribed  using a voice dictation system, voice-recognition errors may occur, this should not be taken to alter the contents or meaning for this note.    Cc Referring Physician   None, None

## 2025-01-10 NOTE — PROGRESS NOTES
Hospitalist Progress Note    NAME:   Rosita Morillo   : 1945   MRN: [id number]     Date/Time: 1/10/2025 1:39 PM  Patient PCP: None, None    Estimated discharge date:24-48 hrs  Barriers: Clinical improvement, cardiology clearance      Assessment / Plan:  Recent upper GI bleed  - S/p EGD with injections of epinephrine  - Avoid all NSAIDs and anticoagulants per GI  - Continue PPI  - Monitor H&H  - Follow-up with GI for biopsy results    Troponin elevation  - Troponin peaked at 6508, now trending down  - Serial EKGs  - Echo with minimally reduced EF, no wall motion abnormalities  - Cardiology recommending conservative management, statin  - Consider ischemic evaluation outpatient after repeat EGD    HFmrEF  - No evidence of fluid overload  - Avoid excessive fluids  - Follow-up with cardiology    Volume contraction  - BMP consistent with dry  - Gentle IVF for 24 hours, avoid excessive due to mildly reduced EF  - Creatinine 1.39, baseline around 1.2  - Sodium 148, chloride 116    History of A-fib  - Avoid anticoagulants secondary to GI bleed  - Continue telemetry  - Outpatient Watchman device consideration for cardiology    Type 2 diabetes  - Blood sugars relatively uncontrolled  - Increase to high-dose sliding scale insulin and Accu-Cheks  - Check A1c  - Monitor for hypoglycemia with insulin use    Code Status: Full  DVT Prophylaxis: Contraindicated secondary to GI bleed  GI Prophylaxis: Protonix    --------------------------------------------------------------------  [x] High (any 2)    A. Problems (any 1)  [x] Acute/Chronic Illness/injury posing threat to life or bodily function: Elevated troponin, GI  [] Severe exacerbation of chronic illness:    ---------------------------------------------------------------------  B. Risk of Treatment (any 1)   [] Drugs/treatments that require intensive monitoring for toxicity include:    [] IV ABX requiring serial renal monitoring for nephrotoxicity:     [] IV Narcotic  112/45 98 °F (36.7 °C) -- 82 12 98 % --   01/09/25 1400 (!) 121/58 -- -- 84 14 97 % --         Intake/Output Summary (Last 24 hours) at 1/10/2025 1339  Last data filed at 1/10/2025 1108  Gross per 24 hour   Intake 1034.17 ml   Output 3150 ml   Net -2115.83 ml        I had a face to face encounter and independently examined this patient on 1/10/2025, as outlined below:    Review of Systems   Constitutional:  Negative for appetite change, fatigue and fever.   Respiratory:  Negative for cough, shortness of breath and wheezing.    Cardiovascular:  Negative for chest pain and palpitations.   Gastrointestinal:  Positive for abdominal pain. Negative for nausea and vomiting.   Musculoskeletal:  Negative for back pain and myalgias.   Skin:  Negative for rash and wound.   Neurological:  Negative for dizziness and light-headedness.   Psychiatric/Behavioral:  Negative for confusion and dysphoric mood.         PHYSICAL EXAM:  Physical Exam  Constitutional:       General: She is not in acute distress.     Appearance: Normal appearance. She is obese. She is not ill-appearing.   Cardiovascular:      Rate and Rhythm: Normal rate and regular rhythm.      Pulses: Normal pulses.   Pulmonary:      Effort: Pulmonary effort is normal. No respiratory distress.      Breath sounds: Normal breath sounds. No wheezing.   Abdominal:      General: Abdomen is flat. There is no distension.      Palpations: Abdomen is soft.      Tenderness: There is no abdominal tenderness.   Musculoskeletal:         General: No swelling or tenderness. Normal range of motion.   Skin:     General: Skin is warm and dry.   Neurological:      Mental Status: She is alert and oriented to person, place, and time. Mental status is at baseline.   Psychiatric:         Mood and Affect: Mood normal.         Behavior: Behavior normal.          Reviewed most current lab test results and cultures  YES  Reviewed most current radiology test results   YES  Review and summation of old

## 2025-01-10 NOTE — PROGRESS NOTES
4 Eyes Skin Assessment     NAME:  Rosita Morillo  YOB: 1945  MEDICAL RECORD NUMBER:  [id number]    The patient is being assessed for  Admission    I agree that at least one RN has performed a thorough Head to Toe Skin Assessment on the patient. ALL assessment sites listed below have been assessed.      Areas assessed by both nurses:    Head, Face, Ears, Shoulders, Back, Chest, Arms, Elbows, Hands, Sacrum. Buttock, Coccyx, Ischium, and Legs. Feet and Heels        Does the Patient have a Wound? Yes wound(s) were present on assessment. LDA wound assessment was Initiated and completed by RN RIGHT heel AND REDNESS ON THE LEFT HEEL       Pool Prevention initiated by RN: Yes  Wound Care Orders initiated by RN: Yes    Pressure Injury (Stage 3,4, Unstageable, DTI, NWPT, and Complex wounds) if present, place Wound referral order by RN under : No    New Ostomies, if present place, Ostomy referral order under : No     Nurse 1 eSignature: Electronically signed by ANI Willams RN on 1/10/25 at 3:07 AM EST    **SHARE this note so that the co-signing nurse can place an eSignature**    Nurse 2 eSignature: Electronically signed by Holly Dang RN on 1/10/25 at 7:57 AM EST

## 2025-01-10 NOTE — CONSULTS
CARDIOLOGY CONSULTATION    REASON FOR CONSULT: Elevated troponin    REQUESTING PROVIDER: Castillo Weiss PA     CHIEF COMPLAINT:  Hematemesis     HISTORY OF PRESENT ILLNESS:  Rosita Morillo is a 79 y.o. year-old female with past medical history significant for CHF, Afib on Eliquis at home, diabetes, HTN, HLD who was evaluated today due to elevated troponin. Patient initially presented to outside emergency department with complaints of hematemesis, fatigue, and nausea. Patient reportedly with melena for two days prior to arrival. Transferred here for higher level of care. She is now s/p EGD with two large ulcers noted in the antrum, both injected with epinephrine. Patient seen today, sitting upright in bed. Endorses long standing cardiac history, multiple previous normal stress tests. Seen most recently by her cardiologist earlier this week, was referred to pulmonary. She did have some chest pain on arrival, which she describes as burning pain in between her breasts. This has since improved. Denies shortness of breath.        Records from hospital admission course thus far reviewed.      Telemetry reviewed.       INPATIENT MEDICATIONS:  Home medications reviewed.    Current Facility-Administered Medications:     pregabalin (LYRICA) capsule 75 mg, 75 mg, Oral, BID, Abebe Guzman PA-C, 75 mg at 01/10/25 0509    0.45 % sodium chloride infusion, , IntraVENous, Continuous, Castillo Weiss PA-C, Last Rate: 125 mL/hr at 01/10/25 0831, New Bag at 01/10/25 0831    insulin lispro (HUMALOG,ADMELOG) injection vial 0-8 Units, 0-8 Units, SubCUTAneous, 4x Daily AC & HS, Castillo Weiss PA-C    perflutren lipid microspheres (DEFINITY) injection, , , ,     sodium chloride flush 0.9 % injection 5-40 mL, 5-40 mL, IntraVENous, 2 times per day, Aide Coker APRN - CNP, 10 mL at 01/10/25 0827    sodium chloride flush 0.9 % injection 5-40 mL, 5-40 mL, IntraVENous, PRN, Aide Coker APRN - CNP

## 2025-01-11 VITALS
TEMPERATURE: 98.4 F | BODY MASS INDEX: 43.36 KG/M2 | RESPIRATION RATE: 18 BRPM | SYSTOLIC BLOOD PRESSURE: 122 MMHG | HEART RATE: 87 BPM | DIASTOLIC BLOOD PRESSURE: 64 MMHG | WEIGHT: 244.71 LBS | OXYGEN SATURATION: 100 % | HEIGHT: 63 IN

## 2025-01-11 PROBLEM — I50.22 HEART FAILURE WITH MID-RANGE EJECTION FRACTION (HFMEF) (HCC): Status: ACTIVE | Noted: 2025-01-11

## 2025-01-11 PROBLEM — R79.89 ELEVATED TROPONIN: Status: ACTIVE | Noted: 2025-01-11

## 2025-01-11 PROBLEM — K92.2 GI BLEED: Status: RESOLVED | Noted: 2025-01-09 | Resolved: 2025-01-11

## 2025-01-11 PROBLEM — E11.9 DM W/O COMPLICATION TYPE II (HCC): Status: ACTIVE | Noted: 2025-01-11

## 2025-01-11 LAB
ANION GAP SERPL CALC-SCNC: 6 MMOL/L (ref 2–12)
BASOPHILS # BLD: 0.05 K/UL (ref 0–0.1)
BASOPHILS NFR BLD: 0.6 % (ref 0–1)
BUN SERPL-MCNC: 35 MG/DL (ref 6–20)
BUN/CREAT SERPL: 33 (ref 12–20)
CA-I BLD-MCNC: 8.7 MG/DL (ref 8.5–10.1)
CHLORIDE SERPL-SCNC: 113 MMOL/L (ref 97–108)
CO2 SERPL-SCNC: 25 MMOL/L (ref 21–32)
CREAT SERPL-MCNC: 1.06 MG/DL (ref 0.55–1.02)
DIFFERENTIAL METHOD BLD: ABNORMAL
EOSINOPHIL # BLD: 0.21 K/UL (ref 0–0.4)
EOSINOPHIL NFR BLD: 2.7 % (ref 0–7)
ERYTHROCYTE [DISTWIDTH] IN BLOOD BY AUTOMATED COUNT: 16.7 % (ref 11.5–14.5)
GLUCOSE BLD STRIP.AUTO-MCNC: 133 MG/DL (ref 65–100)
GLUCOSE BLD STRIP.AUTO-MCNC: 208 MG/DL (ref 65–100)
GLUCOSE SERPL-MCNC: 137 MG/DL (ref 65–100)
HCT VFR BLD AUTO: 33.5 % (ref 35–47)
HGB BLD-MCNC: 10.9 G/DL (ref 11.5–16)
IMM GRANULOCYTES # BLD AUTO: 0.02 K/UL (ref 0–0.04)
IMM GRANULOCYTES NFR BLD AUTO: 0.3 % (ref 0–0.5)
LYMPHOCYTES # BLD: 1.25 K/UL (ref 0.8–3.5)
LYMPHOCYTES NFR BLD: 16.2 % (ref 12–49)
MCH RBC QN AUTO: 29.2 PG (ref 26–34)
MCHC RBC AUTO-ENTMCNC: 32.5 G/DL (ref 30–36.5)
MCV RBC AUTO: 89.8 FL (ref 80–99)
MONOCYTES # BLD: 0.85 K/UL (ref 0–1)
MONOCYTES NFR BLD: 11 % (ref 5–13)
NEUTS SEG # BLD: 5.33 K/UL (ref 1.8–8)
NEUTS SEG NFR BLD: 69.2 % (ref 32–75)
NRBC # BLD: 0.06 K/UL (ref 0–0.01)
NRBC BLD-RTO: 0.8 PER 100 WBC
PERFORMED BY:: ABNORMAL
PERFORMED BY:: ABNORMAL
PLATELET # BLD AUTO: 165 K/UL (ref 150–400)
PMV BLD AUTO: 11.3 FL (ref 8.9–12.9)
POTASSIUM SERPL-SCNC: 3.3 MMOL/L (ref 3.5–5.1)
RBC # BLD AUTO: 3.73 M/UL (ref 3.8–5.2)
SODIUM SERPL-SCNC: 144 MMOL/L (ref 136–145)
WBC # BLD AUTO: 7.7 K/UL (ref 3.6–11)

## 2025-01-11 PROCEDURE — 6360000002 HC RX W HCPCS: Performed by: NURSE PRACTITIONER

## 2025-01-11 PROCEDURE — 85025 COMPLETE CBC W/AUTO DIFF WBC: CPT

## 2025-01-11 PROCEDURE — 2580000003 HC RX 258: Performed by: PHYSICIAN ASSISTANT

## 2025-01-11 PROCEDURE — 36415 COLL VENOUS BLD VENIPUNCTURE: CPT

## 2025-01-11 PROCEDURE — 82962 GLUCOSE BLOOD TEST: CPT

## 2025-01-11 PROCEDURE — 6370000000 HC RX 637 (ALT 250 FOR IP): Performed by: PHYSICIAN ASSISTANT

## 2025-01-11 PROCEDURE — 6370000000 HC RX 637 (ALT 250 FOR IP): Performed by: NURSE PRACTITIONER

## 2025-01-11 PROCEDURE — 2580000003 HC RX 258: Performed by: NURSE PRACTITIONER

## 2025-01-11 PROCEDURE — 80048 BASIC METABOLIC PNL TOTAL CA: CPT

## 2025-01-11 RX ORDER — INSULIN GLARGINE 100 [IU]/ML
10 INJECTION, SOLUTION SUBCUTANEOUS DAILY
Qty: 3 ADJUSTABLE DOSE PRE-FILLED PEN SYRINGE | Refills: 0 | Status: SHIPPED | OUTPATIENT
Start: 2025-01-12 | End: 2025-02-11

## 2025-01-11 RX ORDER — PANTOPRAZOLE SODIUM 40 MG/1
40 TABLET, DELAYED RELEASE ORAL 2 TIMES DAILY
Qty: 60 TABLET | Refills: 0 | Status: SHIPPED | OUTPATIENT
Start: 2025-01-11 | End: 2025-02-10

## 2025-01-11 RX ORDER — ATORVASTATIN CALCIUM 20 MG/1
20 TABLET, FILM COATED ORAL NIGHTLY
Status: DISCONTINUED | OUTPATIENT
Start: 2025-01-11 | End: 2025-01-11 | Stop reason: HOSPADM

## 2025-01-11 RX ORDER — LIDOCAINE 4 G/G
1 PATCH TOPICAL DAILY
Qty: 1 EACH | Refills: 0 | Status: SHIPPED | OUTPATIENT
Start: 2025-01-12 | End: 2025-02-11

## 2025-01-11 RX ADMIN — PREGABALIN 75 MG: 75 CAPSULE ORAL at 08:54

## 2025-01-11 RX ADMIN — ACETAMINOPHEN 650 MG: 325 TABLET ORAL at 02:28

## 2025-01-11 RX ADMIN — SODIUM CHLORIDE: 4.5 INJECTION, SOLUTION INTRAVENOUS at 06:10

## 2025-01-11 RX ADMIN — INSULIN GLARGINE 10 UNITS: 100 INJECTION, SOLUTION SUBCUTANEOUS at 08:52

## 2025-01-11 RX ADMIN — SODIUM CHLORIDE, PRESERVATIVE FREE 40 MG: 5 INJECTION INTRAVENOUS at 01:17

## 2025-01-11 ASSESSMENT — PAIN SCALES - GENERAL
PAINLEVEL_OUTOF10: 5
PAINLEVEL_OUTOF10: 4

## 2025-01-11 ASSESSMENT — PAIN DESCRIPTION - DESCRIPTORS: DESCRIPTORS: ACHING

## 2025-01-11 ASSESSMENT — PAIN DESCRIPTION - LOCATION: LOCATION: BACK

## 2025-01-11 NOTE — PROGRESS NOTES
Patient was discharge from  [facility] this [facility]. Patient was discharge home with family care. Patient was notified of discharge from physician. Patient's IV was removed and the patient place clothing on with assistance by her . The nurse, patient, and  went over the discharge paperwork where they both stated understanding.   Patient was assisted into a wheelchair with the assistance of staff. Patient was propelled to the front door of the hospital. Patient had private transportation home.

## 2025-01-11 NOTE — CARE COORDINATION
CM noted discharge order.  Patient has no CM needs at this time. Patient will discharge home/self per order.    Transition of Care Plan:    RUR: 14%  Prior Level of Functioning: Needs assistance  Disposition: Home with   DENNISE: 1.11.25  If SNF or IPR: Date FOC offered: N/A  Date FOC received: N/A  Accepting facility: N/A  Date authorization started with reference number: N/A  Date authorization received and expires: N/A  Follow up appointments: Per MD  DME needed: N/A  Transportation at discharge:   IM/IMM Medicare/ letter given: Yes (1.9.25)  Is patient a  and connected with [facility]? N/A   If yes, was [place] transfer form completed and [facility] notified?   Caregiver Contact: N/A  Discharge Caregiver contacted prior to discharge? N/A  Care Conference needed? No  Barriers to discharge: None

## 2025-01-11 NOTE — PROGRESS NOTES
CARDIOLOGY PROGRESS NOTE      Patient Name: Rosita Morillo  Age: 79 y.o.  Gender:female  :1945  MRN: [id number]    Patient seen and examined. This is a patient with a history of CHF, Afib on Eliquis at home, diabetes, HTN, HLD who presented with hematemesis now being followed for elevated troponin. Now s/p EGD revealing 2 large ulcers that were treated. Denies chest pain, no SOB. Reports feeling better overall. No other complaints reported.    Telemetry reviewed, there were no events noted in the past 24 hours.    Pertinent review of systems items noted above, all other systems are negative. Current medications reviewed.    Physical Examination    Allergies   Allergen Reactions    Amoxicillin Other (See Comments)    Amoxicillin-Pot Clavulanate Other (See Comments)    Clindamycin Other (See Comments)    Sulfamethoxazole-Trimethoprim Other (See Comments)     Vitals:    25 0854   BP: 122/64   Pulse: 87   Resp: 18   Temp: 98.4 °F (36.9 °C)   SpO2: 100%     Vital signs are stable  No apparent distress.  Heart has a irregularly irregular.  no murmur  Lungs are clear  Abdomen is soft, nontender, normal bowel sounds.  Extremities have no edema  Skin is dry and warm.  Normal affect    Labs reviewed:  Recent Results (from the past 12 hour(s))   POCT Glucose    Collection Time: 25  6:42 AM   Result Value Ref Range    POC Glucose 133 (H) 65 - 100 mg/dL    Performed by: Екатерина Willett    CBC with Auto Differential    Collection Time: 25  9:52 AM   Result Value Ref Range    WBC 7.7 3.6 - 11.0 K/uL    RBC 3.73 (L) 3.80 - 5.20 M/uL    Hemoglobin 10.9 (L) 11.5 - 16.0 g/dL    Hematocrit 33.5 (L) 35.0 - 47.0 %    MCV 89.8 80.0 - 99.0 FL    MCH 29.2 26.0 - 34.0 PG    MCHC 32.5 30.0 - 36.5 g/dL    RDW 16.7 (H) 11.5 - 14.5 %    Platelets 165 150 - 400 K/uL    MPV 11.3 8.9 - 12.9 FL    Nucleated RBCs 0.8 (H) 0.0  WBC    nRBC 0.06 (H) 0.00 - 0.01 K/uL    Neutrophils % 69.2 32.0 - 75.0 %    Lymphocytes %

## 2025-01-11 NOTE — DISCHARGE SUMMARY
Discharge Summary    Name: Rosita Morillo  [id number]  YOB: 1945 (Age: 79 y.o.)   Date of Admission: 1/9/2025  Date of Discharge: 1/11/2025  Attending Physician: Ramirez Batista MD    Discharge Diagnosis:   Principal Problem (Resolved):    GI bleed  Active Problems:    Elevated troponin    Heart failure with mid-range ejection fraction (HFmEF) (HCC)    DM w/o complication type II (HCC)       Consultations:  IP CONSULT TO GI  IP CONSULT TO CARDIOLOGY    Brief Hospital Course by Main Problems:   Patient is a 79-year-old female with PMH of A-fib, diabetes who presented to freestanding ED for bloody vomit, fatigue.  She first noticed nausea and vomiting a few days ago, subsequently developed blood and presented for evaluation.  She had been on Eliquis for atrial fibrillation.  Patient found to have significant anemia, hemoglobin as low as 6.0.  She received a unit of PRBCs and GI was consulted.  GI performed EGD revealing 2 gastric ulcers in the antrum, s/p biopsy and epinephrine injection.  Recommendations included PPI, avoid all NSAIDs and anticoagulants.  Troponin trended upward, echo with mildly reduced EF of 45 to 50%, no wall motion abnormalities.  Cardiology following, recommending conservative management until ulcer is healed.  Patient was observed without any further worsening symptoms at this time.  A1c 5.8%.  Troponin began to trend downward.  Discussion held regarding follow-up, patient and  at bedside understand and agree with the plan.  Patient will follow-up with PCP, cardiology, gastroenterology.    Recent upper GI bleed  - Continue PPI twice daily  - Follow-up with GI for biopsy results  - Follow-up with PCP for repeat CBC in 1 to 2 weeks  - Low acidity diet for 2 weeks     Troponin elevation  HFmrEF  History of atrial fibrillation  - Troponin peaked at 6508, now trending down  - Follow-up with cardiology for outpatient ischemic evaluation  -

## 2025-01-12 LAB
BACTERIA SPEC CULT: NORMAL
Lab: NORMAL

## 2025-01-12 NOTE — PROGRESS NOTES
Physician Progress Note      PATIENT:               MIRI ACE  [facility] #:                  [id number]  :                       1945  ADMIT DATE:       2025 12:22 AM  DISCH DATE:        2025 1:25 PM  RESPONDING  PROVIDER #:        Castillo Weiss PA-C          QUERY TEXT:    Pt admitted with GIB and has anemia documented. If possible, please document   in progress notes and discharge summary further specificity regarding the   acuity and type of anemia:    The medical record reflects the following:  Risk Factors: GIB    Clinical Indicators:    Hgb 8.2-7.1-6.0-11.4-10.9    DCS:  She had been on Eliquis for atrial fibrillation.  Patient found to have   significant anemia, hemoglobin as low as 6.0.  She received a unit of PRBCs   and GI was consulted.  GI performed EGD revealing 2 gastric ulcers in the   antrum, s/p biopsy and epinephrine injection    Treatment: PRBCs; EGD  Options provided:  -- Anemia due to acute blood loss  -- Other - I will add my own diagnosis  -- Disagree - Not applicable / Not valid  -- Disagree - Clinically unable to determine / Unknown  -- Refer to Clinical Documentation Reviewer    PROVIDER RESPONSE TEXT:    This patient has acute blood loss anemia.    Query created by: Jessica Demarco on 2025 10:58 AM      QUERY TEXT:    Pt with elevated troponins, anemia and CP.  If possible, please document in   the progress notes and discharge summary if you are evaluating and/or treating   any of the following:    The medical record reflects the following:  Risk Factors: anemia    Clinical Indicators:    Trops- 20->1362->4679->5622->6508    H&P:  Elevated troponin/NSTEMI    CCU :  Troponin elevated, likely due to demand due to bleed.    Cards 1/10:  Trend to peak, repeat pending  Endorses improvement in chest pain  Serial ECGs  Echo with EF 45-50%, NWM  Recommend conservative management at this time. Continue statin  Can consider possible ischemic evaluation with cardiac cath

## 2025-01-13 LAB
GLUCOSE BLD STRIP.AUTO-MCNC: 270 MG/DL (ref 65–100)
PERFORMED BY:: ABNORMAL

## 2025-01-31 NOTE — ANESTHESIA POSTPROCEDURE EVALUATION
Department of Anesthesiology  Postprocedure Note    Patient: Rosita Morillo  MRN: [id number]  YOB: 1945    Procedure Summary       Date: 01/09/25 Room / Location: [facility] ENDO TRAVEL / [facility] ENDOSCOPY    Anesthesia Start: 1110 Anesthesia Stop: 1128    Procedure: ESOPHAGOGASTRODUODENOSCOPY (Upper GI Region) Diagnosis:       Gastrointestinal hemorrhage, unspecified gastrointestinal hemorrhage type      (Gastrointestinal hemorrhage, unspecified gastrointestinal hemorrhage type [K92.2])    Surgeons: Sean Meza MD Responsible Provider: Arnoldo Trevizo MD    Anesthesia Type: MAC ASA Status: 4            Anesthesia Type: MAC    Sarah Phase I:      Sarah Phase II:      Anesthesia Post Evaluation    Patient location during evaluation: bedside (Endoscopy Unit)  Patient participation: complete - patient participated  Level of consciousness: sleepy but conscious  Pain score: 0  Airway patency: patent  Nausea & Vomiting: no nausea and no vomiting  Cardiovascular status: hemodynamically stable  Respiratory status: acceptable  Hydration status: stable  Comments: This patient remained on the stretcher.  The patient was handed off to the endoscopy nursing team.  All questions regarding pre-, intra-, and postoperative care were answered.  Multimodal analgesia pain management approach        No notable events documented.

## 2025-02-10 PROBLEM — R79.89 ELEVATED TROPONIN: Status: RESOLVED | Noted: 2025-01-11 | Resolved: 2025-02-10

## 2025-02-27 ENCOUNTER — APPOINTMENT (OUTPATIENT)
Facility: HOSPITAL | Age: 80
End: 2025-02-27
Payer: OTHER GOVERNMENT

## 2025-02-27 ENCOUNTER — HOSPITAL ENCOUNTER (EMERGENCY)
Facility: HOSPITAL | Age: 80
Discharge: ANOTHER HOSPITAL | End: 2025-02-27
Attending: EMERGENCY MEDICINE
Payer: OTHER GOVERNMENT

## 2025-02-27 ENCOUNTER — APPOINTMENT (OUTPATIENT)
Facility: HOSPITAL | Age: 80
DRG: 637 | End: 2025-02-27
Payer: MEDICARE

## 2025-02-27 ENCOUNTER — HOSPITAL ENCOUNTER (INPATIENT)
Facility: HOSPITAL | Age: 80
LOS: 15 days | Discharge: NURSING, REHAB OR LONG-TERM CARE | DRG: 637 | End: 2025-03-14
Attending: STUDENT IN AN ORGANIZED HEALTH CARE EDUCATION/TRAINING PROGRAM | Admitting: STUDENT IN AN ORGANIZED HEALTH CARE EDUCATION/TRAINING PROGRAM
Payer: MEDICARE

## 2025-02-27 VITALS
OXYGEN SATURATION: 96 % | TEMPERATURE: 98.5 F | HEART RATE: 80 BPM | RESPIRATION RATE: 16 BRPM | WEIGHT: 218 LBS | DIASTOLIC BLOOD PRESSURE: 61 MMHG | HEIGHT: 63 IN | BODY MASS INDEX: 38.62 KG/M2 | SYSTOLIC BLOOD PRESSURE: 106 MMHG

## 2025-02-27 DIAGNOSIS — R60.0 BILATERAL LOWER EXTREMITY EDEMA: ICD-10-CM

## 2025-02-27 DIAGNOSIS — R60.0 BILATERAL LEG EDEMA: ICD-10-CM

## 2025-02-27 DIAGNOSIS — R06.02 SHORTNESS OF BREATH: ICD-10-CM

## 2025-02-27 DIAGNOSIS — R79.89 ELEVATED TROPONIN: ICD-10-CM

## 2025-02-27 DIAGNOSIS — L03.119 CELLULITIS OF LOWER EXTREMITY, UNSPECIFIED LATERALITY: Primary | ICD-10-CM

## 2025-02-27 PROBLEM — L03.115 CELLULITIS OF BOTH LOWER EXTREMITIES: Status: ACTIVE | Noted: 2025-02-27

## 2025-02-27 PROBLEM — L03.116 CELLULITIS OF BOTH LOWER EXTREMITIES: Status: ACTIVE | Noted: 2025-02-27

## 2025-02-27 LAB
ALBUMIN SERPL-MCNC: 2.5 G/DL (ref 3.5–5)
ALBUMIN/GLOB SERPL: 0.6 (ref 1.1–2.2)
ALP SERPL-CCNC: 221 U/L (ref 45–117)
ALT SERPL-CCNC: 41 U/L (ref 12–78)
ANION GAP SERPL CALC-SCNC: 13 MMOL/L (ref 2–12)
AST SERPL-CCNC: 52 U/L (ref 15–37)
BASOPHILS # BLD: 0.07 K/UL (ref 0–0.1)
BASOPHILS NFR BLD: 0.7 % (ref 0–1)
BILIRUB SERPL-MCNC: 0.6 MG/DL (ref 0.2–1)
BUN SERPL-MCNC: 46 MG/DL (ref 6–20)
BUN/CREAT SERPL: 30 (ref 12–20)
CALCIUM SERPL-MCNC: 9 MG/DL (ref 8.5–10.1)
CHLORIDE SERPL-SCNC: 102 MMOL/L (ref 97–108)
CK SERPL-CCNC: 81 U/L (ref 26–192)
CO2 SERPL-SCNC: 22 MMOL/L (ref 21–32)
CREAT SERPL-MCNC: 1.51 MG/DL (ref 0.55–1.02)
DIFFERENTIAL METHOD BLD: ABNORMAL
ECHO BSA: 2.1 M2
EOSINOPHIL # BLD: 0.13 K/UL (ref 0–0.4)
EOSINOPHIL NFR BLD: 1.3 % (ref 0–0.7)
ERYTHROCYTE [DISTWIDTH] IN BLOOD BY AUTOMATED COUNT: 20.1 % (ref 11.5–14.5)
GLOBULIN SER CALC-MCNC: 4.5 G/DL (ref 2–4)
GLUCOSE BLD STRIP.AUTO-MCNC: 106 MG/DL (ref 65–117)
GLUCOSE SERPL-MCNC: 169 MG/DL (ref 65–100)
HCT VFR BLD AUTO: 35.1 % (ref 35–47)
HGB BLD-MCNC: 11 G/DL (ref 11.5–16)
IMM GRANULOCYTES # BLD AUTO: 0.04 K/UL (ref 0–0.04)
IMM GRANULOCYTES NFR BLD AUTO: 0.4 % (ref 0–0.5)
LACTATE SERPL-SCNC: 1.9 MMOL/L (ref 0.4–2)
LYMPHOCYTES # BLD: 1.27 K/UL (ref 0.8–3.5)
LYMPHOCYTES NFR BLD: 12.6 % (ref 12–49)
MCH RBC QN AUTO: 25.3 PG (ref 26–34)
MCHC RBC AUTO-ENTMCNC: 31.3 G/DL (ref 30–36.5)
MCV RBC AUTO: 80.7 FL (ref 80–99)
MONOCYTES # BLD: 1.18 K/UL (ref 0–1)
MONOCYTES NFR BLD: 11.7 % (ref 5–13)
NEUTS SEG # BLD: 7.41 K/UL (ref 1.8–8)
NEUTS SEG NFR BLD: 73.3 % (ref 32–75)
NRBC # BLD: 0 K/UL (ref 0–0.01)
NRBC BLD-RTO: 0 PER 100 WBC
NT PRO BNP: 5477 PG/ML (ref 0–450)
PLATELET # BLD AUTO: 361 K/UL (ref 150–400)
PLATELET COMMENT: ABNORMAL
PMV BLD AUTO: 10.6 FL (ref 8.9–12.9)
POTASSIUM SERPL-SCNC: 5.9 MMOL/L (ref 3.5–5.1)
PROT SERPL-MCNC: 7 G/DL (ref 6.4–8.2)
RBC # BLD AUTO: 4.35 M/UL (ref 3.8–5.2)
RBC MORPH BLD: ABNORMAL
SERVICE CMNT-IMP: NORMAL
SODIUM SERPL-SCNC: 137 MMOL/L (ref 136–145)
TROPONIN I SERPL HS-MCNC: 651 NG/L (ref 0–51)
WBC # BLD AUTO: 10.1 K/UL (ref 3.6–11)

## 2025-02-27 PROCEDURE — 85025 COMPLETE CBC W/AUTO DIFF WBC: CPT

## 2025-02-27 PROCEDURE — 96375 TX/PRO/DX INJ NEW DRUG ADDON: CPT

## 2025-02-27 PROCEDURE — 73590 X-RAY EXAM OF LOWER LEG: CPT

## 2025-02-27 PROCEDURE — 93005 ELECTROCARDIOGRAM TRACING: CPT | Performed by: STUDENT IN AN ORGANIZED HEALTH CARE EDUCATION/TRAINING PROGRAM

## 2025-02-27 PROCEDURE — 73701 CT LOWER EXTREMITY W/DYE: CPT

## 2025-02-27 PROCEDURE — 2580000003 HC RX 258: Performed by: EMERGENCY MEDICINE

## 2025-02-27 PROCEDURE — 6360000002 HC RX W HCPCS: Performed by: EMERGENCY MEDICINE

## 2025-02-27 PROCEDURE — 1100000003 HC PRIVATE W/ TELEMETRY

## 2025-02-27 PROCEDURE — 83880 ASSAY OF NATRIURETIC PEPTIDE: CPT

## 2025-02-27 PROCEDURE — 82962 GLUCOSE BLOOD TEST: CPT

## 2025-02-27 PROCEDURE — 6370000000 HC RX 637 (ALT 250 FOR IP): Performed by: EMERGENCY MEDICINE

## 2025-02-27 PROCEDURE — 71045 X-RAY EXAM CHEST 1 VIEW: CPT

## 2025-02-27 PROCEDURE — 6370000000 HC RX 637 (ALT 250 FOR IP): Performed by: STUDENT IN AN ORGANIZED HEALTH CARE EDUCATION/TRAINING PROGRAM

## 2025-02-27 PROCEDURE — 96374 THER/PROPH/DIAG INJ IV PUSH: CPT

## 2025-02-27 PROCEDURE — 84484 ASSAY OF TROPONIN QUANT: CPT

## 2025-02-27 PROCEDURE — 93970 EXTREMITY STUDY: CPT

## 2025-02-27 PROCEDURE — 6360000002 HC RX W HCPCS: Performed by: STUDENT IN AN ORGANIZED HEALTH CARE EDUCATION/TRAINING PROGRAM

## 2025-02-27 PROCEDURE — 83605 ASSAY OF LACTIC ACID: CPT

## 2025-02-27 PROCEDURE — 36415 COLL VENOUS BLD VENIPUNCTURE: CPT

## 2025-02-27 PROCEDURE — 82550 ASSAY OF CK (CPK): CPT

## 2025-02-27 PROCEDURE — P9047 ALBUMIN (HUMAN), 25%, 50ML: HCPCS | Performed by: STUDENT IN AN ORGANIZED HEALTH CARE EDUCATION/TRAINING PROGRAM

## 2025-02-27 PROCEDURE — 99285 EMERGENCY DEPT VISIT HI MDM: CPT

## 2025-02-27 PROCEDURE — 6360000004 HC RX CONTRAST MEDICATION: Performed by: STUDENT IN AN ORGANIZED HEALTH CARE EDUCATION/TRAINING PROGRAM

## 2025-02-27 PROCEDURE — 96366 THER/PROPH/DIAG IV INF ADDON: CPT

## 2025-02-27 PROCEDURE — 96365 THER/PROPH/DIAG IV INF INIT: CPT

## 2025-02-27 PROCEDURE — 87040 BLOOD CULTURE FOR BACTERIA: CPT

## 2025-02-27 PROCEDURE — 80053 COMPREHEN METABOLIC PANEL: CPT

## 2025-02-27 PROCEDURE — 96367 TX/PROPH/DG ADDL SEQ IV INF: CPT

## 2025-02-27 RX ORDER — ALBUMIN (HUMAN) 12.5 G/50ML
25 SOLUTION INTRAVENOUS EVERY 6 HOURS PRN
Status: DISCONTINUED | OUTPATIENT
Start: 2025-02-27 | End: 2025-03-01

## 2025-02-27 RX ORDER — SODIUM CHLORIDE 9 MG/ML
INJECTION, SOLUTION INTRAVENOUS PRN
Status: DISCONTINUED | OUTPATIENT
Start: 2025-02-27 | End: 2025-03-14 | Stop reason: HOSPADM

## 2025-02-27 RX ORDER — SODIUM CHLORIDE 0.9 % (FLUSH) 0.9 %
5-40 SYRINGE (ML) INJECTION EVERY 12 HOURS SCHEDULED
Status: DISCONTINUED | OUTPATIENT
Start: 2025-02-27 | End: 2025-03-06

## 2025-02-27 RX ORDER — MIDODRINE HYDROCHLORIDE 5 MG/1
5 TABLET ORAL
Status: COMPLETED | OUTPATIENT
Start: 2025-02-27 | End: 2025-02-27

## 2025-02-27 RX ORDER — MORPHINE SULFATE 4 MG/ML
4 INJECTION, SOLUTION INTRAMUSCULAR; INTRAVENOUS EVERY 4 HOURS PRN
Status: DISCONTINUED | OUTPATIENT
Start: 2025-02-27 | End: 2025-03-01

## 2025-02-27 RX ORDER — ONDANSETRON 2 MG/ML
4 INJECTION INTRAMUSCULAR; INTRAVENOUS EVERY 6 HOURS PRN
Status: DISCONTINUED | OUTPATIENT
Start: 2025-02-27 | End: 2025-03-14 | Stop reason: HOSPADM

## 2025-02-27 RX ORDER — VANCOMYCIN 1.5 G/300ML
1500 INJECTION, SOLUTION INTRAVENOUS
Status: DISCONTINUED | OUTPATIENT
Start: 2025-02-27 | End: 2025-02-27

## 2025-02-27 RX ORDER — BUMETANIDE 0.25 MG/ML
1 INJECTION, SOLUTION INTRAMUSCULAR; INTRAVENOUS ONCE
Status: COMPLETED | OUTPATIENT
Start: 2025-02-27 | End: 2025-02-27

## 2025-02-27 RX ORDER — ATORVASTATIN CALCIUM 10 MG/1
10 TABLET, FILM COATED ORAL DAILY
Status: DISCONTINUED | OUTPATIENT
Start: 2025-02-28 | End: 2025-03-05

## 2025-02-27 RX ORDER — INSULIN LISPRO 100 [IU]/ML
0-8 INJECTION, SOLUTION INTRAVENOUS; SUBCUTANEOUS
Status: DISCONTINUED | OUTPATIENT
Start: 2025-02-27 | End: 2025-03-05

## 2025-02-27 RX ORDER — 0.9 % SODIUM CHLORIDE 0.9 %
1000 INTRAVENOUS SOLUTION INTRAVENOUS ONCE
Status: COMPLETED | OUTPATIENT
Start: 2025-02-27 | End: 2025-02-27

## 2025-02-27 RX ORDER — BUMETANIDE 0.25 MG/ML
1 INJECTION, SOLUTION INTRAMUSCULAR; INTRAVENOUS ONCE
Status: DISCONTINUED | OUTPATIENT
Start: 2025-02-27 | End: 2025-02-27

## 2025-02-27 RX ORDER — INSULIN GLARGINE 100 [IU]/ML
10 INJECTION, SOLUTION SUBCUTANEOUS DAILY
Status: DISCONTINUED | OUTPATIENT
Start: 2025-02-28 | End: 2025-03-02

## 2025-02-27 RX ORDER — FENTANYL CITRATE 50 UG/ML
50 INJECTION, SOLUTION INTRAMUSCULAR; INTRAVENOUS ONCE
Status: COMPLETED | OUTPATIENT
Start: 2025-02-27 | End: 2025-02-27

## 2025-02-27 RX ORDER — ALBUMIN (HUMAN) 12.5 G/50ML
25 SOLUTION INTRAVENOUS ONCE
Status: COMPLETED | OUTPATIENT
Start: 2025-02-27 | End: 2025-02-28

## 2025-02-27 RX ORDER — 0.9 % SODIUM CHLORIDE 0.9 %
500 INTRAVENOUS SOLUTION INTRAVENOUS ONCE
Status: COMPLETED | OUTPATIENT
Start: 2025-02-27 | End: 2025-02-27

## 2025-02-27 RX ORDER — KETOROLAC TROMETHAMINE 15 MG/ML
15 INJECTION, SOLUTION INTRAMUSCULAR; INTRAVENOUS
Status: COMPLETED | OUTPATIENT
Start: 2025-02-27 | End: 2025-02-27

## 2025-02-27 RX ORDER — ACETAMINOPHEN 325 MG/1
650 TABLET ORAL EVERY 6 HOURS PRN
Status: DISCONTINUED | OUTPATIENT
Start: 2025-02-27 | End: 2025-03-08

## 2025-02-27 RX ORDER — POTASSIUM CHLORIDE 7.45 MG/ML
10 INJECTION INTRAVENOUS PRN
Status: DISCONTINUED | OUTPATIENT
Start: 2025-02-27 | End: 2025-03-14 | Stop reason: HOSPADM

## 2025-02-27 RX ORDER — TIMOLOL MALEATE 2.5 MG/ML
1 SOLUTION/ DROPS OPHTHALMIC 2 TIMES DAILY
Status: DISCONTINUED | OUTPATIENT
Start: 2025-02-27 | End: 2025-03-14 | Stop reason: HOSPADM

## 2025-02-27 RX ORDER — HYDROCODONE BITARTRATE AND ACETAMINOPHEN 5; 325 MG/1; MG/1
1 TABLET ORAL
Status: COMPLETED | OUTPATIENT
Start: 2025-02-27 | End: 2025-02-27

## 2025-02-27 RX ORDER — IOPAMIDOL 755 MG/ML
100 INJECTION, SOLUTION INTRAVASCULAR
Status: COMPLETED | OUTPATIENT
Start: 2025-02-27 | End: 2025-02-27

## 2025-02-27 RX ORDER — ONDANSETRON 4 MG/1
4 TABLET, ORALLY DISINTEGRATING ORAL EVERY 8 HOURS PRN
Status: DISCONTINUED | OUTPATIENT
Start: 2025-02-27 | End: 2025-03-14 | Stop reason: HOSPADM

## 2025-02-27 RX ORDER — POTASSIUM CHLORIDE 1500 MG/1
40 TABLET, EXTENDED RELEASE ORAL PRN
Status: DISCONTINUED | OUTPATIENT
Start: 2025-02-27 | End: 2025-03-14 | Stop reason: HOSPADM

## 2025-02-27 RX ORDER — 0.9 % SODIUM CHLORIDE 0.9 %
500 INTRAVENOUS SOLUTION INTRAVENOUS ONCE
Status: DISCONTINUED | OUTPATIENT
Start: 2025-02-27 | End: 2025-02-27

## 2025-02-27 RX ORDER — DEXTROSE MONOHYDRATE 100 MG/ML
INJECTION, SOLUTION INTRAVENOUS CONTINUOUS PRN
Status: DISCONTINUED | OUTPATIENT
Start: 2025-02-27 | End: 2025-03-14 | Stop reason: HOSPADM

## 2025-02-27 RX ORDER — MORPHINE SULFATE 2 MG/ML
2 INJECTION, SOLUTION INTRAMUSCULAR; INTRAVENOUS
Status: COMPLETED | OUTPATIENT
Start: 2025-02-27 | End: 2025-02-27

## 2025-02-27 RX ORDER — MAGNESIUM SULFATE IN WATER 40 MG/ML
2000 INJECTION, SOLUTION INTRAVENOUS PRN
Status: DISCONTINUED | OUTPATIENT
Start: 2025-02-27 | End: 2025-03-14 | Stop reason: HOSPADM

## 2025-02-27 RX ORDER — SODIUM CHLORIDE 0.9 % (FLUSH) 0.9 %
5-40 SYRINGE (ML) INJECTION PRN
Status: DISCONTINUED | OUTPATIENT
Start: 2025-02-27 | End: 2025-03-14 | Stop reason: HOSPADM

## 2025-02-27 RX ORDER — PANTOPRAZOLE SODIUM 40 MG/1
40 TABLET, DELAYED RELEASE ORAL 2 TIMES DAILY
Status: DISCONTINUED | OUTPATIENT
Start: 2025-02-27 | End: 2025-03-05

## 2025-02-27 RX ORDER — POLYETHYLENE GLYCOL 3350 17 G/17G
17 POWDER, FOR SOLUTION ORAL DAILY PRN
Status: DISCONTINUED | OUTPATIENT
Start: 2025-02-27 | End: 2025-03-14 | Stop reason: HOSPADM

## 2025-02-27 RX ORDER — OXYCODONE HYDROCHLORIDE 5 MG/1
5 TABLET ORAL EVERY 4 HOURS PRN
Status: DISCONTINUED | OUTPATIENT
Start: 2025-02-27 | End: 2025-03-01

## 2025-02-27 RX ORDER — GLUCAGON 1 MG/ML
1 KIT INJECTION PRN
Status: DISCONTINUED | OUTPATIENT
Start: 2025-02-27 | End: 2025-03-14 | Stop reason: HOSPADM

## 2025-02-27 RX ORDER — ACETAMINOPHEN 650 MG/1
650 SUPPOSITORY RECTAL EVERY 6 HOURS PRN
Status: DISCONTINUED | OUTPATIENT
Start: 2025-02-27 | End: 2025-03-14 | Stop reason: HOSPADM

## 2025-02-27 RX ORDER — PREGABALIN 75 MG/1
75 CAPSULE ORAL 2 TIMES DAILY
Status: DISCONTINUED | OUTPATIENT
Start: 2025-02-27 | End: 2025-03-14 | Stop reason: HOSPADM

## 2025-02-27 RX ORDER — MIDODRINE HYDROCHLORIDE 5 MG/1
5 TABLET ORAL 3 TIMES DAILY PRN
Status: DISCONTINUED | OUTPATIENT
Start: 2025-02-27 | End: 2025-02-28

## 2025-02-27 RX ADMIN — MORPHINE SULFATE 4 MG: 4 INJECTION, SOLUTION INTRAMUSCULAR; INTRAVENOUS at 23:34

## 2025-02-27 RX ADMIN — PANTOPRAZOLE SODIUM 40 MG: 40 TABLET, DELAYED RELEASE ORAL at 23:55

## 2025-02-27 RX ADMIN — FENTANYL CITRATE 50 MCG: 50 INJECTION INTRAMUSCULAR; INTRAVENOUS at 22:09

## 2025-02-27 RX ADMIN — VANCOMYCIN HYDROCHLORIDE 2000 MG: 1 INJECTION, POWDER, LYOPHILIZED, FOR SOLUTION INTRAVENOUS at 15:45

## 2025-02-27 RX ADMIN — KETOROLAC TROMETHAMINE 15 MG: 15 INJECTION, SOLUTION INTRAMUSCULAR; INTRAVENOUS at 15:33

## 2025-02-27 RX ADMIN — CEFEPIME 2000 MG: 2 INJECTION, POWDER, FOR SOLUTION INTRAVENOUS at 15:11

## 2025-02-27 RX ADMIN — SODIUM CHLORIDE 500 ML: 9 INJECTION, SOLUTION INTRAVENOUS at 18:48

## 2025-02-27 RX ADMIN — ALBUMIN (HUMAN) 25 G: 0.25 INJECTION, SOLUTION INTRAVENOUS at 23:59

## 2025-02-27 RX ADMIN — SODIUM CHLORIDE 1000 ML: 0.9 INJECTION, SOLUTION INTRAVENOUS at 16:17

## 2025-02-27 RX ADMIN — MIDODRINE HYDROCHLORIDE 5 MG: 5 TABLET ORAL at 14:56

## 2025-02-27 RX ADMIN — FENTANYL CITRATE 50 MCG: 50 INJECTION, SOLUTION INTRAMUSCULAR; INTRAVENOUS at 14:59

## 2025-02-27 RX ADMIN — MIDODRINE HYDROCHLORIDE 5 MG: 5 TABLET ORAL at 16:31

## 2025-02-27 RX ADMIN — IOPAMIDOL 100 ML: 755 INJECTION, SOLUTION INTRAVENOUS at 22:55

## 2025-02-27 RX ADMIN — MORPHINE SULFATE 2 MG: 2 INJECTION, SOLUTION INTRAMUSCULAR; INTRAVENOUS at 17:32

## 2025-02-27 RX ADMIN — HYDROCODONE BITARTRATE AND ACETAMINOPHEN 1 TABLET: 5; 325 TABLET ORAL at 14:56

## 2025-02-27 RX ADMIN — PREGABALIN 75 MG: 25 CAPSULE ORAL at 23:55

## 2025-02-27 RX ADMIN — BUMETANIDE 1 MG: 0.25 INJECTION INTRAMUSCULAR; INTRAVENOUS at 23:55

## 2025-02-27 ASSESSMENT — PAIN SCALES - GENERAL
PAINLEVEL_OUTOF10: 10
PAINLEVEL_OUTOF10: 6
PAINLEVEL_OUTOF10: 10
PAINLEVEL_OUTOF10: 10

## 2025-02-27 ASSESSMENT — PAIN DESCRIPTION - ORIENTATION
ORIENTATION: LEFT;RIGHT;LOWER
ORIENTATION: RIGHT;LEFT
ORIENTATION: RIGHT;LEFT;LOWER
ORIENTATION: RIGHT;LEFT;LOWER

## 2025-02-27 ASSESSMENT — PAIN - FUNCTIONAL ASSESSMENT: PAIN_FUNCTIONAL_ASSESSMENT: 0-10

## 2025-02-27 ASSESSMENT — PAIN DESCRIPTION - DESCRIPTORS
DESCRIPTORS: ACHING
DESCRIPTORS: TENDER;PRESSURE
DESCRIPTORS: BURNING;ACHING;SORE;TENDER
DESCRIPTORS: ACHING

## 2025-02-27 ASSESSMENT — PAIN DESCRIPTION - LOCATION
LOCATION: LEG

## 2025-02-27 NOTE — ED PROVIDER NOTES
[facility] EMERGENCY DEPARTMENT  EMERGENCY DEPARTMENT ENCOUNTER       Pt Name: Rosita Morillo  MRN: [id number]  Birthdate 1945  Date of evaluation: 2/27/2025  Provider: Carlos Dumont DO   PCP: None, None  Note Started: 3:07 PM EST 2/27/25     CHIEF COMPLAINT       Chief Complaint   Patient presents with    Leg Swelling     bilateral        HISTORY OF PRESENT ILLNESS: 1 or more elements      History From: Patient, History limited by: None     Rosita Morillo is a 79 y.o. female Presents to the emergency department for evaluation of bilateral leg swelling pain and erythema.         Please See MDM for Additional Details of the HPI/PMH  Nursing Notes were all reviewed and agreed with or any disagreements were addressed in the HPI.     REVIEW OF SYSTEMS        Positives and Pertinent negatives as per HPI.    PAST HISTORY     Past Medical History:  Past Medical History:   Diagnosis Date    Arthritis     CAD (coronary artery disease)     afib and chf    Congestive heart failure (HCC)     Depression     Diabetes (HCC)     type 2    Fibromyalgia     Gastrointestinal disorder     reflux and gastro paresis    Heart failure (HCC)     Infectious disease 2010    MRSA? in right big toe    Menopause     Psychiatric disorder     PTSD from 911       Past Surgical History:  Past Surgical History:   Procedure Laterality Date    APPENDECTOMY      BREAST BIOPSY Left     STbb  benign    COLONOSCOPY  08/20/2018    polyps    COLONOSCOPY N/A 8/20/2018    COLONOSCOPY performed by Nayely Corcoran MD at [facility] [place] MAIN OR    GI      colonscopy and endo    GYN      cyst on right ovary removed    HEENT      T&A Bilateral Cataract Surgery with coptic lenses    UPPER GASTROINTESTINAL ENDOSCOPY N/A 1/9/2025    ESOPHAGOGASTRODUODENOSCOPY performed by Sean Meza MD at [facility] ENDOSCOPY       Family History:  Family History   Problem Relation Age of Onset    No Known Problems Mother        Social History:  Social History     Tobacco Use    Smoking

## 2025-02-27 NOTE — ED NOTES
Pt accepted to [facility] ER  attempted to notify nursing supp and no answer

## 2025-02-27 NOTE — PROGRESS NOTES
arranged ALS  transportation from [facility] ED 4 to [facility] ED .  Arranged ALS transport w/[facility] -  advised to bring appropiate equipment - ETA of   1930 given - updated ED staff w/ETA

## 2025-02-27 NOTE — ED NOTES
TRANSFER - OUT REPORT:    Verbal report given to Debi Aldana RN on Rosita Morillo  being transferred to [facility] ED for routine progression of patient care       Report consisted of patient's Situation, Background, Assessment and   Recommendations(SBAR).     Information from the following report(s) Nurse Handoff Report, ED SBAR, Adult Overview, MAR, Recent Results, Med Rec Status, Cardiac Rhythm V-paced, and Quality Measures was reviewed with the receiving nurse.    Mcgrew Fall Assessment:    Presents to emergency department  because of falls (Syncope, seizure, or loss of consciousness): No  Age > 70: Yes  Altered Mental Status, Intoxication with alcohol or substance confusion (Disorientation, impaired judgment, poor safety awaremess, or inability to follow instructions): No  Impaired Mobility: Ambulates or transfers with assistive devices or assistance; Unable to ambulate or transer.: No  Nursing Judgement: No          Lines:   Peripheral IV 02/27/25 Right;Anterior Forearm (Active)   Site Assessment Clean, dry & intact 02/27/25 1446   Line Status Blood return noted;Brisk blood return;Flushed;Normal saline locked 02/27/25 1446   Phlebitis Assessment No symptoms 02/27/25 1446   Infiltration Assessment 0 02/27/25 1446   Dressing Status New dressing applied;Clean, dry & intact 02/27/25 1446   Dressing Type Transparent 02/27/25 1446       Peripheral IV 02/27/25 Left;Ventral Forearm (Active)        Opportunity for questions and clarification was provided.      Patient transported with:  [facility] on continued cardiac monitoring, hemodynamic monitoring, infusions

## 2025-02-27 NOTE — ED NOTES
Pt resting comfortably, extra blankets provided. Call bell in reach. Pt  has taken her clothes, hearing aid home with other possessions. Both aware of ETA of transport at 2020. Pt has no needs expressed at this time, more comfortable after administration of morphine.

## 2025-02-27 NOTE — ED NOTES
Pt reporting medications given for pain are not working at this time, ED Provider aware. Medication to be given more time to work per ED Provider.

## 2025-02-27 NOTE — ED TRIAGE NOTES
Pt arrived by EMS for bilateral leg swelling and back pain.  Per EMS pt complains of bilateral leg swelling and weeping, EMS reports pts  has been having to wrap pts leg up to 5 times a day due to the weeping.  Pt was admitted back in January for NSTEMI and UGIB  with a history of CHF and A-fib.  Pt tearful on arrival and stated she waited as long as she could before coming back to the hospital.  Pt is awake and alert  pt educated on ER flow

## 2025-02-28 LAB
ANION GAP SERPL CALC-SCNC: 6 MMOL/L (ref 2–12)
ANION GAP SERPL CALC-SCNC: 6 MMOL/L (ref 2–12)
BASOPHILS # BLD: 0.08 K/UL (ref 0–0.1)
BASOPHILS NFR BLD: 1 % (ref 0–1)
BUN SERPL-MCNC: 39 MG/DL (ref 6–20)
BUN SERPL-MCNC: 41 MG/DL (ref 6–20)
BUN/CREAT SERPL: 30 (ref 12–20)
BUN/CREAT SERPL: 32 (ref 12–20)
CALCIUM SERPL-MCNC: 8.7 MG/DL (ref 8.5–10.1)
CALCIUM SERPL-MCNC: 9.1 MG/DL (ref 8.5–10.1)
CHLORIDE SERPL-SCNC: 110 MMOL/L (ref 97–108)
CHLORIDE SERPL-SCNC: 112 MMOL/L (ref 97–108)
CO2 SERPL-SCNC: 19 MMOL/L (ref 21–32)
CO2 SERPL-SCNC: 22 MMOL/L (ref 21–32)
CREAT SERPL-MCNC: 1.3 MG/DL (ref 0.55–1.02)
CREAT SERPL-MCNC: 1.31 MG/DL (ref 0.55–1.02)
DIFFERENTIAL METHOD BLD: ABNORMAL
EKG ATRIAL RATE: 80 BPM
EKG DIAGNOSIS: NORMAL
EKG Q-T INTERVAL: 398 MS
EKG QRS DURATION: 122 MS
EKG QTC CALCULATION (BAZETT): 459 MS
EKG R AXIS: 265 DEGREES
EKG T AXIS: 99 DEGREES
EKG VENTRICULAR RATE: 80 BPM
EOSINOPHIL # BLD: 0.11 K/UL (ref 0–0.4)
EOSINOPHIL NFR BLD: 1.4 % (ref 0–7)
ERYTHROCYTE [DISTWIDTH] IN BLOOD BY AUTOMATED COUNT: 21.1 % (ref 11.5–14.5)
GLUCOSE BLD STRIP.AUTO-MCNC: 106 MG/DL (ref 65–117)
GLUCOSE BLD STRIP.AUTO-MCNC: 111 MG/DL (ref 65–117)
GLUCOSE BLD STRIP.AUTO-MCNC: 120 MG/DL (ref 65–117)
GLUCOSE BLD STRIP.AUTO-MCNC: 139 MG/DL (ref 65–117)
GLUCOSE SERPL-MCNC: 115 MG/DL (ref 65–100)
GLUCOSE SERPL-MCNC: 152 MG/DL (ref 65–100)
HCT VFR BLD AUTO: 33.4 % (ref 35–47)
HGB BLD-MCNC: 9.7 G/DL (ref 11.5–16)
IMM GRANULOCYTES # BLD AUTO: 0.03 K/UL (ref 0–0.04)
IMM GRANULOCYTES NFR BLD AUTO: 0.4 % (ref 0–0.5)
LYMPHOCYTES # BLD: 0.69 K/UL (ref 0.8–3.5)
LYMPHOCYTES NFR BLD: 8.7 % (ref 12–49)
MCH RBC QN AUTO: 25.1 PG (ref 26–34)
MCHC RBC AUTO-ENTMCNC: 29 G/DL (ref 30–36.5)
MCV RBC AUTO: 86.5 FL (ref 80–99)
MONOCYTES # BLD: 1.13 K/UL (ref 0–1)
MONOCYTES NFR BLD: 14.3 % (ref 5–13)
NEUTS SEG # BLD: 5.86 K/UL (ref 1.8–8)
NEUTS SEG NFR BLD: 74.2 % (ref 32–75)
NRBC # BLD: 0 K/UL (ref 0–0.01)
NRBC BLD-RTO: 0 PER 100 WBC
PLATELET # BLD AUTO: 288 K/UL (ref 150–400)
PMV BLD AUTO: 10.3 FL (ref 8.9–12.9)
POTASSIUM SERPL-SCNC: 5.5 MMOL/L (ref 3.5–5.1)
POTASSIUM SERPL-SCNC: 5.7 MMOL/L (ref 3.5–5.1)
RBC # BLD AUTO: 3.86 M/UL (ref 3.8–5.2)
RBC MORPH BLD: ABNORMAL
SERVICE CMNT-IMP: ABNORMAL
SERVICE CMNT-IMP: ABNORMAL
SERVICE CMNT-IMP: NORMAL
SERVICE CMNT-IMP: NORMAL
SODIUM SERPL-SCNC: 137 MMOL/L (ref 136–145)
SODIUM SERPL-SCNC: 138 MMOL/L (ref 136–145)
TROPONIN I SERPL HS-MCNC: 559 NG/L (ref 0–51)
WBC # BLD AUTO: 7.9 K/UL (ref 3.6–11)

## 2025-02-28 PROCEDURE — 2500000003 HC RX 250 WO HCPCS: Performed by: STUDENT IN AN ORGANIZED HEALTH CARE EDUCATION/TRAINING PROGRAM

## 2025-02-28 PROCEDURE — 84484 ASSAY OF TROPONIN QUANT: CPT

## 2025-02-28 PROCEDURE — 2580000003 HC RX 258: Performed by: STUDENT IN AN ORGANIZED HEALTH CARE EDUCATION/TRAINING PROGRAM

## 2025-02-28 PROCEDURE — 6360000002 HC RX W HCPCS: Performed by: STUDENT IN AN ORGANIZED HEALTH CARE EDUCATION/TRAINING PROGRAM

## 2025-02-28 PROCEDURE — 6370000000 HC RX 637 (ALT 250 FOR IP): Performed by: STUDENT IN AN ORGANIZED HEALTH CARE EDUCATION/TRAINING PROGRAM

## 2025-02-28 PROCEDURE — 51702 INSERT TEMP BLADDER CATH: CPT

## 2025-02-28 PROCEDURE — 85025 COMPLETE CBC W/AUTO DIFF WBC: CPT

## 2025-02-28 PROCEDURE — 51798 US URINE CAPACITY MEASURE: CPT

## 2025-02-28 PROCEDURE — 1100000003 HC PRIVATE W/ TELEMETRY

## 2025-02-28 PROCEDURE — 6370000000 HC RX 637 (ALT 250 FOR IP): Performed by: INTERNAL MEDICINE

## 2025-02-28 PROCEDURE — 80048 BASIC METABOLIC PNL TOTAL CA: CPT

## 2025-02-28 PROCEDURE — 94761 N-INVAS EAR/PLS OXIMETRY MLT: CPT

## 2025-02-28 PROCEDURE — 82962 GLUCOSE BLOOD TEST: CPT

## 2025-02-28 PROCEDURE — 6360000002 HC RX W HCPCS: Performed by: INTERNAL MEDICINE

## 2025-02-28 PROCEDURE — 36415 COLL VENOUS BLD VENIPUNCTURE: CPT

## 2025-02-28 RX ORDER — MIDODRINE HYDROCHLORIDE 5 MG/1
5 TABLET ORAL
Status: DISCONTINUED | OUTPATIENT
Start: 2025-02-28 | End: 2025-03-05

## 2025-02-28 RX ORDER — ACETAMINOPHEN 500 MG
500 TABLET ORAL EVERY 8 HOURS SCHEDULED
Status: DISCONTINUED | OUTPATIENT
Start: 2025-02-28 | End: 2025-03-05

## 2025-02-28 RX ORDER — BUMETANIDE 0.25 MG/ML
0.5 INJECTION, SOLUTION INTRAMUSCULAR; INTRAVENOUS 2 TIMES DAILY
Status: DISCONTINUED | OUTPATIENT
Start: 2025-02-28 | End: 2025-03-02

## 2025-02-28 RX ADMIN — SODIUM CHLORIDE, PRESERVATIVE FREE 10 ML: 5 INJECTION INTRAVENOUS at 19:51

## 2025-02-28 RX ADMIN — OXYCODONE 5 MG: 5 TABLET ORAL at 22:28

## 2025-02-28 RX ADMIN — ATORVASTATIN CALCIUM 10 MG: 10 TABLET, FILM COATED ORAL at 09:25

## 2025-02-28 RX ADMIN — MORPHINE SULFATE 4 MG: 4 INJECTION, SOLUTION INTRAMUSCULAR; INTRAVENOUS at 19:36

## 2025-02-28 RX ADMIN — TIMOLOL MALEATE 1 DROP: 2.5 SOLUTION OPHTHALMIC at 09:27

## 2025-02-28 RX ADMIN — VANCOMYCIN HYDROCHLORIDE 500 MG: 500 INJECTION, POWDER, LYOPHILIZED, FOR SOLUTION INTRAVENOUS at 06:31

## 2025-02-28 RX ADMIN — SODIUM ZIRCONIUM CYCLOSILICATE 10 G: 10 POWDER, FOR SUSPENSION ORAL at 05:06

## 2025-02-28 RX ADMIN — ACETAMINOPHEN 500 MG: 500 TABLET, FILM COATED ORAL at 17:03

## 2025-02-28 RX ADMIN — CEFEPIME 2000 MG: 2 INJECTION, POWDER, FOR SOLUTION INTRAVENOUS at 00:33

## 2025-02-28 RX ADMIN — INSULIN GLARGINE 10 UNITS: 100 INJECTION, SOLUTION SUBCUTANEOUS at 09:23

## 2025-02-28 RX ADMIN — SODIUM ZIRCONIUM CYCLOSILICATE 10 G: 10 POWDER, FOR SUSPENSION ORAL at 09:37

## 2025-02-28 RX ADMIN — TIMOLOL MALEATE 1 DROP: 2.5 SOLUTION OPHTHALMIC at 22:31

## 2025-02-28 RX ADMIN — PANTOPRAZOLE SODIUM 40 MG: 40 TABLET, DELAYED RELEASE ORAL at 19:48

## 2025-02-28 RX ADMIN — SODIUM CHLORIDE, PRESERVATIVE FREE 10 ML: 5 INJECTION INTRAVENOUS at 09:26

## 2025-02-28 RX ADMIN — OXYCODONE 5 MG: 5 TABLET ORAL at 15:51

## 2025-02-28 RX ADMIN — CEFEPIME 2000 MG: 2 INJECTION, POWDER, FOR SOLUTION INTRAVENOUS at 09:31

## 2025-02-28 RX ADMIN — CEFEPIME 2000 MG: 2 INJECTION, POWDER, FOR SOLUTION INTRAVENOUS at 22:28

## 2025-02-28 RX ADMIN — ACETAMINOPHEN 500 MG: 500 TABLET, FILM COATED ORAL at 23:26

## 2025-02-28 RX ADMIN — MORPHINE SULFATE 4 MG: 4 INJECTION, SOLUTION INTRAMUSCULAR; INTRAVENOUS at 09:37

## 2025-02-28 RX ADMIN — VANCOMYCIN HYDROCHLORIDE 500 MG: 500 INJECTION, POWDER, LYOPHILIZED, FOR SOLUTION INTRAVENOUS at 17:32

## 2025-02-28 RX ADMIN — BUMETANIDE 0.5 MG: 0.25 INJECTION INTRAMUSCULAR; INTRAVENOUS at 17:04

## 2025-02-28 RX ADMIN — PREGABALIN 75 MG: 25 CAPSULE ORAL at 19:48

## 2025-02-28 RX ADMIN — SODIUM CHLORIDE: 0.9 INJECTION, SOLUTION INTRAVENOUS at 06:29

## 2025-02-28 RX ADMIN — TIMOLOL MALEATE 1 DROP: 2.5 SOLUTION OPHTHALMIC at 00:33

## 2025-02-28 RX ADMIN — PREGABALIN 75 MG: 25 CAPSULE ORAL at 09:25

## 2025-02-28 RX ADMIN — PANTOPRAZOLE SODIUM 40 MG: 40 TABLET, DELAYED RELEASE ORAL at 09:25

## 2025-02-28 RX ADMIN — MIDODRINE HYDROCHLORIDE 5 MG: 5 TABLET ORAL at 17:04

## 2025-02-28 RX ADMIN — OXYCODONE 5 MG: 5 TABLET ORAL at 10:17

## 2025-02-28 RX ADMIN — EMPAGLIFLOZIN 10 MG: 10 TABLET, FILM COATED ORAL at 09:25

## 2025-02-28 ASSESSMENT — PAIN SCALES - GENERAL
PAINLEVEL_OUTOF10: 10
PAINLEVEL_OUTOF10: 8
PAINLEVEL_OUTOF10: 8
PAINLEVEL_OUTOF10: 10
PAINLEVEL_OUTOF10: 8
PAINLEVEL_OUTOF10: 6
PAINLEVEL_OUTOF10: 10

## 2025-02-28 ASSESSMENT — PAIN SCALES - WONG BAKER
WONGBAKER_NUMERICALRESPONSE: HURTS EVEN MORE
WONGBAKER_NUMERICALRESPONSE: HURTS EVEN MORE

## 2025-02-28 ASSESSMENT — PAIN DESCRIPTION - LOCATION
LOCATION: LEG
LOCATION: LEG

## 2025-02-28 ASSESSMENT — PAIN DESCRIPTION - ORIENTATION
ORIENTATION: RIGHT;LEFT
ORIENTATION: RIGHT;LEFT

## 2025-02-28 ASSESSMENT — PAIN DESCRIPTION - DESCRIPTORS
DESCRIPTORS: GNAWING;ACHING
DESCRIPTORS: ACHING;BURNING;GNAWING

## 2025-02-28 ASSESSMENT — PAIN - FUNCTIONAL ASSESSMENT
PAIN_FUNCTIONAL_ASSESSMENT: PREVENTS OR INTERFERES WITH MANY ACTIVE NOT PASSIVE ACTIVITIES
PAIN_FUNCTIONAL_ASSESSMENT: PREVENTS OR INTERFERES SOME ACTIVE ACTIVITIES AND ADLS

## 2025-02-28 NOTE — PROGRESS NOTES
End of Shift Note    Bedside shift change report given to ALEXANDRIA Sequeira (oncoming nurse) by NAVIN DICK RN (offgoing nurse).  Report included the following information SBAR, Kardex, and MAR    Shift worked:  5pm-7pm     Shift summary and any significant changes:     Report received from ALEXANDRIA Mock at 1700. Pt tolerated care fairly well. Medications were given and education was provided. Hourly rounding completed. Pt made no complaints of pain during this shift. Harvey care completed with CHG.          NAVIN DICK RN

## 2025-02-28 NOTE — H&P
Basophils % 0.7 0.0 - 1.0 %    Immature Granulocytes % 0.4 0.0 - 0.5 %    Neutrophils Absolute 7.41 1.80 - 8.00 K/UL    Lymphocytes Absolute 1.27 0.80 - 3.50 K/UL    Monocytes Absolute 1.18 (H) 0.00 - 1.00 K/UL    Eosinophils Absolute 0.13 0.00 - 0.40 K/UL    Basophils Absolute 0.07 0.00 - 0.10 K/UL    Immature Granulocytes Absolute 0.04 0.00 - 0.04 K/UL    Differential Type AUTOMATED      Platelet Comment ADEQUATE PLATELETS      RBC Comment ANISOCYTOSIS  1+       Comprehensive Metabolic Panel    Collection Time: 02/27/25  2:45 PM   Result Value Ref Range    Sodium 137 136 - 145 mmol/L    Potassium 5.9 (H) 3.5 - 5.1 mmol/L    Chloride 102 97 - 108 mmol/L    CO2 22 21 - 32 mmol/L    Anion Gap 13 (H) 2 - 12 mmol/L    Glucose 169 (H) 65 - 100 mg/dL    BUN 46 (H) 6 - 20 MG/DL    Creatinine 1.51 (H) 0.55 - 1.02 MG/DL    BUN/Creatinine Ratio 30 (H) 12 - 20      Est, Glom Filt Rate 35 (L) >60 ml/min/1.73m2    Calcium 9.0 8.5 - 10.1 MG/DL    Total Bilirubin 0.6 0.2 - 1.0 MG/DL    ALT 41 12 - 78 U/L    AST 52 (H) 15 - 37 U/L    Alk Phosphatase 221 (H) 45 - 117 U/L    Total Protein 7.0 6.4 - 8.2 g/dL    Albumin 2.5 (L) 3.5 - 5.0 g/dL    Globulin 4.5 (H) 2.0 - 4.0 g/dL    Albumin/Globulin Ratio 0.6 (L) 1.1 - 2.2     Troponin    Collection Time: 02/27/25  2:45 PM   Result Value Ref Range    Troponin, High Sensitivity 651 (HH) 0 - 51 ng/L   Brain Natriuretic Peptide    Collection Time: 02/27/25  2:45 PM   Result Value Ref Range    NT Pro-BNP 5,477 (H) 0 - 450 PG/ML   CK    Collection Time: 02/27/25  2:45 PM   Result Value Ref Range    Total CK 81 26 - 192 U/L   Lactic Acid    Collection Time: 02/27/25  2:48 PM   Result Value Ref Range    Lactic Acid, Plasma 1.9 0.4 - 2.0 MMOL/L   EKG 12 Lead    Collection Time: 02/27/25  4:06 PM   Result Value Ref Range    Ventricular Rate 80 BPM    Atrial Rate 86 BPM    QRS Duration 120 ms    Q-T Interval 406 ms    QTc Calculation (Bazett) 468 ms    R Axis -87 degrees    T Axis 95 degrees     CHEST PORTABLE, XR TIBIA FIBULA RIGHT (2 VIEWS), XR TIBIA FIBULA LEFT  (2 VIEWS)    INDICATION: Chest pain, bilateral lower leg pain and swelling.    COMPARISON: Chest x-ray 2 months ago    TECHNIQUE: portable chest AP view. Two-view examinations of the right and left  tibia and fibula    FINDINGS:    CHEST: The cardiac silhouette is within normal limits. The pulmonary vasculature  is within normal limits.    The lungs and pleural spaces are clear. The visualized bones and upper abdomen  are age-appropriate.    Lower legs: 2 view examination of the right and left tibia and fibula reveal no  fracture, dislocation or other acute bony abnormality. Degenerative changes  noted at the knees and ankles bilaterally. Vascular calcification is heavy.    Impression  1. No acute process on portable chest.  2. No acute bony abnormality of the right or left tibia and fibula.      Electronically signed by MANJULA HARRY        _______________________________________________________________________    TOTAL TIME:  75 Minutes   TOBACCO CESSATION COUNSELING: No    Critical Care Provided: N/A  (Minutes non procedure based)        Signed:Randy Schilling DO  [facility] [place] [facility] Hospitalist/ Nocturnist  2/27/2025 10:19 PM    Please do not hesitate to reach out to myself or assigned provider on-call via [employer] system with questions or concerns.     Procedures: see electronic medical records for all procedures/Xrays and details which were not copied into this note but were reviewed prior to creation of Plan.

## 2025-02-28 NOTE — ED NOTES
Report given to ALEXANDRIA Mack. Nurse was informed of reason for arrival, vitals, labs, medications, orders, procedures, results, anything left pending and current plan of action. Questions were asked and received prior to departure from the patient.

## 2025-02-28 NOTE — PROGRESS NOTES
Physical Therapy    Arrived to see pt for eval. RN states pt in severe amts of pain and begins screaming even on anyone approaching her legs. Unable to tolerate eval at this time. Will defer and follow.    Anna Katz PT

## 2025-02-28 NOTE — PROGRESS NOTES
Occupational Therapy    Order's acknowledged and chart reviewed in prep for skilled OT evaluation; however, RN reports pt in 10/10 pain to BLE, with pain medication administered.  RN requests therapy to hold at this time.  OT to hold and follow up later as able and appropriate.    Thank you,  Matthew Kerley, OT

## 2025-02-28 NOTE — ED PROVIDER NOTES
[facility] EMERGENCY DEPARTMENT  EMERGENCY DEPARTMENT ENCOUNTER       Pt Name: Rosita Morillo  MRN: [id number]  Birthdate 1945  Date of evaluation: 2/27/2025  Provider: Conor Collins MD   PCP: Juan Go MD  Note Started: 11:10 PM EST 2/27/25     CHIEF COMPLAINT       Chief Complaint   Patient presents with    Chest Pain    Leg Swelling     Pt is a transfer from [facility] with cc of mid chest pain x 2 days. Also reports chronic pain on her both LE but worst pain x 2 days.  Hx of CHF. Bolus of 500cc given per EMS.         HISTORY OF PRESENT ILLNESS: 1 or more elements      History From: Patient  HPI Limitations: None     Rosita Morillo is a 79 y.o. female who presents as a transfer from [facility] with complaints of chest pain, bilateral lower extremity edema and bilateral lower extremity pain.  Seen at [facility], treated for presumed cellulitis.  Transferred to our facility for higher level of care and admission for IV antibiotics, further workup and treatment of her complaints and observation.     Nursing Notes were all reviewed and agreed with or any disagreements were addressed in the HPI.     REVIEW OF SYSTEMS      Review of Systems     Positives and Pertinent negatives as per HPI.    PAST HISTORY     Past Medical History:  Past Medical History:   Diagnosis Date    Arthritis     CAD (coronary artery disease)     afib and chf    Congestive heart failure (HCC)     Depression     Diabetes (HCC)     type 2    Fibromyalgia     Gastrointestinal disorder     reflux and gastro paresis    Heart failure (HCC)     Infectious disease 2010    MRSA? in right big toe    Menopause     Psychiatric disorder     PTSD from 911         Past Surgical History:  Past Surgical History:   Procedure Laterality Date    APPENDECTOMY      BREAST BIOPSY Left     STbb  benign    COLONOSCOPY  08/20/2018    polyps    COLONOSCOPY N/A 8/20/2018    COLONOSCOPY performed by Nayely Corcoran

## 2025-02-28 NOTE — PROGRESS NOTES
Pharmacy Antimicrobial Kinetic Dosing    Indication for Antimicrobials: SSTI     Current Regimen of Each Antimicrobial:  Vanc 2000mg x 1 f/b 500mg q12h x 7 days, start , day 1;   Cefepime 2000mg q12h x 7 days, start 25, day 1  Previous Antimicrobial Therapy:  none     Goal Level: Vancomycin -600    Date Dose & Interval Measured (mcg/mL) Predicted AUC 24-48 Predicted AUC 24,ss                          Significant Cultures:   25 - blood - pending    Labs:  Recent Labs     Units 25  0006 25  1445   CREATININE MG/DL 1.31* 1.51*   BUN MG/DL 39* 46*   WBC K/uL  --  10.1     Temp (24hrs), Av.4 °F (36.9 °C), Min:98.4 °F (36.9 °C), Max:98.5 °F (36.9 °C)      Conditions for Dosing Consideration: None    Creatinine Clearance (mL/min): Estimated Creatinine Clearance: 39 mL/min (A) (based on SCr of 1.31 mg/dL (H)).       Impression/Plan:   Dose and monitor vanc, adjust prn  Predicted WYI70-73 = 533, Predicted AUC24,ss = 580  Antimicrobial stop date 3/6/25 (7 days)     Pharmacy will follow daily and adjust medications as appropriate for renal function and/or serum levels.    Thank you,  EVGENY HOLLOWAY [facility]

## 2025-02-28 NOTE — PROGRESS NOTES
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%  Elevated troponin in setting of recent NSTEMI-downtrending from prior  Atrial fibrillation off anticoagulation  Continue PTA atorvastatin, Jardiance  Bumex 0.5 mg twice daily  Scheduled midodrine for now  -Strict intake and output with daily weights  -Monitor electrolytes and renal function address as indicated  Elevate BLE     Recent upper GI bleed-gastric ulcers  Recommended to remain off anticoagulation  Continue twice daily PPI     Hyperkalemia  CKD 3, baseline  Potassium slowly improving  Give Lokelma again  Should improve with diuresis  Recheck BMP tomorrow  Trend renal function  Renally dose medications and avoid nephrotoxic agents     Insulin-dependent diabetes mellitus  Continue PTA glargine  Corrective protein snacks and Accu-Cheks  Diabetic diet  Hypoglycemia protocol in place     Obesity class II by BMI 38.62  Recommend medically assisted weight loss in outpatient setting     Medical Decision Making:   I personally reviewed labs: Yes, as listed below  I personally reviewed imaging: CXR from OSH, bilateral tib-fib radiographs from OSH  Toxic drug monitoring: Vancomycin, opiates  Discussed case with: RN, patient        Code Status: Full  DVT Prophylaxis: Held  GI Prophylaxis: Protonix  Baseline: Independent       Subjective:     Chief Complaint / Reason for Physician Visit  \" Continues to complain of bilateral leg

## 2025-02-28 NOTE — ED NOTES
TRANSFER - OUT REPORT:    Verbal report given to Rober Newton EMT-P on Rosita Morillo  being transferred to [facility] ED for routine progression of patient care       Report consisted of patient's Situation, Background, Assessment and   Recommendations(SBAR).     Information from the following report(s) Nurse Handoff Report, ED SBAR, Adult Overview, MAR, Recent Results, Med Rec Status, Cardiac Rhythm V-paced, and Quality Measures was reviewed with the receiving nurse.    [place] Fall Assessment:    Presents to emergency department  because of falls (Syncope, seizure, or loss of consciousness): No  Age > 70: Yes  Altered Mental Status, Intoxication with alcohol or substance confusion (Disorientation, impaired judgment, poor safety awaremess, or inability to follow instructions): No  Impaired Mobility: Ambulates or transfers with assistive devices or assistance; Unable to ambulate or transer.: No  Nursing Judgement: No          Lines:   Peripheral IV 02/27/25 Right;Anterior Forearm (Active)   Site Assessment Clean, dry & intact 02/27/25 1446   Line Status Blood return noted;Brisk blood return;Flushed;Normal saline locked 02/27/25 1446   Phlebitis Assessment No symptoms 02/27/25 1446   Infiltration Assessment 0 02/27/25 1446   Dressing Status New dressing applied;Clean, dry & intact 02/27/25 1446   Dressing Type Transparent 02/27/25 1446       Peripheral IV 02/27/25 Left;Ventral Forearm (Active)        Opportunity for questions and clarification was provided.      Patient transported with:  [facility] on continued cardiac monitoring, hemodynamic monitoring, consideration for pt legs        Dislocation    A dislocation is a displacement of the bones that form a joint. This can result from trauma or due to a previous weakening of that joint. Symptoms include pain, swelling, and deformity at the site. Your health care provider has performed maneuvers to place the bones back in place. If a splint or brace was applied, make sure to wear it until you see an orthopedist as instructed.     SEEK IMMEDIATE MEDICAL CARE IF YOU HAVE ANY OF THE FOLLOWING SYMPTOMS: numbness, tingling, pain, weakness, or skin color/temperature change in any part of your body distal to the injury.

## 2025-02-28 NOTE — ED NOTES
Pt  updated on transport via phone, appreciative. [facility] in ambulance bay, here to transport pt at this time.

## 2025-03-01 LAB
AMORPH CRY URNS QL MICRO: ABNORMAL
ANION GAP SERPL CALC-SCNC: 9 MMOL/L (ref 2–12)
APPEARANCE UR: CLEAR
BACTERIA URNS QL MICRO: ABNORMAL /HPF
BILIRUB UR QL: NEGATIVE
BUN SERPL-MCNC: 42 MG/DL (ref 6–20)
BUN/CREAT SERPL: 26 (ref 12–20)
CALCIUM SERPL-MCNC: 9 MG/DL (ref 8.5–10.1)
CHLORIDE SERPL-SCNC: 112 MMOL/L (ref 97–108)
CO2 SERPL-SCNC: 17 MMOL/L (ref 21–32)
COLOR UR: ABNORMAL
CREAT SERPL-MCNC: 1.61 MG/DL (ref 0.55–1.02)
EPITH CASTS URNS QL MICRO: ABNORMAL /LPF
GLUCOSE BLD STRIP.AUTO-MCNC: 101 MG/DL (ref 65–117)
GLUCOSE BLD STRIP.AUTO-MCNC: 119 MG/DL (ref 65–117)
GLUCOSE BLD STRIP.AUTO-MCNC: 129 MG/DL (ref 65–117)
GLUCOSE BLD STRIP.AUTO-MCNC: 63 MG/DL (ref 65–117)
GLUCOSE BLD STRIP.AUTO-MCNC: 75 MG/DL (ref 65–117)
GLUCOSE SERPL-MCNC: 113 MG/DL (ref 65–100)
GLUCOSE UR STRIP.AUTO-MCNC: 100 MG/DL
GRAN CASTS URNS QL MICRO: ABNORMAL /LPF
HGB UR QL STRIP: ABNORMAL
HYALINE CASTS URNS QL MICRO: ABNORMAL /LPF (ref 0–5)
KETONES UR QL STRIP.AUTO: ABNORMAL MG/DL
LEUKOCYTE ESTERASE UR QL STRIP.AUTO: ABNORMAL
NITRITE UR QL STRIP.AUTO: NEGATIVE
PH UR STRIP: 5 (ref 5–8)
POTASSIUM SERPL-SCNC: 4.9 MMOL/L (ref 3.5–5.1)
PROT UR STRIP-MCNC: ABNORMAL MG/DL
RBC #/AREA URNS HPF: ABNORMAL /HPF (ref 0–5)
SERVICE CMNT-IMP: ABNORMAL
SERVICE CMNT-IMP: NORMAL
SERVICE CMNT-IMP: NORMAL
SODIUM SERPL-SCNC: 138 MMOL/L (ref 136–145)
SP GR UR REFRACTOMETRY: 1.02
URINE CULTURE IF INDICATED: ABNORMAL
UROBILINOGEN UR QL STRIP.AUTO: 0.2 EU/DL (ref 0.2–1)
VANCOMYCIN SERPL-MCNC: 15.1 UG/ML
WBC URNS QL MICRO: ABNORMAL /HPF (ref 0–4)

## 2025-03-01 PROCEDURE — 82962 GLUCOSE BLOOD TEST: CPT

## 2025-03-01 PROCEDURE — 1100000003 HC PRIVATE W/ TELEMETRY

## 2025-03-01 PROCEDURE — 80202 ASSAY OF VANCOMYCIN: CPT

## 2025-03-01 PROCEDURE — 80048 BASIC METABOLIC PNL TOTAL CA: CPT

## 2025-03-01 PROCEDURE — 2580000003 HC RX 258: Performed by: STUDENT IN AN ORGANIZED HEALTH CARE EDUCATION/TRAINING PROGRAM

## 2025-03-01 PROCEDURE — P9047 ALBUMIN (HUMAN), 25%, 50ML: HCPCS | Performed by: INTERNAL MEDICINE

## 2025-03-01 PROCEDURE — 6370000000 HC RX 637 (ALT 250 FOR IP): Performed by: INTERNAL MEDICINE

## 2025-03-01 PROCEDURE — 97530 THERAPEUTIC ACTIVITIES: CPT | Performed by: PHYSICAL THERAPIST

## 2025-03-01 PROCEDURE — 36415 COLL VENOUS BLD VENIPUNCTURE: CPT

## 2025-03-01 PROCEDURE — 97530 THERAPEUTIC ACTIVITIES: CPT

## 2025-03-01 PROCEDURE — 6360000002 HC RX W HCPCS: Performed by: INTERNAL MEDICINE

## 2025-03-01 PROCEDURE — 2500000003 HC RX 250 WO HCPCS: Performed by: STUDENT IN AN ORGANIZED HEALTH CARE EDUCATION/TRAINING PROGRAM

## 2025-03-01 PROCEDURE — 6360000002 HC RX W HCPCS: Performed by: STUDENT IN AN ORGANIZED HEALTH CARE EDUCATION/TRAINING PROGRAM

## 2025-03-01 PROCEDURE — 6370000000 HC RX 637 (ALT 250 FOR IP): Performed by: STUDENT IN AN ORGANIZED HEALTH CARE EDUCATION/TRAINING PROGRAM

## 2025-03-01 PROCEDURE — 97161 PT EVAL LOW COMPLEX 20 MIN: CPT | Performed by: PHYSICAL THERAPIST

## 2025-03-01 PROCEDURE — 81001 URINALYSIS AUTO W/SCOPE: CPT

## 2025-03-01 PROCEDURE — 97167 OT EVAL HIGH COMPLEX 60 MIN: CPT

## 2025-03-01 RX ORDER — MORPHINE SULFATE 2 MG/ML
2 INJECTION, SOLUTION INTRAMUSCULAR; INTRAVENOUS EVERY 4 HOURS PRN
Status: DISCONTINUED | OUTPATIENT
Start: 2025-03-01 | End: 2025-03-01

## 2025-03-01 RX ORDER — OXYCODONE HYDROCHLORIDE 5 MG/1
5 TABLET ORAL EVERY 4 HOURS PRN
Status: DISCONTINUED | OUTPATIENT
Start: 2025-03-01 | End: 2025-03-14 | Stop reason: HOSPADM

## 2025-03-01 RX ORDER — ALBUMIN (HUMAN) 12.5 G/50ML
25 SOLUTION INTRAVENOUS EVERY 12 HOURS
Status: DISCONTINUED | OUTPATIENT
Start: 2025-03-01 | End: 2025-03-02

## 2025-03-01 RX ADMIN — INSULIN GLARGINE 10 UNITS: 100 INJECTION, SOLUTION SUBCUTANEOUS at 09:42

## 2025-03-01 RX ADMIN — BUMETANIDE 0.5 MG: 0.25 INJECTION INTRAMUSCULAR; INTRAVENOUS at 09:39

## 2025-03-01 RX ADMIN — SODIUM CHLORIDE, PRESERVATIVE FREE 10 ML: 5 INJECTION INTRAVENOUS at 22:36

## 2025-03-01 RX ADMIN — CEFEPIME 2000 MG: 2 INJECTION, POWDER, FOR SOLUTION INTRAVENOUS at 22:25

## 2025-03-01 RX ADMIN — MIDODRINE HYDROCHLORIDE 5 MG: 5 TABLET ORAL at 14:35

## 2025-03-01 RX ADMIN — DEXTROSE MONOHYDRATE 125 ML: 100 INJECTION, SOLUTION INTRAVENOUS at 20:45

## 2025-03-01 RX ADMIN — SODIUM CHLORIDE, PRESERVATIVE FREE 10 ML: 5 INJECTION INTRAVENOUS at 09:41

## 2025-03-01 RX ADMIN — CEFEPIME 2000 MG: 2 INJECTION, POWDER, FOR SOLUTION INTRAVENOUS at 09:49

## 2025-03-01 RX ADMIN — ACETAMINOPHEN 500 MG: 500 TABLET, FILM COATED ORAL at 22:29

## 2025-03-01 RX ADMIN — EMPAGLIFLOZIN 10 MG: 10 TABLET, FILM COATED ORAL at 09:44

## 2025-03-01 RX ADMIN — ALBUMIN (HUMAN) 25 G: 0.25 INJECTION, SOLUTION INTRAVENOUS at 16:44

## 2025-03-01 RX ADMIN — ACETAMINOPHEN 500 MG: 500 TABLET, FILM COATED ORAL at 14:34

## 2025-03-01 RX ADMIN — ATORVASTATIN CALCIUM 10 MG: 10 TABLET, FILM COATED ORAL at 09:44

## 2025-03-01 RX ADMIN — VANCOMYCIN HYDROCHLORIDE 500 MG: 500 INJECTION, POWDER, LYOPHILIZED, FOR SOLUTION INTRAVENOUS at 06:43

## 2025-03-01 RX ADMIN — OXYCODONE 5 MG: 5 TABLET ORAL at 14:35

## 2025-03-01 RX ADMIN — MIDODRINE HYDROCHLORIDE 5 MG: 5 TABLET ORAL at 09:44

## 2025-03-01 RX ADMIN — PANTOPRAZOLE SODIUM 40 MG: 40 TABLET, DELAYED RELEASE ORAL at 09:44

## 2025-03-01 RX ADMIN — ACETAMINOPHEN 500 MG: 500 TABLET, FILM COATED ORAL at 06:32

## 2025-03-01 RX ADMIN — TIMOLOL MALEATE 1 DROP: 2.5 SOLUTION OPHTHALMIC at 09:48

## 2025-03-01 RX ADMIN — BUMETANIDE 0.5 MG: 0.25 INJECTION INTRAMUSCULAR; INTRAVENOUS at 18:14

## 2025-03-01 RX ADMIN — MORPHINE SULFATE 4 MG: 4 INJECTION, SOLUTION INTRAMUSCULAR; INTRAVENOUS at 04:47

## 2025-03-01 RX ADMIN — MIDODRINE HYDROCHLORIDE 5 MG: 5 TABLET ORAL at 16:46

## 2025-03-01 ASSESSMENT — PAIN SCALES - GENERAL
PAINLEVEL_OUTOF10: 7
PAINLEVEL_OUTOF10: 10
PAINLEVEL_OUTOF10: 0

## 2025-03-01 ASSESSMENT — PAIN DESCRIPTION - ORIENTATION
ORIENTATION: RIGHT;LEFT;LOWER
ORIENTATION: RIGHT;LEFT

## 2025-03-01 ASSESSMENT — PAIN DESCRIPTION - DESCRIPTORS: DESCRIPTORS: THROBBING;ACHING

## 2025-03-01 ASSESSMENT — PAIN DESCRIPTION - LOCATION
LOCATION: LEG
LOCATION: LEG

## 2025-03-01 NOTE — PLAN OF CARE
Problem: Skin/Tissue Integrity  Goal: Skin integrity remains intact  Description: 1.  Monitor for areas of redness and/or skin breakdown  2.  Assess vascular access sites hourly  3.  Every 4-6 hours minimum:  Change oxygen saturation probe site  4.  Every 4-6 hours:  If on nasal continuous positive airway pressure, respiratory therapy assess nares and determine need for appliance change or resting period  3/1/2025 0129 by Yumiko Núñez LPN  Outcome: Progressing  Flowsheets (Taken 2/28/2025 1933 by Fabby Garcia, RN)  Skin Integrity Remains Intact: Monitor for areas of redness and/or skin breakdown

## 2025-03-01 NOTE — PLAN OF CARE
Problem: Occupational Therapy - Adult  Goal: By Discharge: Performs self-care activities at highest level of function for planned discharge setting.  See evaluation for individualized goals.  Description: FUNCTIONAL STATUS PRIOR TO ADMISSION:  Lives with spouse in one level house with 4 steps to enter. Patient wheelchair level at home, stand step transfer with assist from spouse. Has electric wheelchair at home.    Receives Help From: Family, Prior Level of Assist for ADLs: Needs assistance,  ,  ,  ,  ,  ,  ,  , Prior Level of Assist for Transfers: Needs assistance, Active : No     HOME SUPPORT: Lives with spouse    Occupational Therapy Goals:  Initiated 3/1/2025  1.  Patient will perform self-feeding with Minimal Assist within 7 day(s).  2.  Patient will perform grooming with Minimal Assist within 7 day(s).  3.  Patient will roll in bed with minimal assist to aide with lower body self care within 7 days.  4.  Patient will sit EOB x 5 minutes with moderate assist to engage in self care tasks within 7 days  5.  Patient will stand with mod a x 2 in preparation for OOB activity within 7 days.  Outcome: Not Progressing     OCCUPATIONAL THERAPY EVALUATION    Patient: Rosita Morillo (79 y.o. female)  Date: 3/1/2025  Primary Diagnosis: Cellulitis of both lower extremities [L03.115, L03.116]         Precautions: Fall Risk, Skin                  ASSESSMENT :  The patient is limited by decreased functional mobility, independence in ADLs, ROM, strength, endurance, cognition, command following, attention/concentration, balance, increased pain levels, labile vitals .    Based on the impairments listed above patient requires total assist x 2 staff for bed level care, including bathing, dressing and toileting with related mobility. She requires hand over hand assist for grooming and eating tasks with impaired motor planning noted for all tasks. Patient with impaired orientation and ability to follow instructions - spouse  you for this referral.  Loyda Lim OT  Minutes: 40    Occupational Therapy Evaluation Charge Determination   History Examination Decision-Making   HIGH Complexity : Extensive review of history including physical, cognitive and psychocial history  HIGH Complexity: 5 Performance deficits relating to physical, cognitive, or psychosocial skills that result in activity limitations and/or participation restrictions  HIGH Complexity: Patient presents with comorbidities that affect occupational performance.  Significant modifications of tasks or assistance (eg. physical or verbal) with assessment (s) is necessary to enable pt to complete evaluation   Based on the above components, the patient evaluation is determined to be of the following complexity level: High

## 2025-03-01 NOTE — PLAN OF CARE
Problem: Chronic Conditions and Co-morbidities  Goal: Patient's chronic conditions and co-morbidity symptoms are monitored and maintained or improved  3/1/2025 1332 by Radha Baker RN  Outcome: Progressing  3/1/2025 0129 by Yumiko Núñez LPN  Outcome: Progressing     Problem: Skin/Tissue Integrity  Goal: Skin integrity remains intact  Description: 1.  Monitor for areas of redness and/or skin breakdown  2.  Assess vascular access sites hourly  3.  Every 4-6 hours minimum:  Change oxygen saturation probe site  4.  Every 4-6 hours:  If on nasal continuous positive airway pressure, respiratory therapy assess nares and determine need for appliance change or resting period  3/1/2025 1332 by Radha Baker RN  Outcome: Progressing  3/1/2025 0129 by Yumiko Núñez LPN  Outcome: Progressing  Flowsheets (Taken 2/28/2025 1933 by Fabby Garcia, RN)  Skin Integrity Remains Intact: Monitor for areas of redness and/or skin breakdown     Problem: Safety - Adult  Goal: Free from fall injury  3/1/2025 1332 by Radha Baker RN  Outcome: Progressing  3/1/2025 0129 by Yumiko Núñez LPN  Outcome: Progressing     Problem: ABCDS Injury Assessment  Goal: Absence of physical injury  3/1/2025 1332 by Radha Baker RN  Outcome: Progressing  3/1/2025 0129 by Yumiko Núñez LPN  Outcome: Progressing     Problem: Pain  Goal: Verbalizes/displays adequate comfort level or baseline comfort level  3/1/2025 1332 by Radha Baker RN  Outcome: Progressing  3/1/2025 0129 by Yumiko Núñez LPN  Outcome: Progressing

## 2025-03-01 NOTE — PROGRESS NOTES
Pharmacy Antimicrobial Kinetic Dosing    Indication for Antimicrobials: SSTI     Current Regimen of Each Antimicrobial:  Vanc 2000mg x 1 f/b 500mg q12h x 7 days, start , day 2   Cefepime 2000mg q12h x 7 days, start 25, day 2  Previous Antimicrobial Therapy:  none     Goal Level: Vancomycin -600    Date Dose & Interval Measured (mcg/mL) Predicted AUC 24-48 Predicted AUC 24,ss   3/1 0533 500mg q 12h 15.1 541 636                   Significant Cultures:   25 - blood - pending    Labs:  Recent Labs     Units 25  0533 25  0530 25  0006 25  1445   CREATININE MG/DL 1.61* 1.30* 1.31* 1.51*   BUN MG/DL 42* 41* 39* 46*   WBC K/uL  --  7.9  --  10.1     Temp (24hrs), Av.5 °F (37.5 °C), Min:97.9 °F (36.6 °C), Max:101.5 °F (38.6 °C)      Conditions for Dosing Consideration: None    Creatinine Clearance (mL/min): Estimated Creatinine Clearance: 32 mL/min (A) (based on SCr of 1.61 mg/dL (H)).       Impression/Plan:   Vancomycin level slightly supratherapeutic  Change to Vancomycin 750mg q24h   Predicted QSY95-92 = 489, Predicted AUC24,ss = 502  Cefepime as above  Bmp through 3/2  Antimicrobial stop date 3/6/25 (7 days)     Pharmacy will follow daily and adjust medications as appropriate for renal function and/or serum levels.    Thank you,  Miguelito Myers [facility]

## 2025-03-01 NOTE — PLAN OF CARE
Problem: Physical Therapy - Adult  Goal: By Discharge: Performs mobility at highest level of function for planned discharge setting.  See evaluation for individualized goals.  Description: FUNCTIONAL STATUS PRIOR TO ADMISSION: Patient was independent with bed mobility and required assistance for stand pivot transfer to w/c; increasing difficulty with mobility secondary to severe LE pain    HOME SUPPORT PRIOR TO ADMISSION: The patient lived with  and has required increased assistance.    Physical Therapy Goals  Initiated 3/1/2025  1.  Patient will move from supine to sit and sit to supine , scoot up and down, and roll side to side in bed with moderate assistance  within 7 day(s).    2.  Patient will sit EOB with good sitting balance for 5 minutes.  3. Patient will complete active ROM LE exercises for 10 reps each with mod A within 7 days.  Outcome: Not Progressing     PHYSICAL THERAPY EVALUATION    Patient: Rosita Morillo (79 y.o. female)  Date: 3/1/2025  Primary Diagnosis: Cellulitis of both lower extremities [L03.115, L03.116]       Precautions:  falls            ASSESSMENT :   Patient seen for PT evaluation today following admission for B LE cellulitis. Limited evaluation completed today secondary to increased confusion and severe pain in B Les preventing ability to attempt sitting EOB.  Patient confused and easily distractible with tangential speech noted. She presents with grossly impaired functional mobility and significantly limited ROM in B Les secondary to pain. She was able to wiggle toes minimally but reports severe pain when attempting any other movement in legs and unable to tolerate PROM of B Les. Patient demonstrating decreased activity tolerance also with fluctuating BP during tx session- nursing and MD aware.  Patient requiring total A for rolling today and unable to attempt sitting EOB.  Patient unable to provide any PLOF but  states that patient has been independent with bed mobility  doni    UPPER GASTROINTESTINAL ENDOSCOPY N/A 1/9/2025    ESOPHAGOGASTRODUODENOSCOPY performed by Sean Meza MD at [facility] ENDOSCOPY       Home Situation:  Social/Functional History  Lives With: Spouse  Type of Home: House  Home Layout: One level  Home Access: Stairs to enter with rails  Entrance Stairs - Number of Steps: 4  Bathroom Shower/Tub: Walk-in shower  Bathroom Equipment: Grab bars in shower, Safety frame  Home Equipment: Wheelchair - Electric  Has the patient had two or more falls in the past year or any fall with injury in the past year?: No  Receives Help From: Family  Prior Level of Assist for ADLs: Needs assistance  Homemaking Responsibilities: No  Prior Level of Assist for Ambulation: Independent in home with wheelchair and able to pivot transfer  Prior Level of Assist for Transfers: Needs assistance  Active : No    Cognitive/Behavioral Status:  Orientation  Orientation Level: Oriented to person;Disoriented to place;Oriented to situation;Oriented to time (month only)  Cognition  Overall Cognitive Status: Exceptions  Attention Span: Attends with cues to redirect;Difficulty attending to directions;Unable to maintain attention  Memory: Impaired      Hearing:    [place]    Strength:    Strength: Grossly decreased, non-functional    Tone & Sensation:   Tone: Normal  Sensation:  (appears intact)    Coordination:  Coordination: Grossly decreased, non-functional    Range Of Motion:  AROM: Grossly decreased, non-functional (for B LEs secondary to pain;)       Functional Mobility:  Bed Mobility:     Bed Mobility Training  Bed Mobility Training: Yes  Rolling: Dependent/Total      Pain Rating:  10/10   Pain Intervention(s):   elevation and repositioning    Activity Tolerance:   Poor; limited by severe LE pain    After treatment:   Patient left in no apparent distress in bed, Call bell within reach, Bed/ chair alarm activated, Caregiver / family present, Side rails x3, and Patient offloaded in partial R side

## 2025-03-01 NOTE — PROGRESS NOTES
End of Shift Note    Bedside shift change report given to ALEXANDRIA Leroy (oncoming nurse) by ALICIA TIJERINA RN (offgoing nurse).  Report included the following information SBAR, Kardex, and MAR    Shift worked:  7am-7pm     Shift summary and any significant changes:     Patient had complaints of pain, PRN oxycodone given x1. Pt having trouble following commands. Medications crushed in applesauce. Harvey care completed. Repositioned throughout the shift.          ALICIA TIJERINA, RN

## 2025-03-01 NOTE — PROGRESS NOTES
Hospitalist Progress Note    NAME:   Rosita Morillo   : 1945   MRN: [id number]     Date/Time: 3/1/2025 2:38 PM  Patient PCP: Juan Go MD    Estimated discharge date: March 3  Barriers: Volume status improvement      Assessment / Plan:        Cellulitis of bilateral lower extremity  Fibromyalgia      -CT of bilateral lower extremity showed edema but no abscess/fracture  -Doppler negative for DVT  -Continue vancomycin, cefepime  Continue current pain regimen, scheduled Tylenol  DC morphine.  Continue as needed oxycodone    Continue PTA Lyrica       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%  Elevated troponin in setting of recent NSTEMI-downtrending from prior  Atrial fibrillation off anticoagulation  Continue PTA atorvastatin, Jardiance  Bumex 0.5 mg twice daily, added albumin with Bumex  Scheduled midodrine for now  -Strict intake and output with daily weights  -Monitor electrolytes and renal function address as indicated  Elevate BLE     Recent upper GI bleed-gastric ulcers  Recommended to remain off anticoagulation  Continue twice daily PPI     Hyperkalemia  CKD 3, baseline  Potassium slowly improving  Give Lokelma again  Should improve with diuresis  Recheck BMP tomorrow  Trend renal function  Renally dose medications and avoid nephrotoxic agents    Insulin-dependent diabetes mellitus  Continue PTA glargine  Corrective protein snacks and Accu-Cheks  Diabetic diet  Hypoglycemia protocol in place     Obesity class II by BMI 38.62  Recommend medically assisted weight loss in outpatient setting     Medical Decision Making:   I personally reviewed labs: Yes, as listed below  I personally reviewed imaging: CXR from OSH, bilateral tib-fib radiographs from OSH  Toxic drug monitoring: Vancomycin, opiates  Discussed case with: RN, patient        Code Status: Full  DVT Prophylaxis: Held  GI Prophylaxis: Protonix  Baseline: Independent       Subjective:   Follow-up for

## 2025-03-01 NOTE — PROGRESS NOTES
Pt C/O pain at shift change( says it is above grade 10)asking for anything available to alleviate her pain. After assessment and VS, given Morphine per MAR. Given Oxy after cleaning and changing her at 2230 and Morphine this morning. Gets severe pain with turning and repositioning. Wounds on her posterior legs are dirty and draining pus. Cleaned with wound  PRN. VSS and is AFIB on Telemetry. Labs drawn.

## 2025-03-02 LAB
ANION GAP SERPL CALC-SCNC: 7 MMOL/L (ref 2–12)
BACTERIA SPEC CULT: NORMAL
BACTERIA SPEC CULT: NORMAL
BUN SERPL-MCNC: 43 MG/DL (ref 6–20)
BUN/CREAT SERPL: 30 (ref 12–20)
CALCIUM SERPL-MCNC: 9 MG/DL (ref 8.5–10.1)
CHLORIDE SERPL-SCNC: 115 MMOL/L (ref 97–108)
CO2 SERPL-SCNC: 21 MMOL/L (ref 21–32)
CREAT SERPL-MCNC: 1.45 MG/DL (ref 0.55–1.02)
EKG ATRIAL RATE: 86 BPM
EKG DIAGNOSIS: NORMAL
EKG Q-T INTERVAL: 406 MS
EKG QRS DURATION: 120 MS
EKG QTC CALCULATION (BAZETT): 468 MS
EKG R AXIS: -87 DEGREES
EKG T AXIS: 95 DEGREES
EKG VENTRICULAR RATE: 80 BPM
GLUCOSE BLD STRIP.AUTO-MCNC: 104 MG/DL (ref 65–117)
GLUCOSE BLD STRIP.AUTO-MCNC: 118 MG/DL (ref 65–117)
GLUCOSE BLD STRIP.AUTO-MCNC: 122 MG/DL (ref 65–117)
GLUCOSE BLD STRIP.AUTO-MCNC: 51 MG/DL (ref 65–117)
GLUCOSE BLD STRIP.AUTO-MCNC: 71 MG/DL (ref 65–117)
GLUCOSE BLD STRIP.AUTO-MCNC: 79 MG/DL (ref 65–117)
GLUCOSE BLD STRIP.AUTO-MCNC: 86 MG/DL (ref 65–117)
GLUCOSE BLD STRIP.AUTO-MCNC: 88 MG/DL (ref 65–117)
GLUCOSE BLD STRIP.AUTO-MCNC: 97 MG/DL (ref 65–117)
GLUCOSE SERPL-MCNC: 74 MG/DL (ref 65–100)
POTASSIUM SERPL-SCNC: 4 MMOL/L (ref 3.5–5.1)
SERVICE CMNT-IMP: ABNORMAL
SERVICE CMNT-IMP: NORMAL
SODIUM SERPL-SCNC: 143 MMOL/L (ref 136–145)

## 2025-03-02 PROCEDURE — 6370000000 HC RX 637 (ALT 250 FOR IP): Performed by: NURSE PRACTITIONER

## 2025-03-02 PROCEDURE — 82962 GLUCOSE BLOOD TEST: CPT

## 2025-03-02 PROCEDURE — 2580000003 HC RX 258: Performed by: INTERNAL MEDICINE

## 2025-03-02 PROCEDURE — 1100000003 HC PRIVATE W/ TELEMETRY

## 2025-03-02 PROCEDURE — P9047 ALBUMIN (HUMAN), 25%, 50ML: HCPCS | Performed by: INTERNAL MEDICINE

## 2025-03-02 PROCEDURE — 6370000000 HC RX 637 (ALT 250 FOR IP): Performed by: STUDENT IN AN ORGANIZED HEALTH CARE EDUCATION/TRAINING PROGRAM

## 2025-03-02 PROCEDURE — 80048 BASIC METABOLIC PNL TOTAL CA: CPT

## 2025-03-02 PROCEDURE — 2580000003 HC RX 258: Performed by: STUDENT IN AN ORGANIZED HEALTH CARE EDUCATION/TRAINING PROGRAM

## 2025-03-02 PROCEDURE — 6360000002 HC RX W HCPCS: Performed by: STUDENT IN AN ORGANIZED HEALTH CARE EDUCATION/TRAINING PROGRAM

## 2025-03-02 PROCEDURE — 51702 INSERT TEMP BLADDER CATH: CPT

## 2025-03-02 PROCEDURE — 36415 COLL VENOUS BLD VENIPUNCTURE: CPT

## 2025-03-02 PROCEDURE — 2700000000 HC OXYGEN THERAPY PER DAY

## 2025-03-02 PROCEDURE — 6360000002 HC RX W HCPCS: Performed by: INTERNAL MEDICINE

## 2025-03-02 PROCEDURE — 6370000000 HC RX 637 (ALT 250 FOR IP): Performed by: INTERNAL MEDICINE

## 2025-03-02 PROCEDURE — 2500000003 HC RX 250 WO HCPCS: Performed by: STUDENT IN AN ORGANIZED HEALTH CARE EDUCATION/TRAINING PROGRAM

## 2025-03-02 RX ORDER — MORPHINE SULFATE 2 MG/ML
1 INJECTION, SOLUTION INTRAMUSCULAR; INTRAVENOUS EVERY 4 HOURS PRN
Status: DISCONTINUED | OUTPATIENT
Start: 2025-03-02 | End: 2025-03-06

## 2025-03-02 RX ORDER — ALBUMIN (HUMAN) 12.5 G/50ML
25 SOLUTION INTRAVENOUS EVERY 12 HOURS
Status: COMPLETED | OUTPATIENT
Start: 2025-03-02 | End: 2025-03-04

## 2025-03-02 RX ORDER — HYDROXYZINE HYDROCHLORIDE 10 MG/1
10 TABLET, FILM COATED ORAL 3 TIMES DAILY PRN
Status: DISPENSED | OUTPATIENT
Start: 2025-03-02 | End: 2025-03-03

## 2025-03-02 RX ORDER — BUMETANIDE 0.25 MG/ML
1 INJECTION, SOLUTION INTRAMUSCULAR; INTRAVENOUS 2 TIMES DAILY
Status: DISCONTINUED | OUTPATIENT
Start: 2025-03-02 | End: 2025-03-03

## 2025-03-02 RX ADMIN — SODIUM CHLORIDE, PRESERVATIVE FREE 10 ML: 5 INJECTION INTRAVENOUS at 09:32

## 2025-03-02 RX ADMIN — TIMOLOL MALEATE 1 DROP: 2.5 SOLUTION OPHTHALMIC at 21:23

## 2025-03-02 RX ADMIN — CEFEPIME 2000 MG: 2 INJECTION, POWDER, FOR SOLUTION INTRAVENOUS at 11:03

## 2025-03-02 RX ADMIN — HYDROXYZINE HYDROCHLORIDE 10 MG: 10 TABLET ORAL at 21:08

## 2025-03-02 RX ADMIN — PANTOPRAZOLE SODIUM 40 MG: 40 TABLET, DELAYED RELEASE ORAL at 09:33

## 2025-03-02 RX ADMIN — ALBUMIN (HUMAN) 25 G: 0.25 INJECTION, SOLUTION INTRAVENOUS at 04:50

## 2025-03-02 RX ADMIN — ACETAMINOPHEN 500 MG: 500 TABLET, FILM COATED ORAL at 22:51

## 2025-03-02 RX ADMIN — BUMETANIDE 1 MG: 0.25 INJECTION INTRAMUSCULAR; INTRAVENOUS at 17:48

## 2025-03-02 RX ADMIN — MIDODRINE HYDROCHLORIDE 5 MG: 5 TABLET ORAL at 12:11

## 2025-03-02 RX ADMIN — ACETAMINOPHEN 500 MG: 500 TABLET, FILM COATED ORAL at 14:19

## 2025-03-02 RX ADMIN — PREGABALIN 75 MG: 25 CAPSULE ORAL at 20:55

## 2025-03-02 RX ADMIN — MEROPENEM 1000 MG: 1 INJECTION INTRAVENOUS at 21:22

## 2025-03-02 RX ADMIN — ATORVASTATIN CALCIUM 10 MG: 10 TABLET, FILM COATED ORAL at 09:33

## 2025-03-02 RX ADMIN — VANCOMYCIN HYDROCHLORIDE 750 MG: 750 INJECTION, POWDER, LYOPHILIZED, FOR SOLUTION INTRAVENOUS at 09:54

## 2025-03-02 RX ADMIN — MORPHINE SULFATE 1 MG: 2 INJECTION, SOLUTION INTRAMUSCULAR; INTRAVENOUS at 23:49

## 2025-03-02 RX ADMIN — OXYCODONE 5 MG: 5 TABLET ORAL at 20:55

## 2025-03-02 RX ADMIN — TIMOLOL MALEATE 1 DROP: 2.5 SOLUTION OPHTHALMIC at 09:50

## 2025-03-02 RX ADMIN — MIDODRINE HYDROCHLORIDE 5 MG: 5 TABLET ORAL at 09:33

## 2025-03-02 RX ADMIN — ALBUMIN (HUMAN) 25 G: 0.25 INJECTION, SOLUTION INTRAVENOUS at 15:59

## 2025-03-02 RX ADMIN — DEXTROSE MONOHYDRATE 125 ML: 100 INJECTION, SOLUTION INTRAVENOUS at 04:18

## 2025-03-02 RX ADMIN — OXYCODONE 5 MG: 5 TABLET ORAL at 14:19

## 2025-03-02 RX ADMIN — BUMETANIDE 1 MG: 0.25 INJECTION INTRAMUSCULAR; INTRAVENOUS at 12:07

## 2025-03-02 RX ADMIN — OXYCODONE 5 MG: 5 TABLET ORAL at 10:54

## 2025-03-02 RX ADMIN — PREGABALIN 75 MG: 25 CAPSULE ORAL at 09:32

## 2025-03-02 RX ADMIN — MORPHINE SULFATE 1 MG: 2 INJECTION, SOLUTION INTRAMUSCULAR; INTRAVENOUS at 15:53

## 2025-03-02 RX ADMIN — SODIUM CHLORIDE, PRESERVATIVE FREE 10 ML: 5 INJECTION INTRAVENOUS at 21:23

## 2025-03-02 RX ADMIN — PANTOPRAZOLE SODIUM 40 MG: 40 TABLET, DELAYED RELEASE ORAL at 20:56

## 2025-03-02 RX ADMIN — ACETAMINOPHEN 650 MG: 325 TABLET ORAL at 09:45

## 2025-03-02 ASSESSMENT — PAIN SCALES - GENERAL
PAINLEVEL_OUTOF10: 8
PAINLEVEL_OUTOF10: 6
PAINLEVEL_OUTOF10: 8
PAINLEVEL_OUTOF10: 5
PAINLEVEL_OUTOF10: 6
PAINLEVEL_OUTOF10: 0
PAINLEVEL_OUTOF10: 3
PAINLEVEL_OUTOF10: 8
PAINLEVEL_OUTOF10: 3
PAINLEVEL_OUTOF10: 8
PAINLEVEL_OUTOF10: 9

## 2025-03-02 ASSESSMENT — PAIN DESCRIPTION - LOCATION
LOCATION: GENERALIZED
LOCATION: GENERALIZED;LEG
LOCATION: GENERALIZED
LOCATION: GENERALIZED

## 2025-03-02 ASSESSMENT — PAIN DESCRIPTION - DESCRIPTORS
DESCRIPTORS: ACHING

## 2025-03-02 ASSESSMENT — PAIN SCALES - WONG BAKER: WONGBAKER_NUMERICALRESPONSE: HURTS EVEN MORE

## 2025-03-02 NOTE — PROGRESS NOTES
.End of Shift Note    Bedside shift change report given to Kay IBRAHIM  (oncoming nurse) by Candelaria Varela RN (offgoing nurse).  Report included the following information SBAR, Kardex, Intake/Output, MAR, Recent Results, and Med Rec Status    Shift worked:  [id number]     Shift summary and any significant changes:     Pt has been inconsolable for most of the shift. Oral pain med and IV pain med given and pt repositioned. Provider notified. Consult called to Infectious Disease, Provider Rubi responded.  updated and educated on pt care.      Concerns for physician to address:  none         Candelaria Varela RN

## 2025-03-02 NOTE — PROGRESS NOTES
Pharmacy Antimicrobial Kinetic Dosing    Indication for Antimicrobials: SSTI     Current Regimen of Each Antimicrobial:  Vanc 2000mg x 1 f/b 500mg q12h x 7 days, start , day 3   Cefepime 2000mg q12h x 7 days, start 25, day 3  Previous Antimicrobial Therapy:  none     Goal Level: Vancomycin -600    Date Dose & Interval Measured (mcg/mL) Predicted AUC 24-48 Predicted AUC 24,ss   3/1 0533 500mg q 12h 15.1 541 636                   Significant Cultures:   25 - blood - pending    Labs:  Recent Labs     Units 25  0531 25  0533 25  0530 25  0006 25  1445   CREATININE MG/DL 1.45* 1.61* 1.30*   < > 1.51*   BUN MG/DL 43* 42* 41*   < > 46*   WBC K/uL  --   --  7.9  --  10.1    < > = values in this interval not displayed.     Temp (24hrs), Av.2 °F (36.8 °C), Min:97.7 °F (36.5 °C), Max:98.6 °F (37 °C)      Conditions for Dosing Consideration: None    Creatinine Clearance (mL/min): Estimated Creatinine Clearance: 35 mL/min (A) (based on SCr of 1.45 mg/dL (H)).       Impression/Plan:   Vancomycin level slightly supratherapeutic  Change to Vancomycin 750mg q24h   Predicted WWW99-41 = 489, Predicted AUC24,ss = 502  Cefepime as above  Bmp through 3/2  Antimicrobial stop date 3/6/25 (7 days)     Pharmacy will follow daily and adjust medications as appropriate for renal function and/or serum levels.    Thank you,  Miguelito Myers [facility]

## 2025-03-02 NOTE — PROGRESS NOTES
Hospitalist Progress Note    NAME:   Rosita Morillo   : 1945   MRN: [id number]     Date/Time: 3/2/2025 2:32 PM  Patient PCP: Juan Go MD    Estimated discharge date:   Barriers: Volume status improvement, pain improvement      Assessment / Plan:        Cellulitis of bilateral lower extremity  Fibromyalgia      -CT of bilateral lower extremity showed edema but no abscess/fracture  -Doppler negative for DVT  -Currently on vancomycin and cefepime  Continue current pain regimen, scheduled Tylenol  DC morphine.  Continue as needed oxycodone  ID eval due to persistent cellulitis  DC cefepime, possibly causing delirium.  Changed to meropenem    Continue PTA Lyrica       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%  Elevated troponin in setting of recent NSTEMI-downtrending from prior  Atrial fibrillation off anticoagulation  Continue PTA atorvastatin, Jardiance  Continue Bumex, increase to 1 mg IV twice daily, continue albumin  Scheduled midodrine for now  -Strict intake and output with daily weights  -Monitor electrolytes and renal function address as indicated  Elevate BLE     Recent upper GI bleed-gastric ulcers  Recommended to remain off anticoagulation  Continue twice daily PPI     Hyperkalemia  CKD 3, baseline  Potassium slowly improving  Give Lokelma again  Should improve with diuresis  Recheck BMP tomorrow  Trend renal function  Renally dose medications and avoid nephrotoxic agents    Insulin-dependent diabetes mellitus  Continue PTA glargine  Corrective protein snacks and Accu-Cheks  Diabetic diet  Hypoglycemia protocol in place     Obesity class II by BMI 38.62  Recommend medically assisted weight loss in outpatient setting     Medical Decision Making:   I personally reviewed labs: Yes, as listed below  I personally reviewed imaging: CXR from OSH, bilateral tib-fib radiographs from OSH  Toxic drug monitoring: Vancomycin, opiates  Discussed case with: RN,  to H&P  ________________________________________________________________________  Care Plan discussed with:    Comments   Patient x    Family      RN x    Care Manager     Consultant                        Multidiciplinary team rounds were held today with , nursing, pharmacist and clinical coordinator.  Patient's plan of care was discussed; medications were reviewed and discharge planning was addressed.     ________________________________________________________________________  Total NON critical care TIME:  51  Minutes    Total CRITICAL CARE TIME Spent:   Minutes non procedure based      Comments   >50% of visit spent in counseling and coordination of care     ________________________________________________________________________  Herson Trivedi MD     Procedures: see electronic medical records for all procedures/Xrays and details which were not copied into this note but were reviewed prior to creation of Plan.      LABS:  I reviewed today's most current labs and imaging studies.  Pertinent labs include:  Recent Labs     02/27/25  1445 02/28/25  0530   WBC 10.1 7.9   HGB 11.0* 9.7*   HCT 35.1 33.4*    288     Recent Labs     02/27/25  1445 02/28/25  0006 02/28/25  0530 03/01/25  0533 03/02/25  0531      < > 137 138 143   K 5.9*   < > 5.5* 4.9 4.0      < > 112* 112* 115*   CO2 22   < > 19* 17* 21   GLUCOSE 169*   < > 152* 113* 74   BUN 46*   < > 41* 42* 43*   CREATININE 1.51*   < > 1.30* 1.61* 1.45*   CALCIUM 9.0   < > 9.1 9.0 9.0   BILITOT 0.6  --   --   --   --    AST 52*  --   --   --   --    ALT 41  --   --   --   --     < > = values in this interval not displayed.       Signed: Herson Trivedi MD

## 2025-03-03 PROBLEM — L03.90 CELLULITIS: Status: ACTIVE | Noted: 2025-02-27

## 2025-03-03 PROBLEM — I87.2 VENOUS STASIS DERMATITIS: Status: ACTIVE | Noted: 2025-03-03

## 2025-03-03 LAB
AMMONIA PLAS-SCNC: 33 UMOL/L
ANION GAP SERPL CALC-SCNC: 13 MMOL/L (ref 2–12)
BUN SERPL-MCNC: 37 MG/DL (ref 6–20)
BUN/CREAT SERPL: 29 (ref 12–20)
CALCIUM SERPL-MCNC: 9.4 MG/DL (ref 8.5–10.1)
CHLORIDE SERPL-SCNC: 114 MMOL/L (ref 97–108)
CO2 SERPL-SCNC: 20 MMOL/L (ref 21–32)
CREAT SERPL-MCNC: 1.26 MG/DL (ref 0.55–1.02)
ERYTHROCYTE [DISTWIDTH] IN BLOOD BY AUTOMATED COUNT: 21.2 % (ref 11.5–14.5)
GLUCOSE BLD STRIP.AUTO-MCNC: 215 MG/DL (ref 65–117)
GLUCOSE BLD STRIP.AUTO-MCNC: 226 MG/DL (ref 65–117)
GLUCOSE BLD STRIP.AUTO-MCNC: 227 MG/DL (ref 65–117)
GLUCOSE BLD STRIP.AUTO-MCNC: 238 MG/DL (ref 65–117)
GLUCOSE SERPL-MCNC: 194 MG/DL (ref 65–100)
HCT VFR BLD AUTO: 35.8 % (ref 35–47)
HGB BLD-MCNC: 10.6 G/DL (ref 11.5–16)
MAGNESIUM SERPL-MCNC: 2 MG/DL (ref 1.6–2.4)
MCH RBC QN AUTO: 25.1 PG (ref 26–34)
MCHC RBC AUTO-ENTMCNC: 29.6 G/DL (ref 30–36.5)
MCV RBC AUTO: 84.8 FL (ref 80–99)
NRBC # BLD: 0.02 K/UL (ref 0–0.01)
NRBC BLD-RTO: 0.2 PER 100 WBC
PLATELET # BLD AUTO: 345 K/UL (ref 150–400)
PMV BLD AUTO: 10.9 FL (ref 8.9–12.9)
POTASSIUM SERPL-SCNC: 3.6 MMOL/L (ref 3.5–5.1)
RBC # BLD AUTO: 4.22 M/UL (ref 3.8–5.2)
SERVICE CMNT-IMP: ABNORMAL
SODIUM SERPL-SCNC: 147 MMOL/L (ref 136–145)
WBC # BLD AUTO: 8.1 K/UL (ref 3.6–11)

## 2025-03-03 PROCEDURE — 82962 GLUCOSE BLOOD TEST: CPT

## 2025-03-03 PROCEDURE — 6360000002 HC RX W HCPCS: Performed by: NURSE PRACTITIONER

## 2025-03-03 PROCEDURE — 2700000000 HC OXYGEN THERAPY PER DAY

## 2025-03-03 PROCEDURE — 83735 ASSAY OF MAGNESIUM: CPT

## 2025-03-03 PROCEDURE — 85027 COMPLETE CBC AUTOMATED: CPT

## 2025-03-03 PROCEDURE — 51702 INSERT TEMP BLADDER CATH: CPT

## 2025-03-03 PROCEDURE — 2500000003 HC RX 250 WO HCPCS: Performed by: NURSE PRACTITIONER

## 2025-03-03 PROCEDURE — P9047 ALBUMIN (HUMAN), 25%, 50ML: HCPCS | Performed by: INTERNAL MEDICINE

## 2025-03-03 PROCEDURE — 2580000003 HC RX 258: Performed by: INTERNAL MEDICINE

## 2025-03-03 PROCEDURE — 80048 BASIC METABOLIC PNL TOTAL CA: CPT

## 2025-03-03 PROCEDURE — 6370000000 HC RX 637 (ALT 250 FOR IP): Performed by: INTERNAL MEDICINE

## 2025-03-03 PROCEDURE — 6370000000 HC RX 637 (ALT 250 FOR IP): Performed by: STUDENT IN AN ORGANIZED HEALTH CARE EDUCATION/TRAINING PROGRAM

## 2025-03-03 PROCEDURE — 82140 ASSAY OF AMMONIA: CPT

## 2025-03-03 PROCEDURE — 2500000003 HC RX 250 WO HCPCS: Performed by: STUDENT IN AN ORGANIZED HEALTH CARE EDUCATION/TRAINING PROGRAM

## 2025-03-03 PROCEDURE — 1100000003 HC PRIVATE W/ TELEMETRY

## 2025-03-03 PROCEDURE — 36415 COLL VENOUS BLD VENIPUNCTURE: CPT

## 2025-03-03 PROCEDURE — 6360000002 HC RX W HCPCS: Performed by: INTERNAL MEDICINE

## 2025-03-03 PROCEDURE — 99222 1ST HOSP IP/OBS MODERATE 55: CPT | Performed by: NURSE PRACTITIONER

## 2025-03-03 RX ORDER — BUMETANIDE 0.25 MG/ML
0.5 INJECTION, SOLUTION INTRAMUSCULAR; INTRAVENOUS 2 TIMES DAILY
Status: DISCONTINUED | OUTPATIENT
Start: 2025-03-03 | End: 2025-03-10

## 2025-03-03 RX ORDER — SENNA AND DOCUSATE SODIUM 50; 8.6 MG/1; MG/1
2 TABLET, FILM COATED ORAL DAILY
Status: DISCONTINUED | OUTPATIENT
Start: 2025-03-03 | End: 2025-03-05

## 2025-03-03 RX ORDER — INSULIN GLARGINE 100 [IU]/ML
5 INJECTION, SOLUTION SUBCUTANEOUS NIGHTLY
Status: DISCONTINUED | OUTPATIENT
Start: 2025-03-03 | End: 2025-03-05

## 2025-03-03 RX ADMIN — ALBUMIN (HUMAN) 25 G: 0.25 INJECTION, SOLUTION INTRAVENOUS at 03:36

## 2025-03-03 RX ADMIN — INSULIN LISPRO 2 UNITS: 100 INJECTION, SOLUTION INTRAVENOUS; SUBCUTANEOUS at 21:44

## 2025-03-03 RX ADMIN — BUMETANIDE 0.5 MG: 0.25 INJECTION INTRAMUSCULAR; INTRAVENOUS at 17:42

## 2025-03-03 RX ADMIN — PREGABALIN 75 MG: 25 CAPSULE ORAL at 10:54

## 2025-03-03 RX ADMIN — INSULIN LISPRO 2 UNITS: 100 INJECTION, SOLUTION INTRAVENOUS; SUBCUTANEOUS at 17:41

## 2025-03-03 RX ADMIN — PANTOPRAZOLE SODIUM 40 MG: 40 TABLET, DELAYED RELEASE ORAL at 10:55

## 2025-03-03 RX ADMIN — MIDODRINE HYDROCHLORIDE 5 MG: 5 TABLET ORAL at 10:55

## 2025-03-03 RX ADMIN — SODIUM CHLORIDE, PRESERVATIVE FREE 10 ML: 5 INJECTION INTRAVENOUS at 10:50

## 2025-03-03 RX ADMIN — WATER 1000 MG: 1 INJECTION INTRAMUSCULAR; INTRAVENOUS; SUBCUTANEOUS at 10:45

## 2025-03-03 RX ADMIN — ATORVASTATIN CALCIUM 10 MG: 10 TABLET, FILM COATED ORAL at 10:54

## 2025-03-03 RX ADMIN — MORPHINE SULFATE 1 MG: 2 INJECTION, SOLUTION INTRAMUSCULAR; INTRAVENOUS at 11:48

## 2025-03-03 RX ADMIN — INSULIN LISPRO 2 UNITS: 100 INJECTION, SOLUTION INTRAVENOUS; SUBCUTANEOUS at 10:41

## 2025-03-03 RX ADMIN — BUMETANIDE 0.5 MG: 0.25 INJECTION INTRAMUSCULAR; INTRAVENOUS at 11:14

## 2025-03-03 RX ADMIN — ACETAMINOPHEN 500 MG: 500 TABLET, FILM COATED ORAL at 06:35

## 2025-03-03 RX ADMIN — ALBUMIN (HUMAN) 25 G: 0.25 INJECTION, SOLUTION INTRAVENOUS at 15:37

## 2025-03-03 RX ADMIN — TIMOLOL MALEATE 1 DROP: 2.5 SOLUTION OPHTHALMIC at 21:48

## 2025-03-03 RX ADMIN — INSULIN LISPRO 2 UNITS: 100 INJECTION, SOLUTION INTRAVENOUS; SUBCUTANEOUS at 13:41

## 2025-03-03 RX ADMIN — VANCOMYCIN HYDROCHLORIDE 750 MG: 750 INJECTION, POWDER, LYOPHILIZED, FOR SOLUTION INTRAVENOUS at 10:54

## 2025-03-03 RX ADMIN — TIMOLOL MALEATE 1 DROP: 2.5 SOLUTION OPHTHALMIC at 11:08

## 2025-03-03 RX ADMIN — INSULIN GLARGINE 5 UNITS: 100 INJECTION, SOLUTION SUBCUTANEOUS at 21:44

## 2025-03-03 RX ADMIN — ACETAMINOPHEN 500 MG: 500 TABLET, FILM COATED ORAL at 13:41

## 2025-03-03 RX ADMIN — SODIUM CHLORIDE, PRESERVATIVE FREE 10 ML: 5 INJECTION INTRAVENOUS at 21:46

## 2025-03-03 ASSESSMENT — PAIN DESCRIPTION - ONSET
ONSET: ON-GOING
ONSET: ON-GOING

## 2025-03-03 ASSESSMENT — PAIN SCALES - GENERAL
PAINLEVEL_OUTOF10: 8
PAINLEVEL_OUTOF10: 3
PAINLEVEL_OUTOF10: 6
PAINLEVEL_OUTOF10: 3
PAINLEVEL_OUTOF10: 6
PAINLEVEL_OUTOF10: 10
PAINLEVEL_OUTOF10: 0

## 2025-03-03 ASSESSMENT — PAIN DESCRIPTION - DESCRIPTORS
DESCRIPTORS: ACHING

## 2025-03-03 ASSESSMENT — PAIN - FUNCTIONAL ASSESSMENT
PAIN_FUNCTIONAL_ASSESSMENT: PREVENTS OR INTERFERES WITH MANY ACTIVE NOT PASSIVE ACTIVITIES

## 2025-03-03 ASSESSMENT — PAIN DESCRIPTION - LOCATION
LOCATION: FOOT
LOCATION: GENERALIZED
LOCATION: LEG
LOCATION: GENERALIZED

## 2025-03-03 ASSESSMENT — PAIN DESCRIPTION - ORIENTATION
ORIENTATION: RIGHT;LEFT;LOWER
ORIENTATION: RIGHT;LEFT

## 2025-03-03 ASSESSMENT — PAIN DESCRIPTION - FREQUENCY
FREQUENCY: CONTINUOUS
FREQUENCY: CONTINUOUS

## 2025-03-03 ASSESSMENT — PAIN DESCRIPTION - PAIN TYPE
TYPE: CHRONIC PAIN;ACUTE PAIN
TYPE: ACUTE PAIN;CHRONIC PAIN

## 2025-03-03 NOTE — PROGRESS NOTES
Pharmacy Antimicrobial Kinetic Dosing    Indication for Antimicrobials: SSTI     Current Regimen of Each Antimicrobial:  Vanc 2000mg x 1 f/b 500mg q12h x 7 days, start , day 4   Cefepime 2000mg q12h x 7 days, start 25, day 4    Previous Antimicrobial Therapy:  none     Goal Level: Vancomycin -600    Date Dose & Interval Measured (mcg/mL) Predicted AUC 24-48 Predicted AUC 24,ss   3/1 0533 500mg q 12h 15.1 541 636                   Significant Cultures:   25 - blood - ngtd    Labs:  Recent Labs     Units 25  0550 25  0531 25  0533   CREATININE MG/DL 1.26* 1.45* 1.61*   BUN MG/DL 37* 43* 42*   WBC K/uL 8.1  --   --      Temp (24hrs), Av.2 °F (36.8 °C), Min:98.1 °F (36.7 °C), Max:98.4 °F (36.9 °C)      Conditions for Dosing Consideration: None    Creatinine Clearance (mL/min): Estimated Creatinine Clearance: 41 mL/min (A) (based on SCr of 1.26 mg/dL (H)).       Impression/Plan:   Vancomycin level slightly supratherapeutic  Change to Vancomycin 750mg q24h   Predicted FEB82-97 = 489, Predicted AUC24,ss = 502  Cefepime as above  Bmp through 3/2  Antimicrobial stop date 3/6/25 (7 days)     Pharmacy will follow daily and adjust medications as appropriate for renal function and/or serum levels.    Thank you,  Kimberly Quevedo [facility]

## 2025-03-03 NOTE — PROGRESS NOTES
Nurse Recinos contacted Nocturnist/cross cover provider via non-urgent messaging system [employer] and notified patient having generalized itching, states pt itching since dayshift, states asking for meds, no rash or allergic reaction reported. No other concerns reported. No acute distress reported. No other information provided by nurse. VSS. Patient denies any further complaints or concerns. See prior hospitalist group notes for complete details of course of treatment. Recent lab work and documented vs reviewed.    Ordered atarax 10mg po prn x2 doses to  in the am if unused. Appreciate Nursing assistance in the care of this patient.    Continue remaining plan/orders as per dayshift team. Will defer further evaluation/management and timing of discontinuation of regimens to the day shift primary attending care team. Nursing to notify dayshift Hospitalist team in the AM of overnight events and for further/continued concerns. Will remain available overnight cross coverage for further concerns if nursing/patient needs. Please note, there are RRT systems in this hospital in place that if nursing has acute or critical patient condition change or concern, this is to help facilitate and notify that patient needs immediate bedside evaluation by a provider.    Non-billable note.

## 2025-03-03 NOTE — PROGRESS NOTES
End of Shift Note    Bedside shift change report given to Marisol (oncoming nurse) by Grisel Stephens RN (offgoing nurse).  Report included the following information SBAR, Kardex, and MAR    Shift worked:  7p-7a     Shift summary and any significant changes:     Humalog was held due to the patient's blood glucose level, see MAR.  Scheduled medications were given, see MAR.  IV's have been flushed and are patent.  Patient did complain of generalized itching, NP was notified and order was received for Atarax which was administered, see PRN MAR.  Patient was given Oxycodone and Morphine once each respectively, see PRN MAR.  Patient was repositioned during my shift.  Harvey care was done.  Patient teaching and routine rounding has been done.       Concerns for physician to address:       Zone phone for oncoming shift:          Activity:  Level of Assistance: Dependent, patient does less than 25%  Number times ambulated in hallways past shift: 0  Number of times OOB to chair past shift: 0    Cardiac:   Cardiac Monitoring: Yes      Cardiac Rhythm: Atrial paced    Access:  Current line(s): PIV     Genitourinary:   Urinary Status: Harvey, Draining    Respiratory:   O2 Device: Nasal cannula  Chronic home O2 use?: NO  Incentive spirometer at bedside: NO    GI:  Last BM (including prior to admit): 02/27/25  Current diet:  ADULT DIET; Regular; 4 carb choices (60 gm/meal); Low Fat/Low Chol/High Fiber/CHRISTIANO  ADULT ORAL NUTRITION SUPPLEMENT; Breakfast, Dinner, Lunch; Standard High Calorie/High Protein Oral Supplement  Passing flatus: YES    Pain Management:   Patient states pain is manageable on current regimen: YES    Skin:  Pool Scale Score: 14  Interventions: Wound Offloading (Prevention Methods): Blankets, Pillows, Repositioning, Turning, Elevate heels    Patient Safety:  Fall Risk: Nursing Judgement-Fall Risk High(Add Comments): Yes  Fall Risk Interventions  Nursing Judgement-Fall Risk High(Add Comments): Yes  Toilet Every 2

## 2025-03-03 NOTE — CONSULTS
Infectious Disease Consult    Today's Date: 3/3/2025   Admit Date: 2/27/2025    Date of Consultation:  March 3, 2025  Reason for consult: Leg cellulitis  Referring Physician: MD Farooq    HPI:  Patient is a 79 y.o. female with medical history of CHF, A-fib, fibromyalgia, CKD, type II DM and chronic venous stasis was brought to ER on 2/27 with chest pain and leg swelling.    In ER, temp was 98.4 (T-max 101.5 on 2/28), wbc 10.1, u/a revealed wbc 5-10, 2+ bacteria, blood cx with no growth so far. Duplex study of low extremities revealed no DVT. CT of lower extremities and both feet revealed soft tissue swelling. Pt was started on IV merrem and vancomycin.    During visit, pt was alert, but confused. Not following any commends.     ID team was consulted for evaluation and treatment recommendations regarding leg cellulitis       right    left    Impression:   Cellulitis of toes  Acute on chronic venous stasis  - afebrile, wbc 8.1    Blood cx (2/27) no growth so far    Duplex study of low extremities - No DVT    Type II DM; A1C 5.8  CHF  A-fib  - management per primary team  Plan:     - stopped merrem    Start on IV rocephin and vancomycin    Pharmacy to dose per creatinine clearance.     Check CRP in am    Appreciate wound care input    Plan of care d/w pt's nurse, Dr. Trivedi, and Dr. Angel             Anti-inflectives:   Merrem  Vancomycin  rocephin    Past Medical History:   Diagnosis Date    Arthritis     CAD (coronary artery disease)     afib and chf    Congestive heart failure (HCC)     Depression     Diabetes (HCC)     type 2    Fibromyalgia     Gastrointestinal disorder     reflux and gastro paresis    Heart failure (HCC)     Infectious disease 2010    MRSA? in right big toe    Menopause     Psychiatric disorder     PTSD from 911      Family History   Problem Relation Age of Onset    No Known Problems Mother       Social History     Tobacco Use    Smoking status: Former    Smokeless tobacco: Never   Substance  following:  Preparing for visit and chart review.  Obtaining and/or reviewing separately obtained history  Performing a medically appropriate exam and/or evaluation  Counseling and educating a patient/family/caregiver as noted above  Referring and communicating with other professionals (not separately reported)  Independently interpreting results (not separately reported) and communicating results to the patient/family/caregiver  Care coordination (not separately reported) as noted above  Documenting clinical information in the electronic health records (e.g. problem list, visit note) on the day of the encounter

## 2025-03-03 NOTE — PROGRESS NOTES
Hospitalist Progress Note    NAME:   Rosita Morillo   : 1945   MRN: [id number]     Date/Time: 3/3/2025 1:52 PM  Patient PCP: Juan Go MD    Estimated discharge date:   Barriers: Volume status improvement, pain improvement      Assessment / Plan:        Cellulitis of bilateral lower extremity  Fibromyalgia      -CT of bilateral lower extremity showed edema but no abscess/fracture  -Doppler negative for DVT  -Continue vancomycin.  Initially on cefepime, changed to meropenem due to concern for worsening confusion  -ID consulted for further recommendation  Continue current pain regimen, scheduled Tylenol    Altered mental status, likely delirium  Confounded by underlying fibromyalgia/anxiety/PTSD  -No focal deficits  -Antibiotic changed to meropenem  -Check ammonia       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%  Elevated troponin in setting of recent NSTEMI-downtrending from prior  Atrial fibrillation off anticoagulation  Continue PTA atorvastatin, Jardiance  Continue Bumex, 0.5 mg twice daily  Scheduled midodrine for now  -Strict intake and output with daily weights  -Monitor electrolytes and renal function address as indicated  Elevate BLE     Recent upper GI bleed-gastric ulcers  Recommended to remain off anticoagulation  Continue twice daily PPI     Hyperkalemia, improved  CKD 3, baseline  Potassium slowly improving  Should improve with diuresis  Recheck BMP tomorrow  Trend renal function  Renally dose medications and avoid nephrotoxic agents    Insulin-dependent diabetes mellitus  Place low-dose Lantus  Corrective protein snacks and Accu-Cheks  Diabetic diet  Hypoglycemia protocol in place     Obesity class II by BMI 38.62  Recommend medically assisted weight loss in outpatient setting     Medical Decision Making:   I personally reviewed labs: Yes, as listed below  I personally reviewed imaging: CXR from OSH, bilateral tib-fib radiographs from OSH  Toxic

## 2025-03-03 NOTE — WOUND CARE
Wound Care consult for multiple wounds present on admission.    79yr. Old patient who was admitted due to several active problems noted on admission - wounds, heart failure, elevated troponin, CKD stage 3, severe fibromyalgia.    Past Medical History:   Diagnosis Date    Arthritis     CAD (coronary artery disease)     afib and chf    Congestive heart failure (HCC)     Depression     Diabetes (HCC)     type 2    Fibromyalgia     Gastrointestinal disorder     reflux and gastro paresis    Heart failure (HCC)     Infectious disease 2010    MRSA? in right big toe    Menopause     Psychiatric disorder     PTSD from 911     Past Surgical History:   Procedure Laterality Date    APPENDECTOMY      BREAST BIOPSY Left     STbb  benign    COLONOSCOPY  08/20/2018    polyps    COLONOSCOPY N/A 8/20/2018    COLONOSCOPY performed by Nayely Corcoran MD at [facility] MAIN OR    GI      colonscopy and endo    GYN      cyst on right ovary removed    HEENT      T&A Bilateral Cataract Surgery with coptic lenses    UPPER GASTROINTESTINAL ENDOSCOPY N/A 1/9/2025    ESOPHAGOGASTRODUODENOSCOPY performed by Sean Meza MD at [facility] ENDOSCOPY     Assessment:  Pt. Cried out with every active motion or touch of her skin during wound care and just to move her gently. Pt.'s wounds were not dressed but the legs / heels were resting on the chux like a dressing.   Pt. Has skin tears on the arms, back and on the back of the calfs.  She has bilateral pressure injuries to the heels that are both unstageable with slough and eschar present on the surface.  There are raw skin areas on the right ankle area and crust covered wounds on both sets of toes with an open wound in between the left toes that need a dressing.   The sacrum / buttocks is mildly discolored but the skin blanches well. Pt. Has a dominguez catheter but is incontinent of stool.   See all photos below.     Left heel with red wound bed and necrotic  Edge.       Right heel and surrounding raw skin:       Left  toes with raw wound in between the  2nd and third toes. Needs xeroform gauze.       Right toes with hard crusty scabs:           Mid and lumbar back skin tears:       Buttocks discoloration blanchable:       Treatment for all of the wounds:  Cleansed everything with the Wound spray cleanser after cleansing the legs and feet with Dynahex soap and water.   Dressed the lower legs / calfs with Therahoney sheets and high drainage packs with mehran wrap and tape.   Foam dressings applied to the back and xeroform for the skin tear on the arms.    Her skin folds were cleansed and then dressed with zinc oxide cream.   Plan:  Recommend an AIR PUMP for her Talmage Bed,  Float the heels each on pillows. Significant turns with lower head positioning to redistribute pressure better to the posterior body. Pt. Breathed well lying down at about 25 degrees.   All dressings dated / timed today. Nursing assisted with the wound care (thank you).   Eim Schaeffer, RN, BSN, CWON

## 2025-03-04 ENCOUNTER — APPOINTMENT (OUTPATIENT)
Facility: HOSPITAL | Age: 80
DRG: 637 | End: 2025-03-04
Payer: MEDICARE

## 2025-03-04 PROBLEM — G93.41 METABOLIC ENCEPHALOPATHY: Status: ACTIVE | Noted: 2025-03-04

## 2025-03-04 PROBLEM — R41.82 ALTERED MENTAL STATUS: Status: ACTIVE | Noted: 2025-03-04

## 2025-03-04 LAB
ANION GAP SERPL CALC-SCNC: 10 MMOL/L (ref 2–12)
ANION GAP SERPL CALC-SCNC: 15 MMOL/L (ref 2–12)
BUN SERPL-MCNC: 39 MG/DL (ref 6–20)
BUN SERPL-MCNC: 39 MG/DL (ref 6–20)
BUN/CREAT SERPL: 27 (ref 12–20)
BUN/CREAT SERPL: 29 (ref 12–20)
CALCIUM SERPL-MCNC: 10 MG/DL (ref 8.5–10.1)
CALCIUM SERPL-MCNC: 9.8 MG/DL (ref 8.5–10.1)
CHLORIDE SERPL-SCNC: 118 MMOL/L (ref 97–108)
CHLORIDE SERPL-SCNC: 122 MMOL/L (ref 97–108)
CO2 SERPL-SCNC: 20 MMOL/L (ref 21–32)
CO2 SERPL-SCNC: 22 MMOL/L (ref 21–32)
CREAT SERPL-MCNC: 1.33 MG/DL (ref 0.55–1.02)
CREAT SERPL-MCNC: 1.47 MG/DL (ref 0.55–1.02)
CRP SERPL-MCNC: 11.6 MG/DL (ref 0–0.3)
ERYTHROCYTE [DISTWIDTH] IN BLOOD BY AUTOMATED COUNT: 21.4 % (ref 11.5–14.5)
GLUCOSE BLD STRIP.AUTO-MCNC: 263 MG/DL (ref 65–117)
GLUCOSE BLD STRIP.AUTO-MCNC: 294 MG/DL (ref 65–117)
GLUCOSE BLD STRIP.AUTO-MCNC: 297 MG/DL (ref 65–117)
GLUCOSE BLD STRIP.AUTO-MCNC: 298 MG/DL (ref 65–117)
GLUCOSE BLD STRIP.AUTO-MCNC: 325 MG/DL (ref 65–117)
GLUCOSE SERPL-MCNC: 264 MG/DL (ref 65–100)
GLUCOSE SERPL-MCNC: 355 MG/DL (ref 65–100)
HCT VFR BLD AUTO: 36.5 % (ref 35–47)
HGB BLD-MCNC: 11.3 G/DL (ref 11.5–16)
MAGNESIUM SERPL-MCNC: 2 MG/DL (ref 1.6–2.4)
MCH RBC QN AUTO: 25.5 PG (ref 26–34)
MCHC RBC AUTO-ENTMCNC: 31 G/DL (ref 30–36.5)
MCV RBC AUTO: 82.4 FL (ref 80–99)
NRBC # BLD: 0.03 K/UL (ref 0–0.01)
NRBC BLD-RTO: 0.4 PER 100 WBC
PLATELET # BLD AUTO: 405 K/UL (ref 150–400)
PMV BLD AUTO: 10.6 FL (ref 8.9–12.9)
POTASSIUM SERPL-SCNC: 2.8 MMOL/L (ref 3.5–5.1)
POTASSIUM SERPL-SCNC: 3.1 MMOL/L (ref 3.5–5.1)
RBC # BLD AUTO: 4.43 M/UL (ref 3.8–5.2)
SERVICE CMNT-IMP: ABNORMAL
SODIUM SERPL-SCNC: 153 MMOL/L (ref 136–145)
SODIUM SERPL-SCNC: 154 MMOL/L (ref 136–145)
WBC # BLD AUTO: 8.6 K/UL (ref 3.6–11)

## 2025-03-04 PROCEDURE — 36415 COLL VENOUS BLD VENIPUNCTURE: CPT

## 2025-03-04 PROCEDURE — 6360000002 HC RX W HCPCS: Performed by: NURSE PRACTITIONER

## 2025-03-04 PROCEDURE — 6360000002 HC RX W HCPCS: Performed by: INTERNAL MEDICINE

## 2025-03-04 PROCEDURE — 86140 C-REACTIVE PROTEIN: CPT

## 2025-03-04 PROCEDURE — P9047 ALBUMIN (HUMAN), 25%, 50ML: HCPCS | Performed by: INTERNAL MEDICINE

## 2025-03-04 PROCEDURE — 51702 INSERT TEMP BLADDER CATH: CPT

## 2025-03-04 PROCEDURE — 82962 GLUCOSE BLOOD TEST: CPT

## 2025-03-04 PROCEDURE — 6360000002 HC RX W HCPCS: Performed by: STUDENT IN AN ORGANIZED HEALTH CARE EDUCATION/TRAINING PROGRAM

## 2025-03-04 PROCEDURE — 99223 1ST HOSP IP/OBS HIGH 75: CPT | Performed by: PSYCHIATRY & NEUROLOGY

## 2025-03-04 PROCEDURE — 1100000003 HC PRIVATE W/ TELEMETRY

## 2025-03-04 PROCEDURE — 2580000003 HC RX 258: Performed by: INTERNAL MEDICINE

## 2025-03-04 PROCEDURE — 6370000000 HC RX 637 (ALT 250 FOR IP): Performed by: STUDENT IN AN ORGANIZED HEALTH CARE EDUCATION/TRAINING PROGRAM

## 2025-03-04 PROCEDURE — 95816 EEG AWAKE AND DROWSY: CPT | Performed by: PSYCHIATRY & NEUROLOGY

## 2025-03-04 PROCEDURE — 95816 EEG AWAKE AND DROWSY: CPT

## 2025-03-04 PROCEDURE — 2500000003 HC RX 250 WO HCPCS: Performed by: STUDENT IN AN ORGANIZED HEALTH CARE EDUCATION/TRAINING PROGRAM

## 2025-03-04 PROCEDURE — 6360000002 HC RX W HCPCS

## 2025-03-04 PROCEDURE — 83735 ASSAY OF MAGNESIUM: CPT

## 2025-03-04 PROCEDURE — 92610 EVALUATE SWALLOWING FUNCTION: CPT

## 2025-03-04 PROCEDURE — 2580000003 HC RX 258: Performed by: STUDENT IN AN ORGANIZED HEALTH CARE EDUCATION/TRAINING PROGRAM

## 2025-03-04 PROCEDURE — 6370000000 HC RX 637 (ALT 250 FOR IP): Performed by: INTERNAL MEDICINE

## 2025-03-04 PROCEDURE — 2500000003 HC RX 250 WO HCPCS: Performed by: NURSE PRACTITIONER

## 2025-03-04 PROCEDURE — 85027 COMPLETE CBC AUTOMATED: CPT

## 2025-03-04 PROCEDURE — 80048 BASIC METABOLIC PNL TOTAL CA: CPT

## 2025-03-04 PROCEDURE — 70450 CT HEAD/BRAIN W/O DYE: CPT

## 2025-03-04 RX ORDER — POTASSIUM CHLORIDE, DEXTROSE MONOHYDRATE 150; 5 MG/100ML; G/100ML
INJECTION, SOLUTION INTRAVENOUS CONTINUOUS
Status: DISCONTINUED | OUTPATIENT
Start: 2025-03-04 | End: 2025-03-09

## 2025-03-04 RX ORDER — POTASSIUM CHLORIDE 7.45 MG/ML
10 INJECTION INTRAVENOUS
Status: COMPLETED | OUTPATIENT
Start: 2025-03-04 | End: 2025-03-05

## 2025-03-04 RX ORDER — DEXTROSE, SODIUM CHLORIDE, SODIUM LACTATE, POTASSIUM CHLORIDE, AND CALCIUM CHLORIDE 5; .6; .31; .03; .02 G/100ML; G/100ML; G/100ML; G/100ML; G/100ML
INJECTION, SOLUTION INTRAVENOUS CONTINUOUS
Status: DISCONTINUED | OUTPATIENT
Start: 2025-03-04 | End: 2025-03-04

## 2025-03-04 RX ADMIN — SODIUM CHLORIDE, PRESERVATIVE FREE 10 ML: 5 INJECTION INTRAVENOUS at 09:58

## 2025-03-04 RX ADMIN — INSULIN LISPRO 4 UNITS: 100 INJECTION, SOLUTION INTRAVENOUS; SUBCUTANEOUS at 12:56

## 2025-03-04 RX ADMIN — BUMETANIDE 0.5 MG: 0.25 INJECTION INTRAMUSCULAR; INTRAVENOUS at 09:56

## 2025-03-04 RX ADMIN — VANCOMYCIN HYDROCHLORIDE 750 MG: 750 INJECTION, POWDER, LYOPHILIZED, FOR SOLUTION INTRAVENOUS at 09:56

## 2025-03-04 RX ADMIN — WATER 1000 MG: 1 INJECTION INTRAMUSCULAR; INTRAVENOUS; SUBCUTANEOUS at 10:59

## 2025-03-04 RX ADMIN — SODIUM CHLORIDE: 0.9 INJECTION, SOLUTION INTRAVENOUS at 23:16

## 2025-03-04 RX ADMIN — SODIUM CHLORIDE, PRESERVATIVE FREE 10 ML: 5 INJECTION INTRAVENOUS at 21:29

## 2025-03-04 RX ADMIN — ONDANSETRON 4 MG: 2 INJECTION, SOLUTION INTRAMUSCULAR; INTRAVENOUS at 14:00

## 2025-03-04 RX ADMIN — INSULIN LISPRO 4 UNITS: 100 INJECTION, SOLUTION INTRAVENOUS; SUBCUTANEOUS at 09:56

## 2025-03-04 RX ADMIN — TIMOLOL MALEATE 1 DROP: 2.5 SOLUTION OPHTHALMIC at 09:59

## 2025-03-04 RX ADMIN — INSULIN LISPRO 6 UNITS: 100 INJECTION, SOLUTION INTRAVENOUS; SUBCUTANEOUS at 21:27

## 2025-03-04 RX ADMIN — INSULIN GLARGINE 5 UNITS: 100 INJECTION, SOLUTION SUBCUTANEOUS at 21:27

## 2025-03-04 RX ADMIN — ACETAMINOPHEN 650 MG: 650 SUPPOSITORY RECTAL at 03:17

## 2025-03-04 RX ADMIN — DEXTROSE, SODIUM CHLORIDE, SODIUM LACTATE, POTASSIUM CHLORIDE, AND CALCIUM CHLORIDE: 5; .6; .31; .03; .02 INJECTION, SOLUTION INTRAVENOUS at 11:33

## 2025-03-04 RX ADMIN — ACETAMINOPHEN 650 MG: 650 SUPPOSITORY RECTAL at 23:19

## 2025-03-04 RX ADMIN — ALBUMIN (HUMAN) 12.5 G: 0.25 INJECTION, SOLUTION INTRAVENOUS at 04:07

## 2025-03-04 RX ADMIN — INSULIN LISPRO 4 UNITS: 100 INJECTION, SOLUTION INTRAVENOUS; SUBCUTANEOUS at 18:10

## 2025-03-04 RX ADMIN — TIMOLOL MALEATE 1 DROP: 2.5 SOLUTION OPHTHALMIC at 21:29

## 2025-03-04 RX ADMIN — POTASSIUM CHLORIDE 10 MEQ: 7.45 INJECTION INTRAVENOUS at 23:17

## 2025-03-04 RX ADMIN — DEXTROSE MONOHYDRATE AND POTASSIUM CHLORIDE INJECTION, SOLUTION: 5; .149 INJECTION, SOLUTION INTRAVENOUS at 13:29

## 2025-03-04 ASSESSMENT — PAIN SCALES - GENERAL
PAINLEVEL_OUTOF10: 0
PAINLEVEL_OUTOF10: 2
PAINLEVEL_OUTOF10: 0

## 2025-03-04 ASSESSMENT — PAIN SCALES - WONG BAKER: WONGBAKER_NUMERICALRESPONSE: HURTS A LITTLE BIT

## 2025-03-04 NOTE — PROGRESS NOTES
Physical Therapy    Arrived to see pt for session. RN states pt lethargic, not following commands, not swallowing well, moaning/groaning, eyes open but not responding to commands/questions. Observed pt in room and noted same. Will defer at this time. Will monitor and follow as appropriate.    Anna Katz PT

## 2025-03-04 NOTE — CONSULTS
Neurology Note    Patient ID:  Rosita Morillo  [id number]  79 y.o.  1945      Date of Consultation:  March 4, 2025    Referring Physician: Dr. Trivedi  Reason for Consultation:  altered mental status      Assessment and Plan:    The patient is a 79-year-old female with multiple medical conditions initially admitted to the hospital with lower extremity cellulitis who has developed progressive worsening of cognitive function.  Her neurological examination reveals significant cognitive impairment with generalized weakness    Worsening mental status:  Differential includes metabolic encephalopathy, systemic impact of infection, medication induced, intermittent seizures, decreased nutritional status, acute stroke (patient does have a history of atrial fibrillation but has been off of anticoagulation due to GI bleed)  This most likely is multifactorial in etiology  Head ct  ordered  EEG ordered  Vitamin b12, vitamin d, tsh/t4 levels ordered  Correct underlying metabolic derangements-nephrology has been consulted  Attempt to optimize nutritional status  Minimize cognitively impacting medication  Continue aggressive treatment of underlying infection - ID following  Additional testing may need to be considered depending on the results of above    Concern for possible underlying cognitive decline prior to admission:  I discussed with the patient's son and daughter-in-law that more formal cognitive testing can be performed in the outpatient setting after discharge        Neurology will continue to follow closely in this complicated patient with ongoing neurological symptoms necessitating additional testing. Updated orders placed.  Care plan discussed with primary team, patient's son and daughter in law.           Subjective: no verbal output       History of Present Illness:   Rosita Morillo is a 79 y.o. female who was admitted to the hospital under 8/27/2025 due to a transfer from [facility] due to to complaints

## 2025-03-04 NOTE — CARE COORDINATION
Care Management Initial Assessment       RUR: 18%  Readmission? No  1st IM letter given? Yes   1st  letter given: No    CM spoke with pts spouse, via telephone.  Pt experiencing confusion at this time.  Pt is known  to reside with spouse in their one story home.  Pt is known to be active with PCP and use [facility].  Pt was known to be independent with ADLs prior to admission and doesn't drive.  Pt use DME at home: power wheelchair.  Spouse reported HH in the past but no reports of SNF.    CM informed spouse of recommendations: snf.  Spouse would like additional information on snf, and was agreeable for spouse to place referrals to facilities closer to pts address.  CM placed referrals to the following facilities.      CM will continue to follow.    NAVNEET Cruz CM  [phone]       03/04/25 125   Service Assessment   Patient Orientation Unable to Assess   Cognition Other (see comment)  (confusion)   History Provided By Spouse   Primary Caregiver Self   Support Systems Family Members;Spouse/Significant Other   PCP Verified by CM Yes   Last Visit to PCP Within last 3 months   Prior Functional Level Independent in ADLs/IADLs  (limited assistance)   Current Functional Level Assistance with the following:;Mobility;Shopping;Housework;Cooking;Toileting;Dressing;Bathing;Feeding   Can patient return to prior living arrangement Unknown at present   Ability to make needs known: Unable   Family able to assist with home care needs: Yes   Would you like for me to discuss the discharge plan with any other family members/significant others, and if so, who? Yes   Financial Resources Medicare  (part A)   Social/Functional History   Lives With Spouse   Type of Home House   Active  No   Mode of Transportation Family;Car   Discharge Planning   Type of Residence Skilled Nursing Facility   Living Arrangements Spouse/Significant Other   Patient expects to be discharged to: House

## 2025-03-04 NOTE — PLAN OF CARE
Problem: Chronic Conditions and Co-morbidities  Goal: Patient's chronic conditions and co-morbidity symptoms are monitored and maintained or improved  3/4/2025 0659 by Cathy Rinaldi RN  Outcome: Progressing  3/3/2025 2101 by Marisol Gillespie RN  Outcome: Progressing  Flowsheets (Taken 3/3/2025 2000 by Cathy Rinaldi RN)  Care Plan - Patient's Chronic Conditions and Co-Morbidity Symptoms are Monitored and Maintained or Improved: Monitor and assess patient's chronic conditions and comorbid symptoms for stability, deterioration, or improvement     Problem: Skin/Tissue Integrity  Goal: Skin integrity remains intact  Description: 1.  Monitor for areas of redness and/or skin breakdown  2.  Assess vascular access sites hourly  3.  Every 4-6 hours minimum:  Change oxygen saturation probe site  4.  Every 4-6 hours:  If on nasal continuous positive airway pressure, respiratory therapy assess nares and determine need for appliance change or resting period  3/4/2025 0659 by Cathy Rinaldi RN  Outcome: Progressing  3/3/2025 2101 by Marisol Gillespie RN  Outcome: Progressing  Flowsheets  Taken 3/3/2025 2000 by Cathy Rinaldi RN  Skin Integrity Remains Intact: Assess vascular access sites hourly  Taken 3/3/2025 0852 by Marisol Gillespie RN  Skin Integrity Remains Intact:   Assess vascular access sites hourly   Monitor for areas of redness and/or skin breakdown     Problem: Safety - Adult  Goal: Free from fall injury  3/4/2025 0659 by Cathy Rinaldi RN  Outcome: Progressing  3/3/2025 2101 by Marisol Gillespie RN  Outcome: Progressing  Flowsheets (Taken 3/3/2025 2000 by Cathy Rinaldi RN)  Free From Fall Injury: Instruct family/caregiver on patient safety     Problem: ABCDS Injury Assessment  Goal: Absence of physical injury  3/4/2025 0659 by Cathy Rinaldi RN  Outcome: Progressing  3/3/2025 2101 by Marisol Gillespie RN  Outcome: Progressing  Flowsheets (Taken 3/3/2025 2000 by Cathy Rinaldi RN)  Absence  of Physical Injury: Implement safety measures based on patient assessment     Problem: Pain  Goal: Verbalizes/displays adequate comfort level or baseline comfort level  3/4/2025 0659 by Cathy Rinaldi RN  Outcome: Progressing  3/3/2025 2101 by Marisol Gillespie RN  Outcome: Not Progressing  Flowsheets (Taken 3/3/2025 1947 by Cathy Rinaldi, RN)  Verbalizes/displays adequate comfort level or baseline comfort level:   Assess pain using appropriate pain scale   Encourage patient to monitor pain and request assistance   Administer analgesics based on type and severity of pain and evaluate response     Problem: Pain  Goal: Verbalizes/displays adequate comfort level or baseline comfort level  3/4/2025 0659 by Cathy Rinaldi RN  Outcome: Progressing  3/3/2025 2101 by Marisol Gillespie RN  Outcome: Not Progressing  Flowsheets (Taken 3/3/2025 1947 by Cathy Rinaldi, RN)  Verbalizes/displays adequate comfort level or baseline comfort level:   Assess pain using appropriate pain scale   Encourage patient to monitor pain and request assistance   Administer analgesics based on type and severity of pain and evaluate response

## 2025-03-04 NOTE — PROGRESS NOTES
Hospitalist Progress Note    NAME:   Rosita Morillo   : 1945   MRN: [id number]     Date/Time: 3/4/2025 2:57 PM  Patient PCP: Juan Go MD    Estimated discharge date:   Barriers: Volume status improvement, pain improvement      Assessment / Plan:        Cellulitis of bilateral lower extremity  Fibromyalgia      -CT of bilateral lower extremity showed edema but no abscess/fracture  -Doppler negative for DVT  -Continue vancomycin.  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%  Elevated troponin in setting of recent NSTEMI-downtrending from prior  Atrial fibrillation off anticoagulation  Continue PTA atorvastatin, Jardiance  Continue Bumex, 0.5 mg twice daily, hold for now due to above  Scheduled midodrine for now  -Strict intake and output with daily weights  -Monitor electrolytes and renal function address as indicated  Elevate BLE     Recent upper GI bleed-gastric ulcers  Recommended to remain off anticoagulation  Continue twice daily PPI     Hyperkalemia, improved  CKD 3, baseline  Potassium slowly improving  Trend renal function  Renally dose medications and avoid nephrotoxic agents    Insulin-dependent diabetes mellitus  Corrective protein snacks and Accu-Cheks  Diabetic diet  Hypoglycemia protocol in place     Obesity class II by BMI 38.62  Recommend medically assisted weight loss in outpatient setting     Medical Decision Making:   I personally reviewed labs: Yes, as listed below  I personally reviewed imaging: CXR from OSH, bilateral

## 2025-03-04 NOTE — PROGRESS NOTES
Occupational Therapy Note:    Chart reviewed. Per RN pt lethargic and not following commands, not appropriate for OT session at this time. Will defer and continue to follow. Thank you.    Ilene Dos Santos OTR/L

## 2025-03-04 NOTE — PROCEDURES
EEG REPORT    Patient Name: Rosita Morillo  : 1945  Age: 79 y.o.    Ordering physician: Dr. Trivedi  Date of EEG: 3/4/2025   15:48 - 16:10  Diagnosis: altered mental status  Interpreting physician: Vasquez Galicia D.O. FAAN    Procedure: EEG    CLINICAL INDICATION: The patient is a 79 y.o. female who is being evaluated for baseline electro cerebral activities and to rule out seizure focus.      Current Facility-Administered Medications   Medication Dose Route Frequency    dextrose 5 % with KCl 20 mEq infusion   IntraVENous Continuous    cefTRIAXone (ROCEPHIN) 1,000 mg in sterile water 10 mL IV syringe  1,000 mg IntraVENous Q24H    [Held by provider] bumetanide (BUMEX) injection 0.5 mg  0.5 mg IntraVENous BID    sennosides-docusate sodium (SENOKOT-S) 8.6-50 MG tablet 2 tablet  2 tablet Oral Daily    insulin glargine (LANTUS) injection vial 5 Units  5 Units SubCUTAneous Nightly    morphine (PF) injection 1 mg  1 mg IntraVENous Q4H PRN    vancomycin (VANCOCIN) 750 mg in sodium chloride 0.9 % 250 mL IVPB ([id number])  750 mg IntraVENous Q24H    oxyCODONE (ROXICODONE) immediate release tablet 5 mg  5 mg Oral Q4H PRN    acetaminophen (TYLENOL) tablet 500 mg  500 mg Oral 3 times per day    midodrine (PROAMATINE) tablet 5 mg  5 mg Oral TID WC    atorvastatin (LIPITOR) tablet 10 mg  10 mg Oral Daily    [Held by provider] empagliflozin (JARDIANCE) tablet 10 mg  10 mg Oral Daily    insulin lispro (HUMALOG,ADMELOG) injection vial 0-8 Units  0-8 Units SubCUTAneous 4x Daily AC & HS    glucose chewable tablet 16 g  4 tablet Oral PRN    dextrose bolus 10% 125 mL  125 mL IntraVENous PRN    Or    dextrose bolus 10% 250 mL  250 mL IntraVENous PRN    glucagon injection 1 mg  1 mg SubCUTAneous PRN    dextrose 10 % infusion   IntraVENous Continuous PRN    pantoprazole (PROTONIX) tablet 40 mg  40 mg Oral BID    [Held by provider] pregabalin (LYRICA) capsule 75 mg  75 mg Oral BID    timolol (TIMOPTIC) 0.25 % ophthalmic solution 1 drop  1

## 2025-03-04 NOTE — CONSULTS
Nephrology Consult Note      Assessment:  ESPINOZA on CKD stage 3a: Serum Cr now hovering 1.3-1.5mg/dl with recent diuresis. IV contrast exposure on admission but no significant LEIGH noted. Baseline Cr seems to be 1.1-1.3mg/dl.    Hypernatremia: Na up to 153-> FWD ~4.6L. Due to recent IV diuresis. +AMS    Combined CHF: EF 45-50%. LE edema has improved s/p diuresis    LE cellulitis    DM2    Hypokalemia: 2 to diuresis.    Plan/Recommendations:  Stop IV D5 LR  Start D5W + 20meq/L Kcl at 100cc/hr  Control hyperglycemia  Repeat BMP this evening  Hold IV Bumex  Strict I/Os  Am labs    Discussed with patient's family and RN    Thanks for the consultation. Renal service will follow patient with you. Please contact me with any questions or concerns.    Initial Consult note         Patient name: Rosita Morillo  MR no: [id number]  Date of admission: 2/27/2025  Date of consultation: 3/4/25  Requested by: Dr. Herson Trivedi  Reason for consult: Hypernatremia    Patient seen and examined. History obtained from chart review. Relevant labs, data and notes reviewed.      HPI: Rosita Morillo is a 79 y.o. female with PMH significant for CKD stage 3a, Combined CHF, Afib, DM2 initially presented back on 2/27/25 with LE cellulitis. Receiving IV Bumex this admission to help with LE edema. Hospital course complicated by worsening AMS/Hypernatremia. Na up to 153 today. Currently on IV D5 LR. Family at bedside. Patient altered-> unable to offer additional history.     PMH:  Past Medical History:   Diagnosis Date    Arthritis     CAD (coronary artery disease)     afib and chf    Congestive heart failure (HCC)     Depression     Diabetes (HCC)     type 2    Fibromyalgia     Gastrointestinal disorder     reflux and gastro paresis    Heart failure (HCC)     Infectious disease 2010    MRSA? in right big toe    Menopause     Psychiatric disorder     PTSD from 911      PSH:  Past Surgical History:   Procedure Laterality Date    APPENDECTOMY

## 2025-03-04 NOTE — PLAN OF CARE
Speech LAnguage Pathology EVALUATION    Patient: Rosita Morillo (79 y.o. female)  Date: 3/4/2025  Primary Diagnosis: Cellulitis of both lower extremities [L03.115, L03.116]       Precautions:  Fall Risk, Skin                  ASSESSMENT :  The patient presents with no command following, poor alertness, and no bolus acceptance or oral manipulation 2/2 acute mentation. Single ice chip provided via spoon. Patient with no attempts to manipulate orally or initiate swallow at this time despite maximal cues so ice chip removed from oral cavity for patient's safety. Patient is currently not appropriate for PO diet at this time. Recommend NPO for now with SLP to follow along acutely for re-evaluation as patient is appropriate.    Patient will benefit from skilled intervention to address the above impairments.     PLAN :  Recommendations and Planned Interventions:  Diet: NPO  -- Non-oral route for medication  -- Strict oral hygiene 2-3x/day with use of suction toothbrush       Recommend next SLP session: swallow re-evaluation    Acute SLP Services: SLP Plan of Care: 4 times/week. Patient's rehabilitation potential is considered to be Fair-Guarded as current etiology of mentation is unknown.  Discharge Recommendations: Continue to assess pending progress     SUBJECTIVE:   Patient with no attempts to verbalize on this date    OBJECTIVE:     Past Medical History:   Diagnosis Date    Arthritis     CAD (coronary artery disease)     afib and chf    Congestive heart failure (HCC)     Depression     Diabetes (HCC)     type 2    Fibromyalgia     Gastrointestinal disorder     reflux and gastro paresis    Heart failure (HCC)     Infectious disease 2010    MRSA? in right big toe    Menopause     Psychiatric disorder     PTSD from 911     Past Surgical History:   Procedure Laterality Date    APPENDECTOMY      BREAST BIOPSY Left     STbb  benign    COLONOSCOPY  08/20/2018    polyps    COLONOSCOPY N/A 8/20/2018    COLONOSCOPY performed by  Greater than 50% of bolus;Anterior  Initiation of Swallow: Absent  Laryngeal Elevation: Absent  Aspiration Signs/Symptoms:  (Significant risk for aspiration given acute mentation)            Respiratory Status/Airway:  Room air                         Outcome Measure:  Functional Oral Intake Scale (FOIS): 1--Nothing by mouth (NPO)    After treatment:   Patient left in no apparent distress in bed, Call bell left within reach, Nursing notified, and Updated patient's board with:  diet recommendations    COMMUNICATION/EDUCATION:   Patient was educated regarding role of SLP.  She demonstrated Poor understanding as evidenced by Limited understanding/response due to cognitive status.     The patient's plan of care including recommendations, planned interventions, and recommended diet changes were discussed with: Registered nurse and Physician    Patient/family have participated as able in goal setting and plan of care and Patient/family agree to work toward stated goals and plan of care    Thank you,  Brian Parsons, SLP    SLP Individual Minutes  Time In: 1000  Time Out: 1010  Minutes: 10      Problem: SLP Adult - Impaired Swallowing  Goal: By Discharge: Advance to least restrictive diet without signs or symptoms of aspiration for planned discharge setting.  See evaluation for individualized goals.  Description: Speech Pathology Note  Initiated 3/4/2025    1. Patient will participate in re-evaluation of swallow function within 7 days.   Outcome: Not Progressing

## 2025-03-04 NOTE — PLAN OF CARE
Problem: Pain  Goal: Verbalizes/displays adequate comfort level or baseline comfort level  Outcome: Not Progressing     Problem: Chronic Conditions and Co-morbidities  Goal: Patient's chronic conditions and co-morbidity symptoms are monitored and maintained or improved  Outcome: Progressing     Problem: Skin/Tissue Integrity  Goal: Skin integrity remains intact  Description: 1.  Monitor for areas of redness and/or skin breakdown  2.  Assess vascular access sites hourly  3.  Every 4-6 hours minimum:  Change oxygen saturation probe site  4.  Every 4-6 hours:  If on nasal continuous positive airway pressure, respiratory therapy assess nares and determine need for appliance change or resting period  Outcome: Progressing  Flowsheets (Taken 3/3/2025 2641)  Skin Integrity Remains Intact:   Assess vascular access sites hourly   Monitor for areas of redness and/or skin breakdown     Problem: Safety - Adult  Goal: Free from fall injury  Outcome: Progressing     Problem: ABCDS Injury Assessment  Goal: Absence of physical injury  Outcome: Progressing     Problem: Pain  Goal: Verbalizes/displays adequate comfort level or baseline comfort level  Outcome: Not Progressing

## 2025-03-04 NOTE — PROGRESS NOTES
Pharmacy Antimicrobial Kinetic Dosing    Indication for Antimicrobials: SSTI     Current Regimen of Each Antimicrobial:  Vanc 2000mg x 1 f/b 500mg q12h x 7 days, start , day 5  Ceftriaxone 1gm q 24; Start 3/3; Day #2    Previous Antimicrobial Therapy:  Cefepime  - 3/2  Meropenem 3/2 - 3/3     Goal Level: Vancomycin -600    Date Dose & Interval Measured (mcg/mL) Predicted AUC 24-48 Predicted AUC 24,ss   3/1 0533 500mg q 12h 15.1 541 636                   Significant Cultures:   25 - blood - ngtd    Labs:  Recent Labs     Units 25  0521 25  0550 25  0531   CREATININE MG/DL 1.33* 1.26* 1.45*   BUN MG/DL 39* 37* 43*   WBC K/uL 8.6 8.1  --      Temp (24hrs), Av.4 °F (36.9 °C), Min:97.9 °F (36.6 °C), Max:98.8 °F (37.1 °C)      Conditions for Dosing Consideration: None    Creatinine Clearance (mL/min): Estimated Creatinine Clearance: 39 mL/min (A) (based on SCr of 1.33 mg/dL (H)).       Impression/Plan:   Vancomycin level slightly supratherapeutic  Change to Vancomycin 750mg q24h   Predicted HUR50-13 = 489, Predicted AUC24,ss = 502  Vancomycin level with am labs tomorrow   Cefepime as above  Bmp through 3/2  Antimicrobial stop date 3/6/25 (7 days)     Pharmacy will follow daily and adjust medications as appropriate for renal function and/or serum levels.    Thank you,  Kimberly Quevedo [facility]

## 2025-03-04 NOTE — PROGRESS NOTES
End of Shift Note    Bedside shift change report given to ALEXANDRIA Morgan (oncoming nurse) by Marisol Gillespie RN (offgoing nurse).  Report included the following information SBAR, Kardex, Intake/Output, MAR, Recent Results, and Cardiac Rhythm Atrial paced    Shift worked:  7 am to 7:30 pm     Shift summary and any significant changes:     Held afternoon and evening Midodrine for hypertension, and held Senokot dose as patient was too lethargic to take PO medications at the time, see MAR. All other scheduled medications administered crushed in applesauce with encouragement to swallow each bite (pt was pocketing food). Wound care came to assess patient; 1 dose PRN IV Morphine administered prior to wound care, see MAR. Incontinence care and Harvey care provided; urine output documented and no bowel movement. Repositioning provided and heels elevated. IV's flushed and patent. Patient's  came to visit during shift and was provided with updates per RN and MD. Bed alarm engaged. Nursing rounds and education completed.     Concerns for physician to address:  Patient became more lethargic and had stopped following commands around 1530. Vitals obtained and patient's DI had increased to 54. Notified Dr. Trivedi. Ammonia lab drawn per order. Suggested a potential speech consult to evaluate swallowing.    Patient weaned off of oxygen, sating between 97-99% at rest.     Zone phone for oncoming shift:   [phone]       Activity:  Level of Assistance: Dependent, patient does less than 25%  Number times ambulated in hallways past shift: 0  Number of times OOB to chair past shift: 0    Cardiac:   Cardiac Monitoring: Yes      Cardiac Rhythm: Atrial paced    Access:  Current line(s): PIV     Genitourinary:   Urinary Status: Harvey    Respiratory:   O2 Device: None (Room air)  Chronic home O2 use?: NO  Incentive spirometer at bedside: NO    GI:  Last BM (including prior to admit): 02/27/25 (per chart)  Current diet:  ADULT DIET; Regular; 4  carb choices (60 gm/meal); Low Fat/Low Chol/High Fiber/CHRISTIANO  ADULT ORAL NUTRITION SUPPLEMENT; Breakfast, Dinner, Lunch; Standard High Calorie/High Protein Oral Supplement  Passing flatus: YES    Pain Management:   Patient states pain is manageable on current regimen: NO    Skin:  Pool Scale Score: 13  Interventions: Wound Offloading (Prevention Methods): Bed, pressure reduction mattress, Pillows, Repositioning, Turning, Elevate heels    Patient Safety:  Fall Risk: Nursing Judgement-Fall Risk High(Add Comments): Yes  Fall Risk Interventions  Nursing Judgement-Fall Risk High(Add Comments): Yes  Toilet Every 2 Hours-In Advance of Need: Yes  Hourly Visual Checks: Awake, In bed, Confused  Fall Visual Posted: Socks, Armband, Fall sign posted  Room Door Open: Yes  Alarm On: Bed  Patient Moved Closer to Nursing Station: No    Active Consults:   IP CONSULT TO PHARMACY  IP CONSULT TO INFECTIOUS DISEASES    Length of Stay:  Expected LOS: 6  Actual LOS: 4    Marisol Gillespie RN

## 2025-03-04 NOTE — PLAN OF CARE
Problem: SLP Adult - Impaired Swallowing  Goal: By Discharge: Advance to least restrictive diet without signs or symptoms of aspiration for planned discharge setting.  See evaluation for individualized goals.  Description: Speech Pathology Note  Initiated 3/4/2025    1. Patient will participate in re-evaluation of swallow function within 7 days.   3/4/2025 1019 by Brian Parsons SLP  Outcome: Not Progressing     Problem: Chronic Conditions and Co-morbidities  Goal: Patient's chronic conditions and co-morbidity symptoms are monitored and maintained or improved  3/4/2025 1158 by Radha Baker RN  Outcome: Progressing  3/4/2025 0659 by Cathy Rinaldi RN  Outcome: Progressing     Problem: Skin/Tissue Integrity  Goal: Skin integrity remains intact  Description: 1.  Monitor for areas of redness and/or skin breakdown  2.  Assess vascular access sites hourly  3.  Every 4-6 hours minimum:  Change oxygen saturation probe site  4.  Every 4-6 hours:  If on nasal continuous positive airway pressure, respiratory therapy assess nares and determine need for appliance change or resting period  3/4/2025 1158 by Radha Baker RN  Outcome: Progressing  3/4/2025 0659 by Cathy Rinaldi RN  Outcome: Progressing     Problem: Safety - Adult  Goal: Free from fall injury  3/4/2025 1158 by Radha Baker RN  Outcome: Progressing  3/4/2025 0659 by Cathy Rinaldi RN  Outcome: Progressing     Problem: ABCDS Injury Assessment  Goal: Absence of physical injury  3/4/2025 1158 by Radha Baker RN  Outcome: Progressing  3/4/2025 0659 by Cathy Rinaldi RN  Outcome: Progressing     Problem: Pain  Goal: Verbalizes/displays adequate comfort level or baseline comfort level  3/4/2025 1158 by Radha Baker RN  Outcome: Progressing  3/4/2025 0659 by Cathy Rinaldi RN  Outcome: Progressing

## 2025-03-04 NOTE — PROGRESS NOTES
.End of Shift Note    Bedside shift change report given to ALEXANDRIA VARGAS (oncoming nurse) by Cathy Rinaldi RN (offgoing nurse).  Report included the following information SBAR, Kardex, and MAR    Shift worked:  7P-7A     Shift summary and any significant changes:    MD notified of lethargy and unable to swallow at the moment. Rapid response nurse came during rounding and assessed the patient, will continue to monitor. All oral medications held at the moment due to patient not able to tolerate. PRN Tylenol given x1 rectally for excessive groaning. Patient unable to verbalize pain but is shown through moaning/ groaning. No acute distress noted throughout the night. IV Albumin tolerated well. Q2 turning and repositioning complete. Harvey is patent, in place and draining. Harvey care complete. CHG Bath complete. Heels remain elevated. Patient is bedrest. No bowel movement noted, output documented. Vital signs remain stable. Rounding and hourly care complete.          Cathy Rinaldi RN

## 2025-03-05 ENCOUNTER — APPOINTMENT (OUTPATIENT)
Facility: HOSPITAL | Age: 80
DRG: 637 | End: 2025-03-05
Payer: MEDICARE

## 2025-03-05 ENCOUNTER — ANESTHESIA (OUTPATIENT)
Facility: HOSPITAL | Age: 80
End: 2025-03-05
Payer: MEDICARE

## 2025-03-05 ENCOUNTER — ANESTHESIA EVENT (OUTPATIENT)
Facility: HOSPITAL | Age: 80
End: 2025-03-05
Payer: MEDICARE

## 2025-03-05 LAB
25(OH)D3 SERPL-MCNC: 33 NG/ML (ref 30–100)
ALBUMIN SERPL-MCNC: 3.2 G/DL (ref 3.5–5)
ALBUMIN SERPL-MCNC: 3.3 G/DL (ref 3.5–5)
ALBUMIN/GLOB SERPL: 0.7 (ref 1.1–2.2)
ALBUMIN/GLOB SERPL: 0.7 (ref 1.1–2.2)
ALP SERPL-CCNC: 121 U/L (ref 45–117)
ALP SERPL-CCNC: 129 U/L (ref 45–117)
ALT SERPL-CCNC: 17 U/L (ref 12–78)
ALT SERPL-CCNC: 19 U/L (ref 12–78)
ANION GAP SERPL CALC-SCNC: 6 MMOL/L (ref 2–12)
ANION GAP SERPL CALC-SCNC: 8 MMOL/L (ref 2–12)
AST SERPL-CCNC: 16 U/L (ref 15–37)
AST SERPL-CCNC: 17 U/L (ref 15–37)
BACTERIA SPEC CULT: NORMAL
BACTERIA SPEC CULT: NORMAL
BASOPHILS # BLD: 0.08 K/UL (ref 0–0.1)
BASOPHILS NFR BLD: 0.6 % (ref 0–1)
BILIRUB SERPL-MCNC: 0.7 MG/DL (ref 0.2–1)
BILIRUB SERPL-MCNC: 0.8 MG/DL (ref 0.2–1)
BUN SERPL-MCNC: 36 MG/DL (ref 6–20)
BUN SERPL-MCNC: 38 MG/DL (ref 6–20)
BUN/CREAT SERPL: 21 (ref 12–20)
BUN/CREAT SERPL: 25 (ref 12–20)
CALCIUM SERPL-MCNC: 9.6 MG/DL (ref 8.5–10.1)
CALCIUM SERPL-MCNC: 9.8 MG/DL (ref 8.5–10.1)
CHLORIDE SERPL-SCNC: 119 MMOL/L (ref 97–108)
CHLORIDE SERPL-SCNC: 120 MMOL/L (ref 97–108)
CO2 SERPL-SCNC: 26 MMOL/L (ref 21–32)
CO2 SERPL-SCNC: 26 MMOL/L (ref 21–32)
CREAT SERPL-MCNC: 1.44 MG/DL (ref 0.55–1.02)
CREAT SERPL-MCNC: 1.79 MG/DL (ref 0.55–1.02)
DIFFERENTIAL METHOD BLD: ABNORMAL
EOSINOPHIL # BLD: 0.01 K/UL (ref 0–0.4)
EOSINOPHIL NFR BLD: 0.1 % (ref 0–7)
ERYTHROCYTE [DISTWIDTH] IN BLOOD BY AUTOMATED COUNT: 21.8 % (ref 11.5–14.5)
ERYTHROCYTE [DISTWIDTH] IN BLOOD BY AUTOMATED COUNT: 22.1 % (ref 11.5–14.5)
GLOBULIN SER CALC-MCNC: 4.5 G/DL (ref 2–4)
GLOBULIN SER CALC-MCNC: 5 G/DL (ref 2–4)
GLUCOSE BLD STRIP.AUTO-MCNC: 332 MG/DL (ref 65–117)
GLUCOSE BLD STRIP.AUTO-MCNC: 358 MG/DL (ref 65–117)
GLUCOSE BLD STRIP.AUTO-MCNC: 365 MG/DL (ref 65–117)
GLUCOSE BLD STRIP.AUTO-MCNC: 451 MG/DL (ref 65–117)
GLUCOSE SERPL-MCNC: 413 MG/DL (ref 65–100)
GLUCOSE SERPL-MCNC: 430 MG/DL (ref 65–100)
HCT VFR BLD AUTO: 39.3 % (ref 35–47)
HCT VFR BLD AUTO: 43.7 % (ref 35–47)
HGB BLD-MCNC: 11.9 G/DL (ref 11.5–16)
HGB BLD-MCNC: 13 G/DL (ref 11.5–16)
IMM GRANULOCYTES # BLD AUTO: 0.23 K/UL (ref 0–0.04)
IMM GRANULOCYTES NFR BLD AUTO: 1.6 % (ref 0–0.5)
LACTATE SERPL-SCNC: 3.7 MMOL/L (ref 0.4–2)
LYMPHOCYTES # BLD: 2.72 K/UL (ref 0.8–3.5)
LYMPHOCYTES NFR BLD: 19.3 % (ref 12–49)
MAGNESIUM SERPL-MCNC: 2.1 MG/DL (ref 1.6–2.4)
MAGNESIUM SERPL-MCNC: 2.2 MG/DL (ref 1.6–2.4)
MCH RBC QN AUTO: 24.9 PG (ref 26–34)
MCH RBC QN AUTO: 25.2 PG (ref 26–34)
MCHC RBC AUTO-ENTMCNC: 29.7 G/DL (ref 30–36.5)
MCHC RBC AUTO-ENTMCNC: 30.3 G/DL (ref 30–36.5)
MCV RBC AUTO: 82.4 FL (ref 80–99)
MCV RBC AUTO: 84.7 FL (ref 80–99)
MONOCYTES # BLD: 1.49 K/UL (ref 0–1)
MONOCYTES NFR BLD: 10.6 % (ref 5–13)
NEUTS SEG # BLD: 9.57 K/UL (ref 1.8–8)
NEUTS SEG NFR BLD: 67.8 % (ref 32–75)
NRBC # BLD: 0.07 K/UL (ref 0–0.01)
NRBC # BLD: 0.21 K/UL (ref 0–0.01)
NRBC BLD-RTO: 0.7 PER 100 WBC
NRBC BLD-RTO: 1.5 PER 100 WBC
NT PRO BNP: ABNORMAL PG/ML
PHOSPHATE SERPL-MCNC: 2 MG/DL (ref 2.6–4.7)
PHOSPHATE SERPL-MCNC: 2.3 MG/DL (ref 2.6–4.7)
PLATELET # BLD AUTO: 468 K/UL (ref 150–400)
PLATELET # BLD AUTO: 517 K/UL (ref 150–400)
PMV BLD AUTO: 10.6 FL (ref 8.9–12.9)
PMV BLD AUTO: 10.9 FL (ref 8.9–12.9)
POTASSIUM SERPL-SCNC: 3.3 MMOL/L (ref 3.5–5.1)
POTASSIUM SERPL-SCNC: 3.8 MMOL/L (ref 3.5–5.1)
PROT SERPL-MCNC: 7.7 G/DL (ref 6.4–8.2)
PROT SERPL-MCNC: 8.3 G/DL (ref 6.4–8.2)
RBC # BLD AUTO: 4.77 M/UL (ref 3.8–5.2)
RBC # BLD AUTO: 5.16 M/UL (ref 3.8–5.2)
RBC MORPH BLD: ABNORMAL
SERVICE CMNT-IMP: ABNORMAL
SERVICE CMNT-IMP: NORMAL
SERVICE CMNT-IMP: NORMAL
SODIUM SERPL-SCNC: 151 MMOL/L (ref 136–145)
SODIUM SERPL-SCNC: 154 MMOL/L (ref 136–145)
T4 FREE SERPL-MCNC: 1.5 NG/DL (ref 0.8–1.5)
TSH SERPL DL<=0.05 MIU/L-ACNC: 1.65 UIU/ML (ref 0.36–3.74)
VANCOMYCIN SERPL-MCNC: 14.2 UG/ML
VIT B12 SERPL-MCNC: 1995 PG/ML (ref 193–986)
WBC # BLD AUTO: 10.6 K/UL (ref 3.6–11)
WBC # BLD AUTO: 14.1 K/UL (ref 3.6–11)

## 2025-03-05 PROCEDURE — 6370000000 HC RX 637 (ALT 250 FOR IP): Performed by: STUDENT IN AN ORGANIZED HEALTH CARE EDUCATION/TRAINING PROGRAM

## 2025-03-05 PROCEDURE — 84443 ASSAY THYROID STIM HORMONE: CPT

## 2025-03-05 PROCEDURE — 92526 ORAL FUNCTION THERAPY: CPT

## 2025-03-05 PROCEDURE — 82306 VITAMIN D 25 HYDROXY: CPT

## 2025-03-05 PROCEDURE — 31500 INSERT EMERGENCY AIRWAY: CPT | Performed by: STUDENT IN AN ORGANIZED HEALTH CARE EDUCATION/TRAINING PROGRAM

## 2025-03-05 PROCEDURE — 6360000002 HC RX W HCPCS

## 2025-03-05 PROCEDURE — 93005 ELECTROCARDIOGRAM TRACING: CPT

## 2025-03-05 PROCEDURE — 83735 ASSAY OF MAGNESIUM: CPT

## 2025-03-05 PROCEDURE — 83880 ASSAY OF NATRIURETIC PEPTIDE: CPT

## 2025-03-05 PROCEDURE — 99232 SBSQ HOSP IP/OBS MODERATE 35: CPT | Performed by: NURSE PRACTITIONER

## 2025-03-05 PROCEDURE — 2500000003 HC RX 250 WO HCPCS: Performed by: STUDENT IN AN ORGANIZED HEALTH CARE EDUCATION/TRAINING PROGRAM

## 2025-03-05 PROCEDURE — 83605 ASSAY OF LACTIC ACID: CPT

## 2025-03-05 PROCEDURE — 99232 SBSQ HOSP IP/OBS MODERATE 35: CPT | Performed by: PSYCHIATRY & NEUROLOGY

## 2025-03-05 PROCEDURE — 82607 VITAMIN B-12: CPT

## 2025-03-05 PROCEDURE — 82962 GLUCOSE BLOOD TEST: CPT

## 2025-03-05 PROCEDURE — 71045 X-RAY EXAM CHEST 1 VIEW: CPT

## 2025-03-05 PROCEDURE — 2580000003 HC RX 258: Performed by: NURSE PRACTITIONER

## 2025-03-05 PROCEDURE — 2500000003 HC RX 250 WO HCPCS: Performed by: INTERNAL MEDICINE

## 2025-03-05 PROCEDURE — 6360000002 HC RX W HCPCS: Performed by: INTERNAL MEDICINE

## 2025-03-05 PROCEDURE — 94660 CPAP INITIATION&MGMT: CPT

## 2025-03-05 PROCEDURE — 84100 ASSAY OF PHOSPHORUS: CPT

## 2025-03-05 PROCEDURE — 80053 COMPREHEN METABOLIC PANEL: CPT

## 2025-03-05 PROCEDURE — 6370000000 HC RX 637 (ALT 250 FOR IP): Performed by: INTERNAL MEDICINE

## 2025-03-05 PROCEDURE — 2580000003 HC RX 258: Performed by: INTERNAL MEDICINE

## 2025-03-05 PROCEDURE — 94002 VENT MGMT INPAT INIT DAY: CPT

## 2025-03-05 PROCEDURE — 36415 COLL VENOUS BLD VENIPUNCTURE: CPT

## 2025-03-05 PROCEDURE — 85025 COMPLETE CBC W/AUTO DIFF WBC: CPT

## 2025-03-05 PROCEDURE — 0BH17EZ INSERTION OF ENDOTRACHEAL AIRWAY INTO TRACHEA, VIA NATURAL OR ARTIFICIAL OPENING: ICD-10-PCS | Performed by: STUDENT IN AN ORGANIZED HEALTH CARE EDUCATION/TRAINING PROGRAM

## 2025-03-05 PROCEDURE — 6360000002 HC RX W HCPCS: Performed by: NURSE PRACTITIONER

## 2025-03-05 PROCEDURE — 5A1945Z RESPIRATORY VENTILATION, 24-96 CONSECUTIVE HOURS: ICD-10-PCS | Performed by: INTERNAL MEDICINE

## 2025-03-05 PROCEDURE — 93005 ELECTROCARDIOGRAM TRACING: CPT | Performed by: INTERNAL MEDICINE

## 2025-03-05 PROCEDURE — 31500 INSERT EMERGENCY AIRWAY: CPT

## 2025-03-05 PROCEDURE — 85027 COMPLETE CBC AUTOMATED: CPT

## 2025-03-05 PROCEDURE — 80202 ASSAY OF VANCOMYCIN: CPT

## 2025-03-05 PROCEDURE — 84439 ASSAY OF FREE THYROXINE: CPT

## 2025-03-05 PROCEDURE — 51702 INSERT TEMP BLADDER CATH: CPT

## 2025-03-05 PROCEDURE — 2700000000 HC OXYGEN THERAPY PER DAY

## 2025-03-05 PROCEDURE — 6370000000 HC RX 637 (ALT 250 FOR IP): Performed by: NURSE PRACTITIONER

## 2025-03-05 PROCEDURE — 36600 WITHDRAWAL OF ARTERIAL BLOOD: CPT

## 2025-03-05 PROCEDURE — 2000000000 HC ICU R&B

## 2025-03-05 PROCEDURE — 2500000003 HC RX 250 WO HCPCS: Performed by: NURSE PRACTITIONER

## 2025-03-05 RX ORDER — INSULIN LISPRO 100 [IU]/ML
2 INJECTION, SOLUTION INTRAVENOUS; SUBCUTANEOUS ONCE
Status: COMPLETED | OUTPATIENT
Start: 2025-03-05 | End: 2025-03-05

## 2025-03-05 RX ORDER — DEXMEDETOMIDINE HYDROCHLORIDE 4 UG/ML
.1-1.5 INJECTION, SOLUTION INTRAVENOUS CONTINUOUS
Status: DISCONTINUED | OUTPATIENT
Start: 2025-03-06 | End: 2025-03-08

## 2025-03-05 RX ORDER — INSULIN LISPRO 100 [IU]/ML
4 INJECTION, SOLUTION INTRAVENOUS; SUBCUTANEOUS
Status: DISCONTINUED | OUTPATIENT
Start: 2025-03-05 | End: 2025-03-06

## 2025-03-05 RX ORDER — PROPOFOL 10 MG/ML
INJECTION, EMULSION INTRAVENOUS
Status: COMPLETED
Start: 2025-03-05 | End: 2025-03-06

## 2025-03-05 RX ORDER — INSULIN GLARGINE 100 [IU]/ML
5 INJECTION, SOLUTION SUBCUTANEOUS 2 TIMES DAILY
Status: DISCONTINUED | OUTPATIENT
Start: 2025-03-05 | End: 2025-03-06

## 2025-03-05 RX ORDER — ATORVASTATIN CALCIUM 10 MG/1
10 TABLET, FILM COATED ORAL DAILY
Status: DISCONTINUED | OUTPATIENT
Start: 2025-03-06 | End: 2025-03-14 | Stop reason: HOSPADM

## 2025-03-05 RX ORDER — NOREPINEPHRINE BITARTRATE 0.06 MG/ML
INJECTION, SOLUTION INTRAVENOUS
Status: COMPLETED
Start: 2025-03-05 | End: 2025-03-06

## 2025-03-05 RX ORDER — INSULIN LISPRO 100 [IU]/ML
0-8 INJECTION, SOLUTION INTRAVENOUS; SUBCUTANEOUS EVERY 6 HOURS
Status: DISCONTINUED | OUTPATIENT
Start: 2025-03-06 | End: 2025-03-09

## 2025-03-05 RX ORDER — BUMETANIDE 0.25 MG/ML
1 INJECTION, SOLUTION INTRAMUSCULAR; INTRAVENOUS ONCE
Status: COMPLETED | OUTPATIENT
Start: 2025-03-05 | End: 2025-03-05

## 2025-03-05 RX ORDER — LANSOPRAZOLE 30 MG/1
30 TABLET, ORALLY DISINTEGRATING, DELAYED RELEASE ORAL
Status: DISCONTINUED | OUTPATIENT
Start: 2025-03-06 | End: 2025-03-14 | Stop reason: HOSPADM

## 2025-03-05 RX ORDER — MIDODRINE HYDROCHLORIDE 5 MG/1
15 TABLET ORAL EVERY 8 HOURS
Status: DISCONTINUED | OUTPATIENT
Start: 2025-03-06 | End: 2025-03-06

## 2025-03-05 RX ORDER — SENNA AND DOCUSATE SODIUM 50; 8.6 MG/1; MG/1
2 TABLET, FILM COATED ORAL DAILY
Status: DISCONTINUED | OUTPATIENT
Start: 2025-03-06 | End: 2025-03-06

## 2025-03-05 RX ORDER — NOREPINEPHRINE BITARTRATE 0.06 MG/ML
.5-2 INJECTION, SOLUTION INTRAVENOUS CONTINUOUS
Status: DISPENSED | OUTPATIENT
Start: 2025-03-06 | End: 2025-03-07

## 2025-03-05 RX ORDER — INSULIN LISPRO 100 [IU]/ML
7 INJECTION, SOLUTION INTRAVENOUS; SUBCUTANEOUS ONCE
Status: COMPLETED | OUTPATIENT
Start: 2025-03-05 | End: 2025-03-05

## 2025-03-05 RX ORDER — DILTIAZEM HYDROCHLORIDE 5 MG/ML
5 INJECTION INTRAVENOUS ONCE
Status: COMPLETED | OUTPATIENT
Start: 2025-03-05 | End: 2025-03-05

## 2025-03-05 RX ORDER — PROPOFOL 10 MG/ML
5-50 INJECTION, EMULSION INTRAVENOUS CONTINUOUS
Status: DISCONTINUED | OUTPATIENT
Start: 2025-03-06 | End: 2025-03-08

## 2025-03-05 RX ORDER — NYSTATIN 100000 [USP'U]/ML
5 SUSPENSION ORAL 4 TIMES DAILY
Status: DISCONTINUED | OUTPATIENT
Start: 2025-03-05 | End: 2025-03-14 | Stop reason: HOSPADM

## 2025-03-05 RX ADMIN — DEXTROSE MONOHYDRATE AND POTASSIUM CHLORIDE INJECTION, SOLUTION: 5; .149 INJECTION, SOLUTION INTRAVENOUS at 10:31

## 2025-03-05 RX ADMIN — INSULIN LISPRO 7 UNITS: 100 INJECTION, SOLUTION INTRAVENOUS; SUBCUTANEOUS at 10:27

## 2025-03-05 RX ADMIN — POTASSIUM CHLORIDE 10 MEQ: 7.45 INJECTION INTRAVENOUS at 00:20

## 2025-03-05 RX ADMIN — NYSTATIN 500000 UNITS: 100000 SUSPENSION ORAL at 17:38

## 2025-03-05 RX ADMIN — INSULIN LISPRO 8 UNITS: 100 INJECTION, SOLUTION INTRAVENOUS; SUBCUTANEOUS at 10:26

## 2025-03-05 RX ADMIN — POTASSIUM CHLORIDE 10 MEQ: 7.45 INJECTION INTRAVENOUS at 01:25

## 2025-03-05 RX ADMIN — DILTIAZEM HYDROCHLORIDE 5 MG: 5 INJECTION, SOLUTION INTRAVENOUS at 19:01

## 2025-03-05 RX ADMIN — INSULIN GLARGINE 5 UNITS: 100 INJECTION, SOLUTION SUBCUTANEOUS at 21:06

## 2025-03-05 RX ADMIN — ACETAMINOPHEN 650 MG: 650 SUPPOSITORY RECTAL at 13:19

## 2025-03-05 RX ADMIN — INSULIN GLARGINE 5 UNITS: 100 INJECTION, SOLUTION SUBCUTANEOUS at 10:25

## 2025-03-05 RX ADMIN — WATER 1000 MG: 1 INJECTION INTRAMUSCULAR; INTRAVENOUS; SUBCUTANEOUS at 09:18

## 2025-03-05 RX ADMIN — SODIUM CHLORIDE 2.5 MG/HR: 900 INJECTION, SOLUTION INTRAVENOUS at 19:31

## 2025-03-05 RX ADMIN — TIMOLOL MALEATE 1 DROP: 2.5 SOLUTION OPHTHALMIC at 21:57

## 2025-03-05 RX ADMIN — SODIUM CHLORIDE, PRESERVATIVE FREE 10 ML: 5 INJECTION INTRAVENOUS at 21:10

## 2025-03-05 RX ADMIN — BUMETANIDE 1 MG: 0.25 INJECTION INTRAMUSCULAR; INTRAVENOUS at 19:35

## 2025-03-05 RX ADMIN — NYSTATIN 500000 UNITS: 100000 SUSPENSION ORAL at 13:19

## 2025-03-05 RX ADMIN — INSULIN LISPRO 6 UNITS: 100 INJECTION, SOLUTION INTRAVENOUS; SUBCUTANEOUS at 21:06

## 2025-03-05 RX ADMIN — TIMOLOL MALEATE 1 DROP: 2.5 SOLUTION OPHTHALMIC at 10:28

## 2025-03-05 RX ADMIN — INSULIN LISPRO 8 UNITS: 100 INJECTION, SOLUTION INTRAVENOUS; SUBCUTANEOUS at 13:30

## 2025-03-05 RX ADMIN — VANCOMYCIN HYDROCHLORIDE 750 MG: 750 INJECTION, POWDER, LYOPHILIZED, FOR SOLUTION INTRAVENOUS at 09:30

## 2025-03-05 RX ADMIN — INSULIN LISPRO 2 UNITS: 100 INJECTION, SOLUTION INTRAVENOUS; SUBCUTANEOUS at 13:31

## 2025-03-05 RX ADMIN — DEXTROSE MONOHYDRATE AND POTASSIUM CHLORIDE INJECTION, SOLUTION: 5; .149 INJECTION, SOLUTION INTRAVENOUS at 00:22

## 2025-03-05 RX ADMIN — INSULIN LISPRO 8 UNITS: 100 INJECTION, SOLUTION INTRAVENOUS; SUBCUTANEOUS at 18:25

## 2025-03-05 RX ADMIN — INSULIN LISPRO 4 UNITS: 100 INJECTION, SOLUTION INTRAVENOUS; SUBCUTANEOUS at 18:28

## 2025-03-05 RX ADMIN — SODIUM CHLORIDE, PRESERVATIVE FREE 10 ML: 5 INJECTION INTRAVENOUS at 09:18

## 2025-03-05 ASSESSMENT — PAIN SCALES - WONG BAKER
WONGBAKER_NUMERICALRESPONSE: HURTS A LITTLE BIT
WONGBAKER_NUMERICALRESPONSE: HURTS A LITTLE BIT

## 2025-03-05 ASSESSMENT — PAIN SCALES - GENERAL
PAINLEVEL_OUTOF10: 4
PAINLEVEL_OUTOF10: 2
PAINLEVEL_OUTOF10: 2
PAINLEVEL_OUTOF10: 0

## 2025-03-05 ASSESSMENT — PAIN DESCRIPTION - LOCATION: LOCATION: GENERALIZED

## 2025-03-05 ASSESSMENT — PULMONARY FUNCTION TESTS: PIF_VALUE: 30

## 2025-03-05 NOTE — PROGRESS NOTES
Pharmacy Antimicrobial Kinetic Dosing    Indication for Antimicrobials: SSTI /cellulitis of toes     Current Regimen of Each Antimicrobial:  Vanc 2000mg x 1 f/b 500mg q12h x 7 days, start , day 6  Ceftriaxone 1gm q 24; Start 3/3; Day #3    Previous Antimicrobial Therapy:  Cefepime  - 3/2  Meropenem 3/2 - 3/3     Goal Level: Vancomycin -600    Date Dose & Interval Measured (mcg/mL) Predicted AUC 24-48 Predicted AUC 24,ss   3/1 0533 500mg q 12h 15.1 541 636   3/5  750mg q 24  14.2 427 427            Significant Cultures:   25 - blood - ngtd    Labs:  Recent Labs     Units 25  0546 25  2042 25  0521 25  0550   CREATININE MG/DL 1.44* 1.47* 1.33* 1.26*   BUN MG/DL 36* 39* 39* 37*   WBC K/uL 10.6  --  8.6 8.1     Temp (24hrs), Av.4 °F (36.9 °C), Min:97.7 °F (36.5 °C), Max:99.1 °F (37.3 °C)      Conditions for Dosing Consideration: None    Creatinine Clearance (mL/min): Estimated Creatinine Clearance: 36 mL/min (A) (based on SCr of 1.44 mg/dL (H)).       Impression/Plan:   Vancomycin level therapeutic, continue 750mg q 24   Ceftriaxone as above  ID on board  Antimicrobial stop date 3/6/25 (7 days)     Pharmacy will follow daily and adjust medications as appropriate for renal function and/or serum levels.    Thank you,  Kimberly Quevedo [facility]

## 2025-03-05 NOTE — PROGRESS NOTES
End of Shift Note    Bedside shift change report given to ALEXANDRIA Chao (oncoming nurse) by ALICIA TIJERINA RN (offgoing nurse).  Report included the following information SBAR, Kardex, Intake/Output, MAR, Recent Results, and Cardiac Rhythm Atrial paced    Shift worked:  7 am to 7:30 pm     Shift summary and any significant changes:     Held all oral medications since patient was unable to follow commands. Pt DI was 71, Dr. MRAIO Trivedi made aware. Nephrology consulted, fluids initiated per order. Neurology consulted, CT of head and EEG ordered. No PRN pain medications given as patient made no indication of pain. SLP therapist evaluated patient and made patient NPO. All wound care completed and dominguez care done. Pt had no BM.     Concerns for physician to address:     Zone phone for oncoming shift:   [phone]       Activity:  Level of Assistance: Maximum assist, patient does 25-49%  Number times ambulated in hallways past shift: 0  Number of times OOB to chair past shift: 0    Cardiac:   Cardiac Monitoring: Yes      Cardiac Rhythm: Atrial paced    Access:  Current line(s): PIV     Genitourinary:   Urinary Status: Dominguez    Respiratory:   O2 Device: None (Room air)  Chronic home O2 use?: NO  Incentive spirometer at bedside: NO    GI:  Last BM (including prior to admit): 02/27/25  Current diet:  Diet NPO  Passing flatus: YES    Pain Management:   Patient states pain is manageable on current regimen: NO    Skin:  Pool Scale Score: 12  Interventions: Wound Offloading (Prevention Methods): Repositioning, Turning    Patient Safety:  Fall Risk: Nursing Judgement-Fall Risk High(Add Comments): Yes  Fall Risk Interventions  Nursing Judgement-Fall Risk High(Add Comments): Yes  Toilet Every 2 Hours-In Advance of Need: Yes  Hourly Visual Checks: Awake, In bed, Confused  Fall Visual Posted: Socks, Armband, Fall sign posted  Room Door Open: Yes  Alarm On: Bed  Patient Moved Closer to Nursing Station: No    Active Consults:   IP CONSULT  TO PHARMACY  IP CONSULT TO INFECTIOUS DISEASES  IP CONSULT TO NEPHROLOGY  IP CONSULT TO NEUROLOGY    Length of Stay:  Expected LOS: 8  Actual LOS: 5    ALICIA TIJERINA RN

## 2025-03-05 NOTE — PROGRESS NOTES
End of Shift Note    Bedside shift change report given to ALEXANDRIA Barrios (oncoming nurse) by Zhanna Edwards RN (offgoing nurse).  Report included the following information SBAR, Kardex, Intake/Output, and MAR    Shift worked:  7p - 7a     Shift summary and any significant changes:    Oral medications held due to NPO status and unsafe to take anything orally.  IVF continues as ordered.  BMP obtained at 2042 per order, results provided to on call provider, new orders obtained.  Acetaminophen suppository administered X1 for patient repeating \"ow\" with fair effect.  Patient moaning/mumbling throughout the night. AM labs obtained by phlebotomy tech.  Caring rounds completed.      Concerns for physician to address:  Please renew tele orders     Zone phone for oncoming shift:          Activity:  Level of Assistance: Maximum assist, patient does 25-49%    Cardiac:   Cardiac Monitoring:  yes - A-fib to A-paced    Access:  Current line(s): PIV     Genitourinary:   Urinary Status: Harvey    Respiratory:   O2 Device: None (Room air)    GI:  Current diet: Diet NPO    Pain Management:   Patient states pain is manageable on current regimen: No    Skin:  Pool Scale Score: 12  Interventions: Wound Offloading (Prevention Methods): Repositioning, Turning  Pressure injury: yes - Multiple    Patient Safety:  Fall Score: Gorman Total Score: 45  Fall Risk Interventions  Nursing Judgement-Fall Risk High(Add Comments): Yes  Toilet Every 2 Hours-In Advance of Need: Yes  Hourly Visual Checks: Awake, In bed, Confused  Fall Visual Posted: Socks, Armband, Fall sign posted  Room Door Open: Yes  Alarm On: Bed  Patient Moved Closer to Nursing Station: No    Active Consults:  IP CONSULT TO PHARMACY  IP CONSULT TO INFECTIOUS DISEASES  IP CONSULT TO NEPHROLOGY  IP CONSULT TO NEUROLOGY    Length of Stay:  Expected LOS: 8  Actual LOS: 6      Zhanna Edwards, RN

## 2025-03-05 NOTE — CARE COORDINATION
CM informed that therapist unable to work with therapist at this time due to medical state.  Pt is lethargic and isnt following commands.      CM followed up on referrals sent on 3/4/25, via [facility].  CM left a voicemail for Maximus cano      Referral pending for [facility]. CM spoke with admin coordinator, Lisa Bunn, and was informed that referral is currently being reviewed.    NAVNEET Cruz CM  [phone]

## 2025-03-05 NOTE — PROGRESS NOTES
Hospitalist Progress Note    NAME:   Rosita Morillo   : 1945   MRN: [id number]     Date/Time: 3/5/2025 2:11 PM  Patient PCP: Juan Go MD    Estimated discharge date: 3/8?, home with HH?  Barriers: Na+ improved, MS improved      Assessment / Plan:        Cellulitis of bilateral lower extremity with chronic LE weeping edema POA  Fibromyalgia  -CT of bilateral lower extremity showed edema but no abscess/fracture  -Doppler negative for DVT     IV Abx-vancomycin.  Initially on cefepime, changed to meropenem due to concern for worsening confusion  ID on board, changed antibiotic to Vanco and Rocephin  Continue current pain regimen, scheduled Tylenol  Cont wound Care    Altered mental status, acute metabolic Encephalopathy - improved slightly per  at bedside  Hypernatremia- Na+ unchanged at 154  Confounded by underlying fibromyalgia/anxiety/PTSD  -No focal deficits, ammonia slightly elevated  Sodium stable at 154 on D5 K+ IVF per nephrology, likely contributing to AMS  Neuro eval- EEG encephalopathic, no seizure focus  CT head neg acute    Continue D5W + 20meq/L Kcl at 100cc/hr per nephrology  Needs Control of hyperglycemia - increased Lantus 5u BID, cont SSI lispro  Cont Holding IV Bumex and Jardiance     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%  Elevated troponin in setting of recent NSTEMI-downtrending from prior  Atrial fibrillation off anticoagulation  Continue PTA atorvastatin, Jardiance  S/p Bumex, 0.5 mg twice daily, hold for now due to above  Scheduled midodrine for now  -Strict intake and output with daily weights  -Monitor electrolytes and renal function address as indicated  Elevate BLE     Recent upper GI bleed-gastric ulcers  Recommended to remain off anticoagulation  Continue twice daily PPI     Hypokalemia, improved at 3.3  CKD 3, baseline  Potassium slowly improving  Trend renal function  Renally dose medications and avoid nephrotoxic

## 2025-03-05 NOTE — PROGRESS NOTES
Occupational Therapy Note:    Chart reviewed, spoke with pt's nurse. Pt continues to be lethargic and not following commands, observed repeatedly moaning. Not appropriate for OT session at this time. Will defer and continue to follow. Thank you.    Ilene Dos Santos OTR/L

## 2025-03-05 NOTE — PROGRESS NOTES
NAME: Rosita Morillo        :  1945        MRN:  [id number]         Assessment:  ESPINOZA on CKD stage 3a: Cr now hovering 1.3-1.5 with recent diuresis. IV contrast exposure on admission but no significant LEIGH noted. Baseline Cr seems to be 1.1-1.3     Hypernatremia: worse, Na 154 (corrected is 159) -> FWD ~4.6L. Due to recent IV diuresis. +AMS     Combined CHF: EF 45-50%. LE edema has improved s/p diuresis     LE cellulitis     DM2     Hypokalemia: 2 to diuresis.     Plan/Recommendations:  continue D5W + 20meq/L Kcl at 100cc/hr  Needs Control of hyperglycemia     Holding IV Bumex and Jardiance    On midodrine    Strict I/Os  Am labs      Subjective:     Chief Complaint:   hard to awaken. Chart reviewed     Review of Systems:    Symptom Y/N Comments  Symptom Y/N Comments   Fever/Chills    Chest Pain     Poor Appetite    Edema     Cough    Abdominal Pain     Sputum    Joint Pain     SOB/DE    Pruritis/Rash     Nausea/vomit    Tolerating PT/OT     Diarrhea    Tolerating Diet     Constipation    Other       Could not obtain due to:      Objective:     VITALS:   Last 24hrs VS reviewed since prior progress note. Most recent are:  Vitals:    25 0801   BP: (!) 147/88   Pulse: (!) 107   Resp: 20   Temp: 99.1 °F (37.3 °C)   SpO2: 92%       Intake/Output Summary (Last 24 hours) at 3/5/2025 1024  Last data filed at 3/5/2025 0600  Gross per 24 hour   Intake 2071.87 ml   Output 3000 ml   Net -928.13 ml      Telemetry Reviewed:     PHYSICAL EXAM:  General: NAD      Lab Data Reviewed: (see below)    Medications Reviewed: (see below)    PMH/SH reviewed - no change compared to H&P  ________________________________________________________________________  Care Plan discussed with:  Patient     Family      RN     Care Manager                    Consultant:          Comments   >50% of visit spent in counseling and coordination of care     500 mg Oral 3 times per day    midodrine (PROAMATINE) tablet 5 mg  5 mg Oral TID WC    atorvastatin (LIPITOR) tablet 10 mg  10 mg Oral Daily    [Held by provider] empagliflozin (JARDIANCE) tablet 10 mg  10 mg Oral Daily    insulin lispro (HUMALOG,ADMELOG) injection vial 0-8 Units  0-8 Units SubCUTAneous 4x Daily AC & HS    glucose chewable tablet 16 g  4 tablet Oral PRN    dextrose bolus 10% 125 mL  125 mL IntraVENous PRN    Or    dextrose bolus 10% 250 mL  250 mL IntraVENous PRN    glucagon injection 1 mg  1 mg SubCUTAneous PRN    dextrose 10 % infusion   IntraVENous Continuous PRN    pantoprazole (PROTONIX) tablet 40 mg  40 mg Oral BID    [Held by provider] pregabalin (LYRICA) capsule 75 mg  75 mg Oral BID    timolol (TIMOPTIC) 0.25 % ophthalmic solution 1 drop  1 drop Both Eyes BID    sodium chloride flush 0.9 % injection 5-40 mL  5-40 mL IntraVENous 2 times per day    sodium chloride flush 0.9 % injection 5-40 mL  5-40 mL IntraVENous PRN    0.9 % sodium chloride infusion   IntraVENous PRN    potassium chloride (KLOR-CON M) extended release tablet 40 mEq  40 mEq Oral PRN    Or    potassium bicarb-citric acid (EFFER-K) effervescent tablet 40 mEq  40 mEq Oral PRN    Or    potassium chloride 10 mEq/100 mL IVPB (Peripheral Line)  10 mEq IntraVENous PRN    magnesium sulfate 2000 mg in 50 mL IVPB premix  2,000 mg IntraVENous PRN    ondansetron (ZOFRAN-ODT) disintegrating tablet 4 mg  4 mg Oral Q8H PRN    Or    ondansetron (ZOFRAN) injection 4 mg  4 mg IntraVENous Q6H PRN    polyethylene glycol (GLYCOLAX) packet 17 g  17 g Oral Daily PRN    acetaminophen (TYLENOL) tablet 650 mg  650 mg Oral Q6H PRN    Or    acetaminophen (TYLENOL) suppository 650 mg  650 mg Rectal Q6H PRN    melatonin tablet 3 mg  3 mg Oral Nightly PRN

## 2025-03-05 NOTE — PROGRESS NOTES
Infectious Disease Progress     Impression:   Cellulitis of toes  Acute on chronic venous stasis  - afebrile, wbc 10.6    CRP 11.6    Blood cx (2/27) no growth     Duplex study of low extremities - No DVT     Type II DM; A1C 5.8  CHF  A-fib  - management per primary team    Plan:     - continue with IV rocephin and vancomycin    Pharmacy to dose per creatinine clearance.     Appreciate wound care input    CT of both feet mentioned fluid collection. Podiatry team was consulted to further evaluate necrotic tissue between left second and third toe      Plan of care d/w pt's nurse, Dr. Trivedi, and Dr. Angel               History of Present Illness   3/3/2025  Patient is a 79 y.o. female with medical history of CHF, A-fib, fibromyalgia, CKD, type II DM and chronic venous stasis was brought to ER on 2/27 with chest pain and leg swelling.     In ER, temp was 98.4 (T-max 101.5 on 2/28), wbc 10.1, u/a revealed wbc 5-10, 2+ bacteria, blood cx with no growth so far. Duplex study of low extremities revealed no DVT. CT of lower extremities and both feet revealed soft tissue swelling. Pt was started on IV merrem and vancomycin.     During visit, pt was alert, but confused. Not following any commends.      ID team was consulted for evaluation and treatment recommendations regarding leg cellulitis     Subjective:    Did not participate with ROS questionnaires.  Pt expressed that she needs help but unable to explain further. Not following any commends    Review of Systems:            Symptom Y/N Comments   Symptom Y/N Comments   Fever/Chills       Chest Pain        Poor Appetite       Edema        Cough       Abdominal Pain        Sputum       Joint Pain        SOB/DE       Pruritis/Rash        Nausea/vomit       Tolerating PT/OT        Diarrhea       Tolerating Diet        Constipation       Other           Could NOT obtain due to:         Past Medical History:   Diagnosis Date    Arthritis     CAD (coronary artery disease)   reaction. Joint fluid: None. Articulations: No significant osteoarthritis. No evidence of inflammatory arthritis. Muscles: No intramuscular hematoma. No focal atrophy. Soft tissue mass: None. No fluid collection diffuse soft tissue swelling no opaque foreign body     Diffuse soft tissue swelling, degenerative osteoarthritis Electronically signed by HANNA GAMBOA MD    CT TIBIA FIBULA RIGHT W CONTRAST    Result Date: 2/28/2025  EXAM: CT TIBIA FIBULA RIGHT W CONTRAST INDICATION: Pain swelling redness COMPARISON: None CONTRAST: 100 mL of Isovue-300. TECHNIQUE: Helical CT of the right tibia-fibula following intravenous contrast administration. Coronal and Sagittal reformats were generated. Images reviewed in soft tissue and bone windows. CT dose reduction was achieved through the use of a standardized protocol tailored for this examination and automatic exposure control for dose modulation. FINDINGS: Bones: Normal bone mineralization. No fracture, dislocation, or periosteal reaction. Joint fluid: None. Articulations: No significant osteoarthritis. No evidence of inflammatory arthritis. Muscles: No intramuscular hematoma. No focal atrophy. Soft tissue mass, fluid collection: None. Diffuse soft tissue swelling mainly laterally. Prominent vascular calcifications.     Diffuse soft tissue swelling. Mild degenerative osteoarthritis. No mass or fluid collection. No opaque foreign body. Electronically signed by HANNA GAMBOA MD    Vascular duplex lower extremity venous bilateral    Result Date: 2/27/2025    No evidence of superficial thrombosis in the right lower extremity. No evidence of deep vein or superficial vein thrombosis in the right lower extremity. Vessels demonstrate normal compressibility, color filling, and phasic and spontaneous flow.   No evidence of superficial thrombosis in the left lower extremity. No evidence of deep vein or superficial vein thrombosis in the left lower extremity. Vessels demonstrate normal

## 2025-03-05 NOTE — PROGRESS NOTES
.End of Shift Note    Bedside shift change report given to Zhanna RN  (oncoming nurse) by Candelaria Varela RN (offgoing nurse).  Report included the following information SBAR, Kardex, Intake/Output, MAR, Recent Results, and Med Rec Status    Shift worked:  7917-4689     Shift summary and any significant changes:     Pt could not take any PO meds per NPO order. Mouth care completed. Q2 turns completed.  updated on pt care by me and provider. Caring rounds completed.    Pt became tachycardic and oxygen in the 80's on 4 liters. EKG completed and showed Afib with RvR. Provider Farooq notified and he placed orders in for transfer.      Concerns for physician to address:  none     Zone phone for oncoming shift:   [phone]           Candelaria Varela, RN

## 2025-03-05 NOTE — PROGRESS NOTES
Physical Therapy    Chart reviewed, spoke with pt's nurse. Pt continues to be lethargic and not following commands, observed repeatedly moaning. Not appropriate for participation in PT session at this time. Will defer and continue to follow. Thank you.

## 2025-03-05 NOTE — PROGRESS NOTES
Neurology Note    Patient ID:  Rosita Morillo  [id number]  79 y.o.  1945      Date of Consultation:  March 5, 2025      Assessment and Plan:    The patient is a 79-year-old female with multiple medical conditions initially admitted to the hospital with lower extremity cellulitis who has developed progressive worsening of cognitive function.  Her neurological examination reveals significant cognitive impairment with generalized weakness    Altered mental status  Head CT with no acute abnormality. Chronic microvascular ischemic changes  EEG with mild generalized slowing and triphasic waves which are typically associated with metabolic abnormalities  This is most likely consistent with metabolic encephalopathy, systemic impact of infection, decreased nutritional status and medication  Continue Correcting underlying metabolic derangements-nephrology following  Glucose levels remain elevated  Attempt to optimize nutritional status  Minimize cognitively impacting medication  Continue aggressive treatment of underlying infection - ID following      Concern for possible underlying cognitive decline prior to admission:  I had previously discussed with the patient's son and daughter-in-law that more formal cognitive testing can be performed in the outpatient setting after discharge    There is no other additional neurology recommendations at this time.  If questions arise, please not hesitate to contact me and I will return to see the patient.             Subjective: minimal verbal output       History of Present Illness:   Rosita Morillo is a 79 y.o. female who was admitted to the hospital under 8/27/2025 due to a transfer from [facility] due to to complaints of progressive worsening, swelling, drainage in her left leg.  She was found to be afebrile and hypotensive at the outside hospital.  It was noted she did have cellulitis of her bilateral lower extremity as well as combined heart failure with a elevated  fasciculations    Motor:   Tone-slightly increased, left greater than right  No evidence of fasciculations  She participated a bit more with examination today.  She would lift each arm off of the bed and hold them for 5 seconds before a drift  Significant pain in her lower extremities minimizing formal testing    Sensory:  Upper extremity: intact to pp,  Lower extremity: intact to pp    Reflexes:    Right Left  Biceps  1 1  Triceps 1 1  Brachiorad. 1 1  Patella  1 1  Achilles NT NT    Cerebellar testing:  no tremor apparent  Gait: not assessed due to mental status.    Labs:     Lab Results   Component Value Date/Time     03/05/2025 05:46 AM    K 3.3 03/05/2025 05:46 AM     03/05/2025 05:46 AM    BUN 36 03/05/2025 05:46 AM    WBC 10.6 03/05/2025 05:46 AM    HCT 39.3 03/05/2025 05:46 AM    HGB 11.9 03/05/2025 05:46 AM     03/05/2025 05:46 AM                      Principal Problem:    Cellulitis  Active Problems:    Venous stasis dermatitis    Metabolic encephalopathy    Altered mental status  Resolved Problems:    * No resolved hospital problems. *      I spent   37  minutes providing care to this  acutely ill inpatient with > 50% of the time counseling and assisting in the coordination of care of the patient on the patient's hospital floor/unit.               Signed By:  Vasquez Galicia DO FAAN    March 5, 2025

## 2025-03-06 ENCOUNTER — APPOINTMENT (OUTPATIENT)
Facility: HOSPITAL | Age: 80
DRG: 637 | End: 2025-03-06
Payer: MEDICARE

## 2025-03-06 PROBLEM — J69.0 ASPIRATION PNEUMONIA (HCC): Status: ACTIVE | Noted: 2025-03-06

## 2025-03-06 LAB
ALBUMIN SERPL-MCNC: 2.9 G/DL (ref 3.5–5)
ALBUMIN/GLOB SERPL: 0.7 (ref 1.1–2.2)
ALP SERPL-CCNC: 111 U/L (ref 45–117)
ALT SERPL-CCNC: 19 U/L (ref 12–78)
ANION GAP SERPL CALC-SCNC: 6 MMOL/L (ref 2–12)
ANION GAP SERPL CALC-SCNC: 6 MMOL/L (ref 2–12)
ARTERIAL PATENCY WRIST A: POSITIVE
AST SERPL-CCNC: 24 U/L (ref 15–37)
BASE EXCESS BLD CALC-SCNC: 4 MMOL/L
BDY SITE: ABNORMAL
BILIRUB SERPL-MCNC: 0.7 MG/DL (ref 0.2–1)
BUN SERPL-MCNC: 41 MG/DL (ref 6–20)
BUN SERPL-MCNC: 44 MG/DL (ref 6–20)
BUN/CREAT SERPL: 26 (ref 12–20)
BUN/CREAT SERPL: 27 (ref 12–20)
CALCIUM SERPL-MCNC: 9 MG/DL (ref 8.5–10.1)
CALCIUM SERPL-MCNC: 9.5 MG/DL (ref 8.5–10.1)
CHLORIDE SERPL-SCNC: 123 MMOL/L (ref 97–108)
CHLORIDE SERPL-SCNC: 126 MMOL/L (ref 97–108)
CO2 SERPL-SCNC: 27 MMOL/L (ref 21–32)
CO2 SERPL-SCNC: 29 MMOL/L (ref 21–32)
CREAT SERPL-MCNC: 1.59 MG/DL (ref 0.55–1.02)
CREAT SERPL-MCNC: 1.64 MG/DL (ref 0.55–1.02)
ERYTHROCYTE [DISTWIDTH] IN BLOOD BY AUTOMATED COUNT: 22.6 % (ref 11.5–14.5)
GAS FLOW.O2 O2 DELIVERY SYS: ABNORMAL
GAS FLOW.O2 SETTING OXYMISER: 20 BPM
GLOBULIN SER CALC-MCNC: 4.1 G/DL (ref 2–4)
GLUCOSE BLD STRIP.AUTO-MCNC: 256 MG/DL (ref 65–117)
GLUCOSE BLD STRIP.AUTO-MCNC: 265 MG/DL (ref 65–117)
GLUCOSE BLD STRIP.AUTO-MCNC: 321 MG/DL (ref 65–117)
GLUCOSE SERPL-MCNC: 223 MG/DL (ref 65–100)
GLUCOSE SERPL-MCNC: 286 MG/DL (ref 65–100)
HCO3 BLD-SCNC: 30.1 MMOL/L (ref 21–28)
HCT VFR BLD AUTO: 43.7 % (ref 35–47)
HGB BLD-MCNC: 12.8 G/DL (ref 11.5–16)
LACTATE SERPL-SCNC: 2.3 MMOL/L (ref 0.4–2)
MAGNESIUM SERPL-MCNC: 2.2 MG/DL (ref 1.6–2.4)
MCH RBC QN AUTO: 24.9 PG (ref 26–34)
MCHC RBC AUTO-ENTMCNC: 29.3 G/DL (ref 30–36.5)
MCV RBC AUTO: 85 FL (ref 80–99)
NRBC # BLD: 0.1 K/UL (ref 0–0.01)
NRBC BLD-RTO: 0.4 PER 100 WBC
O2/TOTAL GAS SETTING VFR VENT: 80 %
PCO2 BLD: 49.7 MMHG (ref 35–48)
PEEP RESPIRATORY: 5 CMH2O
PH BLD: 7.39 (ref 7.35–7.45)
PHOSPHATE SERPL-MCNC: 2 MG/DL (ref 2.6–4.7)
PLATELET # BLD AUTO: 399 K/UL (ref 150–400)
PMV BLD AUTO: 11.8 FL (ref 8.9–12.9)
PO2 BLD: 194 MMHG (ref 83–108)
POTASSIUM SERPL-SCNC: 3.3 MMOL/L (ref 3.5–5.1)
POTASSIUM SERPL-SCNC: 3.5 MMOL/L (ref 3.5–5.1)
PROCALCITONIN SERPL-MCNC: 0.24 NG/ML
PROT SERPL-MCNC: 7 G/DL (ref 6.4–8.2)
RBC # BLD AUTO: 5.14 M/UL (ref 3.8–5.2)
SAO2 % BLD: 99.7 % (ref 92–97)
SERVICE CMNT-IMP: ABNORMAL
SODIUM SERPL-SCNC: 158 MMOL/L (ref 136–145)
SODIUM SERPL-SCNC: 159 MMOL/L (ref 136–145)
SPECIMEN TYPE: ABNORMAL
VENTILATION MODE VENT: ABNORMAL
VT SETTING VENT: 320 ML
WBC # BLD AUTO: 26.2 K/UL (ref 3.6–11)

## 2025-03-06 PROCEDURE — 6360000002 HC RX W HCPCS: Performed by: NURSE PRACTITIONER

## 2025-03-06 PROCEDURE — 93005 ELECTROCARDIOGRAM TRACING: CPT | Performed by: HOSPITALIST

## 2025-03-06 PROCEDURE — 36600 WITHDRAWAL OF ARTERIAL BLOOD: CPT

## 2025-03-06 PROCEDURE — 82803 BLOOD GASES ANY COMBINATION: CPT

## 2025-03-06 PROCEDURE — 6370000000 HC RX 637 (ALT 250 FOR IP): Performed by: NURSE PRACTITIONER

## 2025-03-06 PROCEDURE — 6370000000 HC RX 637 (ALT 250 FOR IP): Performed by: STUDENT IN AN ORGANIZED HEALTH CARE EDUCATION/TRAINING PROGRAM

## 2025-03-06 PROCEDURE — 80053 COMPREHEN METABOLIC PANEL: CPT

## 2025-03-06 PROCEDURE — 6370000000 HC RX 637 (ALT 250 FOR IP): Performed by: INTERNAL MEDICINE

## 2025-03-06 PROCEDURE — 84145 PROCALCITONIN (PCT): CPT

## 2025-03-06 PROCEDURE — 2500000003 HC RX 250 WO HCPCS

## 2025-03-06 PROCEDURE — 83735 ASSAY OF MAGNESIUM: CPT

## 2025-03-06 PROCEDURE — 2580000003 HC RX 258: Performed by: NURSE PRACTITIONER

## 2025-03-06 PROCEDURE — 2500000003 HC RX 250 WO HCPCS: Performed by: NURSE PRACTITIONER

## 2025-03-06 PROCEDURE — 83605 ASSAY OF LACTIC ACID: CPT

## 2025-03-06 PROCEDURE — 94003 VENT MGMT INPAT SUBQ DAY: CPT

## 2025-03-06 PROCEDURE — 6360000002 HC RX W HCPCS: Performed by: INTERNAL MEDICINE

## 2025-03-06 PROCEDURE — 87040 BLOOD CULTURE FOR BACTERIA: CPT

## 2025-03-06 PROCEDURE — 2000000000 HC ICU R&B

## 2025-03-06 PROCEDURE — 93005 ELECTROCARDIOGRAM TRACING: CPT | Performed by: INTERNAL MEDICINE

## 2025-03-06 PROCEDURE — 2500000003 HC RX 250 WO HCPCS: Performed by: STUDENT IN AN ORGANIZED HEALTH CARE EDUCATION/TRAINING PROGRAM

## 2025-03-06 PROCEDURE — 2580000003 HC RX 258: Performed by: INTERNAL MEDICINE

## 2025-03-06 PROCEDURE — 84100 ASSAY OF PHOSPHORUS: CPT

## 2025-03-06 PROCEDURE — 36415 COLL VENOUS BLD VENIPUNCTURE: CPT

## 2025-03-06 PROCEDURE — 85027 COMPLETE CBC AUTOMATED: CPT

## 2025-03-06 PROCEDURE — 76937 US GUIDE VASCULAR ACCESS: CPT

## 2025-03-06 PROCEDURE — 71045 X-RAY EXAM CHEST 1 VIEW: CPT

## 2025-03-06 PROCEDURE — 6360000002 HC RX W HCPCS

## 2025-03-06 PROCEDURE — 82962 GLUCOSE BLOOD TEST: CPT

## 2025-03-06 PROCEDURE — 87154 CUL TYP ID BLD PTHGN 6+ TRGT: CPT

## 2025-03-06 PROCEDURE — 99232 SBSQ HOSP IP/OBS MODERATE 35: CPT | Performed by: NURSE PRACTITIONER

## 2025-03-06 RX ORDER — CHLORHEXIDINE GLUCONATE ORAL RINSE 1.2 MG/ML
15 SOLUTION DENTAL 2 TIMES DAILY
Status: DISCONTINUED | OUTPATIENT
Start: 2025-03-06 | End: 2025-03-07

## 2025-03-06 RX ORDER — SENNOSIDES 8.8 MG/5ML
10 LIQUID ORAL DAILY
Status: DISCONTINUED | OUTPATIENT
Start: 2025-03-06 | End: 2025-03-07

## 2025-03-06 RX ORDER — HEPARIN SODIUM 5000 [USP'U]/ML
5000 INJECTION, SOLUTION INTRAVENOUS; SUBCUTANEOUS EVERY 8 HOURS SCHEDULED
Status: DISCONTINUED | OUTPATIENT
Start: 2025-03-06 | End: 2025-03-14 | Stop reason: HOSPADM

## 2025-03-06 RX ORDER — METRONIDAZOLE 500 MG/100ML
500 INJECTION, SOLUTION INTRAVENOUS EVERY 8 HOURS
Status: DISCONTINUED | OUTPATIENT
Start: 2025-03-06 | End: 2025-03-06

## 2025-03-06 RX ORDER — INDOMETHACIN 25 MG/1
150 CAPSULE ORAL ONCE
Status: DISCONTINUED | OUTPATIENT
Start: 2025-03-06 | End: 2025-03-06

## 2025-03-06 RX ORDER — CHLORHEXIDINE GLUCONATE ORAL RINSE 1.2 MG/ML
15 SOLUTION DENTAL 2 TIMES DAILY
Status: DISCONTINUED | OUTPATIENT
Start: 2025-03-06 | End: 2025-03-10

## 2025-03-06 RX ORDER — INSULIN GLARGINE 100 [IU]/ML
10 INJECTION, SOLUTION SUBCUTANEOUS 2 TIMES DAILY
Status: DISCONTINUED | OUTPATIENT
Start: 2025-03-06 | End: 2025-03-14 | Stop reason: HOSPADM

## 2025-03-06 RX ORDER — INSULIN GLARGINE 100 [IU]/ML
5 INJECTION, SOLUTION SUBCUTANEOUS ONCE
Status: COMPLETED | OUTPATIENT
Start: 2025-03-06 | End: 2025-03-06

## 2025-03-06 RX ORDER — INSULIN LISPRO 100 [IU]/ML
4 INJECTION, SOLUTION INTRAVENOUS; SUBCUTANEOUS EVERY 8 HOURS
Status: DISCONTINUED | OUTPATIENT
Start: 2025-03-06 | End: 2025-03-09

## 2025-03-06 RX ORDER — POLYETHYLENE GLYCOL 3350 17 G/17G
17 POWDER, FOR SOLUTION ORAL DAILY
Status: DISCONTINUED | OUTPATIENT
Start: 2025-03-06 | End: 2025-03-07

## 2025-03-06 RX ADMIN — INSULIN LISPRO 0 UNITS: 100 INJECTION, SOLUTION INTRAVENOUS; SUBCUTANEOUS at 15:00

## 2025-03-06 RX ADMIN — PIPERACILLIN AND TAZOBACTAM 3375 MG: 3; .375 INJECTION, POWDER, LYOPHILIZED, FOR SOLUTION INTRAVENOUS at 09:58

## 2025-03-06 RX ADMIN — SENNOSIDES 17.6 MG: 8.8 LIQUID ORAL at 12:23

## 2025-03-06 RX ADMIN — INSULIN GLARGINE 5 UNITS: 100 INJECTION, SOLUTION SUBCUTANEOUS at 12:21

## 2025-03-06 RX ADMIN — CHLORHEXIDINE GLUCONATE 15 ML: 1.2 RINSE ORAL at 21:31

## 2025-03-06 RX ADMIN — PIPERACILLIN AND TAZOBACTAM 3375 MG: 3; .375 INJECTION, POWDER, LYOPHILIZED, FOR SOLUTION INTRAVENOUS at 20:23

## 2025-03-06 RX ADMIN — SODIUM CHLORIDE, PRESERVATIVE FREE 10 ML: 5 INJECTION INTRAVENOUS at 08:35

## 2025-03-06 RX ADMIN — VANCOMYCIN HYDROCHLORIDE 750 MG: 750 INJECTION, POWDER, LYOPHILIZED, FOR SOLUTION INTRAVENOUS at 12:04

## 2025-03-06 RX ADMIN — SODIUM CHLORIDE, PRESERVATIVE FREE 10 ML: 5 INJECTION INTRAVENOUS at 08:30

## 2025-03-06 RX ADMIN — NOREPINEPHRINE BITARTRATE 4 MCG/MIN: 64 SOLUTION INTRAVENOUS at 22:21

## 2025-03-06 RX ADMIN — INSULIN LISPRO 4 UNITS: 100 INJECTION, SOLUTION INTRAVENOUS; SUBCUTANEOUS at 03:14

## 2025-03-06 RX ADMIN — HEPARIN SODIUM 5000 UNITS: 5000 INJECTION INTRAVENOUS; SUBCUTANEOUS at 17:00

## 2025-03-06 RX ADMIN — CEFEPIME 2000 MG: 2 INJECTION, POWDER, FOR SOLUTION INTRAVENOUS at 01:18

## 2025-03-06 RX ADMIN — NYSTATIN 500000 UNITS: 100000 SUSPENSION ORAL at 21:31

## 2025-03-06 RX ADMIN — POTASSIUM & SODIUM PHOSPHATES POWDER PACK 280-160-250 MG 500 MG: 280-160-250 PACK at 12:21

## 2025-03-06 RX ADMIN — NOREPINEPHRINE BITARTRATE 4 MCG/MIN: 64 SOLUTION INTRAVENOUS at 00:03

## 2025-03-06 RX ADMIN — HEPARIN SODIUM 5000 UNITS: 5000 INJECTION INTRAVENOUS; SUBCUTANEOUS at 01:18

## 2025-03-06 RX ADMIN — Medication 1 AMPULE: at 20:30

## 2025-03-06 RX ADMIN — PROPOFOL 15 MCG/KG/MIN: 10 INJECTION, EMULSION INTRAVENOUS at 16:22

## 2025-03-06 RX ADMIN — ATORVASTATIN CALCIUM 10 MG: 10 TABLET, FILM COATED ORAL at 09:20

## 2025-03-06 RX ADMIN — PROPOFOL 30 MCG/KG/MIN: 10 INJECTION, EMULSION INTRAVENOUS at 00:12

## 2025-03-06 RX ADMIN — POLYETHYLENE GLYCOL 3350 17 G: 17 POWDER, FOR SOLUTION ORAL at 15:15

## 2025-03-06 RX ADMIN — HEPARIN SODIUM 5000 UNITS: 5000 INJECTION INTRAVENOUS; SUBCUTANEOUS at 09:19

## 2025-03-06 RX ADMIN — DEXMEDETOMIDINE HYDROCHLORIDE 0.4 MCG/KG/HR: 400 INJECTION, SOLUTION INTRAVENOUS at 09:09

## 2025-03-06 RX ADMIN — INSULIN GLARGINE 5 UNITS: 100 INJECTION, SOLUTION SUBCUTANEOUS at 09:14

## 2025-03-06 RX ADMIN — INSULIN LISPRO 4 UNITS: 100 INJECTION, SOLUTION INTRAVENOUS; SUBCUTANEOUS at 09:34

## 2025-03-06 RX ADMIN — INSULIN GLARGINE 10 UNITS: 100 INJECTION, SOLUTION SUBCUTANEOUS at 21:31

## 2025-03-06 RX ADMIN — ACETAMINOPHEN 650 MG: 325 TABLET ORAL at 21:49

## 2025-03-06 RX ADMIN — PIPERACILLIN AND TAZOBACTAM 3375 MG: 3; .375 INJECTION, POWDER, LYOPHILIZED, FOR SOLUTION INTRAVENOUS at 22:00

## 2025-03-06 RX ADMIN — LANSOPRAZOLE 30 MG: 30 TABLET, ORALLY DISINTEGRATING, DELAYED RELEASE ORAL at 06:20

## 2025-03-06 RX ADMIN — INSULIN LISPRO 6 UNITS: 100 INJECTION, SOLUTION INTRAVENOUS; SUBCUTANEOUS at 20:17

## 2025-03-06 RX ADMIN — DEXMEDETOMIDINE HYDROCHLORIDE 0.2 MCG/KG/HR: 400 INJECTION, SOLUTION INTRAVENOUS at 21:53

## 2025-03-06 RX ADMIN — NYSTATIN 500000 UNITS: 100000 SUSPENSION ORAL at 09:19

## 2025-03-06 RX ADMIN — DEXMEDETOMIDINE HYDROCHLORIDE 0.2 MCG/KG/HR: 400 INJECTION, SOLUTION INTRAVENOUS at 00:11

## 2025-03-06 RX ADMIN — PROPOFOL 10 MCG/KG/MIN: 10 INJECTION, EMULSION INTRAVENOUS at 21:56

## 2025-03-06 RX ADMIN — PROPOFOL 20 MCG/KG/MIN: 10 INJECTION, EMULSION INTRAVENOUS at 05:19

## 2025-03-06 RX ADMIN — METRONIDAZOLE 500 MG: 500 INJECTION, SOLUTION INTRAVENOUS at 13:42

## 2025-03-06 RX ADMIN — TIMOLOL MALEATE 1 DROP: 2.5 SOLUTION OPHTHALMIC at 21:32

## 2025-03-06 ASSESSMENT — PULMONARY FUNCTION TESTS
PIF_VALUE: 25
PIF_VALUE: 22
PIF_VALUE: 27
PIF_VALUE: 30
PIF_VALUE: 21

## 2025-03-06 ASSESSMENT — PAIN SCALES - GENERAL
PAINLEVEL_OUTOF10: 0

## 2025-03-06 NOTE — INTERDISCIPLINARY ROUNDS
Critical care interdisciplinary rounds today.  Following members present: Case Management, , Clinical Lead, Diabetes Team, Nursing, Nutrition, Pharmacy, and Physician. Family invited to participate.  Plan of care discussed.  See clinical pathway for plan of care and interventions and desired outcomes.    Pt. Remains intubated and sedated.     Levophed stopped this AM.     Harvey in place for strict I/O's.

## 2025-03-06 NOTE — PROGRESS NOTES
alberto  Musculoskeletal: WOLFE  Skin: No acute injury noted, BLE cellulitis  Neurologic: AMS. No acute focal motor or sensory deficits noted.      ASSESSMENT/ INTERVENTION/ RESPONSE  Acute hypoxic respiratory distress -  Consider pulmonary edema, ARDS, pleural effusion, PNA.  -bumex 1mg IV now  -NRB applied  -ABG- pH 7.29, CO2 55, pO2 71, bicarb 26.3 on NRB, sats 89-90%, resp rate 30s and labored , unfortunately patient is too altered to be a candidate for bipap per policy although this could help with her work of breathing. Hiflow applied while icu eval pending  -dominguez in place, strict I/Os  -CXR completed and reviewed- likely will need thora  -consult intensivist for respiratory distress, consider bipap/hiflow/intubation     Afib RVR-   -intermittently appears sinus tach  -dilt gtt  -likely driven from respiratory distress    Her spouse was called and informed of the above events and possible transfer to ICU.  Prefers to be called at [phone] and updated with any changes/transfers.    Remaining work-up as above ordered and final results pending.  Will defer further evaluation/management to the ICU care team who will now be the primary care team.    -Discussed with Dr Schilling plan of care and findings as outlined in this note  -Recent lab work and notes reviewed.    I have spent 45 minutes of critical care time involved in lab review, consultations with specialist, family decision- making, bedside attention and documentation. During this entire length of time I was immediately available to the patient .     Critical Care:  The reason for providing this level of medical care for this critically ill patient was due to a critical illness that impaired one or more vital organ systems, such that there was a high probability of imminent or life threatening deterioration in the patient's condition. This care involved high complexity decision making to assess, manipulate, and support vital system functions, to treat  this degree of vital organ system failure, and to prevent further life threatening deterioration of the patient’s condition.    Signed: ALEX Mayfield NP  [facility] [place] - Internal Medicine Hospitalist/Nocturnist  3/5/2025 7:41 PM    Please do not hesitate to reach out to myself or assigned provider on-call via [employer] paging system with questions or concerns.

## 2025-03-06 NOTE — PROGRESS NOTES
Pt intubated at 2310 for airway protection. Current ventilator settings documented below.      03/05/25 2318   Patient Observation   Pulse 84   Respirations 20   SpO2 100 %   Breath Sounds   Respiratory Pattern Regular   Right Upper Lobe Coarse crackles;Diminished   Right Middle Lobe Coarse crackles;Diminished   Right Lower Lobe Diminished   Left Upper Lobe Coarse crackles;Diminished   Left Lower Lobe Coarse crackles;Diminished   Vent Information   Ventilator ID [id number]   Ventilator Safety Check Performed Pre-Use Yes   Ventilator Initiate Yes   Vent Mode AC/VC   $Ventilation $Initial Day   Ventilator Settings   FiO2  80 %   Vt (Set, mL) 320 mL   Resp Rate (Set) 20 bpm   PEEP/CPAP (cmH2O) 5   Vent Patient Data (Readings)   Vt Mandatory Exp (mL) 329 mL   Vt (Measured) 329 mL   Peak Inspiratory Pressure (cmH2O) 30 cmH2O   Rate Measured 20 br/min   Minute Volume (L/min) 6.6 Liters   Peak Inspiratory Flow (lpm) 45 L/sec   Mean Airway Pressure (cmH2O) 12 cmH20   Plateau Pressure (cm H2O) 23 cm H2O   Driving Pressure 18   I:E Ratio 1:2.8   Flow Sensitivity 3 L/min   PEEP Intrinsic (cm H2O) 0.5 cm H2O   Static Compliance (L/cm H2O) 17   Dynamic Compliance (L/cm H2O) 16 L/cm H2O   Airway Resistance 18   Backup Apnea On   Backup Rate 20 Breaths Per Minute   Backup Vt 320   Vent Alarm Settings   High Pressure (cmH2O) 40 cmH2O   Low Minute Volume (lpm) 2 L/min   High Minute Volume (lpm) 20 L/min   Low Exhaled Vt (ml) 200 mL   High Exhaled Vt (ml) 750 mL   RR High (bpm) 40 br/min   Apnea (secs) 20 secs   Additional Respiratoray Assessments   Humidification Source Heated wire   Humidification Temp 37   Circuit Condensation Not drained   Ambu Bag With Mask At Bedside Yes   Airway Clearance   Suction ET Tube   Suction Device Inline suction catheter   Sputum Method Obtained Endotracheal   Sputum Amount Small   Sputum Color/Odor White;Clear   Sputum Consistency Thin;Frothy   ETT    Placement Date/Time: 03/05/25 (c) 2477    Placement Verified By: Auscultation;Capnometry  Preoxygenation: Yes  Mask Ventilation: Mask ventilation not attempted (0)  Technique: Video laryngoscopy  Airway Tube Size: 7.5 mm  Laryngoscope: Mac  Blade Size:...   $ Intubation Emergent  $ Yes   Secured At 21 cm   Measured From Teeth   ETT Placement Center   Secured By Commercial tube velez   Site Assessment Dry   Cuff Pressure   (MLT)   Tie/Velez Changed Yes   Ventilator Associated Pneumonia Bundle   Elevation of Head of Bed to 30-45 Degrees  Yes

## 2025-03-06 NOTE — CONSULTS
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  She was started on vanc/cefepime. Her course was complicated by encephalopathy, AF RVR requiring dilt drip, and ESPINOZA on CKD3. Neurology was consulted for progressively worsening encephalopathy. EEG negative for seizure. Rapid response was called on 3/5 for worsening hypoxia.  She was transferred to the ICU. After a trial of BiPAP and diuresis she ultimately required intubation for hypoxia in the setting of poor mentation with lack of ability to protect her airway.  It was noted she also had a large R pleural effusion contributing to the hypoxia.     COMPREHENSIVE ASSESSMENT & PLAN:SYSTEM BASED     24 HOUR EVENTS: Acute hypoxic respiratory failure    NEUROLOGICAL:     Toxic metabolic encephalopathy: CT head negative. EEG negative for seizure.   - Neurology following  - Avoid benzos    PULMONOLOGY:     Acute hypoxic respiratory failure requiring MV: New/worsening R pleural effusion. Low grade temp to 100.6. Worsening hypoxia.   - Procal/LA  - Blood/sputum cultures  - MRSA swab  - Plan for thora tomorrow; informed spouse  - Broadened abx to vanc/cefepime    CARDIOVASCULAR:     Septic shock: Broadened abx to vanc/cefepime. Now on levophed. Had acute drop in BP following intubation  - Levo for MAP >65  - Abx as above  - Cultures (blood/sputum)  - Trend LA  - Procal  - Plan for thora tomorrow    PAF:  - Stopped dilt due to hypotension  - Will utilize amio if needed    GASTROINTESTINAL      NPO    RENAL/ELECTROLYTE/FLUIDS:     ESPINOZA ok CKD3  -      Intake/Output Summary (Last 24 hours) at 3/5/2025 2139  Last data filed at 3/5/2025 2100  Gross per 24 hour   Intake 2073.45 ml   Output 1500 ml   Net 573.45 ml       Imaging    CXR: Large R pleural effusion    CRITICAL CARE DOCUMENTATION  I had a face to face encounter with the patient, reviewed and interpreted patient data including clinical events, labs, images, vital signs, I/O's, and examined patient.  I have discussed the case and the plan and management of the patient's care with the consulting services, the bedside nurses and the respiratory therapist.      NOTE OF PERSONAL INVOLVEMENT IN CARE   This patient has a high probability of imminent, clinically significant deterioration, which requires the highest level of preparedness to intervene urgently. I participated in the decision-making and personally managed or directed the management of the following life and organ supporting interventions that required my frequent assessment to treat or prevent imminent deterioration.    I personally spent 90 minutes of critical care time.  This is time spent at this critically ill patient's bedside actively involved in patient care as well as the coordination of care.  This does not include any procedural time which has been billed separately.    Brooke Moreau, APRN - NP   Critical Care Medicine  [facility]

## 2025-03-06 NOTE — PROGRESS NOTES
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  She was started on vanc/cefepime. Her course was complicated by encephalopathy, AF RVR requiring dilt drip, and ESPINOZA on CKD3. Neurology was consulted for progressively worsening encephalopathy. EEG negative for seizure. Rapid response was called on 3/5 for worsening hypoxia.  She was transferred to the ICU. After a trial of BiPAP and diuresis she ultimately required intubation for hypoxia in the setting of poor mentation with lack of ability to protect her airway.  It was noted she also had a large R pleural effusion contributing to the hypoxia.      3/6 - no acute change after intubation. OFF NE        Assessment/Plan:   Toxic metabolic encephalopathy: CT head negative. EEG negative for seizure.   - Neurology following  - Avoid benzos     PULMONOLOGY:      Acute hypoxic respiratory failure requiring MV: New/worsening R pleural effusion. Low grade temp to 100.6. Worsening hypoxia.   ? Pleural effusion vs infiltrate  - Procal/LA  - Blood/sputum cultures pending  - MRSA swab  - Broadened abx to vanc/cefepime     Bedside US preformed without large fluid pocket, no site for thoracentesis    CARDIOVASCULAR:      Septic shock: Broadened abx to vanc/cefepime. Now on levophed. Had acute drop in BP following intubation  - Levo for MAP >65  - Abx as above  - ID following - follow up recommendations      PAF:  -  amio if needed     GASTROINTESTINAL                 NPO     RENAL/ELECTROLYTE/FLUIDS:    Hypernatremia  ESPINOZA ok CKD3  - free water added.   - repeat bmp      ENDOCRINE:      Maintain euglycemia     HEMATOLOGY/ONCOLOGY:      Daily

## 2025-03-06 NOTE — PROGRESS NOTES
NAME: Rosita Morillo        :  1945        MRN:  [id number]         Assessment:    ESPINOZA on CKD stage 3a: Cr had been 1.3-1.5 with recent diuresis, now 1.6. IV contrast exposure on admission but no significant LEIGH noted. Baseline Cr seems to be 1.1-1.3     Hypernatremia: worse, Na 159 (corrected is 161) -> FWD ~4.6L. Due to recent IV diuresis. +AMS     Combined CHF: EF 45-50%. LE edema has improved s/p diuresis     LE cellulitis     DM2     Hypokalemia: 2 to diuresis. Mg ok    HTN    Acute hypoxic respiratory failure - now in icu on vent     Plan/Recommendations:    continue D5W + 20meq/L Kcl at 100cc/hr  Needs Control of hyperglycemia   Start TF's along with free water (250 ml q 3-4 hours)??    Holding IV Bumex (had a one time dose on 3/5) and Jardiance    On midodrine    Strict I/Os  Am labs      Subjective:     Chief Complaint:   hard to awaken. Chart reviewed     Review of Systems:    Symptom Y/N Comments  Symptom Y/N Comments   Fever/Chills    Chest Pain     Poor Appetite    Edema     Cough    Abdominal Pain     Sputum    Joint Pain     SOB/DE    Pruritis/Rash     Nausea/vomit    Tolerating PT/OT     Diarrhea    Tolerating Diet     Constipation    Other       Could not obtain due to:      Objective:     VITALS:   Last 24hrs VS reviewed since prior progress note. Most recent are:  Vitals:    25 1000   BP: (!) 151/58   Pulse: 80   Resp: (!) 6   Temp:    SpO2: 100%       Intake/Output Summary (Last 24 hours) at 3/6/2025 1055  Last data filed at 3/6/2025 1000  Gross per 24 hour   Intake 266.63 ml   Output 1085 ml   Net -818.37 ml      Telemetry Reviewed:     PHYSICAL EXAM:  General: NAD      Lab Data Reviewed: (see below)    Medications Reviewed: (see below)    PMH/SH reviewed - no change compared to H&P  ________________________________________________________________________  Care Plan discussed with:  Patient     Family      RN

## 2025-03-06 NOTE — PROGRESS NOTES
2045: Verbal report received from ALEXANDRIA Diego. Pt appears in distress wearing NRB.     2050: Patient transitioned to HFNC 60l @ 100%    2055: FLORENCIO Cox at bedside. Orders for Bipap    2100: RT @ bedside. Bipap placed 14/8 @ 60%    2130: Patient  notified, no answer. Voicemail left with callback number.     2310: Plans to intubate for airway protection per intensivist. RT Eva and Dr. Steel at bedside. 16mg Etomidate, 400 mcg Phenylephrine and 120mg Succinylcholine given for successful intubation. ETT 7.5, 21 @ teeth.    2322: Dilt gtt stopped. HR 80-90s    0010: Levophed for BP management. Propofol and Precedex infusing for sedaton     0058: ABG resulted. See results. FIO2 weaned to 60%    0100: OGT placed @ 55cm    0125: CXR confirms ETT placement     0300: Levophed weaned off per order. /57 (80)    0551: Critical lactic acid 2.3. Florencio Cox notified.     0700: Bedside and Verbal shift change report given to ALEXANDRIA Monteiro (oncoming nurse) by ALEXANDRIA Field (offgoing nurse). Report included the following information Nurse Handoff Report, Adult Overview, Intake/Output, MAR, Recent Results, Med Rec Status, Cardiac Rhythm NSR/AF, and Alarm Parameters

## 2025-03-06 NOTE — PROGRESS NOTES
Speech Pathology Note    Chart reviewed. Note RRT called yesterday for acute hypoxic respiratory distress, initially placed on BiPAP however requiring intubation. Patient remains intubated at this time and is not currently appropriate for SLP treatment. Will complete orders at this time. Please re-consult once patient extubated and medically stable for SLP intervention.    Brian Parsons M.S., CCC-SLP

## 2025-03-06 NOTE — PROGRESS NOTES
Occupational Therapy     Patient currently intubated and sedated, not appropriate for participation in therapy. Will complete OT order, but please re-consult OT when patient is medically appropriate and able to participate in therapy.  Allan Rodriguez, OTR/L

## 2025-03-06 NOTE — PROGRESS NOTES
03/06/25 0413   B: Both Spontaneous Awakening and Breathing Trials   Was Patient Receiving Mechanical Ventilation Yes   Safety Screening Spontaneous Breathing Trial (SBT) (S)  FIO2 is greater than 50%  (Patient is on 60% FiO2)

## 2025-03-06 NOTE — SIGNIFICANT EVENT
[facility]  RAPID RESPONSE TEAM   RN NOTE       Overhead rapid response called to room # 2302/01  @ 1914 for patient Rosita Morillo , MRN# [id number]      S- Reason for rapid response: afib with RVR/Hypoxia  Per primary RN:      O/A- Focused Assessment:   Cardiac- afib with rvr hr in the 150s. Bp stable 150s. Low grade fever 99F  Respiratory- on NRB, sats in high 80 to low 90s. Coarse lung sounds.   Nuero- does not follow command, moans.   GI//Skin bruses  Lines/drain-PIV x2 and dominguez  Labs/chart reviewed-  Yes.  3.3 K was getting K rider.       Interventions:      Dr. Schilling and APRN at bedside, orders received for the following Interventions:   - EKG  - Labs  - Abg  - CXR  - CCU consult  - not able to do BIPAP  - HHFNC  - dilt gtt    P- Outcome:   Patient stable, vss, does not follow command. On HHFNC.     Patient Disposition:   Patient transferred to higher level of care following RRT?: Yes  RRT End Time: transferred to room 2524 @ 0812  Patient/family education provided as applicable.        Thank you for this RRT call.   Care Collaborated with primary RN, CRN, RT, and attending MD.   Please refer to the flowsheet for detailed vital signs during this event.   See MD notes for details and plan of care.     Code Status: Full Code   Attending MD: Kusum Trivedi MD   Vitals:    03/05/25 1935   BP: (!) 153/95   Pulse: (!) 149   Resp: 19   Temp:    SpO2: 90%          ADELE AndresN, RN, CCRN, TCRN  Rapid Response Team  Ext [phone]

## 2025-03-06 NOTE — PROGRESS NOTES
Placed on BiPAP at 2100 per FLORENCIO Moreau order on settings documented below. Plan to repeat ABG in 1-2 hours after starting BiPAP.      03/05/25 2100   NIV Type   $NIV $Daily Charge   Ventilator ID [id number]   Suction Setup and Functional Yes   NIV Started/Stopped On   Equipment Type (S)  Trilogy   Mode Bilevel   Mask Type Full face mask   Mask Size Large   Assessment   Pulse (!) 130   Heart Rate Source Monitor   Respirations 22   BP (!) 142/68   Cardiac Rhythm Atrial fib   SpO2 99 %   Level of Consciousness 1   Comfort Level Good   Using Accessory Muscles No   Mask Compliance Good   Skin Assessment Clean, dry, & intact   Skin Protection for O2 Device Yes   Orientation Anterior   Location Other (Comment);Nose  (Bridge of Nose)   Breath Sounds   Respiratory Pattern Tachypneic   Breath Sounds Bilateral Crackles ;Diminished   Settings/Measurements   PIP Observed 14.1 cm H20   IPAP 14 cmH20   CPAP/EPAP 8 cmH2O   Vt (Measured) 393 mL   Rate Ordered 18   Insp Rise Time (%) 3 %   FiO2  60 %   I Time/ I Time % 0.9 s   Minute Volume (L/min) 8.6 Liters   Mask Leak (lpm) 59.4 lpm   Patient's Home Machine No   Alarm Settings   Alarms On Y   Apnea (secs) 20 secs   RR Low (bpm) 8   RR High (bpm) 50 br/min   Oxygen Therapy/Pulse Ox   O2 Therapy Oxygen   O2 Device PAP (positive airway pressure)   Pulse Oximeter Device Mode Continuous

## 2025-03-06 NOTE — PROGRESS NOTES
Chart reviewed, events noted. Pt currently intubated, sedated, and not appropriate for participation with therapy. Pt has been inappropriate for participation with therapy x3 previous attempts. Will complete PT order at this time however please re-consult when medically stable and able to actively participate with therapy. Thank you    Marilin Casiano, PT, DPT

## 2025-03-06 NOTE — ANESTHESIA PROCEDURE NOTES
Airway  Date/Time: 3/5/2025 11:10 PM  Urgency: emergent    Airway not difficult    General Information and Staff    Patient location during procedure: ICU  Anesthesiologist: Jun Steel MD  Performed: anesthesiologist   Performed by: Jun Steel MD  Authorized by: Jun Steel MD      Consent for Airway (if performed for an anesthetic, see related documentation for consents)  Patient identity confirmed: per hospital policy  Consent: The procedure was performed in an emergent situation. Verbal consent not obtained. Written consent not obtained.  Risks and benefits: risks, benefits and alternatives were not discussed      Indications and Patient Condition  Indications for airway management: airway protection  Spontaneous ventilation: present  Sedation level: deep  Preoxygenated: yes  Mask difficulty assessment: not attempted    Final Airway Details  Final airway type: endotracheal airway      Successful airway: ETT  Cuffed: yes   Successful intubation technique: video laryngoscopy  Endotracheal tube insertion site: oral  Blade: Jocelin  Blade size: #3  ETT size (mm): 7.5  Cormack-Lehane Classification: grade I - full view of glottis  Placement verified by: chest auscultation and capnometry   Measured from: teeth  ETT to teeth (cm): 21  Number of attempts at approach: 1    Additional Comments  Preoxygenated with BiPAP. Given phenylephrine 400 mcg in divided doses, 16 mg etomidate, 120 mg succinylcholine. HD stable after intubation.   no

## 2025-03-06 NOTE — PROGRESS NOTES
Pt was transferred to PCU room 2302. Pt tached up to 180 and was in v tach. Rapid response was called in room 2302.

## 2025-03-06 NOTE — PROGRESS NOTES
Pharmacy Antimicrobial Kinetic Dosing    Indication for Antimicrobials: SSTI/cellulitis of toes, PNA    Current Regimen of Each Antimicrobial:  Vancomycin Pharmacy to Dose, Start ; Day # 7  Zosyn 3.375g IV q8h; Start 3/6; Day # 1    Previous Antimicrobial Therapy:  Ceftriaxone 3/3-3  Cefepime 3/   Metronidazole 3/6    Goal Level: Vancomycin -600    Date Dose & Interval Measured (mcg/mL) Predicted AUC 24-48 Predicted AUC 24,ss   3/1  500mg q12h 15.1 541 636   3/5  750mg q24h  14.2 427 427            Significant Cultures:    Blood, paired: negative  3/6 Blood, paired: pending    Labs:  Recent Labs     Units 25  0457 25  1930 25  0546 25  2042 25  0521   CREATININE MG/DL 1.59* 1.79* 1.44*   < > 1.33*   BUN MG/DL 41* 38* 36*   < > 39*   WBC K/uL 26.2* 14.1* 10.6  --  8.6    < > = values in this interval not displayed.     Temp (24hrs), Av.2 °F (36.8 °C), Min:97.5 °F (36.4 °C), Max:99.5 °F (37.5 °C)    Conditions for Dosing Consideration: None    Creatinine Clearance (mL/min): Estimated Creatinine Clearance: 31 mL/min (A) (based on SCr of 1.59 mg/dL (H)).     Impression/Plan:   Continue vancomycin 750mg IV q24h   Predicted ENK21-08 = 451, Predicted AUC24,ss = 461  BMP and CBC ordered per protocol  Antimicrobial stop date 3/10     Pharmacy will follow daily and adjust medications as appropriate for renal function and/or serum levels.    Thank you,  AUDIE BENAVIDES [facility]

## 2025-03-06 NOTE — PROGRESS NOTES
Comprehensive Nutrition Assessment    Type and Reason for Visit:  Initial, Consult    Nutrition Recommendations/Plan:   Initiate TF via OGT:  Start Glucerna 1.5 @ 10mL/h, advance rate 10mL q 8h as tolerated to Goal of 30mL/h + Prosource daily + 250mL flush q 3h (provides 1160kcals/79gPro/95gCHO/2546mL)   If no BM by tomorrow, will start scheduled bowel regimen   Adjust FWF daily pending Na levels     Malnutrition Assessment:  Malnutrition Status:  At risk for malnutrition (03/06/25 5789)    Context:  Acute Illness     Findings of the 6 clinical characteristics of malnutrition:  Energy Intake:  50% or less of estimated energy requirements for 5 or more days  Weight Loss:  No weight loss (unreliable wt changes 2' fluid shifts)     Body Fat Loss:  Unable to assess     Muscle Mass Loss:  Unable to assess    Fluid Accumulation:  Mild (UTD if nutrition related) Extremities   Strength:  Not Performed    Nutrition Assessment:  Pt admitted with cellulitis.  PMH: CAD, GERD, HTN, CHF, DM, CKD stage 3, gastroparesis.  Chart reviewed, case discussed during CCU rounds.  Pt intubated and sedated with propofol @ 12.1mL/h, which provides 319 kcals.  OGT clamped.  Concern for over diuresis.  Na 159.  K 3.3 and phos 2.0, being repleted.  BG in the 200's (improved from yesterday which was in the 300's).  Family denies any food allergies.  They report her appetite this admission has been quite poor (LOS 6 days) but prior to admission she was eating great.  Wt is varied greatly, however likely not reliable due to CHF hx and fluid shifts.  Pt covered up with blankets to her chin and appeared comfortable so NFPE deferred today.  No questions about TF at this time from family in the room.  Noted no BM in several days, unsure how accurate this is as she was out on the floor and she has no recorded PO intakes either.  Abdomen is soft, if no BM by tomorrow with TF initiation will start scheduled bowel regimen.     Wt Readings from Last 10

## 2025-03-06 NOTE — PROGRESS NOTES
Infectious Disease Progress     Impression:   Cellulitis of toes  Acute on chronic venous stasis  - afebrile, wbc 26.2    CRP 11.6    Blood cx (2/27) no growth , one set of blood cx sent 3/6    Duplex study of low extremities - No DVT    Oral candidiasis  - started on nystatin on 3/6    Type II DM; A1C 5.8  CHF  A-fib  S/p pacemaker  - management per primary team    Plan:   3/5/2025  Intubated last night, transferred to ICU    - continue with IV cefepime (changed from rocephin) and vancomycin    Added flagyl to cover possible aspiration    Pharmacy to dose per creatinine clearance.     Please send resp cx when able      Appreciate wound care input    CT of both feet mentioned fluid collection. Podiatry team was consulted to further evaluate necrotic tissue between left second and third toe      Plan of care d/w pt's spouse and Dr. Angel               History of Present Illness   3/3/2025  Patient is a 79 y.o. female with medical history of CHF, A-fib, fibromyalgia, CKD, type II DM and chronic venous stasis was brought to ER on 2/27 with chest pain and leg swelling.     In ER, temp was 98.4 (T-max 101.5 on 2/28), wbc 10.1, u/a revealed wbc 5-10, 2+ bacteria, blood cx with no growth so far. Duplex study of low extremities revealed no DVT. CT of lower extremities and both feet revealed soft tissue swelling. Pt was started on IV merrem and vancomycin.     During visit, pt was alert, but confused. Not following any commends.      ID team was consulted for evaluation and treatment recommendations regarding leg cellulitis     Subjective:    Intubated it    Review of Systems:            Symptom Y/N Comments   Symptom Y/N Comments   Fever/Chills       Chest Pain        Poor Appetite       Edema        Cough       Abdominal Pain        Sputum       Joint Pain        SOB/DE       Pruritis/Rash        Nausea/vomit       Tolerating PT/OT        Diarrhea       Tolerating Diet        Constipation       Other           Could  (208 lb 8.9 oz), SpO2 98%.    Physical Exam:   BP (!) 142/68   Pulse 81   Temp 97.7 °F (36.5 °C) (Axillary)   Resp 19   Wt 94.6 kg (208 lb 8.9 oz)   SpO2 98%   BMI 36.94 kg/m²     BP (!) 142/68   Pulse 81   Temp 97.7 °F (36.5 °C) (Axillary)   Resp 19   Wt 94.6 kg (208 lb 8.9 oz)   SpO2 98%   BMI 36.94 kg/m²     PHYSICAL EXAM:  General: Ill appearing, intubated it, no acute distress    EENT:  Pupils are reactive to light, Anicteric sclerae. MMM  Resp:  Clear in apex with decreased breath sounds at bases. No accessory muscle use  CV:  Regular  rhythm,  ++ edema  GI:  Soft, Non distended, Non tender.  +Bowel sounds  Neurologic:  Intubated it. Not following any commends  Psych:   Unable to assess insight.  Skin:  No rashes.  No jaundice          Data Review:     CBC:  Recent Labs     03/05/25 0546 03/05/25 1930 03/06/25 0457   WBC 10.6 14.1* 26.2*   HGB 11.9 13.0 12.8   HCT 39.3 43.7 43.7   * 517* 399       BMP:  Recent Labs     03/05/25 0546 03/05/25 1930 03/06/25  0457   BUN 36* 38* 41*   * 151* 159*   K 3.3* 3.8 3.3*   * 119* 126*   CO2 26 26 27       LFTS:  Recent Labs     03/05/25 0546 03/05/25 1930 03/06/25  0457   ALT 17 19 19       Microbiology:   @CellScapeESULTS@    Cultures:   Results       Procedure Component Value Units Date/Time    Culture, Blood 1 [[id number]] Collected: 03/06/25 0110    Order Status: Sent Specimen: Blood Updated: 03/06/25 0155    Culture, MRSA, Screening [[id number]] Collected: 03/06/25 0110    Order Status: Sent Specimen: Nares Updated: 03/06/25 0123    Culture, Respiratory [[id number]]     Order Status: Sent Specimen: Sputum, Suctioned     Blood Culture 2 [[id number]] Collected: 02/27/25 1448    Order Status: Completed Specimen: Blood Updated: 03/05/25 1813     Special Requests NO SPECIAL REQUESTS        Culture NO GROWTH 5 DAYS       Blood Culture 1 [[id number]] Collected: 02/27/25 1445    Order Status: Completed Specimen: Blood Updated: 03/05/25

## 2025-03-06 NOTE — CONSULTS
EP/ ARRHYTHMIA/ CARDIOLOGY CONSULT requested secondary to atrial fibrillation with rapid ventricular response    Patient ID:  Patient: Rosita Morillo  MRN: [id number]  Age: 79 y.o.  : 1945    Date of  Admission: 2025  9:33 PM   PCP:  Juan Go MD  Usual cardiologist:  Chidi Davis MD    Assessment:   Persistent atrial fibrillation.  Recent GI bleeding from gastric ulcers so off anticoagulation.  Sick sinus syndrome historically with dual chamber Abbott pacemaker.  Septic shock.  Acute hypoxic respiratory failure leading to intubation\ventilation on 3/5.  Large R pleural effusion noted.  Acute on chronic diastolic CHF.  EF 45-50% prior.  ESPINOZA atop CHRONIC KIDNEY DISEASE stage 3.  Metabolic encephalopathy.  PVD with L iliac stent that jails the IVC.  DM type 2 on chronic insulin.  Hyperlipidemia.  Hypernatremia.  Full code.    Plan:     At the time of this consult, she's rate-controlled.  No pacemaker program changes needed.  Diuretic held per nephrology team.    I'll contact Dr. Davis to notify him of her admission.      [x]       High complexity decision making was performed in this patient at high risk for decompensation with multiple organ involvement.    Rosita Morillo is a 79 y.o. female with a history of the above.  I was asked to see her due to atrial fibrillation with rapid ventricular response.       Past Medical History:   Diagnosis Date    Arthritis     CAD (coronary artery disease)     afib and chf    Congestive heart failure (HCC)     Depression     Diabetes (HCC)     type 2    Fibromyalgia     Gastrointestinal disorder     reflux and gastro paresis    Heart failure (HCC)     Infectious disease     MRSA? in right big toe    Menopause     Psychiatric disorder     PTSD from 911        Past Surgical History:   Procedure Laterality Date    APPENDECTOMY      BREAST BIOPSY Left     STbb  benign    COLONOSCOPY  2018    polyps    COLONOSCOPY N/A  Line)  10 mEq IntraVENous PRN    magnesium sulfate 2000 mg in 50 mL IVPB premix  2,000 mg IntraVENous PRN    ondansetron (ZOFRAN-ODT) disintegrating tablet 4 mg  4 mg Oral Q8H PRN    Or    ondansetron (ZOFRAN) injection 4 mg  4 mg IntraVENous Q6H PRN    polyethylene glycol (GLYCOLAX) packet 17 g  17 g Oral Daily PRN    acetaminophen (TYLENOL) tablet 650 mg  650 mg Oral Q6H PRN    Or    acetaminophen (TYLENOL) suppository 650 mg  650 mg Rectal Q6H PRN    melatonin tablet 3 mg  3 mg Oral Nightly PRN       Review of Symptoms:  She cannot communicate secondary to intubation-ventilation.  General: negative for fever, chills, sweats, weakness, weight loss   Eyes: negative for blurred vision, eye pain, loss of vision, diplopia   Ear Nose and Throat: negative for rhinorrhea, pharyngitis, otalgia, tinnitus, speech or swallowing difficulties   Respiratory: negative for SOB, cough, sputum production, wheezing, DE, pleuritic pain   Cardiology: negative for chest pain, palpitations, orthopnea, PND, edema, syncope   Gastrointestinal: negative for abdominal pain, N/V, dysphagia, change in bowel habits, bleeding   Genitourinary: negative for frequency, urgency, dysuria, hematuria, incontinence   Muskuloskeletal : negative for arthralgia, myalgia   Hematology: negative for easy bruising, bleeding, lymphadenopathy   Dermatological: negative for rash, ulceration, mole change, new lesion   Endocrine: negative for hot flashes or polydipsia   Neurological: negative for headache, dizziness, confusion, focal weakness, paresthesia, memory loss, gait disturbance   Psychological: negative for anxiety, depression, agitation       Objective:      Physical Exam:  Temp (24hrs), Av.2 °F (36.8 °C), Min:97.5 °F (36.4 °C), Max:99.5 °F (37.5 °C)    Patient Vitals for the past 8 hrs:   Pulse   25 1225 80   25 1200 80   25 1100 80   25 1000 80   25 0900 79   25 0834 80   25 0800 83   25 0700 81

## 2025-03-07 LAB
ACB COMPLEX DNA BLD POS QL NAA+NON-PROBE: NOT DETECTED
ACCESSION NUMBER, LLC1M: ABNORMAL
ALBUMIN SERPL-MCNC: 2.7 G/DL (ref 3.5–5)
ALBUMIN/GLOB SERPL: 0.7 (ref 1.1–2.2)
ALP SERPL-CCNC: 146 U/L (ref 45–117)
ALT SERPL-CCNC: 24 U/L (ref 12–78)
ANION GAP SERPL CALC-SCNC: 6 MMOL/L (ref 2–12)
ANION GAP SERPL CALC-SCNC: 6 MMOL/L (ref 2–12)
ANION GAP SERPL CALC-SCNC: 7 MMOL/L (ref 2–12)
ANION GAP SERPL CALC-SCNC: 7 MMOL/L (ref 2–12)
ARTERIAL PATENCY WRIST A: YES
AST SERPL-CCNC: 40 U/L (ref 15–37)
B FRAGILIS DNA BLD POS QL NAA+NON-PROBE: NOT DETECTED
BACTERIA SPEC CULT: NORMAL
BACTERIA SPEC CULT: NORMAL
BASE EXCESS BLDA CALC-SCNC: 1 MMOL/L
BDY SITE: ABNORMAL
BILIRUB SERPL-MCNC: 1 MG/DL (ref 0.2–1)
BIOFIRE TEST COMMENT: ABNORMAL
BUN SERPL-MCNC: 42 MG/DL (ref 6–20)
BUN SERPL-MCNC: 44 MG/DL (ref 6–20)
BUN SERPL-MCNC: 45 MG/DL (ref 6–20)
BUN SERPL-MCNC: 46 MG/DL (ref 6–20)
BUN/CREAT SERPL: 29 (ref 12–20)
BUN/CREAT SERPL: 32 (ref 12–20)
BUN/CREAT SERPL: 33 (ref 12–20)
BUN/CREAT SERPL: 34 (ref 12–20)
C ALBICANS DNA BLD POS QL NAA+NON-PROBE: NOT DETECTED
C AURIS DNA BLD POS QL NAA+NON-PROBE: NOT DETECTED
C GATTII+NEOFOR DNA BLD POS QL NAA+N-PRB: NOT DETECTED
C GLABRATA DNA BLD POS QL NAA+NON-PROBE: NOT DETECTED
C KRUSEI DNA BLD POS QL NAA+NON-PROBE: NOT DETECTED
C PARAP DNA BLD POS QL NAA+NON-PROBE: NOT DETECTED
C TROPICLS DNA BLD POS QL NAA+NON-PROBE: NOT DETECTED
CALCIUM SERPL-MCNC: 8.4 MG/DL (ref 8.5–10.1)
CALCIUM SERPL-MCNC: 8.5 MG/DL (ref 8.5–10.1)
CALCIUM SERPL-MCNC: 8.7 MG/DL (ref 8.5–10.1)
CALCIUM SERPL-MCNC: 9 MG/DL (ref 8.5–10.1)
CHLORIDE SERPL-SCNC: 119 MMOL/L (ref 97–108)
CHLORIDE SERPL-SCNC: 121 MMOL/L (ref 97–108)
CHLORIDE SERPL-SCNC: 124 MMOL/L (ref 97–108)
CHLORIDE SERPL-SCNC: 124 MMOL/L (ref 97–108)
CO2 SERPL-SCNC: 25 MMOL/L (ref 21–32)
CO2 SERPL-SCNC: 27 MMOL/L (ref 21–32)
CO2 SERPL-SCNC: 27 MMOL/L (ref 21–32)
CO2 SERPL-SCNC: 29 MMOL/L (ref 21–32)
CREAT SERPL-MCNC: 1.31 MG/DL (ref 0.55–1.02)
CREAT SERPL-MCNC: 1.31 MG/DL (ref 0.55–1.02)
CREAT SERPL-MCNC: 1.4 MG/DL (ref 0.55–1.02)
CREAT SERPL-MCNC: 1.53 MG/DL (ref 0.55–1.02)
E CLOAC COMP DNA BLD POS NAA+NON-PROBE: NOT DETECTED
E COLI DNA BLD POS QL NAA+NON-PROBE: NOT DETECTED
E FAECALIS DNA BLD POS QL NAA+NON-PROBE: NOT DETECTED
E FAECIUM DNA BLD POS QL NAA+NON-PROBE: NOT DETECTED
EKG ATRIAL RATE: 153 BPM
EKG ATRIAL RATE: 441 BPM
EKG DIAGNOSIS: NORMAL
EKG Q-T INTERVAL: 304 MS
EKG Q-T INTERVAL: 400 MS
EKG Q-T INTERVAL: 460 MS
EKG QRS DURATION: 126 MS
EKG QRS DURATION: 64 MS
EKG QRS DURATION: 72 MS
EKG QTC CALCULATION (BAZETT): 470 MS
EKG QTC CALCULATION (BAZETT): 472 MS
EKG QTC CALCULATION (BAZETT): 530 MS
EKG R AXIS: -25 DEGREES
EKG R AXIS: 259 DEGREES
EKG R AXIS: 69 DEGREES
EKG T AXIS: -89 DEGREES
EKG T AXIS: 16 DEGREES
EKG T AXIS: 193 DEGREES
EKG VENTRICULAR RATE: 144 BPM
EKG VENTRICULAR RATE: 80 BPM
EKG VENTRICULAR RATE: 84 BPM
ENTEROBACTERALES DNA BLD POS NAA+N-PRB: NOT DETECTED
ERYTHROCYTE [DISTWIDTH] IN BLOOD BY AUTOMATED COUNT: 22.5 % (ref 11.5–14.5)
FIO2 ON VENT: 30 %
GLOBULIN SER CALC-MCNC: 3.9 G/DL (ref 2–4)
GLUCOSE BLD STRIP.AUTO-MCNC: 217 MG/DL (ref 65–117)
GLUCOSE BLD STRIP.AUTO-MCNC: 228 MG/DL (ref 65–117)
GLUCOSE BLD STRIP.AUTO-MCNC: 237 MG/DL (ref 65–117)
GLUCOSE BLD STRIP.AUTO-MCNC: 241 MG/DL (ref 65–117)
GLUCOSE BLD STRIP.AUTO-MCNC: 271 MG/DL (ref 65–117)
GLUCOSE SERPL-MCNC: 266 MG/DL (ref 65–100)
GLUCOSE SERPL-MCNC: 269 MG/DL (ref 65–100)
GLUCOSE SERPL-MCNC: 276 MG/DL (ref 65–100)
GLUCOSE SERPL-MCNC: 280 MG/DL (ref 65–100)
GP B STREP DNA BLD POS QL NAA+NON-PROBE: NOT DETECTED
HAEM INFLU DNA BLD POS QL NAA+NON-PROBE: NOT DETECTED
HCO3 BLDA-SCNC: 24 MMOL/L (ref 22–26)
HCT VFR BLD AUTO: 42.7 % (ref 35–47)
HGB BLD-MCNC: 12.9 G/DL (ref 11.5–16)
K OXYTOCA DNA BLD POS QL NAA+NON-PROBE: NOT DETECTED
KLEBSIELLA SP DNA BLD POS QL NAA+NON-PRB: NOT DETECTED
KLEBSIELLA SP DNA BLD POS QL NAA+NON-PRB: NOT DETECTED
L MONOCYTOG DNA BLD POS QL NAA+NON-PROBE: NOT DETECTED
MAGNESIUM SERPL-MCNC: 2 MG/DL (ref 1.6–2.4)
MCH RBC QN AUTO: 25 PG (ref 26–34)
MCHC RBC AUTO-ENTMCNC: 30.2 G/DL (ref 30–36.5)
MCV RBC AUTO: 82.6 FL (ref 80–99)
MECA+MECC ISLT/SPM QL: DETECTED
N MEN DNA BLD POS QL NAA+NON-PROBE: NOT DETECTED
NRBC # BLD: 0.1 K/UL (ref 0–0.01)
NRBC BLD-RTO: 0.4 PER 100 WBC
P AERUGINOSA DNA BLD POS NAA+NON-PROBE: NOT DETECTED
PCO2 BLDA: 32 MMHG (ref 35–45)
PEEP RESPIRATORY: 5
PH BLDA: 7.48 (ref 7.35–7.45)
PHOSPHATE SERPL-MCNC: 2.5 MG/DL (ref 2.6–4.7)
PLATELET # BLD AUTO: 441 K/UL (ref 150–400)
PMV BLD AUTO: 10.6 FL (ref 8.9–12.9)
PO2 BLDA: 108 MMHG (ref 80–100)
POTASSIUM SERPL-SCNC: 3 MMOL/L (ref 3.5–5.1)
POTASSIUM SERPL-SCNC: 3.4 MMOL/L (ref 3.5–5.1)
POTASSIUM SERPL-SCNC: 3.5 MMOL/L (ref 3.5–5.1)
POTASSIUM SERPL-SCNC: 3.7 MMOL/L (ref 3.5–5.1)
PRESSURE SUPPORT SETTING VENT: 5
PROT SERPL-MCNC: 6.6 G/DL (ref 6.4–8.2)
PROTEUS SP DNA BLD POS QL NAA+NON-PROBE: NOT DETECTED
RBC # BLD AUTO: 5.17 M/UL (ref 3.8–5.2)
RESISTANT GENE TARGETS: ABNORMAL
S AUREUS DNA BLD POS QL NAA+NON-PROBE: NOT DETECTED
S AUREUS+CONS DNA BLD POS NAA+NON-PROBE: DETECTED
S EPIDERMIDIS DNA BLD POS QL NAA+NON-PRB: DETECTED
S LUGDUNENSIS DNA BLD POS QL NAA+NON-PRB: NOT DETECTED
S MALTOPHILIA DNA BLD POS QL NAA+NON-PRB: NOT DETECTED
S MARCESCENS DNA BLD POS NAA+NON-PROBE: NOT DETECTED
S PNEUM DNA BLD POS QL NAA+NON-PROBE: NOT DETECTED
S PYO DNA BLD POS QL NAA+NON-PROBE: NOT DETECTED
SALMONELLA DNA BLD POS QL NAA+NON-PROBE: NOT DETECTED
SAO2 % BLD: 98 % (ref 92–97)
SAO2% DEVICE SAO2% SENSOR NAME: ABNORMAL
SERVICE CMNT-IMP: ABNORMAL
SERVICE CMNT-IMP: NORMAL
SODIUM SERPL-SCNC: 153 MMOL/L (ref 136–145)
SODIUM SERPL-SCNC: 155 MMOL/L (ref 136–145)
SODIUM SERPL-SCNC: 157 MMOL/L (ref 136–145)
SODIUM SERPL-SCNC: 157 MMOL/L (ref 136–145)
SPECIMEN SITE: ABNORMAL
STREPTOCOCCUS DNA BLD POS NAA+NON-PROBE: NOT DETECTED
TRIGL SERPL-MCNC: 82 MG/DL
WBC # BLD AUTO: 25.2 K/UL (ref 3.6–11)

## 2025-03-07 PROCEDURE — 82962 GLUCOSE BLOOD TEST: CPT

## 2025-03-07 PROCEDURE — 2580000003 HC RX 258: Performed by: NURSE PRACTITIONER

## 2025-03-07 PROCEDURE — 6360000002 HC RX W HCPCS: Performed by: NURSE PRACTITIONER

## 2025-03-07 PROCEDURE — 36600 WITHDRAWAL OF ARTERIAL BLOOD: CPT

## 2025-03-07 PROCEDURE — 2580000003 HC RX 258: Performed by: INTERNAL MEDICINE

## 2025-03-07 PROCEDURE — 6370000000 HC RX 637 (ALT 250 FOR IP): Performed by: INTERNAL MEDICINE

## 2025-03-07 PROCEDURE — 6370000000 HC RX 637 (ALT 250 FOR IP): Performed by: NURSE PRACTITIONER

## 2025-03-07 PROCEDURE — 2000000000 HC ICU R&B

## 2025-03-07 PROCEDURE — 6360000002 HC RX W HCPCS: Performed by: INTERNAL MEDICINE

## 2025-03-07 PROCEDURE — 80053 COMPREHEN METABOLIC PANEL: CPT

## 2025-03-07 PROCEDURE — 87205 SMEAR GRAM STAIN: CPT

## 2025-03-07 PROCEDURE — 36415 COLL VENOUS BLD VENIPUNCTURE: CPT

## 2025-03-07 PROCEDURE — 82803 BLOOD GASES ANY COMBINATION: CPT

## 2025-03-07 PROCEDURE — 84100 ASSAY OF PHOSPHORUS: CPT

## 2025-03-07 PROCEDURE — 83735 ASSAY OF MAGNESIUM: CPT

## 2025-03-07 PROCEDURE — 87070 CULTURE OTHR SPECIMN AEROBIC: CPT

## 2025-03-07 PROCEDURE — 80048 BASIC METABOLIC PNL TOTAL CA: CPT

## 2025-03-07 PROCEDURE — 99232 SBSQ HOSP IP/OBS MODERATE 35: CPT | Performed by: NURSE PRACTITIONER

## 2025-03-07 PROCEDURE — 85027 COMPLETE CBC AUTOMATED: CPT

## 2025-03-07 PROCEDURE — 2500000003 HC RX 250 WO HCPCS: Performed by: NURSE PRACTITIONER

## 2025-03-07 PROCEDURE — 94003 VENT MGMT INPAT SUBQ DAY: CPT

## 2025-03-07 PROCEDURE — 84478 ASSAY OF TRIGLYCERIDES: CPT

## 2025-03-07 RX ORDER — POLYETHYLENE GLYCOL 3350 17 G/17G
17 POWDER, FOR SOLUTION ORAL 2 TIMES DAILY
Status: DISCONTINUED | OUTPATIENT
Start: 2025-03-07 | End: 2025-03-14 | Stop reason: HOSPADM

## 2025-03-07 RX ORDER — SENNOSIDES 8.8 MG/5ML
10 LIQUID ORAL 2 TIMES DAILY
Status: DISCONTINUED | OUTPATIENT
Start: 2025-03-07 | End: 2025-03-14 | Stop reason: HOSPADM

## 2025-03-07 RX ORDER — FENTANYL CITRATE-0.9 % NACL/PF 20 MCG/2ML
50 SYRINGE (ML) INTRAVENOUS EVERY 30 MIN PRN
Status: DISCONTINUED | OUTPATIENT
Start: 2025-03-07 | End: 2025-03-10

## 2025-03-07 RX ORDER — POLYETHYLENE GLYCOL 3350 17 G/17G
17 POWDER, FOR SOLUTION ORAL DAILY
Status: DISCONTINUED | OUTPATIENT
Start: 2025-03-07 | End: 2025-03-07 | Stop reason: SDUPTHER

## 2025-03-07 RX ORDER — FENTANYL CITRATE-0.9 % NACL/PF 10 MCG/ML
20-200 PLASTIC BAG, INJECTION (ML) INTRAVENOUS CONTINUOUS
Status: DISCONTINUED | OUTPATIENT
Start: 2025-03-07 | End: 2025-03-08

## 2025-03-07 RX ORDER — SENNA AND DOCUSATE SODIUM 50; 8.6 MG/1; MG/1
2 TABLET, FILM COATED ORAL 2 TIMES DAILY
Status: DISCONTINUED | OUTPATIENT
Start: 2025-03-07 | End: 2025-03-07

## 2025-03-07 RX ADMIN — SENNOSIDES 17.6 MG: 8.8 LIQUID ORAL at 20:59

## 2025-03-07 RX ADMIN — CHLORHEXIDINE GLUCONATE 15 ML: 1.2 RINSE ORAL at 10:10

## 2025-03-07 RX ADMIN — NYSTATIN 500000 UNITS: 100000 SUSPENSION ORAL at 18:00

## 2025-03-07 RX ADMIN — POLYETHYLENE GLYCOL 3350 17 G: 17 POWDER, FOR SOLUTION ORAL at 20:56

## 2025-03-07 RX ADMIN — DEXMEDETOMIDINE HYDROCHLORIDE 0.2 MCG/KG/HR: 400 INJECTION, SOLUTION INTRAVENOUS at 22:45

## 2025-03-07 RX ADMIN — HEPARIN SODIUM 5000 UNITS: 5000 INJECTION INTRAVENOUS; SUBCUTANEOUS at 17:20

## 2025-03-07 RX ADMIN — PIPERACILLIN AND TAZOBACTAM 3375 MG: 3; .375 INJECTION, POWDER, LYOPHILIZED, FOR SOLUTION INTRAVENOUS at 05:00

## 2025-03-07 RX ADMIN — TIMOLOL MALEATE 1 DROP: 2.5 SOLUTION OPHTHALMIC at 10:00

## 2025-03-07 RX ADMIN — LANSOPRAZOLE 30 MG: 30 TABLET, ORALLY DISINTEGRATING, DELAYED RELEASE ORAL at 06:07

## 2025-03-07 RX ADMIN — SENNOSIDES 17.6 MG: 8.8 LIQUID ORAL at 10:00

## 2025-03-07 RX ADMIN — VANCOMYCIN HYDROCHLORIDE 750 MG: 750 INJECTION, POWDER, LYOPHILIZED, FOR SOLUTION INTRAVENOUS at 09:59

## 2025-03-07 RX ADMIN — INSULIN LISPRO 4 UNITS: 100 INJECTION, SOLUTION INTRAVENOUS; SUBCUTANEOUS at 20:56

## 2025-03-07 RX ADMIN — NYSTATIN 500000 UNITS: 100000 SUSPENSION ORAL at 20:57

## 2025-03-07 RX ADMIN — CHLORHEXIDINE GLUCONATE 15 ML: 1.2 RINSE ORAL at 10:01

## 2025-03-07 RX ADMIN — POLYETHYLENE GLYCOL 3350 17 G: 17 POWDER, FOR SOLUTION ORAL at 10:01

## 2025-03-07 RX ADMIN — HEPARIN SODIUM 5000 UNITS: 5000 INJECTION INTRAVENOUS; SUBCUTANEOUS at 09:45

## 2025-03-07 RX ADMIN — INSULIN LISPRO 2 UNITS: 100 INJECTION, SOLUTION INTRAVENOUS; SUBCUTANEOUS at 10:05

## 2025-03-07 RX ADMIN — PIPERACILLIN AND TAZOBACTAM 3375 MG: 3; .375 INJECTION, POWDER, LYOPHILIZED, FOR SOLUTION INTRAVENOUS at 21:02

## 2025-03-07 RX ADMIN — DEXTROSE MONOHYDRATE AND POTASSIUM CHLORIDE INJECTION, SOLUTION: 5; .149 INJECTION, SOLUTION INTRAVENOUS at 20:55

## 2025-03-07 RX ADMIN — ATORVASTATIN CALCIUM 10 MG: 10 TABLET, FILM COATED ORAL at 10:09

## 2025-03-07 RX ADMIN — DOCUSATE SODIUM LIQUID 100 MG: 100 LIQUID ORAL at 20:55

## 2025-03-07 RX ADMIN — TIMOLOL MALEATE 1 DROP: 2.5 SOLUTION OPHTHALMIC at 20:56

## 2025-03-07 RX ADMIN — NYSTATIN 500000 UNITS: 100000 SUSPENSION ORAL at 10:00

## 2025-03-07 RX ADMIN — CHLORHEXIDINE GLUCONATE 15 ML: 1.2 RINSE ORAL at 21:00

## 2025-03-07 RX ADMIN — INSULIN GLARGINE 10 UNITS: 100 INJECTION, SOLUTION SUBCUTANEOUS at 20:57

## 2025-03-07 RX ADMIN — INSULIN LISPRO 2 UNITS: 100 INJECTION, SOLUTION INTRAVENOUS; SUBCUTANEOUS at 03:45

## 2025-03-07 RX ADMIN — INSULIN LISPRO 4 UNITS: 100 INJECTION, SOLUTION INTRAVENOUS; SUBCUTANEOUS at 01:27

## 2025-03-07 RX ADMIN — HEPARIN SODIUM 5000 UNITS: 5000 INJECTION INTRAVENOUS; SUBCUTANEOUS at 01:28

## 2025-03-07 RX ADMIN — PIPERACILLIN AND TAZOBACTAM 3375 MG: 3; .375 INJECTION, POWDER, LYOPHILIZED, FOR SOLUTION INTRAVENOUS at 14:00

## 2025-03-07 RX ADMIN — Medication 1 AMPULE: at 00:00

## 2025-03-07 RX ADMIN — NYSTATIN 500000 UNITS: 100000 SUSPENSION ORAL at 12:30

## 2025-03-07 RX ADMIN — Medication 1 AMPULE: at 21:00

## 2025-03-07 RX ADMIN — INSULIN GLARGINE 10 UNITS: 100 INJECTION, SOLUTION SUBCUTANEOUS at 09:45

## 2025-03-07 ASSESSMENT — PULMONARY FUNCTION TESTS
PIF_VALUE: 22
PIF_VALUE: 23
PIF_VALUE: 31
PIF_VALUE: 23
PIF_VALUE: 12

## 2025-03-07 ASSESSMENT — PAIN SCALES - GENERAL
PAINLEVEL_OUTOF10: 0
PAINLEVEL_OUTOF10: 0

## 2025-03-07 ASSESSMENT — PAIN SCALES - WONG BAKER: WONGBAKER_NUMERICALRESPONSE: NO HURT

## 2025-03-07 NOTE — PROGRESS NOTES
Critical Care Progress Note  Tonya Dominguez MD          Date of Service:  3/7/2025  NAME:  Rosita Morillo  :  1945  MRN:  [id number]      Subjective/Hospital course:      Rosita Morillo is a 78 yo female with a PMH of CAD, pacemaker, CKD3, combined HF (EF45-50%), DM2, GERD, AF who presented to [facility] from [facility] on  with cellulitis and HF exacerbation with fluid overload.  She was started on vanc/cefepime. Her course was complicated by encephalopathy, AF RVR requiring dilt drip, and ESPINOZA on CKD3. Neurology was consulted for progressively worsening encephalopathy. EEG negative for seizure. Rapid response was called on 3/5 for worsening hypoxia.  She was transferred to the ICU. After a trial of BiPAP and diuresis she ultimately required intubation for hypoxia in the setting of poor mentation with lack of ability to protect her airway.  It was noted she also had a large R pleural effusion contributing to the hypoxia.      3/6 - no acute change after intubation. OFF NE  3/7: Patient is intubated and sedated with Precedex and propofol.  Was on low-dose Levophed this morning and now weaned off.  Currently on 30% FiO2 and PEEP of 5 cm water.    Assessment/Plan:   Toxic metabolic encephalopathy: CT head negative. EEG negative for seizure.   - Neurology following  - Avoid benzos     PULMONOLOGY:      Acute hypoxic respiratory failure requiring MV: New/worsening R pleural effusion. Low grade temp to 100.6. Worsening hypoxia.   -RLL infiltrates and pleural effusion with atelectasis on CXR.   - serial Procal/LA  - Blood/sputum cultures pending  - Broad spectrum abx including vancomycin and cefepime for now.     Bedside US preformed without large fluid pocket, no site for thoracentesis    CARDIOVASCULAR:      Septic shock: Broadened abx to vanc/cefepime. Now on levophed. Had acute drop in BP following intubation  - Levo for MAP >65  - Abx as above  - ID following - follow up recommendations      PAF:  -  amio if  tablet 3 mg  3 mg Oral Nightly PRN     ______________________________________________________________________  EXPECTED LENGTH OF STAY: 9  ACTUAL LENGTH OF STAY:          8                 Tonya Dominguez MD   Pulmonary/CCM  [facility]

## 2025-03-07 NOTE — PROGRESS NOTES
sodium chloride 0.9 % 250 mL IVPB ([id number])  750 mg IntraVENous Q24H Carolynn Fermin, APRN - NP   Stopped at 03/06/25 1304    oxyCODONE (ROXICODONE) immediate release tablet 5 mg  5 mg Oral Q4H PRN Herson Trivedi MD   5 mg at 03/02/25 2055    [Held by provider] empagliflozin (JARDIANCE) tablet 10 mg  10 mg Oral Daily Randy Schilling, DO   10 mg at 03/01/25 0944    glucose chewable tablet 16 g  4 tablet Oral PRN Randy Schilling, DO        dextrose bolus 10% 125 mL  125 mL IntraVENous PRN Randy Schilling, DO   Stopped at 03/02/25 0426    Or    dextrose bolus 10% 250 mL  250 mL IntraVENous PRN Randy Schilling DO        glucagon injection 1 mg  1 mg SubCUTAneous PRN Randy Schilling,         dextrose 10 % infusion   IntraVENous Continuous PRN Randy Schilling, DO        [Held by provider] pregabalin (LYRICA) capsule 75 mg  75 mg Oral BID Randy Schilling, DO   75 mg at 03/03/25 1054    timolol (TIMOPTIC) 0.25 % ophthalmic solution 1 drop  1 drop Both Eyes BID Randy Schilling DO   1 drop at 03/06/25 2132    sodium chloride flush 0.9 % injection 5-40 mL  5-40 mL IntraVENous PRN Randy Schilling, DO   10 mL at 03/06/25 0835    0.9 % sodium chloride infusion   IntraVENous PRN Randy Schilling, DO   Stopped at 03/05/25 0226    potassium chloride (KLOR-CON M) extended release tablet 40 mEq  40 mEq Oral PRN Randy Schilling,         Or    potassium bicarb-citric acid (EFFER-K) effervescent tablet 40 mEq  40 mEq Oral PRN Randy Schilling DO        Or    potassium chloride 10 mEq/100 mL IVPB (Peripheral Line)  10 mEq IntraVENous PRN Randy Schillnig, DO        magnesium sulfate 2000 mg in 50 mL IVPB premix  2,000 mg IntraVENous PRN Randy Schilling, DO        ondansetron (ZOFRAN-ODT) disintegrating tablet 4 mg  4 mg Oral Q8H PRN Schilling, Randy Vipul, DO        Or    ondansetron (ZOFRAN) injection 4 mg  4 mg IntraVENous Q6H PRN Randy Schilling  evaluation without obvious acute intracranial findings. Electronically signed by Tim Palma    CT FOOT LEFT W CONTRAST    Result Date: 2/28/2025  EXAM: CT FOOT LEFT W CONTRAST INDICATION: Pain swelling redness COMPARISON: None CONTRAST: 100 mL of Isovue-300. TECHNIQUE: Helical CT of the left foot following intravenous contrast administration. Coronal and Sagittal reformats were generated. Images reviewed in soft tissue and bone windows. CT dose reduction was achieved through the use of a standardized protocol tailored for this examination and automatic exposure control for dose modulation. FINDINGS: Bones: Normal bone mineralization. No fracture, dislocation, or periosteal reaction. Joint fluid: None. Articulations: No significant osteoarthritis. No evidence of inflammatory arthritis. Muscles: No intramuscular hematoma. No focal atrophy. Soft tissue mass: None.     Soft tissue swelling, degenerative osteoarthritis. No masses fluid collection or opaque foreign body. Electronically signed by HANNA GAMBOA MD    CT TIBIA FIBULA LEFT W CONTRAST    Result Date: 2/28/2025  EXAM: CT TIBIA FIBULA LEFT W CONTRAST INDICATION: Pain redness swelling COMPARISON: None CONTRAST: 100 mL of Isovue-300. TECHNIQUE: Helical CT of the left tib-fib following intravenous contrast administration. Coronal and Sagittal reformats were generated. Images reviewed in soft tissue and bone windows. CT dose reduction was achieved through the use of a standardized protocol tailored for this examination and automatic exposure control for dose modulation. FINDINGS: Bones: Normal bone mineralization. No fracture, dislocation, or periosteal reaction. Joint fluid: None. Articulations: No significant osteoarthritis. No evidence of inflammatory arthritis. Muscles: No intramuscular hematoma. No focal atrophy. Soft tissue mass: None.     Diffuse soft tissue swelling, prominent vascular calcification, no mass or fluid collection. Degenerative

## 2025-03-07 NOTE — PROGRESS NOTES
03/07/25 0438   B: Both Spontaneous Awakening and Breathing Trials   Was Patient Receiving Mechanical Ventilation Yes   Safety Screening Spontaneous Breathing Trial (SBT) Proceed with SBT - no exclusion criteria met  (Per NP okay to trial SBT on peep of 8cmH2O)   Spontaneous Breathing Trial (SBT) Outcome Respiratory rate greater than 35/min for 5 min or more - SBT failure  (RR 35-40 lower tidal volumes)   Weaning Parameters   Respiratory Rate Observed 37   Ve 9.7      RSBI 98

## 2025-03-07 NOTE — INTERDISCIPLINARY ROUNDS
Critical care interdisciplinary rounds today.  Following members present: Case Management, Nursing, Nutrition, Pharmacy, and Physician. Family invited to participate.( 3 relatives) Candice is in for chronic use.  Plan of care discussed.  See clinical pathway for plan of care and interventions and desired outcomes.

## 2025-03-07 NOTE — PROGRESS NOTES
1900: Bedside report received from ALEXANDRIA Monteiro    Patient unsuccessful for extubation post SAT & SBT this morning r/t increase RR for >5min. BP fluctuating throughout the shift, ranging from hypertensive to hypotensive. Continued to monitor closely with Levo support, FLORENCIO Moreau aware.     CHG bath & wound care complete.     0700: Bedside report given to ALEXANDRIA Monteiro

## 2025-03-07 NOTE — PROGRESS NOTES
Spiritual Health History and Assessment/Progress Note  [facility]    Attempted Encounter,  ,  ,      Name: Rosita Morillo MRN: [id number]    Age: 79 y.o.     Sex: female   Language: English   Temple: [employer]   Cellulitis     Date: 3/7/2025            Total Time Calculated: 10 min              Spiritual Assessment began in [facility] CRITICAL [facility]        Referral/Consult From: Rounding   Encounter Overview/Reason: Attempted Encounter  Service Provided For: Patient not available    Suri, Belief, Meaning:   Patient unable to assess at this time  Family/Friends No family/friends present      Importance and Influence:  Patient unable to assess at this time  Family/Friends No family/friends present    Community:  Patient Other: unable to assess  Family/Friends No family/friends present    Assessment and Plan of Care:     Patient Interventions include: Other: patient is currently intubated; unable to complete spiritual assessment  Family/Friends Interventions include: No family/friends present    Patient Plan of Care: Spiritual Care available upon further referral  Family/Friends Plan of Care: No family/friends present    Electronically signed by Chaplain JING [facility] on 3/7/2025 at 9:58 AM

## 2025-03-07 NOTE — PROGRESS NOTES
NAME: Rosita Morillo        :  1945        MRN:  [id number]         Assessment:    ESPINOZA on CKD stage 3a: Cr had been 1.3-1.5 with recent diuresis, now 1.53. IV contrast exposure on admission but no significant LEIGH noted. Baseline Cr seems to be 1.1-1.3    Hypernatremia: worse, Na 157 (corrected is 159) -> FWD ~4.6L. Due to recent IV diuresis. +AMS   Urine output last 3 days 3 L => 1.1 L => send 50 mL so far.  Osmotic diuresis due to hyperglycemia  Hyperglycemia    Combined CHF: EF 45-50%. LE edema has improved s/p diuresis     LE cellulitis     DM2     Hypokalemia: 2 to diuresis. Mg ok    HTN    Acute hypoxic respiratory failure on ventilator  Encephalopathy     Plan/Recommendations:  Sodium not much better but not worse either  Continue TF's along with free water (250 ml q 3-4 hours)??  BMP every 6 hours  Supposed to be on D5W plus KCl.  Renal function stable.  Discussed with ICU nurse    Continue holding IV Bumex (had a one time dose on 3/5) and Jardiance    On midodrine    Strict I/Os  Am labs      Subjective:     Intubated not responsive    Objective:     VITALS:   Last 24hrs VS reviewed since prior progress note. Most recent are:  Vitals:    25 0918   BP:    Pulse: 80   Resp: 20   Temp:    SpO2: 95%       Intake/Output Summary (Last 24 hours) at 3/7/2025 0941  Last data filed at 3/7/2025 0700  Gross per 24 hour   Intake 1618.03 ml   Output 700 ml   Net 918.03 ml      Telemetry Reviewed:     PHYSICAL EXAM:  Ill-appearing on the vent.  Urine looks clear  Tube feeds ongoing.      Lab Data Reviewed: (see below)    Medications Reviewed: (see below)    PMH/SH reviewed - no change compared to H&P  ________________________________________________________________________  Care Plan discussed with:  Patient     Family      RN x    Care Manager                    Consultant:          Comments   >50% of visit spent in counseling and

## 2025-03-08 ENCOUNTER — APPOINTMENT (OUTPATIENT)
Facility: HOSPITAL | Age: 80
DRG: 637 | End: 2025-03-08
Payer: MEDICARE

## 2025-03-08 LAB
ALBUMIN SERPL-MCNC: 2.2 G/DL (ref 3.5–5)
ALBUMIN/GLOB SERPL: 0.5 (ref 1.1–2.2)
ALP SERPL-CCNC: 172 U/L (ref 45–117)
ALT SERPL-CCNC: 28 U/L (ref 12–78)
ANION GAP SERPL CALC-SCNC: 4 MMOL/L (ref 2–12)
ANION GAP SERPL CALC-SCNC: 6 MMOL/L (ref 2–12)
ANION GAP SERPL CALC-SCNC: 7 MMOL/L (ref 2–12)
ANION GAP SERPL CALC-SCNC: NORMAL MMOL/L (ref 2–12)
ARTERIAL PATENCY WRIST A: YES
AST SERPL-CCNC: 39 U/L (ref 15–37)
BACTERIA SPEC CULT: ABNORMAL
BACTERIA SPEC CULT: ABNORMAL
BASE EXCESS BLDA CALC-SCNC: 3.3 MMOL/L
BDY SITE: ABNORMAL
BILIRUB SERPL-MCNC: 0.9 MG/DL (ref 0.2–1)
BUN SERPL-MCNC: 33 MG/DL (ref 6–20)
BUN SERPL-MCNC: 36 MG/DL (ref 6–20)
BUN SERPL-MCNC: 39 MG/DL (ref 6–20)
BUN SERPL-MCNC: NORMAL MG/DL (ref 6–20)
BUN/CREAT SERPL: 27 (ref 12–20)
BUN/CREAT SERPL: 28 (ref 12–20)
BUN/CREAT SERPL: 29 (ref 12–20)
BUN/CREAT SERPL: NORMAL (ref 12–20)
CALCIUM SERPL-MCNC: 8.1 MG/DL (ref 8.5–10.1)
CALCIUM SERPL-MCNC: 8.2 MG/DL (ref 8.5–10.1)
CALCIUM SERPL-MCNC: 8.5 MG/DL (ref 8.5–10.1)
CALCIUM SERPL-MCNC: NORMAL MG/DL (ref 8.5–10.1)
CHLORIDE SERPL-SCNC: 118 MMOL/L (ref 97–108)
CHLORIDE SERPL-SCNC: NORMAL MMOL/L (ref 97–108)
CO2 SERPL-SCNC: 26 MMOL/L (ref 21–32)
CO2 SERPL-SCNC: 28 MMOL/L (ref 21–32)
CO2 SERPL-SCNC: 29 MMOL/L (ref 21–32)
CO2 SERPL-SCNC: NORMAL MMOL/L (ref 21–32)
CREAT SERPL-MCNC: 1.24 MG/DL (ref 0.55–1.02)
CREAT SERPL-MCNC: 1.29 MG/DL (ref 0.55–1.02)
CREAT SERPL-MCNC: 1.34 MG/DL (ref 0.55–1.02)
CREAT SERPL-MCNC: NORMAL MG/DL (ref 0.55–1.02)
ERYTHROCYTE [DISTWIDTH] IN BLOOD BY AUTOMATED COUNT: 22.2 % (ref 11.5–14.5)
GLOBULIN SER CALC-MCNC: 4.3 G/DL (ref 2–4)
GLUCOSE BLD STRIP.AUTO-MCNC: 267 MG/DL (ref 65–117)
GLUCOSE BLD STRIP.AUTO-MCNC: 284 MG/DL (ref 65–117)
GLUCOSE BLD STRIP.AUTO-MCNC: 296 MG/DL (ref 65–117)
GLUCOSE BLD STRIP.AUTO-MCNC: 306 MG/DL (ref 65–117)
GLUCOSE BLD STRIP.AUTO-MCNC: 330 MG/DL (ref 65–117)
GLUCOSE BLD STRIP.AUTO-MCNC: 332 MG/DL (ref 65–117)
GLUCOSE SERPL-MCNC: 301 MG/DL (ref 65–100)
GLUCOSE SERPL-MCNC: 331 MG/DL (ref 65–100)
GLUCOSE SERPL-MCNC: 344 MG/DL (ref 65–100)
GLUCOSE SERPL-MCNC: NORMAL MG/DL (ref 65–100)
HCO3 BLDA-SCNC: 26 MMOL/L (ref 22–26)
HCT VFR BLD AUTO: 36.8 % (ref 35–47)
HGB BLD-MCNC: 11.2 G/DL (ref 11.5–16)
MAGNESIUM SERPL-MCNC: 2.1 MG/DL (ref 1.6–2.4)
MCH RBC QN AUTO: 25.1 PG (ref 26–34)
MCHC RBC AUTO-ENTMCNC: 30.4 G/DL (ref 30–36.5)
MCV RBC AUTO: 82.5 FL (ref 80–99)
NRBC # BLD: 0.05 K/UL (ref 0–0.01)
NRBC BLD-RTO: 0.3 PER 100 WBC
PCO2 BLDA: 35 MMHG (ref 35–45)
PH BLDA: 7.49 (ref 7.35–7.45)
PHOSPHATE SERPL-MCNC: 1.9 MG/DL (ref 2.6–4.7)
PLATELET # BLD AUTO: 365 K/UL (ref 150–400)
PMV BLD AUTO: 10.8 FL (ref 8.9–12.9)
PO2 BLDA: 101 MMHG (ref 80–100)
POTASSIUM SERPL-SCNC: 3.2 MMOL/L (ref 3.5–5.1)
POTASSIUM SERPL-SCNC: 3.5 MMOL/L (ref 3.5–5.1)
POTASSIUM SERPL-SCNC: 3.9 MMOL/L (ref 3.5–5.1)
POTASSIUM SERPL-SCNC: NORMAL MMOL/L (ref 3.5–5.1)
PROT SERPL-MCNC: 6.5 G/DL (ref 6.4–8.2)
RBC # BLD AUTO: 4.46 M/UL (ref 3.8–5.2)
SAO2 % BLD: 98 % (ref 92–97)
SAO2% DEVICE SAO2% SENSOR NAME: ABNORMAL
SERVICE CMNT-IMP: ABNORMAL
SODIUM SERPL-SCNC: 151 MMOL/L (ref 136–145)
SODIUM SERPL-SCNC: 151 MMOL/L (ref 136–145)
SODIUM SERPL-SCNC: 152 MMOL/L (ref 136–145)
SODIUM SERPL-SCNC: NORMAL MMOL/L (ref 136–145)
SPECIMEN SITE: ABNORMAL
VENTILATION MODE VENT: ABNORMAL
WBC # BLD AUTO: 18.6 K/UL (ref 3.6–11)

## 2025-03-08 PROCEDURE — 85027 COMPLETE CBC AUTOMATED: CPT

## 2025-03-08 PROCEDURE — 6370000000 HC RX 637 (ALT 250 FOR IP): Performed by: INTERNAL MEDICINE

## 2025-03-08 PROCEDURE — 82803 BLOOD GASES ANY COMBINATION: CPT

## 2025-03-08 PROCEDURE — 6360000002 HC RX W HCPCS: Performed by: NURSE PRACTITIONER

## 2025-03-08 PROCEDURE — 6370000000 HC RX 637 (ALT 250 FOR IP): Performed by: HOSPITALIST

## 2025-03-08 PROCEDURE — 36415 COLL VENOUS BLD VENIPUNCTURE: CPT

## 2025-03-08 PROCEDURE — 6360000002 HC RX W HCPCS: Performed by: INTERNAL MEDICINE

## 2025-03-08 PROCEDURE — 83735 ASSAY OF MAGNESIUM: CPT

## 2025-03-08 PROCEDURE — 36600 WITHDRAWAL OF ARTERIAL BLOOD: CPT

## 2025-03-08 PROCEDURE — 94003 VENT MGMT INPAT SUBQ DAY: CPT

## 2025-03-08 PROCEDURE — 71045 X-RAY EXAM CHEST 1 VIEW: CPT

## 2025-03-08 PROCEDURE — 2580000003 HC RX 258: Performed by: NURSE PRACTITIONER

## 2025-03-08 PROCEDURE — 80053 COMPREHEN METABOLIC PANEL: CPT

## 2025-03-08 PROCEDURE — 2000000000 HC ICU R&B

## 2025-03-08 PROCEDURE — 2500000003 HC RX 250 WO HCPCS: Performed by: NURSE PRACTITIONER

## 2025-03-08 PROCEDURE — 6370000000 HC RX 637 (ALT 250 FOR IP): Performed by: STUDENT IN AN ORGANIZED HEALTH CARE EDUCATION/TRAINING PROGRAM

## 2025-03-08 PROCEDURE — 84100 ASSAY OF PHOSPHORUS: CPT

## 2025-03-08 PROCEDURE — 6370000000 HC RX 637 (ALT 250 FOR IP): Performed by: NURSE PRACTITIONER

## 2025-03-08 PROCEDURE — 82962 GLUCOSE BLOOD TEST: CPT

## 2025-03-08 PROCEDURE — 80048 BASIC METABOLIC PNL TOTAL CA: CPT

## 2025-03-08 PROCEDURE — 2580000003 HC RX 258: Performed by: INTERNAL MEDICINE

## 2025-03-08 RX ORDER — ACETAMINOPHEN 160 MG/5ML
650 LIQUID ORAL EVERY 6 HOURS PRN
Status: DISCONTINUED | OUTPATIENT
Start: 2025-03-08 | End: 2025-03-14 | Stop reason: HOSPADM

## 2025-03-08 RX ADMIN — INSULIN LISPRO 4 UNITS: 100 INJECTION, SOLUTION INTRAVENOUS; SUBCUTANEOUS at 09:39

## 2025-03-08 RX ADMIN — Medication 1 AMPULE: at 10:09

## 2025-03-08 RX ADMIN — OXYCODONE 5 MG: 5 TABLET ORAL at 20:50

## 2025-03-08 RX ADMIN — ATORVASTATIN CALCIUM 10 MG: 10 TABLET, FILM COATED ORAL at 09:40

## 2025-03-08 RX ADMIN — POTASSIUM BICARBONATE 40 MEQ: 782 TABLET, EFFERVESCENT ORAL at 00:44

## 2025-03-08 RX ADMIN — PIPERACILLIN AND TAZOBACTAM 3375 MG: 3; .375 INJECTION, POWDER, LYOPHILIZED, FOR SOLUTION INTRAVENOUS at 20:47

## 2025-03-08 RX ADMIN — INSULIN LISPRO 4 UNITS: 100 INJECTION, SOLUTION INTRAVENOUS; SUBCUTANEOUS at 00:55

## 2025-03-08 RX ADMIN — Medication 1 AMPULE: at 20:51

## 2025-03-08 RX ADMIN — NYSTATIN 500000 UNITS: 100000 SUSPENSION ORAL at 09:38

## 2025-03-08 RX ADMIN — CHLORHEXIDINE GLUCONATE 15 ML: 1.2 RINSE ORAL at 09:37

## 2025-03-08 RX ADMIN — POTASSIUM PHOSPHATE, MONOBASIC POTASSIUM PHOSPHATE, DIBASIC 30 MMOL: 224; 236 INJECTION, SOLUTION, CONCENTRATE INTRAVENOUS at 15:52

## 2025-03-08 RX ADMIN — HEPARIN SODIUM 5000 UNITS: 5000 INJECTION INTRAVENOUS; SUBCUTANEOUS at 09:39

## 2025-03-08 RX ADMIN — TIMOLOL MALEATE 1 DROP: 2.5 SOLUTION OPHTHALMIC at 20:42

## 2025-03-08 RX ADMIN — DOCUSATE SODIUM LIQUID 100 MG: 100 LIQUID ORAL at 20:41

## 2025-03-08 RX ADMIN — TIMOLOL MALEATE 1 DROP: 2.5 SOLUTION OPHTHALMIC at 09:37

## 2025-03-08 RX ADMIN — INSULIN GLARGINE 10 UNITS: 100 INJECTION, SOLUTION SUBCUTANEOUS at 09:39

## 2025-03-08 RX ADMIN — INSULIN LISPRO 6 UNITS: 100 INJECTION, SOLUTION INTRAVENOUS; SUBCUTANEOUS at 15:43

## 2025-03-08 RX ADMIN — DEXTROSE MONOHYDRATE AND POTASSIUM CHLORIDE INJECTION, SOLUTION 100 ML/HR: 5; .149 INJECTION, SOLUTION INTRAVENOUS at 18:36

## 2025-03-08 RX ADMIN — VANCOMYCIN HYDROCHLORIDE 750 MG: 750 INJECTION, POWDER, LYOPHILIZED, FOR SOLUTION INTRAVENOUS at 09:53

## 2025-03-08 RX ADMIN — SENNOSIDES 17.6 MG: 8.8 LIQUID ORAL at 20:42

## 2025-03-08 RX ADMIN — INSULIN LISPRO 6 UNITS: 100 INJECTION, SOLUTION INTRAVENOUS; SUBCUTANEOUS at 03:33

## 2025-03-08 RX ADMIN — LANSOPRAZOLE 30 MG: 30 TABLET, ORALLY DISINTEGRATING, DELAYED RELEASE ORAL at 06:43

## 2025-03-08 RX ADMIN — DEXTROSE MONOHYDRATE AND POTASSIUM CHLORIDE INJECTION, SOLUTION: 5; .149 INJECTION, SOLUTION INTRAVENOUS at 07:06

## 2025-03-08 RX ADMIN — PIPERACILLIN AND TAZOBACTAM 3375 MG: 3; .375 INJECTION, POWDER, LYOPHILIZED, FOR SOLUTION INTRAVENOUS at 13:41

## 2025-03-08 RX ADMIN — POLYETHYLENE GLYCOL 3350 17 G: 17 POWDER, FOR SOLUTION ORAL at 20:42

## 2025-03-08 RX ADMIN — INSULIN LISPRO 4 UNITS: 100 INJECTION, SOLUTION INTRAVENOUS; SUBCUTANEOUS at 23:46

## 2025-03-08 RX ADMIN — INSULIN LISPRO 4 UNITS: 100 INJECTION, SOLUTION INTRAVENOUS; SUBCUTANEOUS at 15:43

## 2025-03-08 RX ADMIN — POLYETHYLENE GLYCOL 3350 17 G: 17 POWDER, FOR SOLUTION ORAL at 09:37

## 2025-03-08 RX ADMIN — INSULIN LISPRO 6 UNITS: 100 INJECTION, SOLUTION INTRAVENOUS; SUBCUTANEOUS at 21:04

## 2025-03-08 RX ADMIN — PIPERACILLIN AND TAZOBACTAM 3375 MG: 3; .375 INJECTION, POWDER, LYOPHILIZED, FOR SOLUTION INTRAVENOUS at 03:29

## 2025-03-08 RX ADMIN — CHLORHEXIDINE GLUCONATE 15 ML: 1.2 RINSE ORAL at 20:51

## 2025-03-08 RX ADMIN — HEPARIN SODIUM 5000 UNITS: 5000 INJECTION INTRAVENOUS; SUBCUTANEOUS at 00:45

## 2025-03-08 RX ADMIN — INSULIN GLARGINE 10 UNITS: 100 INJECTION, SOLUTION SUBCUTANEOUS at 20:44

## 2025-03-08 RX ADMIN — ACETAMINOPHEN 650 MG: 650 SOLUTION ORAL at 18:35

## 2025-03-08 RX ADMIN — DOCUSATE SODIUM LIQUID 100 MG: 100 LIQUID ORAL at 15:41

## 2025-03-08 RX ADMIN — Medication 3 MG: at 20:50

## 2025-03-08 ASSESSMENT — PAIN DESCRIPTION - LOCATION
LOCATION: BACK
LOCATION: BACK

## 2025-03-08 ASSESSMENT — PAIN SCALES - GENERAL
PAINLEVEL_OUTOF10: 7
PAINLEVEL_OUTOF10: 0
PAINLEVEL_OUTOF10: 5
PAINLEVEL_OUTOF10: 0

## 2025-03-08 ASSESSMENT — PULMONARY FUNCTION TESTS
PIF_VALUE: 12
PIF_VALUE: 11

## 2025-03-08 ASSESSMENT — PAIN SCALES - WONG BAKER: WONGBAKER_NUMERICALRESPONSE: NO HURT

## 2025-03-08 NOTE — PROGRESS NOTES
IntraVENous Continuous    And    fentaNYL (SUBLIMAZE) bolus from bag  50 mcg IntraVENous Q30 Min PRN    senna (SENOKOT) 8.8 MG/5ML syrup 17.6 mg  10 mL Per NG tube BID    polyethylene glycol (GLYCOLAX) packet 17 g  17 g Orogastric BID    docusate (COLACE) 50 MG/5ML liquid 100 mg  100 mg Per NG tube BID    heparin (porcine) injection 5,000 Units  5,000 Units SubCUTAneous 3 times per day    alcohol 62% (NOZIN) nasal  1 ampule  1 ampule Nasal Q12H    chlorhexidine (PERIDEX) 0.12 % solution 15 mL  15 mL Mouth/Throat BID    insulin lispro (HUMALOG,ADMELOG) injection vial 4 Units  4 Units SubCUTAneous Q8H    piperacillin-tazobactam (ZOSYN) 3,375 mg in sodium chloride 0.9 % 50 mL IVPB (addEASE)  3,375 mg IntraVENous Q8H    insulin glargine (LANTUS) injection vial 10 Units  10 Units SubCUTAneous BID    potassium bicarb-citric acid (EFFER-K) effervescent tablet 40 mEq  40 mEq Orogastric Once    nystatin (MYCOSTATIN) 640272 UNIT/ML suspension 500,000 Units  5 mL Oral 4x Daily    propofol infusion  5-50 mcg/kg/min IntraVENous Continuous    dexmedeTOMIDine (PRECEDEX) 400 mcg in sodium chloride 0.9 % 100 mL infusion  0.1-1.5 mcg/kg/hr IntraVENous Continuous    lansoprazole (PREVACID SOLUTAB) disintegrating tablet 30 mg  30 mg Per NG tube QAM AC    insulin lispro (HUMALOG,ADMELOG) injection vial 0-8 Units  0-8 Units SubCUTAneous Q6H    atorvastatin (LIPITOR) tablet 10 mg  10 mg Per NG tube Daily    dextrose 5 % with KCl 20 mEq infusion   IntraVENous Continuous    [Held by provider] bumetanide (BUMEX) injection 0.5 mg  0.5 mg IntraVENous BID    vancomycin (VANCOCIN) 750 mg in sodium chloride 0.9 % 250 mL IVPB (Wnye1Deh)  750 mg IntraVENous Q24H    oxyCODONE (ROXICODONE) immediate release tablet 5 mg  5 mg Oral Q4H PRN    [Held by provider] empagliflozin (JARDIANCE) tablet 10 mg  10 mg Oral Daily    glucose chewable tablet 16 g  4 tablet Oral PRN    dextrose bolus 10% 125 mL  125 mL IntraVENous PRN    Or    dextrose bolus  movement.  Irregular rate and rhythm, no murmur, pericardial rub, knock, or gallop.  No peripheral edema.  Palpable radial  pulses bilaterally.  Abdomen, soft, nontender, nondistended.  No abdominal bruit or pulstatile masses.  Extremities without cyanosis or clubbing.  Muscle tone and bulk normal.  Skin warm and dry.  No rashes or ulcers.  Neuro grossly nonfocal.  No tremor.      CARDIOGRAPHICS and STUDIES, I reviewed:    Telemetry:  Atrial fibrillation with HR 80's      Labs:  No results for input(s): \"CPK\", \"CKMB\" in the last 72 hours.    Invalid input(s): \"CPKMB\", \"CKNDX\", \"TROIQ\"  No results found for: \"CHOL\", \"CHLST\", \"CHOLV\", \"HDL\", \"HDLC\", \"LDL\", \"LDLC\"  No results for input(s): \"INR\", \"APTT\" in the last 72 hours.    Invalid input(s): \"PTP\"   Recent Labs     03/05/25 1930 03/06/25 0457 03/06/25 1959 03/07/25 0337 03/07/25  0959 03/07/25  1530   * 159*   < > 157* 155* 157*   K 3.8 3.3*   < > 3.7 3.5 3.4*   * 126*   < > 124* 119* 124*   CO2 26 27   < > 27 29 27   BUN 38* 41*   < > 45* 46* 44*   PHOS 2.3* 2.0*  --  2.5*  --   --    WBC 14.1* 26.2*  --  25.2*  --   --    HGB 13.0 12.8  --  12.9  --   --    HCT 43.7 43.7  --  42.7  --   --    * 399  --  441*  --   --     < > = values in this interval not displayed.     Recent Labs     03/05/25 1930 03/06/25 0457 03/07/25 0337   GLOB 5.0* 4.1* 3.9     No components found for: \"GLPOC\"  No results for input(s): \"PH\", \"PCO2\", \"PO2\" in the last 72 hours.      Anthony Allen MD 03/07/25 7:52 PM

## 2025-03-08 NOTE — PROGRESS NOTES
0800 - RN explained plan for the day, reviewed labs with patient, and allowed time for patient to express needs via yes no questions.    0930 - Oral care performed    1138 - Dr. Dominguez at bedside. Verbal orders for ABGs.  Plans for extubation pending gas results.  Verbal orders to turn Precedex off, tube feed off..  Verbal orders to remove OG tube.  Verbal orders to place STAT NG tube.    1220 - Pt extubated to nasal cannula,  at bedside.  updated on plan of care. RN allowed time to answer all questions within scope.    1748 -  at bedside stating wife was treated for bleeding ulcers on January 8th.  States ulcers were cauterized and that pt was taken off of eliquis.   states at this time pt was told  not to resume eliquis when she returns home.  RN discussed with Dr. Schwarz at this time (Dr. Dominguez gone for shift).  Verbal orders to hold heparin.  RN attempted BMP, unable to draw from IV's. RN called rapid response nurse to collaborate for ultrasound IV placement and lab collection.

## 2025-03-08 NOTE — PROGRESS NOTES
1900 Bedside and Verbal shift change report given to Zachariah RN  (oncoming nurse) by Thania RN  (offgoing nurse). Report included the following information Intake/Output, MAR, Recent Results, Med Rec Status, Cardiac Rhythm afib , and Alarm Parameters.     2000 on assessment pt is able to open her eyes and tracks, pt has a cough and gag, pt is on a vent, pt is on dex at 0.2, pt is afebrile,pt repositioned q 2hrs,pt family visiting at this time    2200 bpm sent, pt k is 3.0 repleted as ordered    0000 chg bath given     0400 labs sent awaiting results     0700 labs given to the dayshift RN

## 2025-03-08 NOTE — PROGRESS NOTES
Critical Care Progress Note  Tonya Dominguez MD          Date of Service:  3/8/2025  NAME:  Rosita Morillo  :  1945  MRN:  [id number]      Subjective/Hospital course:      Rosita Morillo is a 78 yo female with a PMH of CAD, pacemaker, CKD3, combined HF (EF45-50%), DM2, GERD, AF who presented to [facility] from [facility] on  with cellulitis and HF exacerbation with fluid overload.  She was started on vanc/cefepime. Her course was complicated by encephalopathy, AF RVR requiring dilt drip, and ESPINOZA on CKD3. Neurology was consulted for progressively worsening encephalopathy. EEG negative for seizure. Rapid response was called on 3/5 for worsening hypoxia.  She was transferred to the ICU. After a trial of BiPAP and diuresis she ultimately required intubation for hypoxia in the setting of poor mentation with lack of ability to protect her airway.  It was noted she also had a large R pleural effusion contributing to the hypoxia.      3/6 - no acute change after intubation. OFF NE  3/7: Patient is intubated and sedated with Precedex and propofol.  Was on low-dose Levophed this morning and now weaned off.  Currently on 30% FiO2 and PEEP of 5 cm water.  3/8: Patient is awake and following commands, very weak and deconditioned.  Currently on PSV  with 30% FiO2.    Assessment/Plan:   Toxic metabolic encephalopathy: CT head negative. EEG negative for seizure.   - Neurology following  - Avoid benzos     PULMONOLOGY:      Acute hypoxic respiratory failure requiring MV: New/worsening R pleural effusion. Low grade temp to 100.6. Worsening hypoxia.   -RLL infiltrates and pleural effusion with atelectasis on CXR.   - serial Procal/LA  - Blood/sputum cultures pending  - Broad spectrum abx including vancomycin and cefepime for now.  -Plan for extubation today.     Bedside US preformed without large fluid pocket, no site for thoracentesis.    CARDIOVASCULAR:      Septic shock: Broadened abx to vanc/cefepime. Now on levophed.

## 2025-03-08 NOTE — PROGRESS NOTES
NAME: Rosita Morillo        :  1945        MRN:  [id number]         Assessment:    ESPINOZA on CKD stage 3a: Cr had been 1.3-1.5 with recent diuresis, now 1.34 <--1.53. IV contrast exposure on admission but no significant LEIGH noted. Baseline Cr seems to be 1.1-1.3    Hypernatremia: Improving.  Sodium trend 157 => 153 => 151  Note still with pseudohyponatremia and the corrected sodium would be around 153  Hyperglycemia  Hypophosphatemia  Combined CHF: EF 45-50%. LE edema has improved s/p diuresis     LE cellulitis     DM2     Hypokalemia: 2 to diuresis. Mg ok    HTN    Acute hypoxic respiratory failure on ventilator  Encephalopathy     Plan/Recommendations:  Make free water flushes every 4 hours  Continue monitoring sodium  IV phosphate replacement    Continue holding IV Bumex (had a one time dose on 3/5) and Jardiance    On midodrine    Strict I/Os  Am labs      Subjective:     Intubated not responsive    Objective:     VITALS:   Last 24hrs VS reviewed since prior progress note. Most recent are:  Vitals:    25 0908   BP:    Pulse: 81   Resp: 22   Temp:    SpO2: 98%       Intake/Output Summary (Last 24 hours) at 3/8/2025 0920  Last data filed at 3/8/2025 0200  Gross per 24 hour   Intake 1647.99 ml   Output 925 ml   Net 722.99 ml      Telemetry Reviewed:     PHYSICAL EXAM:  Ill-appearing on the vent.  Urine looks clear  Tube feeds ongoing.      Lab Data Reviewed: (see below)    Medications Reviewed: (see below)    PMH/SH reviewed - no change compared to H&P  ________________________________________________________________________  Care Plan discussed with:  Patient     Family      RN x    Care Manager                    Consultant:          Comments   >50% of visit spent in counseling and coordination of care       ________________________________________________________________________  Matteo Zambrano MD     Procedures: see electronic

## 2025-03-08 NOTE — PROGRESS NOTES
1130  Rounds with ID team.  Discussed SAT/SBT.  Will do trial and see if patient is able to be extubated.    1335  Patient is off propofol and is on SBT.  Family at bedside interactng with her and keeping her alert.    1410  Precedex stopped.    1500  ABG done and looks good.  Will discuss extubation with MD.    1730  MD to bedside.  He assessed patient's respiratory status and index for extubation and would like to hold off on extubation at this time.  He explained in depth to the family and patient his concerns and they were receptive to waiting at this time.   Will restart Precedex and/or fentanyl as needed based on exam.    1945  Bedside and Verbal shift change report given to Zachariah IBRHAIM (oncoming nurse) by ALEXANDRIA Monteiro (offgoing nurse). Report included the following information Adult Overview, Intake/Output, MAR, Recent Results, and Cardiac Rhythm a-fib .       2015  Podiatry PerfectServe'd-Dr. Hu will see patient.

## 2025-03-09 ENCOUNTER — APPOINTMENT (OUTPATIENT)
Facility: HOSPITAL | Age: 80
DRG: 637 | End: 2025-03-09
Attending: HOSPITALIST
Payer: MEDICARE

## 2025-03-09 LAB
ANION GAP SERPL CALC-SCNC: 3 MMOL/L (ref 2–12)
ANION GAP SERPL CALC-SCNC: 4 MMOL/L (ref 2–12)
ANION GAP SERPL CALC-SCNC: 5 MMOL/L (ref 2–12)
ANION GAP SERPL CALC-SCNC: 6 MMOL/L (ref 2–12)
BUN SERPL-MCNC: 22 MG/DL (ref 6–20)
BUN SERPL-MCNC: 23 MG/DL (ref 6–20)
BUN SERPL-MCNC: 25 MG/DL (ref 6–20)
BUN SERPL-MCNC: 30 MG/DL (ref 6–20)
BUN/CREAT SERPL: 22 (ref 12–20)
BUN/CREAT SERPL: 23 (ref 12–20)
BUN/CREAT SERPL: 24 (ref 12–20)
BUN/CREAT SERPL: 26 (ref 12–20)
CALCIUM SERPL-MCNC: 7.9 MG/DL (ref 8.5–10.1)
CALCIUM SERPL-MCNC: 8.1 MG/DL (ref 8.5–10.1)
CALCIUM SERPL-MCNC: 8.2 MG/DL (ref 8.5–10.1)
CALCIUM SERPL-MCNC: 8.3 MG/DL (ref 8.5–10.1)
CHLORIDE SERPL-SCNC: 114 MMOL/L (ref 97–108)
CHLORIDE SERPL-SCNC: 118 MMOL/L (ref 97–108)
CO2 SERPL-SCNC: 25 MMOL/L (ref 21–32)
CO2 SERPL-SCNC: 27 MMOL/L (ref 21–32)
CO2 SERPL-SCNC: 28 MMOL/L (ref 21–32)
CO2 SERPL-SCNC: 28 MMOL/L (ref 21–32)
CREAT SERPL-MCNC: 0.9 MG/DL (ref 0.55–1.02)
CREAT SERPL-MCNC: 1.03 MG/DL (ref 0.55–1.02)
CREAT SERPL-MCNC: 1.11 MG/DL (ref 0.55–1.02)
CREAT SERPL-MCNC: 1.17 MG/DL (ref 0.55–1.02)
ECHO AO ROOT DIAM: 3.2 CM
ECHO AO ROOT INDEX: 1.64 CM/M2
ECHO AV AREA PEAK VELOCITY: 0.9 CM2
ECHO AV AREA VTI: 1 CM2
ECHO AV AREA/BSA PEAK VELOCITY: 0.5 CM2/M2
ECHO AV AREA/BSA VTI: 0.5 CM2/M2
ECHO AV CUSP MM: 1.4 CM
ECHO AV MEAN GRADIENT: 2 MMHG
ECHO AV MEAN VELOCITY: 0.7 M/S
ECHO AV PEAK GRADIENT: 4 MMHG
ECHO AV PEAK VELOCITY: 1 M/S
ECHO AV VELOCITY RATIO: 0.5
ECHO AV VTI: 18.2 CM
ECHO BSA: 2.03 M2
ECHO EST RA PRESSURE: 15 MMHG
ECHO LA DIAMETER INDEX: 2.21 CM/M2
ECHO LA DIAMETER: 4.3 CM
ECHO LA TO AORTIC ROOT RATIO: 1.34
ECHO LA VOL A-L A2C: 53 ML (ref 22–52)
ECHO LA VOL A-L A4C: 52 ML (ref 22–52)
ECHO LA VOL MOD A2C: 49 ML (ref 22–52)
ECHO LA VOL MOD A4C: 47 ML (ref 22–52)
ECHO LA VOLUME AREA LENGTH: 55 ML
ECHO LA VOLUME INDEX A-L A2C: 27 ML/M2 (ref 16–34)
ECHO LA VOLUME INDEX A-L A4C: 27 ML/M2 (ref 16–34)
ECHO LA VOLUME INDEX AREA LENGTH: 28 ML/M2 (ref 16–34)
ECHO LA VOLUME INDEX MOD A2C: 25 ML/M2 (ref 16–34)
ECHO LA VOLUME INDEX MOD A4C: 24 ML/M2 (ref 16–34)
ECHO LV E' LATERAL VELOCITY: 6.74 CM/S
ECHO LV E' SEPTAL VELOCITY: 5.35 CM/S
ECHO LV EDV A4C: 42 ML
ECHO LV EDV INDEX A4C: 22 ML/M2
ECHO LV EF PHYSICIAN: 55 %
ECHO LV EJECTION FRACTION A4C: 52 %
ECHO LV ESV A4C: 20 ML
ECHO LV ESV INDEX A4C: 10 ML/M2
ECHO LV FRACTIONAL SHORTENING: 18 % (ref 28–44)
ECHO LV INTERNAL DIMENSION DIASTOLE INDEX: 2.31 CM/M2
ECHO LV INTERNAL DIMENSION DIASTOLIC: 4.5 CM (ref 3.9–5.3)
ECHO LV INTERNAL DIMENSION SYSTOLIC INDEX: 1.9 CM/M2
ECHO LV INTERNAL DIMENSION SYSTOLIC: 3.7 CM
ECHO LV IVSD: 1.2 CM (ref 0.6–0.9)
ECHO LV MASS 2D: 186.4 G (ref 67–162)
ECHO LV MASS INDEX 2D: 95.6 G/M2 (ref 43–95)
ECHO LV POSTERIOR WALL DIASTOLIC: 1.1 CM (ref 0.6–0.9)
ECHO LV RELATIVE WALL THICKNESS RATIO: 0.49
ECHO LVOT AREA: 1.8 CM2
ECHO LVOT AV VTI INDEX: 0.55
ECHO LVOT DIAM: 1.5 CM
ECHO LVOT MEAN GRADIENT: 1 MMHG
ECHO LVOT PEAK GRADIENT: 1 MMHG
ECHO LVOT PEAK VELOCITY: 0.5 M/S
ECHO LVOT STROKE VOLUME INDEX: 9.1 ML/M2
ECHO LVOT SV: 17.7 ML
ECHO LVOT VTI: 10 CM
ECHO MV A VELOCITY: 0.01 M/S
ECHO MV E DECELERATION TIME (DT): 264.7 MS
ECHO MV E VELOCITY: 1.26 M/S
ECHO MV E/A RATIO: 126
ECHO MV E/E' LATERAL: 18.69
ECHO MV E/E' RATIO (AVERAGED): 21.12
ECHO MV E/E' SEPTAL: 23.55
ECHO PV MAX VELOCITY: 0.7 M/S
ECHO PV PEAK GRADIENT: 2 MMHG
ECHO RIGHT VENTRICULAR SYSTOLIC PRESSURE (RVSP): 86 MMHG
ECHO RV INTERNAL DIMENSION: 3.4 CM
ECHO RV TAPSE: 1.8 CM (ref 1.7–?)
ECHO TV REGURGITANT MAX VELOCITY: 4.2 M/S
ECHO TV REGURGITANT PEAK GRADIENT: 71 MMHG
ERYTHROCYTE [DISTWIDTH] IN BLOOD BY AUTOMATED COUNT: 22.5 % (ref 11.5–14.5)
GLUCOSE BLD STRIP.AUTO-MCNC: 216 MG/DL (ref 65–117)
GLUCOSE BLD STRIP.AUTO-MCNC: 250 MG/DL (ref 65–117)
GLUCOSE BLD STRIP.AUTO-MCNC: 275 MG/DL (ref 65–117)
GLUCOSE BLD STRIP.AUTO-MCNC: 298 MG/DL (ref 65–117)
GLUCOSE SERPL-MCNC: 204 MG/DL (ref 65–100)
GLUCOSE SERPL-MCNC: 252 MG/DL (ref 65–100)
GLUCOSE SERPL-MCNC: 260 MG/DL (ref 65–100)
GLUCOSE SERPL-MCNC: 318 MG/DL (ref 65–100)
HCT VFR BLD AUTO: 36.9 % (ref 35–47)
HGB BLD-MCNC: 11.2 G/DL (ref 11.5–16)
MAGNESIUM SERPL-MCNC: 1.9 MG/DL (ref 1.6–2.4)
MCH RBC QN AUTO: 25.3 PG (ref 26–34)
MCHC RBC AUTO-ENTMCNC: 30.4 G/DL (ref 30–36.5)
MCV RBC AUTO: 83.3 FL (ref 80–99)
NRBC # BLD: 0.02 K/UL (ref 0–0.01)
NRBC BLD-RTO: 0.1 PER 100 WBC
PHOSPHATE SERPL-MCNC: 3.5 MG/DL (ref 2.6–4.7)
PLATELET # BLD AUTO: 378 K/UL (ref 150–400)
PMV BLD AUTO: 11.5 FL (ref 8.9–12.9)
POTASSIUM SERPL-SCNC: 3.3 MMOL/L (ref 3.5–5.1)
POTASSIUM SERPL-SCNC: 3.5 MMOL/L (ref 3.5–5.1)
POTASSIUM SERPL-SCNC: 3.7 MMOL/L (ref 3.5–5.1)
POTASSIUM SERPL-SCNC: 3.9 MMOL/L (ref 3.5–5.1)
RBC # BLD AUTO: 4.43 M/UL (ref 3.8–5.2)
SERVICE CMNT-IMP: ABNORMAL
SODIUM SERPL-SCNC: 145 MMOL/L (ref 136–145)
SODIUM SERPL-SCNC: 145 MMOL/L (ref 136–145)
SODIUM SERPL-SCNC: 146 MMOL/L (ref 136–145)
SODIUM SERPL-SCNC: 150 MMOL/L (ref 136–145)
WBC # BLD AUTO: 15.3 K/UL (ref 3.6–11)

## 2025-03-09 PROCEDURE — 6360000002 HC RX W HCPCS: Performed by: INTERNAL MEDICINE

## 2025-03-09 PROCEDURE — 6360000002 HC RX W HCPCS: Performed by: HOSPITALIST

## 2025-03-09 PROCEDURE — 84100 ASSAY OF PHOSPHORUS: CPT

## 2025-03-09 PROCEDURE — 2580000003 HC RX 258: Performed by: HOSPITALIST

## 2025-03-09 PROCEDURE — 2700000000 HC OXYGEN THERAPY PER DAY

## 2025-03-09 PROCEDURE — 93306 TTE W/DOPPLER COMPLETE: CPT

## 2025-03-09 PROCEDURE — 36415 COLL VENOUS BLD VENIPUNCTURE: CPT

## 2025-03-09 PROCEDURE — 6370000000 HC RX 637 (ALT 250 FOR IP): Performed by: INTERNAL MEDICINE

## 2025-03-09 PROCEDURE — 2500000003 HC RX 250 WO HCPCS: Performed by: HOSPITALIST

## 2025-03-09 PROCEDURE — 83735 ASSAY OF MAGNESIUM: CPT

## 2025-03-09 PROCEDURE — 87040 BLOOD CULTURE FOR BACTERIA: CPT

## 2025-03-09 PROCEDURE — 2000000000 HC ICU R&B

## 2025-03-09 PROCEDURE — 6370000000 HC RX 637 (ALT 250 FOR IP): Performed by: STUDENT IN AN ORGANIZED HEALTH CARE EDUCATION/TRAINING PROGRAM

## 2025-03-09 PROCEDURE — 6370000000 HC RX 637 (ALT 250 FOR IP): Performed by: NURSE PRACTITIONER

## 2025-03-09 PROCEDURE — 82962 GLUCOSE BLOOD TEST: CPT

## 2025-03-09 PROCEDURE — 2580000003 HC RX 258: Performed by: INTERNAL MEDICINE

## 2025-03-09 PROCEDURE — 85027 COMPLETE CBC AUTOMATED: CPT

## 2025-03-09 PROCEDURE — 6370000000 HC RX 637 (ALT 250 FOR IP): Performed by: HOSPITALIST

## 2025-03-09 PROCEDURE — 80048 BASIC METABOLIC PNL TOTAL CA: CPT

## 2025-03-09 RX ORDER — INSULIN LISPRO 100 [IU]/ML
4 INJECTION, SOLUTION INTRAVENOUS; SUBCUTANEOUS
Status: DISCONTINUED | OUTPATIENT
Start: 2025-03-10 | End: 2025-03-14 | Stop reason: HOSPADM

## 2025-03-09 RX ORDER — INSULIN LISPRO 100 [IU]/ML
0-8 INJECTION, SOLUTION INTRAVENOUS; SUBCUTANEOUS
Status: DISCONTINUED | OUTPATIENT
Start: 2025-03-09 | End: 2025-03-09

## 2025-03-09 RX ORDER — INSULIN LISPRO 100 [IU]/ML
0-8 INJECTION, SOLUTION INTRAVENOUS; SUBCUTANEOUS
Status: DISCONTINUED | OUTPATIENT
Start: 2025-03-09 | End: 2025-03-14 | Stop reason: HOSPADM

## 2025-03-09 RX ADMIN — POLYETHYLENE GLYCOL 3350 17 G: 17 POWDER, FOR SOLUTION ORAL at 20:24

## 2025-03-09 RX ADMIN — VASOPRESSIN: 20 INJECTION, SOLUTION INTRAVENOUS at 10:51

## 2025-03-09 RX ADMIN — Medication 1 AMPULE: at 08:34

## 2025-03-09 RX ADMIN — LANSOPRAZOLE 30 MG: 30 TABLET, ORALLY DISINTEGRATING, DELAYED RELEASE ORAL at 05:47

## 2025-03-09 RX ADMIN — INSULIN GLARGINE 10 UNITS: 100 INJECTION, SOLUTION SUBCUTANEOUS at 08:18

## 2025-03-09 RX ADMIN — INSULIN LISPRO 4 UNITS: 100 INJECTION, SOLUTION INTRAVENOUS; SUBCUTANEOUS at 18:25

## 2025-03-09 RX ADMIN — Medication 1 AMPULE: at 20:23

## 2025-03-09 RX ADMIN — INSULIN LISPRO 4 UNITS: 100 INJECTION, SOLUTION INTRAVENOUS; SUBCUTANEOUS at 08:17

## 2025-03-09 RX ADMIN — HEPARIN SODIUM 5000 UNITS: 5000 INJECTION INTRAVENOUS; SUBCUTANEOUS at 18:24

## 2025-03-09 RX ADMIN — ATORVASTATIN CALCIUM 10 MG: 10 TABLET, FILM COATED ORAL at 08:24

## 2025-03-09 RX ADMIN — TIMOLOL MALEATE 1 DROP: 2.5 SOLUTION OPHTHALMIC at 20:25

## 2025-03-09 RX ADMIN — CHLORHEXIDINE GLUCONATE 15 ML: 1.2 RINSE ORAL at 20:29

## 2025-03-09 RX ADMIN — SENNOSIDES 17.6 MG: 8.8 LIQUID ORAL at 20:25

## 2025-03-09 RX ADMIN — DEXTROSE MONOHYDRATE AND POTASSIUM CHLORIDE INJECTION, SOLUTION: 5; .149 INJECTION, SOLUTION INTRAVENOUS at 05:43

## 2025-03-09 RX ADMIN — OXYCODONE 5 MG: 5 TABLET ORAL at 20:23

## 2025-03-09 RX ADMIN — PIPERACILLIN AND TAZOBACTAM 3375 MG: 3; .375 INJECTION, POWDER, LYOPHILIZED, FOR SOLUTION INTRAVENOUS at 04:00

## 2025-03-09 RX ADMIN — INSULIN LISPRO 4 UNITS: 100 INJECTION, SOLUTION INTRAVENOUS; SUBCUTANEOUS at 08:19

## 2025-03-09 RX ADMIN — ACETAMINOPHEN 650 MG: 650 SOLUTION ORAL at 00:02

## 2025-03-09 RX ADMIN — INSULIN LISPRO 4 UNITS: 100 INJECTION, SOLUTION INTRAVENOUS; SUBCUTANEOUS at 04:01

## 2025-03-09 RX ADMIN — VANCOMYCIN HYDROCHLORIDE 750 MG: 750 INJECTION, POWDER, LYOPHILIZED, FOR SOLUTION INTRAVENOUS at 10:52

## 2025-03-09 RX ADMIN — CHLORHEXIDINE GLUCONATE 15 ML: 1.2 RINSE ORAL at 08:24

## 2025-03-09 RX ADMIN — TIMOLOL MALEATE 1 DROP: 2.5 SOLUTION OPHTHALMIC at 08:21

## 2025-03-09 RX ADMIN — INSULIN LISPRO 2 UNITS: 100 INJECTION, SOLUTION INTRAVENOUS; SUBCUTANEOUS at 20:34

## 2025-03-09 RX ADMIN — VASOPRESSIN: 20 INJECTION, SOLUTION INTRAVENOUS at 18:52

## 2025-03-09 RX ADMIN — Medication 3 MG: at 20:32

## 2025-03-09 RX ADMIN — INSULIN GLARGINE 10 UNITS: 100 INJECTION, SOLUTION SUBCUTANEOUS at 20:24

## 2025-03-09 ASSESSMENT — PAIN SCALES - GENERAL
PAINLEVEL_OUTOF10: 0
PAINLEVEL_OUTOF10: 7
PAINLEVEL_OUTOF10: 0
PAINLEVEL_OUTOF10: 7
PAINLEVEL_OUTOF10: 0

## 2025-03-09 ASSESSMENT — PAIN SCALES - WONG BAKER
WONGBAKER_NUMERICALRESPONSE: NO HURT

## 2025-03-09 NOTE — PROGRESS NOTES
Pharmacy Antimicrobial Kinetic Dosing    Indication for Antimicrobials: SSTI/cellulitis of toes, PNA    Current Regimen of Each Antimicrobial:  Vancomycin Pharmacy to Dose, Start ; Day # 9  Zosyn 3.375g IV q8h; Start 3/6; Day # 2    Previous Antimicrobial Therapy:  Ceftriaxone 3/3-3/6  Cefepime 3/6   Metronidazole 3/6    Goal Level: Vancomycin -600    Date Dose & Interval Measured (mcg/mL) Predicted AUC 24-48 Predicted AUC 24,ss   3/1  500mg q12h 15.1 541 636   3/5  750mg q24h  14.2 427 427            Significant Cultures:    Blood, paired: negative  3/6 Blood, paired: NGTD, prelim  3/7 ET culture: pending   3/6 MRSA nares: not present (obtained after 8 days of vancomycin)    Labs:  Recent Labs     Units 25  0450 25  2240 25  1530 25  0959 25  0337 25  2027 25  1959 25  0457 25  1930   CREATININE MG/DL 1.34* 1.31* 1.31*   < > 1.53*  --    < > 1.59* 1.79*   BUN MG/DL 39* 42* 44*   < > 45*  --    < > 41* 38*   PROCAL ng/mL  --   --   --   --   --  0.24  --   --   --    WBC K/uL 18.6*  --   --   --  25.2*  --   --  26.2* 14.1*    < > = values in this interval not displayed.     Temp (24hrs), Av.1 °F (37.3 °C), Min:98.9 °F (37.2 °C), Max:99.2 °F (37.3 °C)    Conditions for Dosing Consideration: None    Creatinine Clearance (mL/min): Estimated Creatinine Clearance: 37 mL/min (A) (based on SCr of 1.34 mg/dL (H)).     Impression/Plan:   Continue vancomycin 750mg IV q24h   Predicted UPS50-42 = 427, Predicted AUC24,ss = 421  Will plan to order vancomycin level on 3/10 w/ AM labs  BMP and CBC ordered per protocol  Antimicrobial stop date vancomycin extended until 3/12; zosyn timed to end 3/13     Pharmacy will follow daily and adjust medications as appropriate for renal function and/or serum levels.    Thank you,  Matheus Phillips [facility]

## 2025-03-09 NOTE — PROGRESS NOTES
0836 - Oral care performed    0955 -Pt cleaned of one moderate liquid bowel movement.  Repositioned, pt passed nursing swallow screen given via verbal orders from Dr. Dominguez at bedside.    1115 - NG tube removed per verbal order of Dr. Dominguez    1225 - RN made two attempts to collect paired blood cultures without success using ultrasound guided insertion    1645 - Pt removed Left 22g IV.  Spouse at bedside stating he witnessed pt remove IV before he identified what she was doing.  Bleeding controled, catheter tip intact.  RN unable to identify other suitable vessel for insertion.  Escalated to charge nurse.

## 2025-03-09 NOTE — PROGRESS NOTES
1900 Bedside and Verbal shift change report given to martina IBRAHIM  (oncoming nurse) by Eugenia IBRAHIM  (offgoing nurse). Report included the following information MAR, Recent Results, Med Rec Status, Cardiac Rhythm Afib, and Alarm Parameters.     2000 on assessment pt is alert and able to follow commands, pt is afebrile, pt is on d5 with 20meqkcl at 100, pt c/oback pain medicated for pain, pt repositioned at this time     Repeat bmp sent     0000 chg bath given     0400 labs sent     0700 given report to Eugenia IBRAHIM

## 2025-03-09 NOTE — PROGRESS NOTES
Critical Care Progress Note  Tonya Dominguez MD          Date of Service:  3/9/2025  NAME:  Rosita oMrillo  :  1945  MRN:  [id number]      Subjective/Hospital course:      Rosita Morillo is a 78 yo female with a PMH of CAD, pacemaker, CKD3, combined HF (EF45-50%), DM2, GERD, AF who presented to [facility] from [facility] on  with cellulitis and HF exacerbation with fluid overload.  She was started on vanc/cefepime. Her course was complicated by encephalopathy, AF RVR requiring dilt drip, and ESPINOZA on CKD3. Neurology was consulted for progressively worsening encephalopathy. EEG negative for seizure. Rapid response was called on 3/5 for worsening hypoxia.  She was transferred to the ICU. After a trial of BiPAP and diuresis she ultimately required intubation for hypoxia in the setting of poor mentation with lack of ability to protect her airway.  It was noted she also had a large R pleural effusion contributing to the hypoxia.      3/6 - no acute change after intubation. OFF NE  3/7: Patient is intubated and sedated with Precedex and propofol.  Was on low-dose Levophed this morning and now weaned off.  Currently on 30% FiO2 and PEEP of 5 cm water.  3/8: Patient is awake and following commands, very weak and deconditioned.  Currently on PSV /5 with 30% FiO2.  3/9: Patient is awake and alert. On 4 lit NC. Looks very weak and deconditioned.     Assessment/Plan:   Toxic metabolic encephalopathy: CT head negative. EEG negative for seizure.   - Neurology following  - Avoid benzos  -Mental status is improving.  -Delirium precautions.     PULMONOLOGY:      Acute hypoxic respiratory failure requiring MV:   -RLL infiltrates and pleural effusion with atelectasis on CXR.   - serial Procal/LA     Bedside US preformed without large fluid pocket, no site for thoracentesis.    CARDIOVASCULAR:      Septic shock:   -Has MRSA bacteremia.  Repeat blood cultures.  Continue vancomycin.  Obtain 2D echo.  - Levo for MAP >65     PAF:  -

## 2025-03-09 NOTE — PROGRESS NOTES
in 50 mL IVPB premix  2,000 mg IntraVENous PRN    ondansetron (ZOFRAN-ODT) disintegrating tablet 4 mg  4 mg Oral Q8H PRN    Or    ondansetron (ZOFRAN) injection 4 mg  4 mg IntraVENous Q6H PRN    polyethylene glycol (GLYCOLAX) packet 17 g  17 g Oral Daily PRN    acetaminophen (TYLENOL) suppository 650 mg  650 mg Rectal Q6H PRN    melatonin tablet 3 mg  3 mg Oral Nightly PRN

## 2025-03-10 ENCOUNTER — APPOINTMENT (OUTPATIENT)
Facility: HOSPITAL | Age: 80
DRG: 637 | End: 2025-03-10
Payer: MEDICARE

## 2025-03-10 LAB
ANION GAP SERPL CALC-SCNC: 5 MMOL/L (ref 2–12)
BUN SERPL-MCNC: 20 MG/DL (ref 6–20)
BUN/CREAT SERPL: 26 (ref 12–20)
CALCIUM SERPL-MCNC: 8.1 MG/DL (ref 8.5–10.1)
CHLORIDE SERPL-SCNC: 114 MMOL/L (ref 97–108)
CO2 SERPL-SCNC: 25 MMOL/L (ref 21–32)
CREAT SERPL-MCNC: 0.77 MG/DL (ref 0.55–1.02)
ERYTHROCYTE [DISTWIDTH] IN BLOOD BY AUTOMATED COUNT: 22.2 % (ref 11.5–14.5)
GLUCOSE BLD STRIP.AUTO-MCNC: 151 MG/DL (ref 65–117)
GLUCOSE BLD STRIP.AUTO-MCNC: 163 MG/DL (ref 65–117)
GLUCOSE BLD STRIP.AUTO-MCNC: 166 MG/DL (ref 65–117)
GLUCOSE BLD STRIP.AUTO-MCNC: 171 MG/DL (ref 65–117)
GLUCOSE BLD STRIP.AUTO-MCNC: 201 MG/DL (ref 65–117)
GLUCOSE SERPL-MCNC: 176 MG/DL (ref 65–100)
HCT VFR BLD AUTO: 36.1 % (ref 35–47)
HGB BLD-MCNC: 10.7 G/DL (ref 11.5–16)
MAGNESIUM SERPL-MCNC: 2 MG/DL (ref 1.6–2.4)
MCH RBC QN AUTO: 24.5 PG (ref 26–34)
MCHC RBC AUTO-ENTMCNC: 29.6 G/DL (ref 30–36.5)
MCV RBC AUTO: 82.8 FL (ref 80–99)
NRBC # BLD: 0.02 K/UL (ref 0–0.01)
NRBC BLD-RTO: 0.2 PER 100 WBC
PHOSPHATE SERPL-MCNC: 2.1 MG/DL (ref 2.6–4.7)
PLATELET # BLD AUTO: 261 K/UL (ref 150–400)
PMV BLD AUTO: 11.8 FL (ref 8.9–12.9)
POTASSIUM SERPL-SCNC: 4.2 MMOL/L (ref 3.5–5.1)
RBC # BLD AUTO: 4.36 M/UL (ref 3.8–5.2)
SERVICE CMNT-IMP: ABNORMAL
SODIUM SERPL-SCNC: 144 MMOL/L (ref 136–145)
VANCOMYCIN SERPL-MCNC: 9.7 UG/ML
WBC # BLD AUTO: 13.2 K/UL (ref 3.6–11)

## 2025-03-10 PROCEDURE — 97530 THERAPEUTIC ACTIVITIES: CPT

## 2025-03-10 PROCEDURE — 97164 PT RE-EVAL EST PLAN CARE: CPT

## 2025-03-10 PROCEDURE — 92610 EVALUATE SWALLOWING FUNCTION: CPT

## 2025-03-10 PROCEDURE — 84100 ASSAY OF PHOSPHORUS: CPT

## 2025-03-10 PROCEDURE — 6360000002 HC RX W HCPCS: Performed by: HOSPITALIST

## 2025-03-10 PROCEDURE — 2580000003 HC RX 258: Performed by: HOSPITALIST

## 2025-03-10 PROCEDURE — 6370000000 HC RX 637 (ALT 250 FOR IP): Performed by: INTERNAL MEDICINE

## 2025-03-10 PROCEDURE — 82962 GLUCOSE BLOOD TEST: CPT

## 2025-03-10 PROCEDURE — 80202 ASSAY OF VANCOMYCIN: CPT

## 2025-03-10 PROCEDURE — 2700000000 HC OXYGEN THERAPY PER DAY

## 2025-03-10 PROCEDURE — 85027 COMPLETE CBC AUTOMATED: CPT

## 2025-03-10 PROCEDURE — 2060000000 HC ICU INTERMEDIATE R&B

## 2025-03-10 PROCEDURE — 6370000000 HC RX 637 (ALT 250 FOR IP): Performed by: STUDENT IN AN ORGANIZED HEALTH CARE EDUCATION/TRAINING PROGRAM

## 2025-03-10 PROCEDURE — 2500000003 HC RX 250 WO HCPCS: Performed by: HOSPITALIST

## 2025-03-10 PROCEDURE — 80048 BASIC METABOLIC PNL TOTAL CA: CPT

## 2025-03-10 PROCEDURE — 97168 OT RE-EVAL EST PLAN CARE: CPT | Performed by: OCCUPATIONAL THERAPIST

## 2025-03-10 PROCEDURE — 97535 SELF CARE MNGMENT TRAINING: CPT | Performed by: OCCUPATIONAL THERAPIST

## 2025-03-10 PROCEDURE — 83735 ASSAY OF MAGNESIUM: CPT

## 2025-03-10 PROCEDURE — 6370000000 HC RX 637 (ALT 250 FOR IP): Performed by: NURSE PRACTITIONER

## 2025-03-10 PROCEDURE — 6370000000 HC RX 637 (ALT 250 FOR IP): Performed by: HOSPITALIST

## 2025-03-10 PROCEDURE — 36415 COLL VENOUS BLD VENIPUNCTURE: CPT

## 2025-03-10 PROCEDURE — 93922 UPR/L XTREMITY ART 2 LEVELS: CPT

## 2025-03-10 PROCEDURE — 2500000003 HC RX 250 WO HCPCS: Performed by: STUDENT IN AN ORGANIZED HEALTH CARE EDUCATION/TRAINING PROGRAM

## 2025-03-10 PROCEDURE — 97530 THERAPEUTIC ACTIVITIES: CPT | Performed by: OCCUPATIONAL THERAPIST

## 2025-03-10 RX ORDER — OXYCODONE HYDROCHLORIDE 5 MG/1
5 TABLET ORAL ONCE
Refills: 0 | Status: COMPLETED | OUTPATIENT
Start: 2025-03-10 | End: 2025-03-10

## 2025-03-10 RX ORDER — GAUZE BANDAGE 2" X 2"
100 BANDAGE TOPICAL DAILY
Status: DISCONTINUED | OUTPATIENT
Start: 2025-03-10 | End: 2025-03-14 | Stop reason: HOSPADM

## 2025-03-10 RX ADMIN — VASOPRESSIN: 20 INJECTION, SOLUTION INTRAVENOUS at 04:13

## 2025-03-10 RX ADMIN — TIMOLOL MALEATE 1 DROP: 2.5 SOLUTION OPHTHALMIC at 21:21

## 2025-03-10 RX ADMIN — Medication 1 AMPULE: at 21:21

## 2025-03-10 RX ADMIN — OXYCODONE 5 MG: 5 TABLET ORAL at 21:17

## 2025-03-10 RX ADMIN — INSULIN GLARGINE 10 UNITS: 100 INJECTION, SOLUTION SUBCUTANEOUS at 21:23

## 2025-03-10 RX ADMIN — INSULIN LISPRO 2 UNITS: 100 INJECTION, SOLUTION INTRAVENOUS; SUBCUTANEOUS at 11:45

## 2025-03-10 RX ADMIN — SODIUM CHLORIDE, PRESERVATIVE FREE 10 ML: 5 INJECTION INTRAVENOUS at 21:21

## 2025-03-10 RX ADMIN — TIMOLOL MALEATE 1 DROP: 2.5 SOLUTION OPHTHALMIC at 08:29

## 2025-03-10 RX ADMIN — LANSOPRAZOLE 30 MG: 30 TABLET, ORALLY DISINTEGRATING, DELAYED RELEASE ORAL at 07:00

## 2025-03-10 RX ADMIN — OXYCODONE 5 MG: 5 TABLET ORAL at 15:19

## 2025-03-10 RX ADMIN — ATORVASTATIN CALCIUM 10 MG: 10 TABLET, FILM COATED ORAL at 08:30

## 2025-03-10 RX ADMIN — VANCOMYCIN HYDROCHLORIDE 750 MG: 750 INJECTION, POWDER, LYOPHILIZED, FOR SOLUTION INTRAVENOUS at 07:04

## 2025-03-10 RX ADMIN — HEPARIN SODIUM 5000 UNITS: 5000 INJECTION INTRAVENOUS; SUBCUTANEOUS at 17:15

## 2025-03-10 RX ADMIN — Medication 1 AMPULE: at 09:00

## 2025-03-10 RX ADMIN — HEPARIN SODIUM 5000 UNITS: 5000 INJECTION INTRAVENOUS; SUBCUTANEOUS at 02:04

## 2025-03-10 RX ADMIN — HEPARIN SODIUM 5000 UNITS: 5000 INJECTION INTRAVENOUS; SUBCUTANEOUS at 08:32

## 2025-03-10 RX ADMIN — OXYCODONE HYDROCHLORIDE 5 MG: 5 TABLET ORAL at 18:06

## 2025-03-10 RX ADMIN — POTASSIUM CHLORIDE 40 MEQ: 1500 TABLET, EXTENDED RELEASE ORAL at 02:04

## 2025-03-10 RX ADMIN — CHLORHEXIDINE GLUCONATE 15 ML: 1.2 RINSE ORAL at 08:30

## 2025-03-10 RX ADMIN — Medication 3 MG: at 21:17

## 2025-03-10 RX ADMIN — INSULIN GLARGINE 10 UNITS: 100 INJECTION, SOLUTION SUBCUTANEOUS at 08:32

## 2025-03-10 RX ADMIN — DIBASIC SODIUM PHOSPHATE, MONOBASIC POTASSIUM PHOSPHATE AND MONOBASIC SODIUM PHOSPHATE 2 TABLET: 852; 155; 130 TABLET ORAL at 13:00

## 2025-03-10 ASSESSMENT — PAIN DESCRIPTION - ONSET: ONSET: ON-GOING

## 2025-03-10 ASSESSMENT — PAIN SCALES - GENERAL
PAINLEVEL_OUTOF10: 3
PAINLEVEL_OUTOF10: 0
PAINLEVEL_OUTOF10: 6
PAINLEVEL_OUTOF10: 0
PAINLEVEL_OUTOF10: 8
PAINLEVEL_OUTOF10: 0

## 2025-03-10 ASSESSMENT — PAIN DESCRIPTION - ORIENTATION
ORIENTATION: RIGHT;LEFT
ORIENTATION: LEFT;RIGHT
ORIENTATION: LEFT;RIGHT

## 2025-03-10 ASSESSMENT — PAIN DESCRIPTION - DESCRIPTORS
DESCRIPTORS: ACHING
DESCRIPTORS: ACHING

## 2025-03-10 ASSESSMENT — PAIN DESCRIPTION - FREQUENCY: FREQUENCY: CONTINUOUS

## 2025-03-10 ASSESSMENT — PAIN DESCRIPTION - LOCATION
LOCATION: LEG
LOCATION: FOOT

## 2025-03-10 ASSESSMENT — PAIN DESCRIPTION - PAIN TYPE: TYPE: CHRONIC PAIN

## 2025-03-10 NOTE — PROGRESS NOTES
Orders received, chart reviewed. Pt currently receiving vascular study at bedside. Will defer however continue to follow.     Marilin Casiano, PT, DPT

## 2025-03-10 NOTE — PROGRESS NOTES
Comprehensive Nutrition Assessment    Type and Reason for Visit:  Reassess    Nutrition Recommendations/Plan:   Advance diet texture as able per SLP  Recommend SLP consult   Please document % meals and supplements consumed in flowsheet I/O's under intake   RD to add Glucerna TID  Recommend making bowel regimen prn     Malnutrition Assessment:  Malnutrition Status:  At risk for malnutrition (03/06/25 1449)    Context:  Acute Illness     Findings of the 6 clinical characteristics of malnutrition:  Energy Intake:  50% or less of estimated energy requirements for 5 or more days  Weight Loss:  No weight loss (unreliable wt changes 2' fluid shifts)     Body Fat Loss:  Unable to assess     Muscle Mass Loss:  Unable to assess    Fluid Accumulation:  Mild (UTD if nutrition related) Extremities   Strength:  Not Performed    Nutrition Assessment:  Chart reviewed, case discussed during CCU rounds.  Pt disoriented to place and situation.  No family in the room to speak with.  She was extubated Saturday, needs re-estimated.  Only consumed 2 bites of pureed meat and 50% mashed potatoes, dislikes the pureed meal texture.  Will ask SLP to re-engage as she is likely to eat more if consistency is upgraded.  In the mean time she is open to trying shakes.  -201, will add Glucerna TID.  Phos 2.1, being repleted.  FMS in place for watery stools, will recommend making bowel regimen prn.  Pt with no questions at this time.   Patient Vitals for the past 120 hrs:   PO Meals Eaten (%)   03/10/25 1005 0%            Nutrition Related Findings:    Meds: lantus, lispro, prevacid, Kphos, vancomycin, glycolax, senokot.    Edema: +1-BUE, +2-BLE.    BM: 3/9   Wound Type: Multiple (none staged yet)       Current Nutrition Intake & Therapies:    Average Meal Intake: 1-25%  Average Supplements Intake: None Ordered  ADULT DIET; Dysphagia - Pureed; Low Sodium (2 gm)  ADULT ORAL NUTRITION SUPPLEMENT; Breakfast, Lunch, Dinner; Diabetic Oral

## 2025-03-10 NOTE — PROGRESS NOTES
Hospitalist Progress Note    NAME:   Rosita Morillo   : 1945   MRN: [id number]     Date/Time: 3/10/2025 1:21 PM  Patient PCP: Juan Go MD    Estimated discharge date:3/15  Barriers: clx,       Assessment / Plan:    Increased risk for hospital delirium  Toxic metabolic encephalopathy likely due to septic shock, acute hypoxic respiratory failure, hypernatremia, resolved  - CT head negative, EEG negative for seizures, neurology is following  - C/W delirium precautions, Melatonin nightly, thiamine daily avoid benzos,    Septic shock 2/2 MRSA bacteremia, resolved  MRSA bacteremia  -- Positive MRSA bacteremia  - Repeat blood cultures, TTE negative for vegetation  -C/W vancomycin    Acute hypoxic respiratory failure 2/2 RLL infiltrates, pleural effusion, with atelectasis, resolved  RLL infiltrates  Pleural effusion with atelectasis  - Required intubation, initially subsequently extubated.  -ICU did bedside ultrasound did not show large fluid pocket, no site for thoracentesis  -C/W vancomycin, on room air    A-fib  - Off of anticoagulation, due to GI bleed, Amio if needed, cardiology is following    Sick sinus syndrome s/p dual-chamber Abbott pacemaker  CHF with a EF of 45%-50  - On Jardiance, outpatient currently on hold due to low blood pressures, will resume when appropriate    Hypophosphatemia  Hypernatremia, resolved  ESPINOZA, resolved  CKD stage III  -Encourage oral hydration, monitor BMP, avoid nephrotoxins, replace electrolytes as needed    DM2  - C/W Lantus, sliding scale    GERD C/W lansoprazole    Constipation  - Continue bowel regimen    HLD C/W atorvastatin      Medical Decision Making:   I personally reviewed labs: CBC, BMP  I personally reviewed imaging:  I personally reviewed EKG:  Toxic drug monitoring:   Discussed case with:         Code Status:   DVT Prophylaxis:   GI Prophylaxis:    Subjective:     Seen and evaluated the patient at bedside.  Family at bedside      Objective:     VITALS:

## 2025-03-10 NOTE — PROGRESS NOTES
Cardiology Progress Note      3/10/2025 1:38 PM    Admit Date: 2/27/2025          Subjective:  Events noted Currently off vent , awake. No specific c/o          BP 94/71   Pulse 99   Temp 97.5 °F (36.4 °C) (Oral)   Resp 18   Ht 1.6 m (5' 3\")   Wt 93 kg (205 lb)   SpO2 97%   BMI 36.31 kg/m²   03/08 1901 - 03/10 0700  In: 6109.7 [I.V.:4447.5]  Out: 1516 [Urine:1516]        Objective:      Physical Exam:  VS as above  Chest scattered crackles bilat  Card Irreg irreg no gallop  Neuro - awake and  responsive     Data Review:   Labs:    BUN 20  Creat 0.8   Hgb 10.7    Telemetry: afib with int V pacing R 80      Assessment:     Persistent atrial fibrillation.  Recent GI bleeding from gastric ulcers so off anticoagulation.  Sick sinus syndrome historically with dual chamber Abbott pacemaker.  Septic shock.  Acute hypoxic respiratory failure leading to intubation\ventilation on 3/5.  Large R pleural effusion noted.  Acute on chronic diastolic CHF.  EF 45-50% prior.  ESPINOZA atop CHRONIC KIDNEY DISEASE stage 3.  Metabolic encephalopathy.  PVD with L iliac stent that jails the IVC.  DM type 2 on chronic insulin.  Hyperlipidemia.  Hypernatremia.  Full code.    Plan:  Cont current Rx. Off anticoagulation. Need to consider Watchman but access may be a problem given jailed stent in IVC

## 2025-03-10 NOTE — PROGRESS NOTES
Bedside and Verbal shift change report given to Derian (oncoming nurse) by Zachariah (offgoing nurse). Report included the following information Nurse Handoff Report, Index, Adult Overview, Intake/Output, MAR, Recent Results, Med Rec Status, Cardiac Rhythm  , and Neuro Assessment.     0000 Head to toe assessment done. Patient is AO X 1, follows commands. Respirations even and unlabored on NC. See flowsheet for details.     0113 Ultrasound guided PIV attempted but failed due to poor PIV access.    0400 Re assessment done. No changes noted.    0625 1/4 NS stopped as per order.     Bedside and verbal report given to Raine IBRAHIM

## 2025-03-10 NOTE — PROGRESS NOTES
0700 - 1100 Bedside report received from ALEXANDRIA Menard. Patient oriented to self and location and able to follow commands. Pt has multiple BLE wounds. Lungs clear, pt passed swallow eval and able to take meds one at a time. Pt has diarrhea, 2 watery stools noted. Incontinence care provided and order for flexiseal obtained from intensivist.  PT able to stand slightly with therapy, and 2 assist. Unable to stand all the way up but able to lift buttocks. Pt returned to bed.      1400 - 1500 PT allowed minced and moist diet per SLP. Supplements added per dietition. Catheter removed per order.      1700 - Podiatry MD at bedside to assess wounds. While attempting to redress wound patient screaming and would not allow me to finish. Order for one time dose of 5 mg of oxy obtained from Dr. Marshall.  Wound care completed per order.      Bedside and Verbal shift change report given to ALEXANDRIA Ramírez (oncoming nurse) by ALEXANDRIA Ni (offgoing nurse). Report included the following information Nurse Handoff Report, Index, Intake/Output, MAR, and Med Rec Status.

## 2025-03-10 NOTE — CARE COORDINATION
Transition of Care Plan:    RUR: 19%  Prior Level of Functioning: Independent  Disposition: Possibly snf  DENNISE: 3/15/25  If SNF or IPR: Date FOC offered: 3/4/25 by previous cm  Date FOC received: 3/4/25  Accepting facility: Awaiting  Date authorization started with reference number: N/A  Date authorization received and expires: N/A  Follow up appointments: To be done by facility.  DME needed: None  Transportation at discharge: BLS  IM/IMM Medicare/[facility] letter given: To be given prior to discharge.  Is patient a  and connected with VA? No   If yes, was  transfer form completed and VA notified? N/A  Caregiver Contact: Wife  Discharge Caregiver contacted prior to discharge? Caregiver to be contacted prior to discharge.  Care Conference needed? No  Barriers to discharge: Medical stability.      Patient transferred to ccu for higher  level of care. Was intubated and now extubated on yesterday and placed on 4liters nasal cannula. PT/OT working with the patient.        Radha Clark RN BSN CRM        [phone]

## 2025-03-10 NOTE — PLAN OF CARE
Problem: Occupational Therapy - Adult  Goal: By Discharge: Performs self-care activities at highest level of function for planned discharge setting.  See evaluation for individualized goals.  Description: FUNCTIONAL STATUS PRIOR TO ADMISSION:  Lives with spouse in one level house with 4 steps to enter. Patient wheelchair level at home, stand step transfer with assist from spouse. Has electric wheelchair at home.    Receives Help From: Family, Prior Level of Assist for ADLs: Needs assistance,  ,  ,  ,  ,  ,  ,  , Prior Level of Assist for Transfers: Needs assistance, Active : No     HOME SUPPORT: Lives with spouse    Occupational Therapy Goals:  Initiated 3/1/2025, Goals reviewed and remain appropriate as per 3/10 reevaluation.   1.  Patient will perform self-feeding with Minimal Assist within 7 day(s).  2.  Patient will perform grooming with Minimal Assist within 7 day(s).  3.  Patient will roll in bed with minimal assist to aide with lower body self care within 7 days.  4.  Patient will sit EOB x 5 minutes with moderate assist to engage in self care tasks within 7 days  5.  Patient will stand with mod a x 2 in preparation for OOB activity within 7 days.  Outcome: Progressing    OCCUPATIONAL THERAPY RE-EVALUATION  Patient: Rosita Morillo (79 y.o. female)  Date: 3/10/2025  Primary Diagnosis: Cellulitis of both lower extremities [L03.115, L03.116]         Precautions: Fall Risk, Skin                Chart, occupational therapy assessment, plan of care, and goals were reviewed.    ASSESSMENT  The presented supine in bed, confused, but pleasant agreeable to work with therapy. Overall she was max A of 2 for bed mobility, mod A of 2 sit to/from stand, CGA for self feeding, min to max A for seated grooming and was total A for LB dressing. She required cueing for attention, initiation, sequencing and to assist with one step command following during ADLs and functional mobility. The patient is currently limited by GW,  referral.  Allan Rodriguez, OTR/L  Minutes: 40

## 2025-03-10 NOTE — PROGRESS NOTES
1900 Bedside and Verbal shift change report given to Zachariah IBRAHIM  (oncoming nurse) by Eugenia IBRAHIM  (offgoing nurse). Report included the following information Intake/Output, MAR, Recent Results, Med Rec Status, Cardiac Rhythm afib , and Alarm Parameters.     2000 pt is alert and oriented x 2, pt is afebrile,pt medicated for pain prior to wound care change, pt has dominguez draining yellow urine, pt had a large bm, pt is on RA, labs and blood cultures sent     2300 report given to Derian IBRAHIM

## 2025-03-10 NOTE — PROGRESS NOTES
Pharmacy Antimicrobial Kinetic Dosing    Indication for Antimicrobials: SSTI/cellulitis of toes, PNA    Current Regimen of Each Antimicrobial:  Vancomycin Pharmacy to Dose, Start 3; Day # 9    Previous Antimicrobial Therapy:  Ceftriaxone 3/3-3/6   Cefepime 3/6  Metronidazole 3/6  Zosyn 3/6-3/9    Goal Level: Vancomycin -600    Date Dose & Interval Measured (mcg/mL) Predicted AUC 24-48 Predicted AUC 24,ss   3/1  500mg q12h 15.1 541 636   3/5  750mg q24h  14.2 427 427   3/10 750mg q24h  9.7 357 352     Significant Cultures:    Blood, paired: negative  3/6 Blood, paired: CoNS  bottles, prelim  3/6 Blood PCR: MRSE  3/6 MRSA, nares: negative (obtained after 8 days of vancomycin)   3/6 Respiratory, ET: few yeast  3/6 Blood, paired: pending    Labs:  Recent Labs     Units 03/10/25  0352 25  2154 25  1750 25  1204 25  0419 25  1131 25  0450   CREATININE MG/DL 0.77 0.90 1.03*   < > 1.17*   < > 1.34*   BUN MG/DL 20 22* 23*   < > 30*   < > 39*   WBC K/uL 13.2*  --   --   --  15.3*  --  18.6*    < > = values in this interval not displayed.     Temp (24hrs), Av °F (36.7 °C), Min:97.5 °F (36.4 °C), Max:98.4 °F (36.9 °C)    Conditions for Dosing Consideration: None    Creatinine Clearance (mL/min): Estimated Creatinine Clearance: 64 mL/min (based on SCr of 0.77 mg/dL).     Impression/Plan:   Adjusted vancomycin 750mg IV q24h   Predicted ZXI12-20 = 454, Predicted AUC24,ss = 462  BMP and CBC ordered per protocol  Antimicrobial stop date vancomycin extended until 3/12     Pharmacy will follow daily and adjust medications as appropriate for renal function and/or serum levels.    Thank you,  AUDIE BENAVIDES [facility]

## 2025-03-10 NOTE — PROGRESS NOTES
NAME: Rosita Morillo        :  1945        MRN:  [id number]         Assessment:    ESPINOZA on CKD stage 3a: Cr had been 1.3-1.5 with recent diuresis, now 0.77<--is going 1.17 <- 1.34 <--1.53. IV contrast exposure on admission but no significant ELIGH noted. Baseline Cr seems to be 1.1-1.3    Hypernatremia: Improving.  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 LE edema has improved s/p diuresis     LE cellulitis     DM2     Hypokalemia: 2 to diuresis. Mg ok    HTN    Acute hypoxic respiratory failure on ventilator  Encephalopathy     Plan/Recommendations:  Not receiving tube feeds  IV fluids as  Monitor labs and may need supplemental fluids if she is not able to keep up with her nutritional needs  Thank you  Matteo Zambrano MD  [facility] ([facility])        Subjective:     Awake and alert.  Lethargic.  Poor historian  Objective:     VITALS:   Last 24hrs VS reviewed since prior progress note. Most recent are:  Vitals:    03/10/25 1001   BP: 94/71   Pulse: 99   Resp: 18   Temp:    SpO2:        Intake/Output Summary (Last 24 hours) at 3/10/2025 1334  Last data filed at 3/10/2025 1005  Gross per 24 hour   Intake 4184.52 ml   Output 775 ml   Net 3409.52 ml      Telemetry Reviewed:     PHYSICAL EXAM:  Ill-appearing on the vent.  Urine looks clear  Tube feeds ongoing.      Lab Data Reviewed: (see below)    Medications Reviewed: (see below)    PMH/SH reviewed - no change compared to H&P  ________________________________________________________________________  Care Plan discussed with:  Patient     Family      RN x    Care Manager                    Consultant:          Comments   >50% of visit spent in counseling and coordination of care       ________________________________________________________________________  Matteo Zambrano MD     Procedures: see electronic medical records  17 g Orogastric BID    heparin (porcine) injection 5,000 Units  5,000 Units SubCUTAneous 3 times per day    alcohol 62% (NOZIN) nasal  1 ampule  1 ampule Nasal Q12H    insulin glargine (LANTUS) injection vial 10 Units  10 Units SubCUTAneous BID    [Held by provider] nystatin (MYCOSTATIN) 137088 UNIT/ML suspension 500,000 Units  5 mL Oral 4x Daily    lansoprazole (PREVACID SOLUTAB) disintegrating tablet 30 mg  30 mg Per NG tube QAM AC    atorvastatin (LIPITOR) tablet 10 mg  10 mg Per NG tube Daily    oxyCODONE (ROXICODONE) immediate release tablet 5 mg  5 mg Oral Q4H PRN    [Held by provider] empagliflozin (JARDIANCE) tablet 10 mg  10 mg Oral Daily    glucose chewable tablet 16 g  4 tablet Oral PRN    dextrose bolus 10% 125 mL  125 mL IntraVENous PRN    Or    dextrose bolus 10% 250 mL  250 mL IntraVENous PRN    glucagon injection 1 mg  1 mg SubCUTAneous PRN    dextrose 10 % infusion   IntraVENous Continuous PRN    [Held by provider] pregabalin (LYRICA) capsule 75 mg  75 mg Oral BID    timolol (TIMOPTIC) 0.25 % ophthalmic solution 1 drop  1 drop Both Eyes BID    sodium chloride flush 0.9 % injection 5-40 mL  5-40 mL IntraVENous PRN    0.9 % sodium chloride infusion   IntraVENous PRN    potassium chloride (KLOR-CON M) extended release tablet 40 mEq  40 mEq Oral PRN    Or    potassium bicarb-citric acid (EFFER-K) effervescent tablet 40 mEq  40 mEq Oral PRN    Or    potassium chloride 10 mEq/100 mL IVPB (Peripheral Line)  10 mEq IntraVENous PRN    magnesium sulfate 2000 mg in 50 mL IVPB premix  2,000 mg IntraVENous PRN    ondansetron (ZOFRAN-ODT) disintegrating tablet 4 mg  4 mg Oral Q8H PRN    Or    ondansetron (ZOFRAN) injection 4 mg  4 mg IntraVENous Q6H PRN    polyethylene glycol (GLYCOLAX) packet 17 g  17 g Oral Daily PRN    acetaminophen (TYLENOL) suppository 650 mg  650 mg Rectal Q6H PRN    melatonin tablet 3 mg  3 mg Oral Nightly PRN

## 2025-03-10 NOTE — PLAN OF CARE
Speech LAnguage Pathology RE-EVALUATION    Patient: Rosita Morillo (79 y.o. female)  Date: 3/10/2025  Primary Diagnosis: Cellulitis of both lower extremities [L03.115, L03.116]    Precautions: Aspiration, Fall Risk, Skin                  ASSESSMENT:  Patient participated in clinical swallow re-evaluation consisting of thin liquids, purees, and solids. Patient denied significant dysphagia or dysphonia since extubation on 3/8. ETT was in place for ~3 days. Patient oriented x self and year. She was easily distractible and required assistance for selective/sustained attention and with word finding during more open-ended communication tasks. Oral phase of swallow revealed slowed but efficient mastication of solid with complete oral cavity clearance with liquid wash. Pharyngeal phase of swallow did not suggest overt s/s of aspiration with any trialed consistency. Noted significantly delayed cough after several minutes which is likely not related to pharyngeal swallowing. Recommend PO diet advancement to minced/moist texture with thin liquids + strict aspiration precautions and low threshold for discussion of instrumental swallow study with any acute respiratory changes or bedside concerns for aspiration. SLP will closely follow.     Patient will benefit from skilled intervention to address the above impairments.     PLAN :  Recommendations and Planned Interventions:  Diet: Minced and moist and thin liquids  -Oral medications whole in purees  -Aspiration precautions: Upright position, small bites/sips, slow rate of intake, alternate bites/sips  -1:1 feeding assistance  -Oral care TID    Recommend next SLP session: Swallow re-assessment    Re-Evaluation: Progress toward goals: fair. SLP Plan of Care: 4 times/week   Discharge Recommendations: Continue to assess pending progress     SUBJECTIVE:   Patient stated, “It's February.”    OBJECTIVE:     Past Medical History:   Diagnosis Date    Arthritis     CAD (coronary artery

## 2025-03-10 NOTE — PLAN OF CARE
Problem: Skin/Tissue Integrity  Goal: Skin integrity remains intact  Description: 1.  Monitor for areas of redness and/or skin breakdown  2.  Assess vascular access sites hourly  3.  Every 4-6 hours minimum:  Change oxygen saturation probe site  4.  Every 4-6 hours:  If on nasal continuous positive airway pressure, respiratory therapy assess nares and determine need for appliance change or resting period  Outcome: Progressing  Flowsheets (Taken 3/10/2025 0000)  Skin Integrity Remains Intact: Monitor for areas of redness and/or skin breakdown     Problem: Safety - Adult  Goal: Free from fall injury  Outcome: Progressing     Problem: Respiratory - Adult  Goal: Achieves optimal ventilation and oxygenation  Outcome: Progressing  Flowsheets (Taken 3/10/2025 0000)  Achieves optimal ventilation and oxygenation: Assess for changes in respiratory status

## 2025-03-10 NOTE — PLAN OF CARE
Problem: Physical Therapy - Adult  Goal: By Discharge: Performs mobility at highest level of function for planned discharge setting.  See evaluation for individualized goals.  Description: FUNCTIONAL STATUS PRIOR TO ADMISSION: Patient was independent with bed mobility and required assistance for stand pivot transfer to w/c; increasing difficulty with mobility secondary to severe LE pain    HOME SUPPORT PRIOR TO ADMISSION: The patient lived with  and has required increased assistance.    Physical Therapy Goals  Revised 3/10/2025  1.  Patient will move from supine to sit and sit to supine , scoot up and down, and roll side to side in bed with moderate assistance  within 7 day(s).    2.  Patient will sit EOB with good sitting balance for 5 minutes.  3. Patient will complete active ROM LE exercises for 10 reps each with mod A within 7 days.  4. Patient will transfer bed<>chair with maximal assistance within 7 day(s).     Initiated 3/1/2025  1.  Patient will move from supine to sit and sit to supine , scoot up and down, and roll side to side in bed with moderate assistance  within 7 day(s).    2.  Patient will sit EOB with good sitting balance for 5 minutes.  3. Patient will complete active ROM LE exercises for 10 reps each with mod A within 7 days.    Outcome: Progressing   PHYSICAL THERAPY RE-EVALUATION    Patient: Rosita Morillo (79 y.o. female)  Date: 3/10/2025  Primary Diagnosis: Cellulitis of both lower extremities [L03.115, L03.116]       Precautions: Restrictions/Precautions  Restrictions/Precautions: Fall Risk, Skin  Activity Level: Up with Assist            ASSESSMENT :  DEFICITS/IMPAIRMENTS:   The patient is limited by increased confusion, BLE pain associated with BLE wounds, generalized weakness, impaired sitting and standing balance, impaired activity tolerance, decreased endurance, and overall impaired functional mobility. Pt oriented x2, agreeable to participation with therapy. Pt assumed seated

## 2025-03-10 NOTE — PROGRESS NOTES
Critical Care Progress Note  Tonya Dominguez MD          Date of Service:  3/10/2025  NAME:  Rosita Morillo  :  1945  MRN:  [id number]      Subjective/Hospital course:      Rosita Morillo is a 80 yo female with a PMH of CAD, pacemaker, CKD3, combined HF (EF45-50%), DM2, GERD, AF who presented to [facility] from [facility] on  with cellulitis and HF exacerbation with fluid overload.  She was started on vanc/cefepime. Her course was complicated by encephalopathy, AF RVR requiring dilt drip, and ESPINOZA on CKD3. Neurology was consulted for progressively worsening encephalopathy. EEG negative for seizure. Rapid response was called on 3/5 for worsening hypoxia.  She was transferred to the ICU. After a trial of BiPAP and diuresis she ultimately required intubation for hypoxia in the setting of poor mentation with lack of ability to protect her airway.  It was noted she also had a large R pleural effusion contributing to the hypoxia.      3/6 - no acute change after intubation. OFF NE  3/7: Patient is intubated and sedated with Precedex and propofol.  Was on low-dose Levophed this morning and now weaned off.  Currently on 30% FiO2 and PEEP of 5 cm water.  3/8: Patient is awake and following commands, very weak and deconditioned.  Currently on PSV /5 with 30% FiO2.  3/9: Patient is awake and alert. On 4 lit NC. Looks very weak and deconditioned.     Assessment/Plan:   Toxic metabolic encephalopathy: CT head negative. EEG negative for seizure.   - Neurology following  - Avoid benzos  -Mental status is improving.  -Delirium precautions.     PULMONOLOGY:      Acute hypoxic respiratory failure requiring MV:   -RLL infiltrates and pleural effusion with atelectasis on CXR.   - serial Procal/LA     Bedside US preformed without large fluid pocket, no site for thoracentesis.    CARDIOVASCULAR:      Septic shock:   -Has MRSA bacteremia.  Repeat blood cultures.  Continue vancomycin.  TTE neg.  - off pressors.     PAF:  -  amio if

## 2025-03-11 LAB
ALBUMIN SERPL-MCNC: 2.4 G/DL (ref 3.5–5)
ANION GAP SERPL CALC-SCNC: 7 MMOL/L (ref 2–12)
BUN SERPL-MCNC: 19 MG/DL (ref 6–20)
BUN/CREAT SERPL: 24 (ref 12–20)
CALCIUM SERPL-MCNC: 8.5 MG/DL (ref 8.5–10.1)
CHLORIDE SERPL-SCNC: 111 MMOL/L (ref 97–108)
CO2 SERPL-SCNC: 24 MMOL/L (ref 21–32)
CREAT SERPL-MCNC: 0.8 MG/DL (ref 0.55–1.02)
ERYTHROCYTE [DISTWIDTH] IN BLOOD BY AUTOMATED COUNT: 22.3 % (ref 11.5–14.5)
GLUCOSE BLD STRIP.AUTO-MCNC: 206 MG/DL (ref 65–117)
GLUCOSE BLD STRIP.AUTO-MCNC: 213 MG/DL (ref 65–117)
GLUCOSE BLD STRIP.AUTO-MCNC: 260 MG/DL (ref 65–117)
GLUCOSE SERPL-MCNC: 291 MG/DL (ref 65–100)
HCT VFR BLD AUTO: 40.4 % (ref 35–47)
HGB BLD-MCNC: 12 G/DL (ref 11.5–16)
MAGNESIUM SERPL-MCNC: 2 MG/DL (ref 1.6–2.4)
MCH RBC QN AUTO: 24.7 PG (ref 26–34)
MCHC RBC AUTO-ENTMCNC: 29.7 G/DL (ref 30–36.5)
MCV RBC AUTO: 83.1 FL (ref 80–99)
NRBC # BLD: 0 K/UL (ref 0–0.01)
NRBC BLD-RTO: 0 PER 100 WBC
PHOSPHATE SERPL-MCNC: 2.7 MG/DL (ref 2.6–4.7)
PHOSPHATE SERPL-MCNC: 3 MG/DL (ref 2.6–4.7)
PLATELET # BLD AUTO: 384 K/UL (ref 150–400)
PMV BLD AUTO: 11.6 FL (ref 8.9–12.9)
POTASSIUM SERPL-SCNC: 4.1 MMOL/L (ref 3.5–5.1)
RBC # BLD AUTO: 4.86 M/UL (ref 3.8–5.2)
SERVICE CMNT-IMP: ABNORMAL
SODIUM SERPL-SCNC: 142 MMOL/L (ref 136–145)
WBC # BLD AUTO: 11.2 K/UL (ref 3.6–11)

## 2025-03-11 PROCEDURE — 2500000003 HC RX 250 WO HCPCS: Performed by: STUDENT IN AN ORGANIZED HEALTH CARE EDUCATION/TRAINING PROGRAM

## 2025-03-11 PROCEDURE — 82962 GLUCOSE BLOOD TEST: CPT

## 2025-03-11 PROCEDURE — 51701 INSERT BLADDER CATHETER: CPT

## 2025-03-11 PROCEDURE — 80069 RENAL FUNCTION PANEL: CPT

## 2025-03-11 PROCEDURE — 6370000000 HC RX 637 (ALT 250 FOR IP): Performed by: STUDENT IN AN ORGANIZED HEALTH CARE EDUCATION/TRAINING PROGRAM

## 2025-03-11 PROCEDURE — 2580000003 HC RX 258: Performed by: HOSPITALIST

## 2025-03-11 PROCEDURE — 2580000003 HC RX 258: Performed by: INTERNAL MEDICINE

## 2025-03-11 PROCEDURE — 51798 US URINE CAPACITY MEASURE: CPT

## 2025-03-11 PROCEDURE — 6370000000 HC RX 637 (ALT 250 FOR IP): Performed by: INTERNAL MEDICINE

## 2025-03-11 PROCEDURE — 6360000002 HC RX W HCPCS: Performed by: INTERNAL MEDICINE

## 2025-03-11 PROCEDURE — 6370000000 HC RX 637 (ALT 250 FOR IP): Performed by: HOSPITALIST

## 2025-03-11 PROCEDURE — 36415 COLL VENOUS BLD VENIPUNCTURE: CPT

## 2025-03-11 PROCEDURE — 6370000000 HC RX 637 (ALT 250 FOR IP): Performed by: NURSE PRACTITIONER

## 2025-03-11 PROCEDURE — 92526 ORAL FUNCTION THERAPY: CPT

## 2025-03-11 PROCEDURE — 84100 ASSAY OF PHOSPHORUS: CPT

## 2025-03-11 PROCEDURE — 99233 SBSQ HOSP IP/OBS HIGH 50: CPT | Performed by: INTERNAL MEDICINE

## 2025-03-11 PROCEDURE — 83735 ASSAY OF MAGNESIUM: CPT

## 2025-03-11 PROCEDURE — 2060000000 HC ICU INTERMEDIATE R&B

## 2025-03-11 PROCEDURE — 85027 COMPLETE CBC AUTOMATED: CPT

## 2025-03-11 PROCEDURE — 6360000002 HC RX W HCPCS: Performed by: HOSPITALIST

## 2025-03-11 RX ORDER — MIDODRINE HYDROCHLORIDE 2.5 MG/1
2.5 TABLET ORAL 3 TIMES DAILY
COMMUNITY

## 2025-03-11 RX ORDER — METOPROLOL SUCCINATE 25 MG/1
25 TABLET, EXTENDED RELEASE ORAL DAILY
Status: ON HOLD | COMMUNITY
End: 2025-03-14

## 2025-03-11 RX ORDER — GUAIFENESIN 200 MG/10ML
400 LIQUID ORAL EVERY 4 HOURS PRN
Status: DISCONTINUED | OUTPATIENT
Start: 2025-03-11 | End: 2025-03-14 | Stop reason: HOSPADM

## 2025-03-11 RX ORDER — CETIRIZINE HYDROCHLORIDE 10 MG/1
10 TABLET ORAL DAILY
COMMUNITY

## 2025-03-11 RX ADMIN — INSULIN LISPRO 4 UNITS: 100 INJECTION, SOLUTION INTRAVENOUS; SUBCUTANEOUS at 16:42

## 2025-03-11 RX ADMIN — Medication 3 MG: at 22:00

## 2025-03-11 RX ADMIN — POLYETHYLENE GLYCOL 3350 17 G: 17 POWDER, FOR SOLUTION ORAL at 10:39

## 2025-03-11 RX ADMIN — Medication 100 MG: at 10:39

## 2025-03-11 RX ADMIN — LANSOPRAZOLE 30 MG: 30 TABLET, ORALLY DISINTEGRATING, DELAYED RELEASE ORAL at 07:49

## 2025-03-11 RX ADMIN — HEPARIN SODIUM 5000 UNITS: 5000 INJECTION INTRAVENOUS; SUBCUTANEOUS at 10:38

## 2025-03-11 RX ADMIN — Medication 1 AMPULE: at 22:55

## 2025-03-11 RX ADMIN — HEPARIN SODIUM 5000 UNITS: 5000 INJECTION INTRAVENOUS; SUBCUTANEOUS at 01:59

## 2025-03-11 RX ADMIN — INSULIN GLARGINE 10 UNITS: 100 INJECTION, SOLUTION SUBCUTANEOUS at 21:59

## 2025-03-11 RX ADMIN — SODIUM CHLORIDE, PRESERVATIVE FREE 10 ML: 5 INJECTION INTRAVENOUS at 10:39

## 2025-03-11 RX ADMIN — HEPARIN SODIUM 5000 UNITS: 5000 INJECTION INTRAVENOUS; SUBCUTANEOUS at 16:42

## 2025-03-11 RX ADMIN — ATORVASTATIN CALCIUM 10 MG: 10 TABLET, FILM COATED ORAL at 10:39

## 2025-03-11 RX ADMIN — OXYCODONE 5 MG: 5 TABLET ORAL at 23:53

## 2025-03-11 RX ADMIN — SENNOSIDES 17.6 MG: 8.8 LIQUID ORAL at 22:54

## 2025-03-11 RX ADMIN — POLYETHYLENE GLYCOL 3350 17 G: 17 POWDER, FOR SOLUTION ORAL at 21:56

## 2025-03-11 RX ADMIN — PIPERACILLIN AND TAZOBACTAM 3375 MG: 3; .375 INJECTION, POWDER, LYOPHILIZED, FOR SOLUTION INTRAVENOUS at 21:46

## 2025-03-11 RX ADMIN — Medication 1 AMPULE: at 10:39

## 2025-03-11 RX ADMIN — VANCOMYCIN HYDROCHLORIDE 1000 MG: 1 INJECTION, POWDER, LYOPHILIZED, FOR SOLUTION INTRAVENOUS at 11:01

## 2025-03-11 ASSESSMENT — PAIN SCALES - WONG BAKER
WONGBAKER_NUMERICALRESPONSE: NO HURT
WONGBAKER_NUMERICALRESPONSE: NO HURT

## 2025-03-11 ASSESSMENT — PAIN DESCRIPTION - LOCATION: LOCATION: LEG

## 2025-03-11 ASSESSMENT — PAIN SCALES - GENERAL
PAINLEVEL_OUTOF10: 4
PAINLEVEL_OUTOF10: 0
PAINLEVEL_OUTOF10: 0

## 2025-03-11 ASSESSMENT — PAIN DESCRIPTION - ORIENTATION: ORIENTATION: RIGHT;LEFT

## 2025-03-11 ASSESSMENT — PAIN DESCRIPTION - DESCRIPTORS: DESCRIPTORS: ACHING

## 2025-03-11 NOTE — PROGRESS NOTES
Physician Progress Note      PATIENT:               MIRI ACE  CSN #:                  [id number]  :                       1945  ADMIT DATE:       2025 9:33 PM  DISCH DATE:  RESPONDING  PROVIDER #:        Joaquim Marshall MD          QUERY TEXT:    Good morning  Pt admitted with BLE cellulitis.  Pt noted to have IDDM.    If possible, please document in progress notes and discharge summary the   relationship, if any, between cellulitis and DM.    The medical record reflects the following:  Risk Factors: advanced age, DM2, cellulitis  Clinical Indicators: presented with worsening swelling and drainage R> L leg.   Known hx of IDDM on insulin. Admitted for bilateral cellulitis and treated   with IV ABT  Treatment: daily labs, POC glucose, insulin lispro, lantus, IV Cefepime,   Merrem, PO Jardiance    Thank you  Ginette Lazaro RN [facility]  [phone]  Options provided:  -- *** (site/location) cellulitis associated with Diabetes  -- *** (site/location) cellulitis unrelated to Diabetes  -- Other - I will add my own diagnosis  -- Disagree - Not applicable / Not valid  -- Disagree - Clinically unable to determine / Unknown  -- Refer to Clinical Documentation Reviewer    PROVIDER RESPONSE TEXT:    *** (site/location) cellulitis associated with Diabetes.    Query created by: Ginette Lazaro on 3/3/2025 12:00 PM      Electronically signed by:  Joaquim Marshall MD 3/11/2025 11:01 AM

## 2025-03-11 NOTE — PROGRESS NOTES
Report called to Deanne on [facility]. Patient transferred to room 2155; all belongings sent with patient. VSS, placed on tele box #[id number].

## 2025-03-11 NOTE — PROGRESS NOTES
Cardiology Progress Note      3/11/2025 9:03 AM    Admit Date: 2/27/2025          Subjective:  No new c/o this am          /63   Pulse 84   Temp 98.1 °F (36.7 °C) (Axillary)   Resp 18   Ht 1.6 m (5' 3\")   Wt 94.1 kg (207 lb 7.3 oz)   SpO2 100%   BMI 36.75 kg/m²   03/09 1901 - 03/11 0700  In: 1369 [P.O.:170; I.V.:1199]  Out: 1598 [Urine:1548]        Objective:      Physical Exam:  VS as above  Chest CTA  Card Irreg irreg no gallop     Data Review:   Labs:    Hgb 12.0    Telemetry:afib R 80-90        Assessment:     Persistent atrial fibrillation.  Recent GI bleeding from gastric ulcers so off anticoagulation.  Sick sinus syndrome historically with dual chamber Abbott pacemaker.  Septic shock- resolved   Acute hypoxic respiratory failure leading to intubation\ventilation on 3/5.  Large R pleural effusion noted.  Acute on chronic diastolic CHF.  EF 45-50% prior.  ESPINOZA atop CHRONIC KIDNEY DISEASE stage 3.  Metabolic encephalopathy- improved   PVD with L iliac stent that jails the IVC.  DM type 2 on chronic insulin.  Hyperlipidemia.  Hypernatremia.  Full code.    Plan: No new cardiac issues. Cont current Rx

## 2025-03-11 NOTE — PROGRESS NOTES
Infectious Disease Progress      Impression    Septic shock   Resolved.    MRSE bacteremia  Blood culture 3/6 + for MRSE1/2  ? Real vs contaminant  Given  multiple skin  wounds will treat as real  Negative repeat cultures 3/9.  Negative ECHO.    Bilateral heel pressure injuries  R/heel eschar  Superficial wounds of toes  Continue wound care  Likely source of bacteremia    Acute hypoxic respiratory failure  Resolved  R/  Pleural effusion, atelectasis on CXR.    Metabolic encephalopathy  Slow improvement.    Paroxysmal atrial fibrillation  On amiodarone as needed    Diabetes Type2  A1c 5.8    PVD  S/p L/iliac stent    ESPINOZA  Resolved    CHF  EF 45-50    Sick sinus syndrome   S/p pacemaker placement    Plan    Continue vancomycin and Zosyn IV  Will need vancomycin x 4 weeks given presence of pacemaker, iliac stent  Planned end date 4/6  Will examine wounds with wound care/nursing in a.m. & determine duration of Zosyn  Adequate fluids, daily probiotic  ID service to follow.      Abx  Cefepime 2/27-3/2  Ceftriaxone 3/3-3/5  Zosyn 2/27, 2/28, 3/2-3/8  Vancomycin 2/27, 2/28, 3/2-3/11    Extensive review of chart notes, labs, imaging, cultures done  Additionally review of done: Recent reports-Labs, cultures, imaging  D/w -hospitalist, RN    Pt seen on [facility].  Awake, mildly confused  D/w RN events of the day    Past Medical History:   Diagnosis Date    Arthritis     CAD (coronary artery disease)     afib and chf    Congestive heart failure (HCC)     Depression     Diabetes (HCC)     type 2    Fibromyalgia     Gastrointestinal disorder     reflux and gastro paresis    Heart failure (HCC)     Infectious disease 2010    MRSA? in right big toe    Menopause     Psychiatric disorder     PTSD from 911       Past Surgical History:   Procedure Laterality Date    APPENDECTOMY      BREAST BIOPSY Left     STbb  benign    COLONOSCOPY  08/20/2018    polyps    COLONOSCOPY N/A 8/20/2018    COLONOSCOPY performed by Nayely Corcoran MD at [facility]  tailored for this examination and automatic exposure control for dose modulation. FINDINGS: Bones: Normal bone mineralization. No fracture, dislocation, or periosteal reaction. Joint fluid: None. Articulations: No significant osteoarthritis. No evidence of inflammatory arthritis. Muscles: No intramuscular hematoma. No focal atrophy. Soft tissue mass: None. No fluid collection diffuse soft tissue swelling no opaque foreign body     Diffuse soft tissue swelling, degenerative osteoarthritis Electronically signed by HANNA GAMBOA MD    CT TIBIA FIBULA RIGHT W CONTRAST  Result Date: 2/28/2025  EXAM: CT TIBIA FIBULA RIGHT W CONTRAST INDICATION: Pain swelling redness COMPARISON: None CONTRAST: 100 mL of Isovue-300. TECHNIQUE: Helical CT of the right tibia-fibula following intravenous contrast administration. Coronal and Sagittal reformats were generated. Images reviewed in soft tissue and bone windows. CT dose reduction was achieved through the use of a standardized protocol tailored for this examination and automatic exposure control for dose modulation. FINDINGS: Bones: Normal bone mineralization. No fracture, dislocation, or periosteal reaction. Joint fluid: None. Articulations: No significant osteoarthritis. No evidence of inflammatory arthritis. Muscles: No intramuscular hematoma. No focal atrophy. Soft tissue mass, fluid collection: None. Diffuse soft tissue swelling mainly laterally. Prominent vascular calcifications.     Diffuse soft tissue swelling. Mild degenerative osteoarthritis. No mass or fluid collection. No opaque foreign body. Electronically signed by HANNA GAMBOA MD    Vascular duplex lower extremity venous bilateral  Result Date: 2/27/2025    No evidence of superficial thrombosis in the right lower extremity. No evidence of deep vein or superficial vein thrombosis in the right lower extremity. Vessels demonstrate normal compressibility, color filling, and phasic and spontaneous flow.   No evidence of

## 2025-03-11 NOTE — PROGRESS NOTES
Pharmacy Medication History Review    Medication history obtained by Jose De Jesus Cade RPH while patient was in room 2155/01 and was completed based on information available during current patient encounter. Medication history was completed after home meds were reconciled by provider.    Pharmacist Admission Medication Reconciliation Recommendations:   None         PTA medication list was corrected to the following:   Prior to Admission Medications   Prescriptions Last Dose Informant   atorvastatin (LIPITOR) 10 MG tablet Unknown    Sig: Take 1 tablet by mouth daily   cetirizine (ZYRTEC) 10 MG tablet     Sig: Take 1 tablet by mouth daily   dapagliflozin (FARXIGA) 10 MG tablet Unknown    Sig: Take 1 tablet by mouth every morning   insulin aspart (NOVOLOG) 100 UNIT/ML injection pen Unknown    Sig: Inject into the skin Use to fill insulin pump   metoprolol succinate (TOPROL XL) 25 MG extended release tablet     Sig: Take 1 tablet by mouth daily   midodrine (PROAMATINE) 2.5 MG tablet     Sig: Take 1 tablet by mouth 3 times daily   pantoprazole (PROTONIX) 40 MG tablet Unknown    Sig: Take 1 tablet by mouth in the morning and at bedtime   potassium chloride (MICRO-K) 10 MEQ extended release capsule Unknown    Sig: Take 1 capsule by mouth 2 times daily   timolol (TIMOPTIC) 0.25 % ophthalmic solution Unknown    Sig: Place 1 drop into both eyes 2 times daily   torsemide (DEMADEX) 20 MG tablet Unknown    Sig: Take 1 tablet by mouth daily      Facility-Administered Medications: None         Pertinent Information:      The following medications have been added:   Cetirizine  Midodrine  Metoprolol  The following medications have been removed:   Lantus  The following medications have been modified:   Changed insulin aspart to be used with an insulin pump    Medication history obtained from: Family member  and Patient    Who takes care of medications prior to arrival:    Able to recall: Name of medication(s), Dose of  medication(s), Frequency of medication(s), and Indication of medication(s)  Adherence: Good adherence (>80-90% doses)    Patient uses insulin pump:   Insulin Type: Insulin aspart      Allergy Update: Amoxicillin, Amoxicillin-pot clavulanate, Clindamycin, and Sulfamethoxazole-trimethoprim        Of Note:    will be bringing medication list tomorrow. Will confirm list with him tomorrow    Thank you,  Jose De Jesus Cade [facility]

## 2025-03-11 NOTE — PROGRESS NOTES
End of Shift Note    Bedside shift change report given to Deanne IBRAHIM (oncoming nurse) by Ron Reich RN (offgoing nurse).  Report included the following information SBAR and MAR    Shift worked:  7a-7p     Shift summary and any significant changes:     Pt received there daily medications, Fecal management system was removed, Pt voided, Pt environment is safe with call bell in reach, bed in the lowest position, and bed alarm on.     Concerns for physician to address:  N/A     Zone phone for oncoming shift:   N/A       Activity:  Level of Assistance: Dependent, patient does less than 25%  Number times ambulated in hallways past shift: 0  Number of times OOB to chair past shift: 0    Cardiac:   Cardiac Monitoring: Yes      Cardiac Rhythm: Ventricular paced    Access:  Current line(s): PIV     Genitourinary:   Urinary Status: Voiding    Respiratory:   O2 Device: None (Room air)  Chronic home O2 use?: NO  Incentive spirometer at bedside: NO    GI:  Last BM (including prior to admit): 03/11/25  Current diet:  ADULT ORAL NUTRITION SUPPLEMENT; Breakfast, Lunch, Dinner; Diabetic Oral Supplement  ADULT DIET; Dysphagia - Minced and Moist; Low Sodium (2 gm)  Passing flatus: YES    Pain Management:   Patient states pain is manageable on current regimen: YES    Skin:  Pool Scale Score: 18  Interventions: Wound Offloading (Prevention Methods): Bed, pressure redistribution/air, Pillows, Repositioning, Turning    Patient Safety:  Fall Risk: Nursing Judgement-Fall Risk High(Add Comments): Yes  Fall Risk Interventions  Nursing Judgement-Fall Risk High(Add Comments): Yes  Toilet Every 2 Hours-In Advance of Need: Yes  Hourly Visual Checks: In bed, Eyes closed  Fall Visual Posted: Armband, Socks, Fall sign posted  Room Door Open: Yes  Alarm On: Bed  Patient Moved Closer to Nursing Station: Yes    Active Consults:   IP CONSULT TO PHARMACY  IP CONSULT TO INFECTIOUS DISEASES  IP CONSULT TO NEPHROLOGY  IP CONSULT TO NEUROLOGY  IP CONSULT  TO PODIATRY  IP CONSULT TO CARDIOLOGY  IP CONSULT TO INTENSIVIST  IP CONSULT TO PHARMACY  IP CONSULT TO VASCULAR ACCESS TEAM  IP CONSULT TO DIETITIAN  IP CONSULT TO PHARMACY    Length of Stay:  Expected LOS: 14  Actual LOS: 12    Ron Reich RN

## 2025-03-11 NOTE — PROGRESS NOTES
Hospitalist Progress Note    NAME:   Rosita Morillo   : 1945   MRN: [id number]     Date/Time: 3/11/2025 8:41 AM  Patient PCP: Juan Go MD    Estimated discharge date:3/13  Barriers: clx, nephro,      Assessment / Plan:    Septic shock 2/2 MRSA bacteremia, resolved  MRSA bacteremia  -- Positive MRSA bacteremia  - Repeat blood cultures, TTE negative for vegetation  -C/W vancomycin    Increased risk for hospital delirium  Toxic metabolic encephalopathy likely due to septic shock, acute hypoxic respiratory failure, hypernatremia, resolved  - CT head negative, EEG negative for seizures, neurology is following  - C/W delirium precautions, Melatonin nightly, thiamine daily avoid benzos,    Acute hypoxic respiratory failure 2/2 RLL infiltrates, pleural effusion, with atelectasis, resolved  RLL infiltrates  Pleural effusion with atelectasis  - Required intubation, initially subsequently extubated.  -ICU did bedside ultrasound did not show large fluid pocket, no site for thoracentesis  -C/W vancomycin, on room air    A-fib  - Off of anticoagulation, due to GI bleed, Amio if needed, cardiology is following    Sick sinus syndrome s/p dual-chamber Abbott pacemaker  CHF with a EF of 45%-50  - On Jardiance, outpatient currently on hold due to low blood pressures, will resume when appropriate    Hypophosphatemia result  Hypernatremia, resolved  ESPINOZA, resolved  CKD stage III  -Encourage oral hydration, monitor BMP, avoid nephrotoxins, replace electrolytes as needed    Multiple bilateral lower extremity wounds  Left heel wound  Right heel wound  Bilateral toes wounds  -- Wound care following    DM2  - C/W Lantus, sliding scale    GERD C/W lansoprazole    Constipation  - Continue bowel regimen    HLD C/W atorvastatin      Medical Decision Making:   I personally reviewed labs: CBC, BMP     Discussed case with: Patient, family        Code Status: Full  DVT Prophylaxis: Lovenox  GI Prophylaxis: PPI    Subjective:

## 2025-03-11 NOTE — PROGRESS NOTES
NAME: Rosita Morillo        :  1945        MRN:  [id number]         Assessment:    ESPINOZA on CKD stage 3a: Cr had been 1.3-1.5 with recent diuresis, now 0.77<--is going 1.17 <- 1.34 <--1.53. IV contrast exposure on admission but no significant LEIGH noted. Baseline Cr seems to be 1.1-1.3    Hypernatremia: Improving.  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 LE edema has improved s/p diuresis     LE cellulitis     DM2     Hypokalemia: 2 to diuresis. Mg ok    HTN    Acute hypoxic respiratory failure on ventilator  Encephalopathy     Plan/Recommendations:  Updated labs today.  Hopefully she is able to hydrate herself well enough and kidney function is stable    Matteo Zambrano MD  [facility] ([facility])        Subjective:     Awake and alert.  Lethargic.  Poor historian  States that she is drinking enough  Appetite is still poor  Objective:     VITALS:   Last 24hrs VS reviewed since prior progress note. Most recent are:  Vitals:    25 1200   BP: 138/66   Pulse: (!) 105   Resp: 18   Temp: 98.1 °F (36.7 °C)   SpO2: 100%       Intake/Output Summary (Last 24 hours) at 3/11/2025 1544  Last data filed at 3/11/2025 0800  Gross per 24 hour   Intake 250 ml   Output 858 ml   Net -608 ml      Telemetry Reviewed:     PHYSICAL EXAM:    Awake and alert, obese, lethargic  Urine looks clear  Cooperative, pleasant    Lab Data Reviewed: (see below)    Medications Reviewed: (see below)    PMH/SH reviewed - no change compared to H&P  ________________________________________________________________________  Care Plan discussed with:  Patient x    Family      RN x    Care Manager                    Consultant:          Comments   >50% of visit spent in counseling and coordination of care       ________________________________________________________________________  Matteo Zambrano MD

## 2025-03-12 PROBLEM — Z95.0 PACEMAKER: Status: ACTIVE | Noted: 2025-03-12

## 2025-03-12 PROBLEM — I48.0 PAROXYSMAL ATRIAL FIBRILLATION (HCC): Status: ACTIVE | Noted: 2025-03-12

## 2025-03-12 PROBLEM — J96.01 ACUTE HYPOXIC RESPIRATORY FAILURE (HCC): Status: ACTIVE | Noted: 2025-03-12

## 2025-03-12 PROBLEM — I50.9 ACUTE ON CHRONIC CONGESTIVE HEART FAILURE (HCC): Status: ACTIVE | Noted: 2025-03-12

## 2025-03-12 PROBLEM — J90 PLEURAL EFFUSION, RIGHT: Status: ACTIVE | Noted: 2025-03-12

## 2025-03-12 PROBLEM — R78.81 STAPHYLOCOCCUS EPIDERMIDIS BACTEREMIA: Status: ACTIVE | Noted: 2025-03-12

## 2025-03-12 PROBLEM — A41.9 SEPTIC SHOCK (HCC): Status: ACTIVE | Noted: 2025-03-12

## 2025-03-12 PROBLEM — E11.8 CONTROLLED DIABETES MELLITUS TYPE 2 WITH COMPLICATIONS (HCC): Status: ACTIVE | Noted: 2025-01-11

## 2025-03-12 PROBLEM — R65.21 SEPTIC SHOCK (HCC): Status: ACTIVE | Noted: 2025-03-12

## 2025-03-12 PROBLEM — Z95.828 S/P INSERTION OF ILIAC ARTERY STENT: Status: ACTIVE | Noted: 2025-03-12

## 2025-03-12 PROBLEM — L97.413 SKIN ULCER OF RIGHT HEEL WITH NECROSIS OF MUSCLE (HCC): Status: ACTIVE | Noted: 2025-03-12

## 2025-03-12 PROBLEM — I49.5 SICK SINUS SYNDROME (HCC): Status: ACTIVE | Noted: 2018-06-23

## 2025-03-12 PROBLEM — L97.421 SKIN ULCER OF LEFT HEEL, LIMITED TO BREAKDOWN OF SKIN (HCC): Status: ACTIVE | Noted: 2025-03-12

## 2025-03-12 PROBLEM — I73.9 PERIPHERAL VASCULAR DISEASE: Status: ACTIVE | Noted: 2025-03-12

## 2025-03-12 PROBLEM — B95.7 STAPHYLOCOCCUS EPIDERMIDIS BACTEREMIA: Status: ACTIVE | Noted: 2025-03-12

## 2025-03-12 LAB
ALBUMIN SERPL-MCNC: 2.3 G/DL (ref 3.5–5)
ANION GAP SERPL CALC-SCNC: 6 MMOL/L (ref 2–12)
BUN SERPL-MCNC: 17 MG/DL (ref 6–20)
BUN/CREAT SERPL: 20 (ref 12–20)
CALCIUM SERPL-MCNC: 8.6 MG/DL (ref 8.5–10.1)
CHLORIDE SERPL-SCNC: 114 MMOL/L (ref 97–108)
CO2 SERPL-SCNC: 24 MMOL/L (ref 21–32)
CREAT SERPL-MCNC: 0.87 MG/DL (ref 0.55–1.02)
GLUCOSE BLD STRIP.AUTO-MCNC: 127 MG/DL (ref 65–117)
GLUCOSE BLD STRIP.AUTO-MCNC: 152 MG/DL (ref 65–117)
GLUCOSE BLD STRIP.AUTO-MCNC: 194 MG/DL (ref 65–117)
GLUCOSE BLD STRIP.AUTO-MCNC: 238 MG/DL (ref 65–117)
GLUCOSE SERPL-MCNC: 206 MG/DL (ref 65–100)
PHOSPHATE SERPL-MCNC: 2.9 MG/DL (ref 2.6–4.7)
POTASSIUM SERPL-SCNC: 3.9 MMOL/L (ref 3.5–5.1)
SERVICE CMNT-IMP: ABNORMAL
SODIUM SERPL-SCNC: 144 MMOL/L (ref 136–145)

## 2025-03-12 PROCEDURE — 6370000000 HC RX 637 (ALT 250 FOR IP): Performed by: STUDENT IN AN ORGANIZED HEALTH CARE EDUCATION/TRAINING PROGRAM

## 2025-03-12 PROCEDURE — 6370000000 HC RX 637 (ALT 250 FOR IP): Performed by: INTERNAL MEDICINE

## 2025-03-12 PROCEDURE — 36415 COLL VENOUS BLD VENIPUNCTURE: CPT

## 2025-03-12 PROCEDURE — 51798 US URINE CAPACITY MEASURE: CPT

## 2025-03-12 PROCEDURE — 2580000003 HC RX 258: Performed by: HOSPITALIST

## 2025-03-12 PROCEDURE — 6360000002 HC RX W HCPCS: Performed by: HOSPITALIST

## 2025-03-12 PROCEDURE — 6370000000 HC RX 637 (ALT 250 FOR IP): Performed by: HOSPITALIST

## 2025-03-12 PROCEDURE — 6360000002 HC RX W HCPCS: Performed by: INTERNAL MEDICINE

## 2025-03-12 PROCEDURE — 2580000003 HC RX 258: Performed by: INTERNAL MEDICINE

## 2025-03-12 PROCEDURE — 92526 ORAL FUNCTION THERAPY: CPT

## 2025-03-12 PROCEDURE — 97535 SELF CARE MNGMENT TRAINING: CPT

## 2025-03-12 PROCEDURE — 6370000000 HC RX 637 (ALT 250 FOR IP): Performed by: NURSE PRACTITIONER

## 2025-03-12 PROCEDURE — 97530 THERAPEUTIC ACTIVITIES: CPT | Performed by: PHYSICAL THERAPIST

## 2025-03-12 PROCEDURE — 2060000000 HC ICU INTERMEDIATE R&B

## 2025-03-12 PROCEDURE — 82962 GLUCOSE BLOOD TEST: CPT

## 2025-03-12 PROCEDURE — 80069 RENAL FUNCTION PANEL: CPT

## 2025-03-12 RX ORDER — PREGABALIN 75 MG/1
75 CAPSULE ORAL 2 TIMES DAILY
Status: ON HOLD | COMMUNITY
End: 2025-03-14 | Stop reason: HOSPADM

## 2025-03-12 RX ORDER — BIOTIN 10 MG
10 TABLET ORAL DAILY
COMMUNITY

## 2025-03-12 RX ADMIN — HEPARIN SODIUM 5000 UNITS: 5000 INJECTION INTRAVENOUS; SUBCUTANEOUS at 03:54

## 2025-03-12 RX ADMIN — INSULIN LISPRO 2 UNITS: 100 INJECTION, SOLUTION INTRAVENOUS; SUBCUTANEOUS at 12:44

## 2025-03-12 RX ADMIN — ACETAMINOPHEN 650 MG: 650 SOLUTION ORAL at 18:50

## 2025-03-12 RX ADMIN — INSULIN LISPRO 4 UNITS: 100 INJECTION, SOLUTION INTRAVENOUS; SUBCUTANEOUS at 12:45

## 2025-03-12 RX ADMIN — SENNOSIDES 17.6 MG: 8.8 LIQUID ORAL at 23:38

## 2025-03-12 RX ADMIN — PIPERACILLIN AND TAZOBACTAM 3375 MG: 3; .375 INJECTION, POWDER, LYOPHILIZED, FOR SOLUTION INTRAVENOUS at 11:25

## 2025-03-12 RX ADMIN — HEPARIN SODIUM 5000 UNITS: 5000 INJECTION INTRAVENOUS; SUBCUTANEOUS at 23:57

## 2025-03-12 RX ADMIN — Medication 1 AMPULE: at 10:05

## 2025-03-12 RX ADMIN — Medication 3 MG: at 23:41

## 2025-03-12 RX ADMIN — Medication 100 MG: at 09:51

## 2025-03-12 RX ADMIN — INSULIN LISPRO 4 UNITS: 100 INJECTION, SOLUTION INTRAVENOUS; SUBCUTANEOUS at 16:53

## 2025-03-12 RX ADMIN — TIMOLOL MALEATE 1 DROP: 2.5 SOLUTION OPHTHALMIC at 09:00

## 2025-03-12 RX ADMIN — ATORVASTATIN CALCIUM 10 MG: 10 TABLET, FILM COATED ORAL at 09:51

## 2025-03-12 RX ADMIN — INSULIN LISPRO 2 UNITS: 100 INJECTION, SOLUTION INTRAVENOUS; SUBCUTANEOUS at 09:55

## 2025-03-12 RX ADMIN — HEPARIN SODIUM 5000 UNITS: 5000 INJECTION INTRAVENOUS; SUBCUTANEOUS at 16:54

## 2025-03-12 RX ADMIN — PIPERACILLIN AND TAZOBACTAM 3375 MG: 3; .375 INJECTION, POWDER, LYOPHILIZED, FOR SOLUTION INTRAVENOUS at 04:17

## 2025-03-12 RX ADMIN — OXYCODONE 5 MG: 5 TABLET ORAL at 16:51

## 2025-03-12 RX ADMIN — POLYETHYLENE GLYCOL 3350 17 G: 17 POWDER, FOR SOLUTION ORAL at 23:57

## 2025-03-12 RX ADMIN — POLYETHYLENE GLYCOL 3350 17 G: 17 POWDER, FOR SOLUTION ORAL at 09:51

## 2025-03-12 RX ADMIN — INSULIN GLARGINE 10 UNITS: 100 INJECTION, SOLUTION SUBCUTANEOUS at 23:55

## 2025-03-12 RX ADMIN — VANCOMYCIN HYDROCHLORIDE 1000 MG: 1 INJECTION, POWDER, LYOPHILIZED, FOR SOLUTION INTRAVENOUS at 13:13

## 2025-03-12 RX ADMIN — OXYCODONE 5 MG: 5 TABLET ORAL at 23:35

## 2025-03-12 RX ADMIN — INSULIN GLARGINE 10 UNITS: 100 INJECTION, SOLUTION SUBCUTANEOUS at 09:51

## 2025-03-12 RX ADMIN — TIMOLOL MALEATE 1 DROP: 2.5 SOLUTION OPHTHALMIC at 03:03

## 2025-03-12 RX ADMIN — HEPARIN SODIUM 5000 UNITS: 5000 INJECTION INTRAVENOUS; SUBCUTANEOUS at 09:51

## 2025-03-12 RX ADMIN — Medication 1 AMPULE: at 23:39

## 2025-03-12 RX ADMIN — INSULIN LISPRO 4 UNITS: 100 INJECTION, SOLUTION INTRAVENOUS; SUBCUTANEOUS at 09:55

## 2025-03-12 RX ADMIN — OXYCODONE 5 MG: 5 TABLET ORAL at 11:17

## 2025-03-12 ASSESSMENT — PAIN SCALES - GENERAL
PAINLEVEL_OUTOF10: 0
PAINLEVEL_OUTOF10: 10
PAINLEVEL_OUTOF10: 0
PAINLEVEL_OUTOF10: 0

## 2025-03-12 ASSESSMENT — PAIN DESCRIPTION - LOCATION
LOCATION: LEG
LOCATION: OTHER (COMMENT)
LOCATION: FOOT;LEG

## 2025-03-12 ASSESSMENT — PAIN SCALES - WONG BAKER
WONGBAKER_NUMERICALRESPONSE: NO HURT
WONGBAKER_NUMERICALRESPONSE: NO HURT

## 2025-03-12 ASSESSMENT — PAIN DESCRIPTION - ORIENTATION
ORIENTATION: RIGHT;LEFT
ORIENTATION: RIGHT;LEFT

## 2025-03-12 ASSESSMENT — PAIN DESCRIPTION - DESCRIPTORS
DESCRIPTORS: ACHING
DESCRIPTORS: ACHING

## 2025-03-12 NOTE — CARE COORDINATION
Transition of Care Plan:     RUR: 18%  Prior Level of Functioning: Independent  Disposition: SNF-  DENNISE: 3/15/25  If SNF or IPR: Date FOC offered: 3/4/25 by previous cm  Date FOC received: 3/4/25  Accepting facility: Awaiting  Date authorization started with reference number: N/A  Date authorization received and expires: N/A  Follow up appointments: To be done by facility.  DME needed: None  Transportation at discharge: BLS  IM/IMM Medicare/ letter given: To be given prior to discharge.  Is patient a [place] and connected with [facility]? No              If yes, was [place] transfer form completed and [facility] notified? N/A  Caregiver Contact: Wife  Discharge Caregiver contacted prior to discharge? Caregiver to be contacted prior to discharge.  Care Conference needed? No  Barriers to discharge: Medical stability    CM called pt's spouse to discuss d/c planning and discuss the accepting facility that was sent out previously.  Pt's spouse stated he prefers to speak in person and will be here later to discuss.  CM will follow up.    2:44 p.m. CM met with pt's spouse at bedside to provide SNF list and discuss referrals.  CM explained [facility] has accepted.  Spouse is planning on stopping by for a tour.  CM will call for updates from [facility] and [place].  CM asked spouse to identify a few more options.    2:50 p.m. CM left a message for Alexia of [facility] checking on the status of the referral.  CM called Lyn Kessler of [facility] [phone] and left voicemail.     3:17 p.m. CM received a call from pt's spouse requesting referral be sent to [facility] as well.  CM sent email to Britni of [facility].    Sri Vanessa, SIMIN, LCSW  Care Management, [facility]  [phone]

## 2025-03-12 NOTE — PROGRESS NOTES
Pharmacy Antimicrobial Kinetic Dosing    Indication for Antimicrobials: SSTI/cellulitis of toes, PNA    Current Regimen of Each Antimicrobial:  Vancomycin Pharmacy to Dose, Start 3; Day # 11    Previous Antimicrobial Therapy:  Ceftriaxone 3/3-3/6   Cefepime 3/6  Metronidazole 3/6  Zosyn 3/6-3/9    Goal Level: Vancomycin -600    Date Dose & Interval Measured (mcg/mL) Predicted AUC 24-48 Predicted AUC 24,ss   3/1  500mg q12h 15.1 541 636   3/5  750mg q24h  14.2 427 427   3/10 750mg q24h  9.7 357 352   3/13 1000mg q24h        Significant Cultures:    Blood, paired: negative  3/6 Blood, paired: CoNS  bottles, prelim  3/6 Blood PCR: MRSE  3/6 MRSA, nares: negative (obtained after 8 days of vancomycin)   3/6 Respiratory, ET: few yeast  3/6 Blood, paired: pending    Labs:  Recent Labs     Units 25  0329 25  1535 25  0729 03/10/25  0352   CREATININE MG/DL 0.87 0.80  --  0.77   BUN MG/DL 17 19  --  20   WBC K/uL  --   --  11.2* 13.2*     Temp (24hrs), Av.9 °F (36.6 °C), Min:97.4 °F (36.3 °C), Max:98.2 °F (36.8 °C)    Conditions for Dosing Consideration: None    Creatinine Clearance (mL/min): Estimated Creatinine Clearance: 57 mL/min (based on SCr of 0.87 mg/dL).     Impression/Plan:   Continue vancomycin 1000mg IV q24h   Predicted [id number] = 454, Predicted AUC24,ss = 462  Vanc random level for AM labs   BMP and CBC ordered per protocol  Antimicrobial stop date vancomycin extended until      Pharmacy will follow daily and adjust medications as appropriate for renal function and/or serum levels.    Thank you,  Jessica Mead [facility]

## 2025-03-12 NOTE — PLAN OF CARE
Problem: Occupational Therapy - Adult  Goal: By Discharge: Performs self-care activities at highest level of function for planned discharge setting.  See evaluation for individualized goals.  Description: FUNCTIONAL STATUS PRIOR TO ADMISSION:  Lives with spouse in one level house with 4 steps to enter. Patient wheelchair level at home, stand step transfer with assist from spouse. Has electric wheelchair at home.    Receives Help From: Family, Prior Level of Assist for ADLs: Needs assistance,  ,  ,  ,  ,  ,  ,  , Prior Level of Assist for Transfers: Needs assistance, Active : No     HOME SUPPORT: Lives with spouse    Occupational Therapy Goals:  Initiated 3/1/2025, Goals reviewed and remain appropriate as per 3/10 reevaluation.   1.  Patient will perform self-feeding with Minimal Assist within 7 day(s).  2.  Patient will perform grooming with Minimal Assist within 7 day(s).  3.  Patient will roll in bed with minimal assist to aide with lower body self care within 7 days.  4.  Patient will sit EOB x 5 minutes with moderate assist to engage in self care tasks within 7 days  5.  Patient will stand with mod a x 2 in preparation for OOB activity within 7 days.  Outcome: Progressing   OCCUPATIONAL THERAPY TREATMENT  Patient: Rosita Morillo (79 y.o. female)  Date: 3/12/2025  Primary Diagnosis: Cellulitis of both lower extremities [L03.115, L03.116]       Precautions: Fall Risk                Chart, occupational therapy assessment, plan of care, and goals were reviewed.    ASSESSMENT  Patient continues to benefit from skilled OT services and is slowly progressing towards goals. Pt tolerated OT session fair. Pt is confused, benefiting from consistent multimodal cues for task initiation/facilitation and reorientation. Endorsing pain in BLE when manipulating extremities, but not when standing. Decreased ROM, pain and cognition limiting pt's ability for distal LB dressing. Attempted transfer with RW, pt unable to clear

## 2025-03-12 NOTE — PLAN OF CARE
Problem: Occupational Therapy - Adult  Goal: By Discharge: Performs self-care activities at highest level of function for planned discharge setting.  See evaluation for individualized goals.  Description: FUNCTIONAL STATUS PRIOR TO ADMISSION:  Lives with spouse in one level house with 4 steps to enter. Patient wheelchair level at home, stand step transfer with assist from spouse. Has electric wheelchair at home.    Receives Help From: Family, Prior Level of Assist for ADLs: Needs assistance,  ,  ,  ,  ,  ,  ,  , Prior Level of Assist for Transfers: Needs assistance, Active : No     HOME SUPPORT: Lives with spouse    Occupational Therapy Goals:  Initiated 3/1/2025, Goals reviewed and remain appropriate as per 3/10 reevaluation.   1.  Patient will perform self-feeding with Minimal Assist within 7 day(s).  2.  Patient will perform grooming with Minimal Assist within 7 day(s).  3.  Patient will roll in bed with minimal assist to aide with lower body self care within 7 days.  4.  Patient will sit EOB x 5 minutes with moderate assist to engage in self care tasks within 7 days  5.  Patient will stand with mod a x 2 in preparation for OOB activity within 7 days.  3/12/2025 1502 by Mariann Puga, OT  Outcome: Progressing  3/12/2025 1452 by Mariann Puga, OT  Outcome: Progressing   OCCUPATIONAL THERAPY TREATMENT  Patient: Rosita Morillo (79 y.o. female)  Date: 3/12/2025  Primary Diagnosis: Cellulitis of both lower extremities [L03.115, L03.116]       Precautions: Fall Risk                Chart, occupational therapy assessment, plan of care, and goals were reviewed.    ASSESSMENT  Patient continues to benefit from skilled OT services and is slowly progressing towards goals. Pt required dependent assist recliner<>bed with nieves stedy. Decreased BUE strength and AROM noted, with assist to guide BUE to equipment in preparation for transfer. BM incontinence noted, pt tolerated standing ~30 seconds with x1 assist in the nieves

## 2025-03-12 NOTE — PROGRESS NOTES
End of Shift Note    Bedside shift change report given to Deanne IBRAHIM (oncoming nurse) by DC ARREAGA RN (offgoing nurse).  Report included the following information SBAR    Shift worked:  7am-7pm     Shift summary and any significant changes:     Has been confused scramming most of the shift. Sitting up in chair for lunch working with therapy .Having loss stool with urin Bladder scan don at 1130 had 175 repeated at 1530 245 and 1730 170 ml Pain med given x2      Concerns for physician to address:  None     Zone phone for oncoming shift:          Activity:  Level of Assistance: Dependent, patient does less than 25%  Number times ambulated in hallways past shift: 0  Number of times OOB to chair past shift: 1    Cardiac:   Cardiac Monitoring: Yes      Cardiac Rhythm: Ventricular paced    Access:  Current line(s): PIV     Genitourinary:   Urinary Status: Retention    Respiratory:   O2 Device: None (Room air)  Chronic home O2 use?: NO  Incentive spirometer at bedside: NO    GI:  Last BM (including prior to admit): 03/12/25  Current diet:  ADULT ORAL NUTRITION SUPPLEMENT; Breakfast, Lunch, Dinner; Diabetic Oral Supplement  ADULT DIET; Dysphagia - Soft and Bite Sized; Low Sodium (2 gm)  Passing flatus: YES    Pain Management:   Patient states pain is manageable on current regimen: YES    Skin:  Pool Scale Score: 17  Interventions: Wound Offloading (Prevention Methods): Pillows, Repositioning    Patient Safety:  Fall Risk: Nursing Judgement-Fall Risk High(Add Comments): Yes  Fall Risk Interventions  Nursing Judgement-Fall Risk High(Add Comments): Yes  Toilet Every 2 Hours-In Advance of Need: No (Comment)  Hourly Visual Checks: In bed, Confused  Fall Visual Posted: Armband, Socks, Fall sign posted  Room Door Open: Deferred to decrease stimulation  Alarm On: Bed, Chair  Patient Moved Closer to Nursing Station: No    Active Consults:   IP CONSULT TO PHARMACY  IP CONSULT TO INFECTIOUS DISEASES  IP CONSULT TO

## 2025-03-12 NOTE — PROGRESS NOTES
Cardiology Progress Note      3/12/2025 10:01 AM    Admit Date: 2/27/2025          Subjective:  Continues to slowly improve. No new c/o          BP (!) 131/54   Pulse 92   Temp 98.2 °F (36.8 °C) (Oral)   Resp 18   Ht 1.6 m (5' 3\")   Wt 94.1 kg (207 lb 7.3 oz)   SpO2 100%   BMI 36.75 kg/m²   03/10 1901 - 03/12 0700  In: 200 [P.O.:200]  Out: 1883 [Urine:1883]        Objective:      Physical Exam:  VS as above  Chest CTA ant  Card Irreg irreg   Neuro alert and awake     Data Review:   Labs:    BUN 17  Creat 0.9    Telemetry: afib R 90 Intermittant V pacing       Assessment:       Persistent atrial fibrillation.  Recent GI bleeding from gastric ulcers so off anticoagulation.  Sick sinus syndrome historically with dual chamber Abbott pacemaker.  Septic shock- resolved  MRSE bacteremia   Acute hypoxic respiratory failure leading to intubation\ventilation on 3/5.  Large R pleural effusion noted.  Acute on chronic diastolic CHF.  EF 45-50% prior.  ESPINOZA atop CHRONIC KIDNEY DISEASE stage 3.-  resolved   Metabolic encephalopathy- improved   PVD with L iliac stent that jails the IVC.  DM type 2 on chronic insulin.  Hyperlipidemia.  Hypernatremia.  Full code.     Plan:  Cont current Rx. Need to consider resuming anticoagulation vs Watchman

## 2025-03-12 NOTE — PLAN OF CARE
Problem: Physical Therapy - Adult  Goal: By Discharge: Performs mobility at highest level of function for planned discharge setting.  See evaluation for individualized goals.  Description: FUNCTIONAL STATUS PRIOR TO ADMISSION: Patient was independent with bed mobility and required assistance for stand pivot transfer to w/c; increasing difficulty with mobility secondary to severe LE pain    HOME SUPPORT PRIOR TO ADMISSION: The patient lived with  and has required increased assistance.    Physical Therapy Goals  Revised 3/10/2025  1.  Patient will move from supine to sit and sit to supine , scoot up and down, and roll side to side in bed with moderate assistance  within 7 day(s).    2.  Patient will sit EOB with good sitting balance for 5 minutes.  3. Patient will complete active ROM LE exercises for 10 reps each with mod A within 7 days.  4. Patient will transfer bed<>chair with maximal assistance within 7 day(s).     Initiated 3/1/2025  1.  Patient will move from supine to sit and sit to supine , scoot up and down, and roll side to side in bed with moderate assistance  within 7 day(s).    2.  Patient will sit EOB with good sitting balance for 5 minutes.  3. Patient will complete active ROM LE exercises for 10 reps each with mod A within 7 days.    Outcome: Progressing   PHYSICAL THERAPY TREATMENT    Patient: Rosita Morillo (79 y.o. female)  Date: 3/12/2025  Diagnosis: Cellulitis of both lower extremities [L03.115, L03.116] Cellulitis      Precautions: Restrictions/Precautions  Restrictions/Precautions: Fall Risk  Activity Level: Up with Assist            ASSESSMENT:  Patient continues to benefit from skilled PT services and is slowly progressing towards goals. Patient overall limited by pain, fear of falling, inability to ambulate, poor balance, global weakness and confusion.  Currently patient requires encouragement to participate and reports pain in B LE's.  She is very confused and requires frequent  reorientation.  Overall maxA X 2 for supine to sit and sit to stand. Unable to get to full  RW and utilized Stedy to come to stand and transfer to the chair.  She continues well below functional baseline and may benefit from rehab to continue mobility progression.         PLAN:  Patient continues to benefit from skilled intervention to address the above impairments.  Continue treatment per established plan of care.        Recommendation for discharge: (in order for the patient to meet his/her long term goals):   Moderate intensity short-term skilled physical therapy up to 5x/week    Other factors to consider for discharge: impaired cognition, high risk for falls, and not safe to be alone    IF patient discharges home will need the following DME: continuing to assess with progress       SUBJECTIVE:   Patient stated, \"Please help me.\"    OBJECTIVE DATA SUMMARY:   Critical Behavior:   Alert and oriented x 1 (self)       Functional Mobility Training:  Bed Mobility:  Bed Mobility Training  Rolling: Substantial/Maximal assistance;2 Person assistance  Supine to Sit: Substantial/Maximal assistance;2 Person assistance  Sit to Supine: Substantial/Maximal assistance;2 Person assistance  Scooting: Substantial/Maximal assistance  Transfers:  Transfer Training  Sit to Stand: Partial/Moderate assistance;2 Person assistance  Stand to Sit: Partial/Moderate assistance;2 Person assistance  Bed to Chair: Dependent/Total  Balance:  Balance  Sitting: Impaired  Sitting - Static: Fair (occasional)  Sitting - Dynamic: Poor (constant support)  Standing: Impaired  Standing - Static: Constant support;Good  Standing - Dynamic: Constant support;Fair   Ambulation/Gait Training:              Neuro Re-Education:                    Pain Rating:  Reports LE pain  Pain Intervention(s):       Activity Tolerance:   Fair  and requires rest breaks    After treatment:   Patient left in no apparent distress sitting up in chair, Call bell within

## 2025-03-12 NOTE — PLAN OF CARE
tolerance, determine appropriateness for diet advancement    Acute SLP Services: Yes, SLP will continue to follow per plan of care.  Discharge Recommendations: Continue to assess pending progress     SUBJECTIVE:   Patient stated, “Don't get into any trouble.”    OBJECTIVE:     Past Medical History:   Diagnosis Date    Arthritis     CAD (coronary artery disease)     afib and chf    Congestive heart failure (HCC)     Depression     Diabetes (HCC)     type 2    Fibromyalgia     Gastrointestinal disorder     reflux and gastro paresis    Heart failure (HCC)     Infectious disease 2010    MRSA? in right big toe    Menopause     Psychiatric disorder     PTSD from 911     Past Surgical History:   Procedure Laterality Date    APPENDECTOMY      BREAST BIOPSY Left     STbb  benign    COLONOSCOPY  08/20/2018    polyps    COLONOSCOPY N/A 8/20/2018    COLONOSCOPY performed by Nayely Corcoran MD at [facility] MAIN OR    GI      colonscopy and endo    GYN      cyst on right ovary removed    HEENT      T&A Bilateral Cataract Surgery with coptic lenses    UPPER GASTROINTESTINAL ENDOSCOPY N/A 1/9/2025    ESOPHAGOGASTRODUODENOSCOPY performed by Sean Meza MD at [facility] ENDOSCOPY     Prior Level of Function/Home Situation:   Social/Functional History  Lives With: Spouse  Type of Home: House  Home Layout: One level  Home Access: Stairs to enter with rails  Entrance Stairs - Number of Steps: 4  Bathroom Shower/Tub: Walk-in shower  Bathroom Equipment: Grab bars in shower, Safety frame  Home Equipment: Wheelchair - Electric  Has the patient had two or more falls in the past year or any fall with injury in the past year?: No  Receives Help From: Family  Prior Level of Assist for ADLs: Needs assistance  Homemaking Responsibilities: No  Prior Level of Assist for Ambulation: Independent in home with wheelchair and able to pivot transfer  Prior Level of Assist for Transfers: Needs assistance  Active : No  Mode of Transportation: Family,  See evaluation for individualized goals.  Description: Speech Pathology Note  1. Patient will participate in re-evaluation of swallow function within 7 days. Initiated 3/4/25. Re-initiated 3/10/25.   2. Patient will tolerate least restrictive PO diet without clinical s/s of aspiration within 7 days. Initiated 3/10/25.   Outcome: Progressing

## 2025-03-12 NOTE — PLAN OF CARE
transferred back to the bed with Stedy and returned to supine with maxA x 2.  Patient very fearful and does yell out at times.           PLAN:  Patient continues to benefit from skilled intervention to address the above impairments.  Continue treatment per established plan of care.        Recommendation for discharge: (in order for the patient to meet his/her long term goals):   Moderate intensity short-term skilled physical therapy up to 5x/week    Other factors to consider for discharge: poor safety awareness, impaired cognition, high risk for falls, and not safe to be alone    IF patient discharges home will need the following DME: continuing to assess with progress       SUBJECTIVE:   Patient stated, \"I'm doing better.\"    OBJECTIVE DATA SUMMARY:   Critical Behavior:  Orientation  Overall Orientation Status: Impaired  Orientation Level: Oriented to person;Disoriented to situation;Disoriented to time;Disoriented to place  Cognition  Overall Cognitive Status: Exceptions  Following Commands: Follows one step commands with increased time;Follows one step commands with repetition  Attention Span: Attends with cues to redirect;Difficulty attending to directions  Memory: Decreased short term memory;Decreased recall of recent events  Safety Judgement: Decreased awareness of need for safety;Decreased awareness of need for assistance  Problem Solving: Decreased awareness of errors;Assistance required to correct errors made;Assistance required to identify errors made  Insights: Decreased awareness of deficits  Initiation: Requires cues for all  Sequencing: Requires cues for some  Cognition Comment: confused, max multimodal cues for task initiation and orientation    Functional Mobility Training:  Bed Mobility:  Bed Mobility Training  Rolling: Substantial/Maximal assistance;2 Person assistance  Supine to Sit: Substantial/Maximal assistance;2 Person assistance  Sit to Supine: Substantial/Maximal assistance;2 Person  assistance  Scooting: Substantial/Maximal assistance  Transfers:  Transfer Training  Sit to Stand: Partial/Moderate assistance;2 Person assistance  Stand to Sit: Partial/Moderate assistance;2 Person assistance  Bed to Chair: Dependent/Total  Balance:  Balance  Sitting: Impaired  Sitting - Static: Fair (occasional)  Sitting - Dynamic: Poor (constant support)  Standing: Impaired  Standing - Static: Constant support;Good  Standing - Dynamic: Constant support;Fair   Ambulation/Gait Training:              Neuro Re-Education:                    Pain Ratin/10   Pain Intervention(s):       Activity Tolerance:   Fair  and requires rest breaks    After treatment:   Patient left in no apparent distress sitting up in chair, Call bell within reach, and Caregiver / family present      COMMUNICATION/EDUCATION:   The patient's plan of care was discussed with: physical therapist, occupational therapist, and registered nurse           Ilene Dennis PT,DPT    Minutes: 16

## 2025-03-13 LAB
APPEARANCE UR: ABNORMAL
BACTERIA SPEC CULT: ABNORMAL
BACTERIA SPEC CULT: ABNORMAL
BACTERIA URNS QL MICRO: NEGATIVE /HPF
BILIRUB UR QL: NEGATIVE
CAOX CRY URNS QL MICRO: ABNORMAL
COLOR UR: ABNORMAL
ECHO BSA: 2.03 M2
EPITH CASTS URNS QL MICRO: ABNORMAL /LPF
GLUCOSE BLD STRIP.AUTO-MCNC: 128 MG/DL (ref 65–117)
GLUCOSE BLD STRIP.AUTO-MCNC: 134 MG/DL (ref 65–117)
GLUCOSE BLD STRIP.AUTO-MCNC: 153 MG/DL (ref 65–117)
GLUCOSE BLD STRIP.AUTO-MCNC: 156 MG/DL (ref 65–117)
GLUCOSE BLD STRIP.AUTO-MCNC: 156 MG/DL (ref 65–117)
GLUCOSE BLD STRIP.AUTO-MCNC: 167 MG/DL (ref 65–117)
GLUCOSE UR STRIP.AUTO-MCNC: NEGATIVE MG/DL
GRAM STN SPEC: ABNORMAL
HGB UR QL STRIP: ABNORMAL
KETONES UR QL STRIP.AUTO: ABNORMAL MG/DL
LEUKOCYTE ESTERASE UR QL STRIP.AUTO: ABNORMAL
NITRITE UR QL STRIP.AUTO: NEGATIVE
PH UR STRIP: 5.5 (ref 5–8)
PROT UR STRIP-MCNC: 30 MG/DL
RBC #/AREA URNS HPF: ABNORMAL /HPF (ref 0–5)
SERVICE CMNT-IMP: ABNORMAL
SP GR UR REFRACTOMETRY: 1.02
URINE CULTURE IF INDICATED: ABNORMAL
UROBILINOGEN UR QL STRIP.AUTO: 0.2 EU/DL (ref 0.2–1)
VAS LEFT ARM BP: 94 MMHG
VAS LEFT DORSALIS PEDIS BP: 255 MMHG
VAS RIGHT ABI: 0.38
VAS RIGHT PTA BP: 36 MMHG
WBC URNS QL MICRO: ABNORMAL /HPF (ref 0–4)
YEAST URNS QL MICRO: PRESENT

## 2025-03-13 PROCEDURE — 2580000003 HC RX 258: Performed by: INTERNAL MEDICINE

## 2025-03-13 PROCEDURE — 87086 URINE CULTURE/COLONY COUNT: CPT

## 2025-03-13 PROCEDURE — 6370000000 HC RX 637 (ALT 250 FOR IP): Performed by: STUDENT IN AN ORGANIZED HEALTH CARE EDUCATION/TRAINING PROGRAM

## 2025-03-13 PROCEDURE — 6370000000 HC RX 637 (ALT 250 FOR IP): Performed by: INTERNAL MEDICINE

## 2025-03-13 PROCEDURE — 82962 GLUCOSE BLOOD TEST: CPT

## 2025-03-13 PROCEDURE — 6370000000 HC RX 637 (ALT 250 FOR IP): Performed by: HOSPITALIST

## 2025-03-13 PROCEDURE — 97535 SELF CARE MNGMENT TRAINING: CPT

## 2025-03-13 PROCEDURE — 6360000002 HC RX W HCPCS: Performed by: INTERNAL MEDICINE

## 2025-03-13 PROCEDURE — 99233 SBSQ HOSP IP/OBS HIGH 50: CPT | Performed by: INTERNAL MEDICINE

## 2025-03-13 PROCEDURE — 6370000000 HC RX 637 (ALT 250 FOR IP): Performed by: NURSE PRACTITIONER

## 2025-03-13 PROCEDURE — 2060000000 HC ICU INTERMEDIATE R&B

## 2025-03-13 PROCEDURE — 51798 US URINE CAPACITY MEASURE: CPT

## 2025-03-13 PROCEDURE — 81001 URINALYSIS AUTO W/SCOPE: CPT

## 2025-03-13 PROCEDURE — 6360000002 HC RX W HCPCS: Performed by: HOSPITALIST

## 2025-03-13 PROCEDURE — 87106 FUNGI IDENTIFICATION YEAST: CPT

## 2025-03-13 PROCEDURE — 2580000003 HC RX 258: Performed by: STUDENT IN AN ORGANIZED HEALTH CARE EDUCATION/TRAINING PROGRAM

## 2025-03-13 PROCEDURE — 97530 THERAPEUTIC ACTIVITIES: CPT

## 2025-03-13 PROCEDURE — 97530 THERAPEUTIC ACTIVITIES: CPT | Performed by: PHYSICAL THERAPIST

## 2025-03-13 RX ORDER — LACTOBACILLUS RHAMNOSUS GG 10B CELL
1 CAPSULE ORAL
Status: DISCONTINUED | OUTPATIENT
Start: 2025-03-14 | End: 2025-03-14 | Stop reason: HOSPADM

## 2025-03-13 RX ADMIN — SODIUM CHLORIDE: 0.9 INJECTION, SOLUTION INTRAVENOUS at 13:10

## 2025-03-13 RX ADMIN — TIMOLOL MALEATE 1 DROP: 2.5 SOLUTION OPHTHALMIC at 23:01

## 2025-03-13 RX ADMIN — ACETAMINOPHEN 650 MG: 650 SOLUTION ORAL at 13:07

## 2025-03-13 RX ADMIN — PIPERACILLIN AND TAZOBACTAM 3375 MG: 3; .375 INJECTION, POWDER, LYOPHILIZED, FOR SOLUTION INTRAVENOUS at 05:51

## 2025-03-13 RX ADMIN — INSULIN LISPRO 4 UNITS: 100 INJECTION, SOLUTION INTRAVENOUS; SUBCUTANEOUS at 09:35

## 2025-03-13 RX ADMIN — OXYCODONE 5 MG: 5 TABLET ORAL at 05:51

## 2025-03-13 RX ADMIN — VANCOMYCIN HYDROCHLORIDE 1000 MG: 1 INJECTION, POWDER, LYOPHILIZED, FOR SOLUTION INTRAVENOUS at 10:53

## 2025-03-13 RX ADMIN — HEPARIN SODIUM 5000 UNITS: 5000 INJECTION INTRAVENOUS; SUBCUTANEOUS at 17:49

## 2025-03-13 RX ADMIN — INSULIN GLARGINE 10 UNITS: 100 INJECTION, SOLUTION SUBCUTANEOUS at 09:08

## 2025-03-13 RX ADMIN — POLYETHYLENE GLYCOL 3350 17 G: 17 POWDER, FOR SOLUTION ORAL at 09:08

## 2025-03-13 RX ADMIN — PIPERACILLIN AND TAZOBACTAM 3375 MG: 3; .375 INJECTION, POWDER, LYOPHILIZED, FOR SOLUTION INTRAVENOUS at 00:03

## 2025-03-13 RX ADMIN — ATORVASTATIN CALCIUM 10 MG: 10 TABLET, FILM COATED ORAL at 09:07

## 2025-03-13 RX ADMIN — TIMOLOL MALEATE 1 DROP: 2.5 SOLUTION OPHTHALMIC at 00:20

## 2025-03-13 RX ADMIN — HEPARIN SODIUM 5000 UNITS: 5000 INJECTION INTRAVENOUS; SUBCUTANEOUS at 09:08

## 2025-03-13 RX ADMIN — LANSOPRAZOLE 30 MG: 30 TABLET, ORALLY DISINTEGRATING, DELAYED RELEASE ORAL at 05:53

## 2025-03-13 RX ADMIN — Medication 1 AMPULE: at 23:00

## 2025-03-13 RX ADMIN — OXYCODONE 5 MG: 5 TABLET ORAL at 23:06

## 2025-03-13 RX ADMIN — Medication 100 MG: at 09:08

## 2025-03-13 RX ADMIN — PIPERACILLIN AND TAZOBACTAM 3375 MG: 3; .375 INJECTION, POWDER, LYOPHILIZED, FOR SOLUTION INTRAVENOUS at 13:11

## 2025-03-13 RX ADMIN — TIMOLOL MALEATE 1 DROP: 2.5 SOLUTION OPHTHALMIC at 09:10

## 2025-03-13 RX ADMIN — Medication 1 AMPULE: at 09:10

## 2025-03-13 RX ADMIN — INSULIN GLARGINE 10 UNITS: 100 INJECTION, SOLUTION SUBCUTANEOUS at 22:59

## 2025-03-13 RX ADMIN — INSULIN LISPRO 4 UNITS: 100 INJECTION, SOLUTION INTRAVENOUS; SUBCUTANEOUS at 13:11

## 2025-03-13 RX ADMIN — Medication 3 MG: at 22:59

## 2025-03-13 RX ADMIN — INSULIN LISPRO 4 UNITS: 100 INJECTION, SOLUTION INTRAVENOUS; SUBCUTANEOUS at 17:49

## 2025-03-13 RX ADMIN — OXYCODONE 5 MG: 5 TABLET ORAL at 15:07

## 2025-03-13 ASSESSMENT — PAIN SCALES - GENERAL
PAINLEVEL_OUTOF10: 10
PAINLEVEL_OUTOF10: 0
PAINLEVEL_OUTOF10: 7
PAINLEVEL_OUTOF10: 10

## 2025-03-13 ASSESSMENT — PAIN DESCRIPTION - LOCATION
LOCATION: OTHER (COMMENT)
LOCATION: LEG
LOCATION: LEG
LOCATION: GENERALIZED

## 2025-03-13 ASSESSMENT — PAIN DESCRIPTION - DESCRIPTORS: DESCRIPTORS: ACHING

## 2025-03-13 ASSESSMENT — PAIN SCALES - WONG BAKER: WONGBAKER_NUMERICALRESPONSE: NO HURT

## 2025-03-13 NOTE — PROGRESS NOTES
Cardiology Progress Note      3/13/2025 9:23 AM    Admit Date: 2/27/2025          Subjective: Up in chair. No new c/o. Working on placement          BP (!) 143/54   Pulse 83   Temp 97.6 °F (36.4 °C)   Resp 16   Ht 1.6 m (5' 3\")   Wt 97.5 kg (214 lb 15.2 oz)   SpO2 97%   BMI 38.08 kg/m²   03/11 1901 - 03/13 0700  In: 680 [P.O.:680]  Out: 825 [Urine:825]        Objective:      Physical Exam:  VS as above    Data Review:   Labs:        Telemetry: afib with int V pacing       Assessment:       Persistent atrial fibrillation.  Recent GI bleeding from gastric ulcers so off anticoagulation.  Sick sinus syndrome historically with dual chamber Abbott pacemaker.  Septic shock- resolved  MRSE bacteremia   Acute hypoxic respiratory failure leading to intubation\ventilation on 3/5.  Large R pleural effusion noted.  Acute on chronic diastolic CHF.  EF 45-50% prior.  ESPINOZA atop CHRONIC KIDNEY DISEASE stage 3.-  resolved   Metabolic encephalopathy- improved   PVD with L iliac stent that jails the IVC.  DM type 2 on chronic insulin.  Hyperlipidemia.  Hypernatremia.  Full code.       Plan:  Cont current Rx. Can see 4-6 weeks p d/c. Will see back as inpt on request

## 2025-03-13 NOTE — PROGRESS NOTES
End of Shift Note    Bedside shift change report given to RN (oncoming nurse) by Lauri Gabriel RN (offgoing nurse).  Report included the following information SBAR, MAR, and Quality Measures    Shift worked:  7a-7p     Shift summary and any significant changes:     PT worked with PT/OT , handled fine. Afternoon patient was very agitated. Complaining of foot pain, but was given oxycodone. Stated pain has been better but patient still yells \" help me\"      Concerns for physician to address:       Zone phone for oncoming shift:          Activity:  Level of Assistance: Dependent, patient does less than 25%  Number times ambulated in hallways past shift: 0  Number of times OOB to chair past shift: 1    Cardiac:   Cardiac Monitoring: Yes      Cardiac Rhythm: Ventricular paced    Access:  Current line(s): PIV     Genitourinary:   Urinary Status: Harvey, Draining    Respiratory:   O2 Device: None (Room air)  Chronic home O2 use?: NO  Incentive spirometer at bedside: NO    GI:  Last BM (including prior to admit): 03/12/25  Current diet:  ADULT ORAL NUTRITION SUPPLEMENT; Breakfast, Lunch, Dinner; Diabetic Oral Supplement  ADULT DIET; Dysphagia - Soft and Bite Sized; Low Sodium (2 gm)  Passing flatus: YES    Pain Management:   Patient states pain is manageable on current regimen: NO    Skin:  Pool Scale Score: 12  Interventions: Wound Offloading (Prevention Methods): Pillows, Repositioning, Turning, Elevate heels    Patient Safety:  Fall Risk: Nursing Judgement-Fall Risk High(Add Comments): Yes  Fall Risk Interventions  Nursing Judgement-Fall Risk High(Add Comments): Yes  Toilet Every 2 Hours-In Advance of Need: Yes  Hourly Visual Checks: In bed  Fall Visual Posted: Socks, Fall sign posted  Room Door Open: Deferred to decrease stimulation  Alarm On: Bed  Patient Moved Closer to Nursing Station: No    Active Consults:   IP CONSULT TO PHARMACY  IP CONSULT TO INFECTIOUS DISEASES  IP CONSULT TO NEPHROLOGY  IP CONSULT TO  NEUROLOGY  IP CONSULT TO PODIATRY  IP CONSULT TO CARDIOLOGY  IP CONSULT TO INTENSIVIST  IP CONSULT TO PHARMACY  IP CONSULT TO VASCULAR ACCESS TEAM  IP CONSULT TO DIETITIAN  IP CONSULT TO PHARMACY    Length of Stay:  Expected LOS: 15  Actual LOS: 14    Lauri Gabriel RN

## 2025-03-13 NOTE — PROGRESS NOTES
Pharmacy Antimicrobial Kinetic Dosing    Indication for Antimicrobials: SSTI/cellulitis of toes, PNA    Current Regimen of Each Antimicrobial:  Vancomycin Pharmacy to Dose, Start 3; Day # 12    Previous Antimicrobial Therapy:  Ceftriaxone 3/3-3/6   Cefepime 3/6  Metronidazole 3/6  Zosyn 3/6-3/9    Goal Level: Vancomycin -600    Date Dose & Interval Measured (mcg/mL) Predicted AUC 24-48 Predicted AUC 24,ss   3/1  500mg q12h 15.1 541 636   3/5  750mg q24h  14.2 427 427   3/10 750mg q24h  9.7 357 352   3/14 1000mg q24h        Significant Cultures:    Blood, paired: negative  3/6 Blood, paired: CoNS  bottles, prelim  3/6 Blood PCR: MRSE  3/6 MRSA, nares: negative (obtained after 8 days of vancomycin)   3/6 Respiratory, ET: few yeast  3/6 Blood, paired: pending    Labs:  Recent Labs     Units 25  0329 25  1535 25  0729   CREATININE MG/DL 0.87 0.80  --    BUN MG/DL 17 19  --    WBC K/uL  --   --  11.2*     Temp (24hrs), Av.7 °F (36.5 °C), Min:97.2 °F (36.2 °C), Max:98.3 °F (36.8 °C)    Conditions for Dosing Consideration: None    Creatinine Clearance (mL/min): Estimated Creatinine Clearance: 58 mL/min (based on SCr of 0.87 mg/dL).     Impression/Plan:   Continue vancomycin 1000mg IV q24h   No vancomycin given yesterday?   Predicted FMM22-06 = 454, Predicted AUC24,ss = 462  Vanc random level for AM labs   BMP and CBC ordered per protocol  Antimicrobial stop date vancomycin extended until      Pharmacy will follow daily and adjust medications as appropriate for renal function and/or serum levels.    Thank you,  Jessica Mead formerly [facility]

## 2025-03-13 NOTE — PLAN OF CARE
Problem: Physical Therapy - Adult  Goal: By Discharge: Performs mobility at highest level of function for planned discharge setting.  See evaluation for individualized goals.  Description: FUNCTIONAL STATUS PRIOR TO ADMISSION: Patient was independent with bed mobility and required assistance for stand pivot transfer to w/c; increasing difficulty with mobility secondary to severe LE pain    HOME SUPPORT PRIOR TO ADMISSION: The patient lived with  and has required increased assistance.    Physical Therapy Goals  Revised 3/10/2025  1.  Patient will move from supine to sit and sit to supine , scoot up and down, and roll side to side in bed with moderate assistance  within 7 day(s).    2.  Patient will sit EOB with good sitting balance for 5 minutes.  3. Patient will complete active ROM LE exercises for 10 reps each with mod A within 7 days.  4. Patient will transfer bed<>chair with maximal assistance within 7 day(s).     Initiated 3/1/2025  1.  Patient will move from supine to sit and sit to supine , scoot up and down, and roll side to side in bed with moderate assistance  within 7 day(s).    2.  Patient will sit EOB with good sitting balance for 5 minutes.  3. Patient will complete active ROM LE exercises for 10 reps each with mod A within 7 days.    Outcome: Not Progressing     PHYSICAL THERAPY TREATMENT    Patient: Rosita Morillo (79 y.o. female)  Date: 3/13/2025  Diagnosis: Cellulitis of both lower extremities [L03.115, L03.116] Cellulitis      Precautions: Restrictions/Precautions  Restrictions/Precautions: Fall Risk  Activity Level: Up with Assist            ASSESSMENT:  Patient continues to benefit from skilled PT services and is not progressing towards goals. Patient received laying in bed and agreeable to therapy. Pt directed in supine to sit with pt requiring increased time as her Les are in pain with touch and movement. Pt confused, asking constantly what we are doing. PT continues to educate pt and  nieves steady)  Balance:  Balance  Sitting: Impaired  Sitting - Static: Fair (occasional)  Sitting - Dynamic: Fair (occasional)  Standing - Static: Constant support;Good  Standing - Dynamic: Constant support;Fair   Ambulation/Gait Training:              Neuro Re-Education:                    Pain Rating:  10/10   Pain Intervention(s):   rest    Activity Tolerance:   Poor    After treatment:   Patient left in no apparent distress sitting up in chair, Call bell within reach, and Bed/ chair alarm activated      COMMUNICATION/EDUCATION:   The patient's plan of care was discussed with: occupational therapist and registered nurse    Patient Education  Education Given To: Patient  Education Provided: Transfer Training  Education Method: Verbal  Barriers to Learning: Cognition  Education Outcome: Continued education needed      Kimberly Martin, PT  Minutes: 24

## 2025-03-13 NOTE — PLAN OF CARE
Problem: Occupational Therapy - Adult  Goal: By Discharge: Performs self-care activities at highest level of function for planned discharge setting.  See evaluation for individualized goals.  Description: FUNCTIONAL STATUS PRIOR TO ADMISSION:  Lives with spouse in one level house with 4 steps to enter. Patient wheelchair level at home, stand step transfer with assist from spouse. Has electric wheelchair at home.    Receives Help From: Family, Prior Level of Assist for ADLs: Needs assistance,  ,  ,  ,  ,  ,  ,  , Prior Level of Assist for Transfers: Needs assistance, Active : No     HOME SUPPORT: Lives with spouse    Occupational Therapy Goals:  Initiated 3/1/2025, Goals reviewed and remain appropriate as per 3/10 reevaluation.   1.  Patient will perform self-feeding with Minimal Assist within 7 day(s).  2.  Patient will perform grooming with Minimal Assist within 7 day(s).  3.  Patient will roll in bed with minimal assist to aide with lower body self care within 7 days.  4.  Patient will sit EOB x 5 minutes with moderate assist to engage in self care tasks within 7 days  5.  Patient will stand with mod a x 2 in preparation for OOB activity within 7 days.  3/13/2025 1551 by Mariann Puga, OT  Outcome: Progressing  3/13/2025 1058 by Mariann Puga, OT  Outcome: Progressing   OCCUPATIONAL THERAPY TREATMENT  Patient: Rosita Morillo (79 y.o. female)  Date: 3/13/2025  Primary Diagnosis: Cellulitis of both lower extremities [L03.115, L03.116]       Precautions: Fall Risk                Chart, occupational therapy assessment, plan of care, and goals were reviewed.    ASSESSMENT  Patient continues to benefit from skilled OT services and is slowly progressing towards goals. Pt demonstrated mild improvement with transfer from recliner with nieves serna, but continues to require x2 assist for sit<>stand and multimodal cues for initiation and sequencing of tasks. Remains limited by cognition, GW, decreased activity  deficits  Initiation: Requires cues for all  Sequencing: Requires cues for some  Cognition Comment: confused, max multimodal cues for task initiation and orientation    Functional Mobility and Transfers for ADLs:  Bed Mobility:  Bed Mobility Training  Bed Mobility Training: Yes  Supine to Sit: Dependent/Total;2 Person assistance     Transfers:   Transfer Training  Transfer Training: Yes  Sit to Stand: Partial/Moderate assistance;2 Person assistance  Stand to Sit: Partial/Moderate assistance;2 Person assistance  Bed to Chair: Dependent/Total (with nieves steady)           Balance:     Balance  Sitting: Impaired  Sitting - Static: Fair (occasional)  Sitting - Dynamic: Fair (occasional)  Standing - Static: Constant support;Good  Standing - Dynamic: Constant support;Fair      ADL Intervention:         Feeding: Stand by assistance   Feeding Skilled Clinical Factors: drink to mouth seated EOB; pt noted with residual egg in mouth, required min verbal cues to drink and swallow remaining food     Grooming: Maximum assistance;Stand by assistance   Grooming Skilled Clinical Factors: max assist brushing hair; SBA to wipe face with cloth in R hand with min verbal cues for initiation                                                   Functional Mobility: Dependent/Total  Functional Mobility Skilled Clinical Factors: recliner<>bed with nieves stedy     Product Used : Bath wipes              Pain Rating:  Endorsing pain in BLE, but not quantifying  Pain Intervention(s):         Activity Tolerance:   Fair   Please refer to the flowsheet for vital signs taken during this treatment.    After treatment:   Patient left in no apparent distress in bed, Call bell within reach, Bed/ chair alarm activated, Caregiver / family present, and Side rails x3    COMMUNICATION/EDUCATION:   The patient's plan of care was discussed with: physical therapist and registered nurse    Patient Education  Education Given To: Patient  Education Provided: Role of

## 2025-03-13 NOTE — PROGRESS NOTES
moderate regurgitation.   Tricuspid Valve: Moderate regurgitation. Severe pulmonary HTN by provided doppler.   Left Atrium: Left atrium is dilated.     XR CHEST PORTABLE  Result Date: 3/8/2025  EXAM: Portable CXR. 1246 hours. COMPARISON: March 6, 2025 INDICATION: NG tube placement FINDINGS: NG tube tip is satisfactory in the stomach. There is interval extubation. Pacemaker remains. Unchanged prominent right pleural effusion with associated atelectasis obscuring the right lung base. Left lung remains clear. Top normal heart size. No overt pulmonary edema. No pneumothorax.     Satisfactory NG tube placement. Unchanged right pleural effusion/atelectasis. Electronically signed by RC TATUM    XR CHEST PORTABLE  Result Date: 3/6/2025  EXAM:  XR CHEST PORTABLE INDICATION: Respiratory distress, intubation. Enteric tube placement. COMPARISON: Portable chest on 3/5/2025 and 2/27/2025 TECHNIQUE: Semiupright portable chest AP view FINDINGS: Endotracheal tube is in good position and terminates 6.5 cm proximal to the jas. Enteric tube extends into the left abdomen but out of the field-of-view. Pacemaker battery pack and leads are unchanged. Cardiac monitoring wires overlie the thorax. Cardiomegaly is accentuated by technique. Aortic arch is atherosclerotic but not enlarged. Pulmonary vascular prominence is mild and unchanged. Right pleural effusion is decreased and moderate. Right lung opacities are nonspecific. Left lung is grossly clear. No pneumothorax. Bones are osteopenic.     Endotracheal tube is in good position. No pneumothorax. Decreased right pleural effusion. Unchanged nonspecific right lung opacities. Electronically signed by Masoud Em    XR CHEST PORTABLE  Result Date: 3/5/2025  INDICATION:   hypoxia COMPARISON: 2025 FINDINGS: AP portable upright view of the chest demonstrates a stable enlarged cardiopericardial silhouette.increased moderate to large right-sided effusion with cardiac pacer on the left.  There is no focal consolidation.There is no pneumothorax.     Increased moderate to large right effusion with cardiac pacer. Electronically signed by ARJUN ALTMAN    XR CHEST PORTABLE  Result Date: 3/5/2025  EXAM:  XR CHEST PORTABLE INDICATION:   h/o CHF , coarse breath sounds on IVF COMPARISON: 2/27/2025 radiograph. TECHNIQUE: Portable frontal view radiograph of the chest was acquired. FINDINGS: Left anterior chest wall Implantable cardiac pacemaker partially obscures the left upper lung with leads projecting over the right right atrium and ventricle, similar to prior study. Lungs/Pleura: No obvious pneumothorax. At least mild to moderate pulmonary edema with scattered peribronchial cuffing and right lower lung hazy opacities, new since prior study. Obscured right costophrenic angles may suggest pleural effusion. Mediastinum: Cardiomegaly with right atrial enlargement again noted. Mediastinal silhouette is otherwise unremarkable. Upper abdomen/soft tissue: Unremarkable. MSK: No acute osseous abnormalities.     At least mild to moderate pulmonary edema. Probable right pleural effusion versus basilar atelectasis. Right lower lung consolidation not completely excluded. Electronically signed by GRAYSON SAMAYOA    CT HEAD WO CONTRAST  Result Date: 3/4/2025  EXAM: CT HEAD WO CONTRAST INDICATION:altered mental status. TECHNIQUE: Helical CT of the head was obtained without intravenous contrast. Axial, coronal and sagittal images were created. Dose reduction technique was used including one or more of the following: automated exposure control, adjustment of mA and kV according to patient size, and/or iterative reconstruction. COMPARISON: April 2023 Suboptimal technique degrades exam quality. FINDINGS: Quality: Average.  No unusual artifacts beyond the limitations of routine CT. INTRACRANIAL: Hemorrhage: No obvious acute hemorrhage. Mass effect: None. Brain parenchyma: Gray-white matter differentiation is grossly maintained.

## 2025-03-13 NOTE — PROGRESS NOTES
Hospitalist Progress Note    NAME:   Rosita Morillo   : 1945   MRN: [id number]     Date/Time: 3/13/2025 2:35 PM  Patient PCP: Juan Go MD    Estimated discharge date:3/15  Barriers: id recs       Assessment / Plan:    Septic shock 2/2 MRSE bacteremia, resolved  MRSE bacteremia  -- Positive MRSA bacteremia  - Repeat blood cultures ntd    TTE negative for vegetation  -C/W vancomycin  ID would like to evaluate patient wounds, and decide if we need Zosyn to be continued  Increased risk for hospital delirium  Toxic metabolic encephalopathy likely due to septic shock, acute hypoxic respiratory failure, hypernatremia, resolved  - CT head negative, EEG negative for seizures, neurology is following  - C/W delirium precautions, Melatonin nightly, thiamine daily avoid benzos,    Acute hypoxic respiratory failure 2/2 RLL infiltrates, pleural effusion, with atelectasis, resolved  RLL infiltrates  Pleural effusion with atelectasis  - Required intubation, initially subsequently extubated.  -ICU did bedside ultrasound did not show large fluid pocket, no site for thoracentesis  -C/W vancomycin, on room air    A-fib  - Off of anticoagulation, due to GI bleed, Amio if needed, cardiology is following    Sick sinus syndrome s/p dual-chamber Abbott pacemaker  CHF with a EF of 45%-50  - On Jardiance, outpatient currently on hold due to low blood pressures, will resume when appropriate    Hypophosphatemia result  Hypernatremia, resolved  ESPINOZA, resolved  CKD stage III  -Encourage oral hydration, monitor BMP, avoid nephrotoxins, replace electrolytes as needed    Multiple bilateral lower extremity wounds  Left heel wound  Right heel wound  Bilateral toes wounds  -- Wound care following    DM2  - C/W Lantus, sliding scale    GERD C/W lansoprazole    Constipation  - Continue bowel regimen    HLD C/W atorvastatin      Medical Decision Making:   I personally reviewed labs: CBC, BMP     Discussed case with: Patient, patient's  were not copied into this note but were reviewed prior to creation of Plan.      LABS:  I reviewed today's most current labs and imaging studies.  Pertinent labs include:  Recent Labs     03/11/25  0729   WBC 11.2*   HGB 12.0   HCT 40.4        Recent Labs     03/11/25  0729 03/11/25  1535 03/12/25  0329   NA  --  142 144   K  --  4.1 3.9   CL  --  111* 114*   CO2  --  24 24   GLUCOSE  --  291* 206*   BUN  --  19 17   CREATININE  --  0.80 0.87   CALCIUM  --  8.5 8.6   MG 2.0  --   --    PHOS 3.0 2.7 2.9       Signed: Lio Rodriguez MD

## 2025-03-13 NOTE — PLAN OF CARE
Problem: Occupational Therapy - Adult  Goal: By Discharge: Performs self-care activities at highest level of function for planned discharge setting.  See evaluation for individualized goals.  Description: FUNCTIONAL STATUS PRIOR TO ADMISSION:  Lives with spouse in one level house with 4 steps to enter. Patient wheelchair level at home, stand step transfer with assist from spouse. Has electric wheelchair at home.    Receives Help From: Family, Prior Level of Assist for ADLs: Needs assistance,  ,  ,  ,  ,  ,  ,  , Prior Level of Assist for Transfers: Needs assistance, Active : No     HOME SUPPORT: Lives with spouse    Occupational Therapy Goals:  Initiated 3/1/2025, Goals reviewed and remain appropriate as per 3/10 reevaluation.   1.  Patient will perform self-feeding with Minimal Assist within 7 day(s).  2.  Patient will perform grooming with Minimal Assist within 7 day(s).  3.  Patient will roll in bed with minimal assist to aide with lower body self care within 7 days.  4.  Patient will sit EOB x 5 minutes with moderate assist to engage in self care tasks within 7 days  5.  Patient will stand with mod a x 2 in preparation for OOB activity within 7 days.  Outcome: Progressing  OCCUPATIONAL THERAPY TREATMENT  Patient: Rosita Morillo (79 y.o. female)  Date: 3/13/2025  Primary Diagnosis: Cellulitis of both lower extremities [L03.115, L03.116]       Precautions: Fall Risk                Chart, occupational therapy assessment, plan of care, and goals were reviewed.    ASSESSMENT  Patient continues to benefit from skilled OT services and is slowly progressing towards goals. Pt tolerate OT session fair. Continues to present with pain in BLE and is fearful of anyone touching her extremities, with significant hesitation in regards to moving BLE, requiring total assist for bed mobility. Once seated EOB and when standing, minimal c/o pain noted. Improved ability with standing today, though continues to require x2 assist  Training;ADL Adaptive Strategies;Equipment  Education Method: Demonstration;Verbal;Teach Back  Barriers to Learning: Cognition  Education Outcome: Continued education needed    Thank you for this referral.  Mariann Puga OT  Minutes: 27

## 2025-03-13 NOTE — PLAN OF CARE
Problem: Physical Therapy - Adult  Goal: By Discharge: Performs mobility at highest level of function for planned discharge setting.  See evaluation for individualized goals.  Description: FUNCTIONAL STATUS PRIOR TO ADMISSION: Patient was independent with bed mobility and required assistance for stand pivot transfer to w/c; increasing difficulty with mobility secondary to severe LE pain    HOME SUPPORT PRIOR TO ADMISSION: The patient lived with  and has required increased assistance.    Physical Therapy Goals  Revised 3/10/2025  1.  Patient will move from supine to sit and sit to supine , scoot up and down, and roll side to side in bed with moderate assistance  within 7 day(s).    2.  Patient will sit EOB with good sitting balance for 5 minutes.  3. Patient will complete active ROM LE exercises for 10 reps each with mod A within 7 days.  4. Patient will transfer bed<>chair with maximal assistance within 7 day(s).     Initiated 3/1/2025  1.  Patient will move from supine to sit and sit to supine , scoot up and down, and roll side to side in bed with moderate assistance  within 7 day(s).    2.  Patient will sit EOB with good sitting balance for 5 minutes.  3. Patient will complete active ROM LE exercises for 10 reps each with mod A within 7 days.    3/13/2025 1305 by Ilene Dennis, PT  Outcome: Progressing  3/13/2025 1012 by Kimberly Martin, PT  Outcome: Not Progressing   PHYSICAL THERAPY TREATMENT    Patient: Rosita Morillo (79 y.o. female)  Date: 3/13/2025  Diagnosis: Cellulitis of both lower extremities [L03.115, L03.116] Cellulitis      Precautions: Restrictions/Precautions  Restrictions/Precautions: Fall Risk  Activity Level: Up with Assist            ASSESSMENT:  Patient continues to benefit from skilled PT services and is progressing towards goals. Patient overall limited by continued weakness, impaired balance, gait instability and decreased activity tolerance.  Currently up in chair and requires

## 2025-03-13 NOTE — PROGRESS NOTES
End of Shift Note    Bedside shift change report given to Kelly (oncoming nurse) by Wendy Landeros RN (offgoing nurse).  Report included the following information SBAR    Shift worked:  7pm to 7A     Shift summary and any significant changes:     Patient has had two straight cath in 24 hours dominguez placed due to urine retention. Patient has bilateral upper extremities edema and arms elevated on pillow. MD Kennedy notified   Concerns for physician to address:  none     Zone phone for oncoming shift:         Activity:  Level of Assistance: Dependent, patient does less than 25%  Number times ambulated in hallways past shift: 0  Number of times OOB to chair past shift: 0    Cardiac:   Cardiac Monitoring: Yes      Cardiac Rhythm: Ventricular paced    Access:  Current line(s): PIV     Genitourinary:   Urinary Status: Dominguez, Draining    Respiratory:   O2 Device: None (Room air)  Chronic home O2 use?: NO  Incentive spirometer at bedside: NO    GI:  Last BM (including prior to admit): 03/12/25  Current diet:  ADULT ORAL NUTRITION SUPPLEMENT; Breakfast, Lunch, Dinner; Diabetic Oral Supplement  ADULT DIET; Dysphagia - Soft and Bite Sized; Low Sodium (2 gm)  Passing flatus: YES    Pain Management:   Patient states pain is manageable on current regimen: YES    Skin:  Pool Scale Score: 12  Interventions: Wound Offloading (Prevention Methods): Pillows, Repositioning, Turning, Elevate heels    Patient Safety:  Fall Risk: Nursing Judgement-Fall Risk High(Add Comments): Yes  Fall Risk Interventions  Nursing Judgement-Fall Risk High(Add Comments): Yes  Toilet Every 2 Hours-In Advance of Need: Yes  Hourly Visual Checks: In bed  Fall Visual Posted: Socks, Fall sign posted  Room Door Open: Deferred to decrease stimulation  Alarm On: Bed  Patient Moved Closer to Nursing Station: No    Active Consults:   IP CONSULT TO PHARMACY  IP CONSULT TO INFECTIOUS DISEASES  IP CONSULT TO NEPHROLOGY  IP CONSULT TO NEUROLOGY  IP CONSULT TO  PODIATRY  IP CONSULT TO CARDIOLOGY  IP CONSULT TO INTENSIVIST  IP CONSULT TO PHARMACY  IP CONSULT TO VASCULAR ACCESS TEAM  IP CONSULT TO DIETITIAN  IP CONSULT TO PHARMACY    Length of Stay:  Expected LOS: 15  Actual LOS: 14    Wendy Landeros RN

## 2025-03-14 VITALS
HEART RATE: 87 BPM | SYSTOLIC BLOOD PRESSURE: 115 MMHG | DIASTOLIC BLOOD PRESSURE: 50 MMHG | WEIGHT: 214.95 LBS | OXYGEN SATURATION: 98 % | HEIGHT: 63 IN | TEMPERATURE: 98.6 F | BODY MASS INDEX: 38.09 KG/M2 | RESPIRATION RATE: 18 BRPM

## 2025-03-14 LAB
ALBUMIN SERPL-MCNC: 2.3 G/DL (ref 3.5–5)
ANION GAP SERPL CALC-SCNC: 4 MMOL/L (ref 2–12)
BUN SERPL-MCNC: 12 MG/DL (ref 6–20)
BUN/CREAT SERPL: 13 (ref 12–20)
CALCIUM SERPL-MCNC: 8.4 MG/DL (ref 8.5–10.1)
CHLORIDE SERPL-SCNC: 109 MMOL/L (ref 97–108)
CO2 SERPL-SCNC: 25 MMOL/L (ref 21–32)
CREAT SERPL-MCNC: 0.91 MG/DL (ref 0.55–1.02)
GLUCOSE BLD STRIP.AUTO-MCNC: 116 MG/DL (ref 65–117)
GLUCOSE BLD STRIP.AUTO-MCNC: 122 MG/DL (ref 65–117)
GLUCOSE BLD STRIP.AUTO-MCNC: 143 MG/DL (ref 65–117)
GLUCOSE SERPL-MCNC: 142 MG/DL (ref 65–100)
PHOSPHATE SERPL-MCNC: 2.8 MG/DL (ref 2.6–4.7)
POTASSIUM SERPL-SCNC: 3.8 MMOL/L (ref 3.5–5.1)
SERVICE CMNT-IMP: ABNORMAL
SERVICE CMNT-IMP: ABNORMAL
SERVICE CMNT-IMP: NORMAL
SODIUM SERPL-SCNC: 138 MMOL/L (ref 136–145)
VANCOMYCIN SERPL-MCNC: 19.5 UG/ML

## 2025-03-14 PROCEDURE — 6360000002 HC RX W HCPCS: Performed by: HOSPITALIST

## 2025-03-14 PROCEDURE — 6370000000 HC RX 637 (ALT 250 FOR IP): Performed by: INTERNAL MEDICINE

## 2025-03-14 PROCEDURE — 02HV33Z INSERTION OF INFUSION DEVICE INTO SUPERIOR VENA CAVA, PERCUTANEOUS APPROACH: ICD-10-PCS | Performed by: INTERNAL MEDICINE

## 2025-03-14 PROCEDURE — 6370000000 HC RX 637 (ALT 250 FOR IP): Performed by: NURSE PRACTITIONER

## 2025-03-14 PROCEDURE — 80202 ASSAY OF VANCOMYCIN: CPT

## 2025-03-14 PROCEDURE — 36415 COLL VENOUS BLD VENIPUNCTURE: CPT

## 2025-03-14 PROCEDURE — 80069 RENAL FUNCTION PANEL: CPT

## 2025-03-14 PROCEDURE — 2580000003 HC RX 258: Performed by: INTERNAL MEDICINE

## 2025-03-14 PROCEDURE — 36569 INSJ PICC 5 YR+ W/O IMAGING: CPT

## 2025-03-14 PROCEDURE — 2500000003 HC RX 250 WO HCPCS: Performed by: INTERNAL MEDICINE

## 2025-03-14 PROCEDURE — 6370000000 HC RX 637 (ALT 250 FOR IP): Performed by: HOSPITALIST

## 2025-03-14 PROCEDURE — 6360000002 HC RX W HCPCS: Performed by: INTERNAL MEDICINE

## 2025-03-14 PROCEDURE — 6370000000 HC RX 637 (ALT 250 FOR IP): Performed by: STUDENT IN AN ORGANIZED HEALTH CARE EDUCATION/TRAINING PROGRAM

## 2025-03-14 PROCEDURE — 82962 GLUCOSE BLOOD TEST: CPT

## 2025-03-14 RX ORDER — CETIRIZINE HYDROCHLORIDE 10 MG/1
5 TABLET ORAL NIGHTLY
Status: DISCONTINUED | OUTPATIENT
Start: 2025-03-14 | End: 2025-03-14 | Stop reason: HOSPADM

## 2025-03-14 RX ORDER — SODIUM CHLORIDE 0.9 % (FLUSH) 0.9 %
5-40 SYRINGE (ML) INJECTION EVERY 12 HOURS SCHEDULED
Status: DISCONTINUED | OUTPATIENT
Start: 2025-03-14 | End: 2025-03-14 | Stop reason: HOSPADM

## 2025-03-14 RX ORDER — INSULIN GLARGINE 100 [IU]/ML
10 INJECTION, SOLUTION SUBCUTANEOUS 2 TIMES DAILY
Qty: 10 ML | Refills: 1 | Status: SHIPPED | OUTPATIENT
Start: 2025-03-14

## 2025-03-14 RX ORDER — THIAMINE MONONITRATE (VIT B1) 100 MG
100 TABLET ORAL DAILY
Qty: 30 TABLET | Refills: 0 | Status: SHIPPED | OUTPATIENT
Start: 2025-03-15 | End: 2025-04-14

## 2025-03-14 RX ORDER — TORSEMIDE 5 MG/1
5 TABLET ORAL DAILY
Qty: 30 TABLET | Refills: 0 | Status: SHIPPED | OUTPATIENT
Start: 2025-03-14 | End: 2025-04-13

## 2025-03-14 RX ORDER — METOPROLOL SUCCINATE 25 MG/1
12.5 TABLET, EXTENDED RELEASE ORAL DAILY
Qty: 30 TABLET | Refills: 3 | Status: SHIPPED | OUTPATIENT
Start: 2025-03-14 | End: 2025-03-14

## 2025-03-14 RX ORDER — OXYCODONE HYDROCHLORIDE 5 MG/1
5 TABLET ORAL EVERY 6 HOURS PRN
Qty: 12 TABLET | Refills: 0 | Status: SHIPPED | OUTPATIENT
Start: 2025-03-14 | End: 2025-03-17

## 2025-03-14 RX ORDER — SODIUM CHLORIDE 0.9 % (FLUSH) 0.9 %
5-40 SYRINGE (ML) INJECTION PRN
Status: DISCONTINUED | OUTPATIENT
Start: 2025-03-14 | End: 2025-03-14 | Stop reason: HOSPADM

## 2025-03-14 RX ORDER — METOPROLOL SUCCINATE 25 MG/1
12.5 TABLET, EXTENDED RELEASE ORAL DAILY
Qty: 30 TABLET | Refills: 3 | Status: SHIPPED | OUTPATIENT
Start: 2025-03-14

## 2025-03-14 RX ORDER — SODIUM CHLORIDE 9 MG/ML
INJECTION, SOLUTION INTRAVENOUS PRN
Status: DISCONTINUED | OUTPATIENT
Start: 2025-03-14 | End: 2025-03-14 | Stop reason: HOSPADM

## 2025-03-14 RX ORDER — LACTOBACILLUS RHAMNOSUS GG 10B CELL
1 CAPSULE ORAL
Qty: 30 CAPSULE | Refills: 0 | Status: SHIPPED | OUTPATIENT
Start: 2025-03-15 | End: 2025-04-14

## 2025-03-14 RX ORDER — DIPHENHYDRAMINE HYDROCHLORIDE 50 MG/ML
25 INJECTION, SOLUTION INTRAMUSCULAR; INTRAVENOUS EVERY 6 HOURS PRN
Status: DISCONTINUED | OUTPATIENT
Start: 2025-03-14 | End: 2025-03-14 | Stop reason: HOSPADM

## 2025-03-14 RX ADMIN — Medication 1 CAPSULE: at 08:41

## 2025-03-14 RX ADMIN — OXYCODONE 5 MG: 5 TABLET ORAL at 17:17

## 2025-03-14 RX ADMIN — HEPARIN SODIUM 5000 UNITS: 5000 INJECTION INTRAVENOUS; SUBCUTANEOUS at 08:42

## 2025-03-14 RX ADMIN — TIMOLOL MALEATE 1 DROP: 2.5 SOLUTION OPHTHALMIC at 08:46

## 2025-03-14 RX ADMIN — HEPARIN SODIUM 5000 UNITS: 5000 INJECTION INTRAVENOUS; SUBCUTANEOUS at 02:03

## 2025-03-14 RX ADMIN — HEPARIN SODIUM 5000 UNITS: 5000 INJECTION INTRAVENOUS; SUBCUTANEOUS at 17:33

## 2025-03-14 RX ADMIN — OXYCODONE 5 MG: 5 TABLET ORAL at 12:59

## 2025-03-14 RX ADMIN — INSULIN LISPRO 4 UNITS: 100 INJECTION, SOLUTION INTRAVENOUS; SUBCUTANEOUS at 09:15

## 2025-03-14 RX ADMIN — DIPHENHYDRAMINE HYDROCHLORIDE 25 MG: 50 INJECTION INTRAMUSCULAR; INTRAVENOUS at 17:17

## 2025-03-14 RX ADMIN — INSULIN GLARGINE 10 UNITS: 100 INJECTION, SOLUTION SUBCUTANEOUS at 08:41

## 2025-03-14 RX ADMIN — Medication 100 MG: at 08:41

## 2025-03-14 RX ADMIN — ATORVASTATIN CALCIUM 10 MG: 10 TABLET, FILM COATED ORAL at 08:41

## 2025-03-14 RX ADMIN — Medication 1 AMPULE: at 08:46

## 2025-03-14 RX ADMIN — SODIUM CHLORIDE, PRESERVATIVE FREE 10 ML: 5 INJECTION INTRAVENOUS at 09:28

## 2025-03-14 RX ADMIN — INSULIN LISPRO 4 UNITS: 100 INJECTION, SOLUTION INTRAVENOUS; SUBCUTANEOUS at 13:01

## 2025-03-14 RX ADMIN — DIPHENHYDRAMINE HYDROCHLORIDE 25 MG: 50 INJECTION INTRAMUSCULAR; INTRAVENOUS at 10:13

## 2025-03-14 RX ADMIN — LANSOPRAZOLE 30 MG: 30 TABLET, ORALLY DISINTEGRATING, DELAYED RELEASE ORAL at 08:41

## 2025-03-14 RX ADMIN — VANCOMYCIN HYDROCHLORIDE 1000 MG: 1 INJECTION, POWDER, LYOPHILIZED, FOR SOLUTION INTRAVENOUS at 09:25

## 2025-03-14 RX ADMIN — OXYCODONE 5 MG: 5 TABLET ORAL at 08:41

## 2025-03-14 ASSESSMENT — PAIN SCALES - GENERAL
PAINLEVEL_OUTOF10: 8

## 2025-03-14 NOTE — PROGRESS NOTES
Bill Figueroa RN gave report and advise pt did not received xray to confirm placement, and they advised they will contact to get this done.

## 2025-03-14 NOTE — CARE COORDINATION
Transition of Care Plan to SNF/Rehab    Communication to Patient/Family:  Met with patient and family and they are agreeable to the transition plan. The Plan for Transition of Care is related to the following treatment goals:       The Patient and/or patient representative was provided with a choice of provider and agrees  with the discharge plan.      Yes [x] No []    A Freedom of choice list was provided with basic dialogue that supports the patient's individualized plan of care/goals and shares the quality data associated with the providers.       Yes [x] No []    SNF/Rehab Transition:  Patient has been accepted to [facility] SNF/Rehab and meets criteria for admission.   Date of Inpatient Status Order:   Patient will transported by [facility] and expected to leave at 5:00 pm.    Communication to SNF/Rehab:  Bedside RN, , has been notified to update the transition plan to the facility and call report (phone number).  Discharge information has been updated on the AVS. And communicated to facility via [employer], or CC link.  .    Nursing Please include all hard scripts for controlled substances, med rec and dc summary, and AVS in packet.     Reviewed and confirmed with facility, They, can manage the patient care needs for the following:     Oseas with (X) only those applicable:  Medication:  [x]Medications are available at the facility  [x]IV Antibiotics    []Controlled Substance - hard copies available sent.  []Weekly Labs    Equipment:  []CPAP/BiPAP  []Wound Vacuum  []Harvey or Urinary Device  []PICC/Central Line  []Nebulizer  []Ventilator    Treatment:  []Isolation (for MRSA, VRE, etc.)  []Surgical Drain Management  []Tracheostomy Care  [x]Dressing Changes  []Dialysis with transportation  []PEG Care  []Oxygen  []Daily Weights for Heart Failure    Dietary:  []Any diet limitations  []Tube Feedings   []Total Parenteral Management (TPN)    Financial Resources:  []Medicaid Application  Resources:  []Medicaid Application Completed    []UAI Completed and copy given to pt/family  and copy given to pt/family  []A screening has previously been completed.    []Level II Completed    [] Private pay individual who will not become   financially eligible for Medicaid within 6 months from admission to a [facility].     [] Individual refused to have screening conducted.     []Medicaid Application Completed    []The screening denied because it was determined individual did not need/did not qualify for nursing facility level of care.  [] Out of state residents seeking direct admission to a VA nursing facility.  [] Individuals who are inpatients of an out of state hospital, or in state or out of state veterans/ hospital and seek direct admission to a VA nursing facility  [] Individuals who are pateints or residents of a state owned/operated facility that is licensed by Department of Behavioral Services (DBHDS) and seek direct admission to VA nursing facility  [] A screening not required for enrollment in Medicaid Hospice services as set out in 12 VAC 30-  [] [facility] ([facility]) staff shall perform screenings of the [facility] clients.    Advanced Care Plan:  []Surrogate Decision Maker of Care  []POA  []Communicated Code Status and copy sent.    Other:  is in agreement with discharge plan.

## 2025-03-14 NOTE — DISCHARGE INSTRUCTIONS
unable to reach your hospital physician; your PCP works as an extension of your hospital doctor just like your hospital doctor is an extension of your PCP for your time at the hospital ([facility]).    If you experience any of the following symptoms then please call your primary care physician or return to the emergency room if you cannot get hold of your doctor:  Fever, chills, nausea, vomiting, diarrhea, change in mentation, falling, bleeding, shortness of breath,

## 2025-03-14 NOTE — PROGRESS NOTES
Discussed with Dr. Davis regarding patient being on anticoagulation, per Dr. Davis okay to hold resuming any anticoagulation.  Would like to resume metoprolol  Therefore metoprolol extended release resume at 12.5 mg  Resume midodrine  Discussed with ID, vancomycin dose adjusted to 1250 mg daily on discharge

## 2025-03-14 NOTE — PROGRESS NOTES
1216: Received perfectserve approval from Dr. Gallo for PICC line placement. New Vascular Access process requires approval from a nephrologist if serum cr is elevated, nephrology is consulted or the patient has documented Hx of CKD.

## 2025-03-14 NOTE — PROGRESS NOTES
Comprehensive Nutrition Assessment    Type and Reason for Visit:  Reassess    Nutrition Recommendations/Plan:   Continue current diet and supplements  Please document % meals and supplements consumed in flowsheet I/O's under intake      Malnutrition Assessment:  Malnutrition Status:  At risk for malnutrition (03/06/25 1449)    Context:  Acute Illness     Findings of the 6 clinical characteristics of malnutrition:  Energy Intake:  50% or less of estimated energy requirements for 5 or more days  Weight Loss:  No weight loss (unreliable wt changes 2' fluid shifts)     Body Fat Loss:  Unable to assess     Muscle Mass Loss:  Unable to assess    Fluid Accumulation:  Mild (UTD if nutrition related) Extremities   Strength:  Not Performed    Nutrition Assessment:     Chart reviewed and case discussed during IDR. Pt seen awake in bed but had trouble recalling specific details when answering questions. She could not remember what she ate for breakfast, but per visual on her tray she consumed >50%. She reports drinking the supplements as provided and enjoys all flavors. Potential discharge today pending PICC line for antibiotics. Continue to encourage intake of meals and supplements.     Patient Vitals for the past 120 hrs:   PO Meals Eaten (%)   03/14/25 0928 26 - 50%   03/13/25 0930 1 - 25%   03/12/25 1350 1 - 25%   03/12/25 0900 26 - 50%   03/10/25 1800 0%   03/10/25 1005 0%     Patient Vitals for the past 120 hrs:   PO Supplement (%)   03/10/25 1800 1 - 25%   03/10/25 1005 1 - 25%     Wt Readings from Last 5 Encounters:   03/13/25 97.5 kg (214 lb 15.2 oz)   02/27/25 98.9 kg (218 lb)   01/11/25 111 kg (244 lb 11.4 oz)   01/08/25 104.8 kg (231 lb)   07/30/24 103.4 kg (228 lb)   ]    Nutrition Related Findings:    No BMP today.   Meds: Lipitor, Lantus, Humalog, Culturelle, Lanxoprazole, Thiamine, Vancomycin, Roxicodone.   Edema: +2 generalized & BLE, +1 BUE, perineal & sacral.   BM 3/13.   Wound Type: Multiple (none staged

## 2025-03-14 NOTE — DISCHARGE SUMMARY
Discharge Summary    Name: Rosita Morillo  [id number]  YOB: 1945 (Age: 79 y.o.)   Date of Admission: 2/27/2025  Date of Discharge: 3/14/2025  Attending Physician: Lio Rodriguez MD    Discharge Diagnosis:   Septic shock 2/2 MRSE bacteremia, resolved  MRSE bacteremia  Toxic metabolic encephalopathy -    Acute hypoxic respiratory failure 2/2 RLL infiltrates, pleural effusion, with atelectasis, resolved  RLL infiltrates  Pleural effusion with atelectasis  -A-fib  Sick sinus syndrome s/p dual-chamber Abbott pacemaker  CHF with a EF of 45%-50  Hypophosphatemia result  Hypernatremia, resolved  ESPINOZA, resolved  CKD stage III  Multiple bilateral lower extremity wounds  Left heel wound  Right heel wound  Bilateral toes wounds  DM2  GERD    Constipation  HLD     Consultations:  IP CONSULT TO PHARMACY  IP CONSULT TO INFECTIOUS DISEASES  IP CONSULT TO NEPHROLOGY  IP CONSULT TO NEUROLOGY  IP CONSULT TO PODIATRY  IP CONSULT TO CARDIOLOGY  IP CONSULT TO INTENSIVIST  IP CONSULT TO PHARMACY  IP CONSULT TO VASCULAR ACCESS TEAM  IP CONSULT TO DIETITIAN  IP CONSULT TO PHARMACY  IP CONSULT TO VASCULAR ACCESS TEAM      Brief Admission History/Reason for Admission Per Randy Schilling, DO:   CHIEF COMPLAINT: Transfer-BLE cellulitis, elevated troponin     HISTORY OF PRESENT ILLNESS:     Rosita Morillo is a 79 y.o.  female with PMHx as listed below presenting to [facility] ED as ED-ED transfer from [facility] after presenting with complaints of 2 days progressively worsening swelling, drainage, pain with interval development of erythema and purulent drainage right greater than left leg extending to level of knee with severe (10/10) pain.  Denies any systemic signs of infection (nausea/vomiting, diarrhea/constipation, fever/chills/shakes.  Has history of chronic edema as well as heart failure with moderately reduced ejection fraction for which she takes torsemide noting that this  care for the lower legs / heels:  Cleanse the wounds with Caraklenz spray and wipe with gauze to remove the old drainage and wound debris.  Apply the therahoney gel with gauze to the posterior calf wounds and the heel wounds and cover with high drainage kit dressing and wrap with mehran, tape and date the dressings.  Float the heels    Follow up with:   PCP : Juan Go MD    No follow-up provider specified.        Total time in minutes spent coordinating this discharge (includes going over instructions, follow-up, prescriptions, and preparing report for sign off to her PCP) :  35 minutes

## 2025-03-14 NOTE — OR NURSING
PICC Line Insertion Procedure Note    Procedure: Insertion of #4 FR/18G PICC    Indications:  Long Term IV therapy    Procedure Details   Informed consent was obtained for the procedure, including sedation.  Risks of lung perforation, hemorrhage, and adverse drug reaction were discussed.     #4 FR/18G PICC inserted to the R Basilic vein per hospital protocol.   Blood return:  yes    Findings:  Catheter inserted to 40 cm, with 0 cm. Exposed. Mid upper arm circumference is 40 cm.  There were no changes to vital signs. Catheter was flushed with 20 cc NS. Patient did tolerate procedure well.    Recommendations:  CXR ordered to verify placement.  PICC Brochure given to patient with teaching instruction.    Right arm picc placed for long-term iv abx. Unable to confirm picc with 3cg. Chest xray ordered. Communicated with ingrid Carlson that chest xray needed for confirmation before patient leaves facility.    Upon completion of procedure, all picc kit components accounted for and intact.

## 2025-03-14 NOTE — PROGRESS NOTES
Hospitalist Progress Note    NAME:   Rosita Morillo   : 1945   MRN: [id number]     Date/Time: 3/14/2025 11:43 AM  Patient PCP: Juan Go MD    Estimated discharge date:3/14  Barriers: PICC line placement      Assessment / Plan:    Septic shock 2/2 MRSE bacteremia, resolved  MRSE bacteremia  -- Positive MRSA bacteremia  - Repeat blood cultures ntd    TTE negative for vegetation  -C/W vancomycin per ID recommendations  ID would like to evaluate patient wounds, and decide if we need Zosyn to be continued  Increased risk for hospital delirium  Toxic metabolic encephalopathy likely due to septic shock, acute hypoxic respiratory failure, hypernatremia, resolved  - CT head negative, EEG negative for seizures, neurology is following  - C/W delirium precautions, Melatonin nightly, thiamine daily avoid benzos,  ID recs   Vancomycin 1 g IV daily  end date   -Pull line at end of therapy.    -Weekly CBC, CMP, vancomycin trough-  -Fax reports to [phone], call with critical labs  at [phone]  -Please call if progressive rise in creatinine is noted  -Encourage adequate fluids, daily probiotic/yogurt  -If line malfunction occurs and home health cannot reposition  please send patient to ED immediately  -ID follow-up -no follow-up required  - If persistent side effects occur stop antibiotic and call-ID/PCP     Acute hypoxic respiratory failure 2/2 RLL infiltrates, pleural effusion, with atelectasis, resolved  RLL infiltrates  Pleural effusion with atelectasis  - Required intubation, initially subsequently extubated.  -ICU did bedside ultrasound did not show large fluid pocket, no site for thoracentesis  -C/W vancomycin, on room air    A-fib  - Off of anticoagulation, due to GI bleed, Amio if needed, cardiology is following    Sick sinus syndrome s/p dual-chamber Abbott pacemaker  CHF with a EF of 45%-50  - On Jardiance, outpatient currently on hold due to low blood pressures, will resume when  cultures  YES  Reviewed most current radiology test results   YES  Review and summation of old records today    NO  Reviewed patient's current orders and MAR    YES  PMH/SH reviewed - no change compared to H&P    Procedures: see electronic medical records for all procedures/Xrays and details which were not copied into this note but were reviewed prior to creation of Plan.      LABS:  I reviewed today's most current labs and imaging studies.  Pertinent labs include:  No results for input(s): \"WBC\", \"HGB\", \"HCT\", \"PLT\" in the last 72 hours.    Recent Labs     03/11/25  1535 03/12/25  0329    144   K 4.1 3.9   * 114*   CO2 24 24   GLUCOSE 291* 206*   BUN 19 17   CREATININE 0.80 0.87   CALCIUM 8.5 8.6   PHOS 2.7 2.9       Signed: Lio Rodriguez MD

## 2025-03-14 NOTE — PLAN OF CARE
Problem: Chronic Conditions and Co-morbidities  Goal: Patient's chronic conditions and co-morbidity symptoms are monitored and maintained or improved  Outcome: Not Progressing     Problem: Pain  Goal: Verbalizes/displays adequate comfort level or baseline comfort level  Outcome: Not Progressing     Problem: Neurosensory - Adult  Goal: Achieves stable or improved neurological status  Outcome: Not Progressing  Goal: Achieves maximal functionality and self care  Outcome: Not Progressing     Problem: Musculoskeletal - Adult  Goal: Return mobility to safest level of function  Outcome: Not Progressing  Goal: Maintain proper alignment of affected body part  Outcome: Not Progressing     Problem: Skin/Tissue Integrity  Goal: Skin integrity remains intact  Description: 1.  Monitor for areas of redness and/or skin breakdown  2.  Assess vascular access sites hourly  3.  Every 4-6 hours minimum:  Change oxygen saturation probe site  4.  Every 4-6 hours:  If on nasal continuous positive airway pressure, respiratory therapy assess nares and determine need for appliance change or resting period  Outcome: Progressing     Problem: Safety - Adult  Goal: Free from fall injury  Outcome: Progressing     Problem: ABCDS Injury Assessment  Goal: Absence of physical injury  Outcome: Progressing     Problem: Respiratory - Adult  Goal: Achieves optimal ventilation and oxygenation  Outcome: Progressing     Problem: Discharge Planning  Goal: Discharge to home or other facility with appropriate resources  Outcome: Adequate for Discharge     Problem: Chronic Conditions and Co-morbidities  Goal: Patient's chronic conditions and co-morbidity symptoms are monitored and maintained or improved  Outcome: Not Progressing     Problem: Pain  Goal: Verbalizes/displays adequate comfort level or baseline comfort level  Outcome: Not Progressing     Problem: Neurosensory - Adult  Goal: Achieves stable or improved neurological status  Outcome: Not  Progressing  Goal: Achieves maximal functionality and self care  Outcome: Not Progressing     Problem: Musculoskeletal - Adult  Goal: Return mobility to safest level of function  Outcome: Not Progressing  Goal: Maintain proper alignment of affected body part  Outcome: Not Progressing

## 2025-03-14 NOTE — CARE COORDINATION
CM Note;  Discharge order noted. Patient still needs line for IV ABX.  I have room assignment for [facility] and will try to set up transport in case we can get her there today.  English Ervin IBRAHIM CM [id number]

## 2025-03-14 NOTE — WOUND CARE
Wound care reassessment for the bilateral lower leg wounds / skin and the bilateral heel pressure injuries (POA):  Pt. Was seen by our team on 3-03-25.  Wound care orders were written and patient was transferred out of the ICU to the [facility] unit. The infectious disease physician is also caring for her and she asked us to reassess the wounds / skin today.   Assessment: Pt. Is alert and oriented x 3, talkative and reactionary to all of the wound care.  Everything was explained and the rationale in terms that she could understand. ALEXANDRIA Carreon was great at distracting her most of the time. She asked us several times to stop but understood why we needed to complete what we were doing.   The legs were super flaky with large areas of peeling dry skin. The right leg has a full thickness Calf wound and the Right heel. Both of the wound areas have wet eschar covering the wound beds and there are open, draining wounds that have already been debrided prior to admission so it was continued here during this admission.   Today the legs were Exfoliated with the Debrisoft mits and Dynahex soap (with CHG). The toe scabs were soft enough to remove and the wound beds were dressed once again with the Xeroform. See the photos below:     Photos taken on admission: (not all photos included in this)               Today the legs and feet were cleansed with the Dynahex soap and the mits to exfoliate and clean the skin / wounds well.   The calf wounds and heel wound and the toes were dressed with moist wound care. Orders were written / modified for her current wound care needs / healing:      Left calf:     Right calf:      The calf wounds were dressed today with moist hydrofera Blue, ABD pads and wrapped with mehran.     Left toes raw / red:     Right toes red:      The toes were dressed with Xeroform gauze weaved in between the toe wounds, ABD pads and wrapped with mehran. When the toes are healed, patient will need better fitting shoes to  accommodate her Hammer toes (and use socks).     Left heel w/ progress:     Right heel eschar: Entire heel      The wounds on heels were dressed with Hydrofera Blue dressings today moistened with Vashe and covered with ABD.   Opening stable eschar is not the standard of care but it had already been started by the last facility she was in. We did not do any sharps debridement to the heels but did continue the moist wound care to promote autolytic debridement / healing. Pt. Will need Podiatry care for next steps upon discharge. Moist wound care needs to continue with complete heel off loading / floating.     Legs have raw spots after being cleansed:    After cleansing the anterior shins and dorsal foot surface was dressed with zinc oxide cream in a thin layer.     All dressings dated / timed. Orders are the essentially the same going forward. It will be easier to obtain the xeroform dressings.  After today the anterior legs / shins can just have lotion applied to support skin healing. Podiatry needed for the heels.   Emi Schaeffer RN, BSN, CWON

## 2025-03-14 NOTE — DISCHARGE SUMMARY
I have reviewed discharge instructions with the patient and spouse. The patient and spouse verbalized understanding. Discharge medications reviewed with patient and spouse and appropriate educational materials and side effects teaching were provided. Follow-up appointments reviewed. Opportunity for questions and clarification was provided.  Venous access removed without difficulty.  Patient's belongings gathered and sent with patient. Patient is ready for discharge.     Lauri Gabriel RN    [Well Developed] : well developed [Well Nourished] : well nourished [No Acute Distress] : no acute distress [Normal Conjunctiva] : normal conjunctiva [Normal Venous Pressure] : normal venous pressure [No Carotid Bruit] : no carotid bruit [Normal S1, S2] : normal S1, S2 [No Murmur] : no murmur [No Rub] : no rub [No Gallop] : no gallop [Clear Lung Fields] : clear lung fields [Good Air Entry] : good air entry [No Respiratory Distress] : no respiratory distress  [Soft] : abdomen soft [Non Tender] : non-tender [No Masses/organomegaly] : no masses/organomegaly [Normal Bowel Sounds] : normal bowel sounds [Normal Gait] : normal gait [No Edema] : no edema [No Cyanosis] : no cyanosis [No Clubbing] : no clubbing [No Varicosities] : no varicosities [No Rash] : no rash [No Skin Lesions] : no skin lesions [Moves all extremities] : moves all extremities [No Focal Deficits] : no focal deficits [Normal Speech] : normal speech [Alert and Oriented] : alert and oriented [Normal memory] : normal memory

## 2025-03-14 NOTE — PROGRESS NOTES
Pharmacy Antimicrobial Kinetic Dosing    Indication for Antimicrobials: SSTI/cellulitis of toes, PNA    Current Regimen of Each Antimicrobial:  Vancomycin Pharmacy to Dose, Start 3; Day # 13    Previous Antimicrobial Therapy:  Ceftriaxone 3/3-3/6   Cefepime 3/6  Metronidazole 3/6  Zosyn 3/6-3/9    Goal Level: Vancomycin -600    Date Dose & Interval Measured (mcg/mL) Predicted AUC 24-48 Predicted AUC 24,ss   3/1  500mg q12h 15.1 541 636   3/5  750mg q24h  14.2 427 427   3/10 750mg q24h  9.7 357 352   3/14  1000mg q24h 19.7 336 336     Significant Cultures:    Blood, paired: negative  3/6 Blood, paired: CoNS  bottles, prelim  3/6 Blood PCR: MRSE  3/6 MRSA, nares: negative (obtained after 8 days of vancomycin)   3/6 Respiratory, ET: few yeast  3/6 Blood, paired: pending    Labs:  Recent Labs     Units 25  1240 25  0329 25  1535   CREATININE MG/DL 0.91 0.87 0.80   BUN MG/DL 12 17 19     Temp (24hrs), Av.1 °F (36.7 °C), Min:97.7 °F (36.5 °C), Max:98.7 °F (37.1 °C)    Conditions for Dosing Consideration: None    Creatinine Clearance (mL/min): Estimated Creatinine Clearance: 56 mL/min (based on SCr of 0.91 mg/dL).     Impression/Plan:   Change to vancomycin 750 mg q12h   Predicted URO77-35 = 462, Predicted AUC24,ss = 492  Vanc random level for    BMP and CBC ordered per protocol  Antimicrobial stop date vancomycin extended until      Pharmacy will follow daily and adjust medications as appropriate for renal function and/or serum levels.    Thank you,  Jessica Mead [facility]

## 2025-03-14 NOTE — PROGRESS NOTES
Patient has been complaining of itching, advised MD. MD ordered benadryl q6 prn for itching.     Care on going

## 2025-03-15 LAB
BACTERIA SPEC CULT: ABNORMAL
BACTERIA SPEC CULT: NORMAL
BACTERIA SPEC CULT: NORMAL
CC UR VC: ABNORMAL
SERVICE CMNT-IMP: ABNORMAL
SERVICE CMNT-IMP: NORMAL
SERVICE CMNT-IMP: NORMAL

## 2025-03-16 LAB
EKG DIAGNOSIS: NORMAL
EKG Q-T INTERVAL: 300 MS
EKG QRS DURATION: 66 MS
EKG QTC CALCULATION (BAZETT): 438 MS
EKG R AXIS: 81 DEGREES
EKG T AXIS: 165 DEGREES
EKG VENTRICULAR RATE: 128 BPM

## 2025-03-24 ENCOUNTER — TRANSCRIBE ORDERS (OUTPATIENT)
Facility: HOSPITAL | Age: 80
End: 2025-03-24

## 2025-03-24 DIAGNOSIS — R78.81 BACTEREMIA: Primary | ICD-10-CM

## 2025-03-25 ENCOUNTER — HOSPITAL ENCOUNTER (OUTPATIENT)
Facility: HOSPITAL | Age: 80
Discharge: HOME | End: 2025-03-28
Payer: OTHER GOVERNMENT

## 2025-03-25 VITALS
TEMPERATURE: 97.5 F | HEART RATE: 95 BPM | OXYGEN SATURATION: 96 % | DIASTOLIC BLOOD PRESSURE: 85 MMHG | RESPIRATION RATE: 24 BRPM | SYSTOLIC BLOOD PRESSURE: 150 MMHG

## 2025-03-25 DIAGNOSIS — R78.81 BACTEREMIA: ICD-10-CM

## 2025-03-25 PROCEDURE — 6360000002 HC RX W HCPCS: Performed by: STUDENT IN AN ORGANIZED HEALTH CARE EDUCATION/TRAINING PROGRAM

## 2025-03-25 PROCEDURE — 2709999900 IR PICC WO SQ PORT/PUMP > 5 YEARS

## 2025-03-25 RX ORDER — LIDOCAINE HYDROCHLORIDE 20 MG/ML
20 INJECTION, SOLUTION INFILTRATION; PERINEURAL ONCE
Status: COMPLETED | OUTPATIENT
Start: 2025-03-25 | End: 2025-03-25

## 2025-03-25 RX ORDER — HEPARIN SODIUM 200 [USP'U]/100ML
200 INJECTION, SOLUTION INTRAVENOUS ONCE
Status: COMPLETED | OUTPATIENT
Start: 2025-03-25 | End: 2025-03-25

## 2025-03-25 RX ADMIN — LIDOCAINE HYDROCHLORIDE 5 ML: 20 INJECTION, SOLUTION INFILTRATION; PERINEURAL at 11:44

## 2025-03-25 RX ADMIN — HEPARIN SODIUM IN SODIUM CHLORIDE 60 UNITS: 200 INJECTION INTRAVENOUS at 11:44

## 2025-03-25 ASSESSMENT — PAIN - FUNCTIONAL ASSESSMENT
PAIN_FUNCTIONAL_ASSESSMENT: PREVENTS OR INTERFERES WITH ALL ACTIVE AND SOME PASSIVE ACTIVITIES
PAIN_FUNCTIONAL_ASSESSMENT: 0-10

## 2025-03-25 ASSESSMENT — PAIN DESCRIPTION - DESCRIPTORS: DESCRIPTORS: ACHING;SORE

## 2025-03-25 NOTE — PROGRESS NOTES
states her blood surgar this mornng was 102.  Pt noted hard of hearing .  Pt awake, alert noted with bilat legs wrapped with ace wrap and  notes open areas under bandages.  Pt noted with dominguez catheter with dk yellow urine noted in collection bag.

## 2025-03-25 NOTE — DISCHARGE INSTRUCTIONS
Follow-up visit information:   Call your primary doctor after discharge for a follow-up appointment if you don’t already have one. Follow up with Interventional Radiology is not routinely necessary.     Occasionally, a situation will require prompt attention and an emergency room visit is necessary:    Chest pain    Pain, swelling, redness or drainage of pus around the PICC site    Swelling of the neck, face or arm on the side where the PICC was inserted    Shortness of breath or dizziness     If you have any questions or concerns, please call [phone] and ask for the nurse on-call.

## 2025-03-25 NOTE — PROGRESS NOTES
Name of Procedure: PICC placement     Procedure Tolerated: Well     Vital Signs:  Stable     Fluids Removed: NA     Samples sent to lab: NA     Any complications related to procedure: none identified at this time     Patient is A&Ox4, on RA, and is in NAD at this time.

## 2025-03-26 ENCOUNTER — TELEPHONE (OUTPATIENT)
Age: 80
End: 2025-03-26

## 2025-03-26 NOTE — TELEPHONE ENCOUNTER
Debbi from [facility] called    Dr Sherman wanted Debbi director of nursing to speak to Dr Angel in reference to an antibiotic     Patient was admitted on 3/14/25  since then she has removed 2 pic lines herself and multiple IV's     Can you change order to oral antibiotics     Please call Debbi      [phone]

## 2025-03-26 NOTE — TELEPHONE ENCOUNTER
[facility],  [place] her nurse called to say patient pulled out PICC last week. Replaced yesterday and pulled out again today. Want to know if patient can take PO therapy. [phone] fax [phone]

## 2025-03-26 NOTE — TELEPHONE ENCOUNTER
ID  D/w Dr Carlos Chapin by phone.  Patient has pulled out her line multiple times.  He would like to switch her to oral therapy  Advised that it would be beneficial if patient can be approved for dalbavancin IV.  If that is not an option  alternate antibiotic would be linezolid twice daily.  Check with pharmacist that there are no dangerous drug interactions, pt should not be on SSRIs.  Weekly CBC needs to be done.  If linezolid cannot be used doxycycline p.o. would be the next choice.

## 2025-03-26 NOTE — TELEPHONE ENCOUNTER
Spoke to Debbi Director of Nursing via telephone call. She has talked with the doctor at the facility already and she said thank you for calling.

## 2025-04-02 ENCOUNTER — HOSPITAL ENCOUNTER (INPATIENT)
Facility: HOSPITAL | Age: 80
LOS: 6 days | Discharge: NURSING, REHAB OR LONG-TERM CARE | DRG: 637 | End: 2025-04-08
Attending: STUDENT IN AN ORGANIZED HEALTH CARE EDUCATION/TRAINING PROGRAM | Admitting: STUDENT IN AN ORGANIZED HEALTH CARE EDUCATION/TRAINING PROGRAM
Payer: MEDICARE

## 2025-04-02 PROCEDURE — 1100000000 HC RM PRIVATE

## 2025-04-03 PROBLEM — B95.62 BACTEREMIA DUE TO METHICILLIN RESISTANT STAPHYLOCOCCUS AUREUS: Status: ACTIVE | Noted: 2025-03-12

## 2025-04-03 PROBLEM — I48.21 PERMANENT ATRIAL FIBRILLATION (HCC): Status: ACTIVE | Noted: 2025-04-03

## 2025-04-03 PROBLEM — L89.92 PRESSURE INJURY, STAGE 2 (HCC): Status: ACTIVE | Noted: 2025-04-03

## 2025-04-03 PROBLEM — T14.8XXA MULTIPLE WOUNDS OF SKIN: Status: ACTIVE | Noted: 2025-04-03

## 2025-04-03 PROBLEM — I50.9: Status: ACTIVE | Noted: 2025-01-11

## 2025-04-03 PROBLEM — Z95.0: Status: ACTIVE | Noted: 2025-01-11

## 2025-04-03 PROBLEM — N17.9 AKI (ACUTE KIDNEY INJURY): Status: ACTIVE | Noted: 2025-04-03

## 2025-04-03 PROBLEM — E66.9 OBESITY (BMI 30-39.9): Status: ACTIVE | Noted: 2025-04-03

## 2025-04-03 LAB
ALBUMIN SERPL-MCNC: 2.6 G/DL (ref 3.5–5)
ALBUMIN/GLOB SERPL: 0.5 (ref 1.1–2.2)
ALP SERPL-CCNC: 186 U/L (ref 45–117)
ALT SERPL-CCNC: 20 U/L (ref 12–78)
ANION GAP SERPL CALC-SCNC: 14 MMOL/L (ref 2–12)
APPEARANCE UR: ABNORMAL
AST SERPL-CCNC: 21 U/L (ref 15–37)
BACTERIA URNS QL MICRO: ABNORMAL /HPF
BASOPHILS # BLD: 0.04 K/UL (ref 0–0.1)
BASOPHILS NFR BLD: 0.5 % (ref 0–1)
BILIRUB SERPL-MCNC: 0.8 MG/DL (ref 0.2–1)
BILIRUB UR QL: NEGATIVE
BUN SERPL-MCNC: 23 MG/DL (ref 6–20)
BUN/CREAT SERPL: 18 (ref 12–20)
CALCIUM SERPL-MCNC: 9.3 MG/DL (ref 8.5–10.1)
CHLORIDE SERPL-SCNC: 108 MMOL/L (ref 97–108)
CO2 SERPL-SCNC: 22 MMOL/L (ref 21–32)
COLOR UR: ABNORMAL
CREAT SERPL-MCNC: 1.29 MG/DL (ref 0.55–1.02)
DIFFERENTIAL METHOD BLD: ABNORMAL
EOSINOPHIL # BLD: 0.03 K/UL (ref 0–0.4)
EOSINOPHIL NFR BLD: 0.4 % (ref 0–7)
EPITH CASTS URNS QL MICRO: ABNORMAL /LPF
ERYTHROCYTE [DISTWIDTH] IN BLOOD BY AUTOMATED COUNT: 24.6 % (ref 11.5–14.5)
GLOBULIN SER CALC-MCNC: 5.4 G/DL (ref 2–4)
GLUCOSE BLD STRIP.AUTO-MCNC: 190 MG/DL (ref 65–117)
GLUCOSE BLD STRIP.AUTO-MCNC: 217 MG/DL (ref 65–117)
GLUCOSE BLD STRIP.AUTO-MCNC: 231 MG/DL (ref 65–117)
GLUCOSE BLD STRIP.AUTO-MCNC: 234 MG/DL (ref 65–117)
GLUCOSE BLD STRIP.AUTO-MCNC: 244 MG/DL (ref 65–117)
GLUCOSE BLD STRIP.AUTO-MCNC: 251 MG/DL (ref 65–117)
GLUCOSE SERPL-MCNC: 256 MG/DL (ref 65–100)
GLUCOSE UR STRIP.AUTO-MCNC: >1000 MG/DL
HCT VFR BLD AUTO: 41.5 % (ref 35–47)
HGB BLD-MCNC: 12.8 G/DL (ref 11.5–16)
HGB UR QL STRIP: ABNORMAL
IMM GRANULOCYTES # BLD AUTO: 0.06 K/UL (ref 0–0.04)
IMM GRANULOCYTES NFR BLD AUTO: 0.8 % (ref 0–0.5)
KETONES UR QL STRIP.AUTO: >80 MG/DL
LEUKOCYTE ESTERASE UR QL STRIP.AUTO: ABNORMAL
LYMPHOCYTES # BLD: 0.95 K/UL (ref 0.8–3.5)
LYMPHOCYTES NFR BLD: 12.5 % (ref 12–49)
MCH RBC QN AUTO: 24.9 PG (ref 26–34)
MCHC RBC AUTO-ENTMCNC: 30.8 G/DL (ref 30–36.5)
MCV RBC AUTO: 80.7 FL (ref 80–99)
MONOCYTES # BLD: 0.5 K/UL (ref 0–1)
MONOCYTES NFR BLD: 6.6 % (ref 5–13)
NEUTS SEG # BLD: 6.02 K/UL (ref 1.8–8)
NEUTS SEG NFR BLD: 79.2 % (ref 32–75)
NITRITE UR QL STRIP.AUTO: NEGATIVE
NRBC # BLD: 0 K/UL (ref 0–0.01)
NRBC BLD-RTO: 0 PER 100 WBC
PH UR STRIP: 5.5 (ref 5–8)
PLATELET # BLD AUTO: 208 K/UL (ref 150–400)
PMV BLD AUTO: 10 FL (ref 8.9–12.9)
POTASSIUM SERPL-SCNC: 3.8 MMOL/L (ref 3.5–5.1)
PROT SERPL-MCNC: 8 G/DL (ref 6.4–8.2)
PROT UR STRIP-MCNC: 30 MG/DL
RBC # BLD AUTO: 5.14 M/UL (ref 3.8–5.2)
RBC #/AREA URNS HPF: ABNORMAL /HPF (ref 0–5)
RBC MORPH BLD: ABNORMAL
SERVICE CMNT-IMP: ABNORMAL
SODIUM SERPL-SCNC: 144 MMOL/L (ref 136–145)
SP GR UR REFRACTOMETRY: 1.03
URINE CULTURE IF INDICATED: ABNORMAL
UROBILINOGEN UR QL STRIP.AUTO: 1 EU/DL (ref 0.2–1)
WBC # BLD AUTO: 7.6 K/UL (ref 3.6–11)
WBC URNS QL MICRO: ABNORMAL /HPF (ref 0–4)

## 2025-04-03 PROCEDURE — 82962 GLUCOSE BLOOD TEST: CPT

## 2025-04-03 PROCEDURE — 97535 SELF CARE MNGMENT TRAINING: CPT

## 2025-04-03 PROCEDURE — 6370000000 HC RX 637 (ALT 250 FOR IP): Performed by: STUDENT IN AN ORGANIZED HEALTH CARE EDUCATION/TRAINING PROGRAM

## 2025-04-03 PROCEDURE — 81001 URINALYSIS AUTO W/SCOPE: CPT

## 2025-04-03 PROCEDURE — 99223 1ST HOSP IP/OBS HIGH 75: CPT | Performed by: INTERNAL MEDICINE

## 2025-04-03 PROCEDURE — 2500000003 HC RX 250 WO HCPCS: Performed by: STUDENT IN AN ORGANIZED HEALTH CARE EDUCATION/TRAINING PROGRAM

## 2025-04-03 PROCEDURE — 1100000000 HC RM PRIVATE

## 2025-04-03 PROCEDURE — 80053 COMPREHEN METABOLIC PANEL: CPT

## 2025-04-03 PROCEDURE — 87086 URINE CULTURE/COLONY COUNT: CPT

## 2025-04-03 PROCEDURE — 36415 COLL VENOUS BLD VENIPUNCTURE: CPT

## 2025-04-03 PROCEDURE — 6360000002 HC RX W HCPCS: Performed by: STUDENT IN AN ORGANIZED HEALTH CARE EDUCATION/TRAINING PROGRAM

## 2025-04-03 PROCEDURE — 92610 EVALUATE SWALLOWING FUNCTION: CPT

## 2025-04-03 PROCEDURE — 97166 OT EVAL MOD COMPLEX 45 MIN: CPT

## 2025-04-03 PROCEDURE — 6370000000 HC RX 637 (ALT 250 FOR IP)

## 2025-04-03 PROCEDURE — 85025 COMPLETE CBC W/AUTO DIFF WBC: CPT

## 2025-04-03 PROCEDURE — 87040 BLOOD CULTURE FOR BACTERIA: CPT

## 2025-04-03 RX ORDER — VANCOMYCIN 1.75 G/350ML
1250 INJECTION, SOLUTION INTRAVENOUS EVERY 24 HOURS
Status: DISCONTINUED | OUTPATIENT
Start: 2025-04-04 | End: 2025-04-03

## 2025-04-03 RX ORDER — DEXTROSE MONOHYDRATE 100 MG/ML
INJECTION, SOLUTION INTRAVENOUS CONTINUOUS PRN
Status: DISCONTINUED | OUTPATIENT
Start: 2025-04-03 | End: 2025-04-08 | Stop reason: HOSPADM

## 2025-04-03 RX ORDER — INSULIN GLARGINE 100 [IU]/ML
10 INJECTION, SOLUTION SUBCUTANEOUS 2 TIMES DAILY
Status: DISCONTINUED | OUTPATIENT
Start: 2025-04-03 | End: 2025-04-08 | Stop reason: HOSPADM

## 2025-04-03 RX ORDER — ONDANSETRON 2 MG/ML
4 INJECTION INTRAMUSCULAR; INTRAVENOUS EVERY 6 HOURS PRN
Status: DISCONTINUED | OUTPATIENT
Start: 2025-04-03 | End: 2025-04-08 | Stop reason: HOSPADM

## 2025-04-03 RX ORDER — METOPROLOL SUCCINATE 25 MG/1
12.5 TABLET, EXTENDED RELEASE ORAL DAILY
Status: DISCONTINUED | OUTPATIENT
Start: 2025-04-03 | End: 2025-04-08 | Stop reason: HOSPADM

## 2025-04-03 RX ORDER — GAUZE BANDAGE 2" X 2"
100 BANDAGE TOPICAL DAILY
Status: DISCONTINUED | OUTPATIENT
Start: 2025-04-03 | End: 2025-04-08 | Stop reason: HOSPADM

## 2025-04-03 RX ORDER — CETIRIZINE HYDROCHLORIDE 10 MG/1
10 TABLET ORAL DAILY
Status: DISCONTINUED | OUTPATIENT
Start: 2025-04-03 | End: 2025-04-08 | Stop reason: HOSPADM

## 2025-04-03 RX ORDER — MIDODRINE HYDROCHLORIDE 5 MG/1
2.5 TABLET ORAL 3 TIMES DAILY
Status: DISCONTINUED | OUTPATIENT
Start: 2025-04-03 | End: 2025-04-08 | Stop reason: HOSPADM

## 2025-04-03 RX ORDER — PANTOPRAZOLE SODIUM 40 MG/1
40 TABLET, DELAYED RELEASE ORAL 2 TIMES DAILY
Status: DISCONTINUED | OUTPATIENT
Start: 2025-04-03 | End: 2025-04-08 | Stop reason: HOSPADM

## 2025-04-03 RX ORDER — INSULIN LISPRO 100 [IU]/ML
0-4 INJECTION, SOLUTION INTRAVENOUS; SUBCUTANEOUS
Status: DISCONTINUED | OUTPATIENT
Start: 2025-04-03 | End: 2025-04-08 | Stop reason: HOSPADM

## 2025-04-03 RX ORDER — SODIUM CHLORIDE 0.9 % (FLUSH) 0.9 %
5-40 SYRINGE (ML) INJECTION EVERY 12 HOURS SCHEDULED
Status: DISCONTINUED | OUTPATIENT
Start: 2025-04-03 | End: 2025-04-08 | Stop reason: HOSPADM

## 2025-04-03 RX ORDER — METOPROLOL TARTRATE 1 MG/ML
2.5 INJECTION, SOLUTION INTRAVENOUS EVERY 6 HOURS PRN
Status: DISCONTINUED | OUTPATIENT
Start: 2025-04-03 | End: 2025-04-08 | Stop reason: HOSPADM

## 2025-04-03 RX ORDER — ENOXAPARIN SODIUM 100 MG/ML
40 INJECTION SUBCUTANEOUS DAILY
Status: DISCONTINUED | OUTPATIENT
Start: 2025-04-03 | End: 2025-04-08 | Stop reason: HOSPADM

## 2025-04-03 RX ORDER — BIOTIN 10 MG
10 TABLET ORAL DAILY
Status: DISCONTINUED | OUTPATIENT
Start: 2025-04-03 | End: 2025-04-03

## 2025-04-03 RX ORDER — ACETAMINOPHEN 650 MG/1
650 SUPPOSITORY RECTAL EVERY 6 HOURS PRN
Status: DISCONTINUED | OUTPATIENT
Start: 2025-04-03 | End: 2025-04-08 | Stop reason: HOSPADM

## 2025-04-03 RX ORDER — GLUCAGON 1 MG/ML
1 KIT INJECTION PRN
Status: DISCONTINUED | OUTPATIENT
Start: 2025-04-03 | End: 2025-04-08 | Stop reason: HOSPADM

## 2025-04-03 RX ORDER — ATORVASTATIN CALCIUM 10 MG/1
10 TABLET, FILM COATED ORAL DAILY
Status: DISCONTINUED | OUTPATIENT
Start: 2025-04-03 | End: 2025-04-08 | Stop reason: HOSPADM

## 2025-04-03 RX ORDER — SODIUM CHLORIDE 0.9 % (FLUSH) 0.9 %
5-40 SYRINGE (ML) INJECTION PRN
Status: DISCONTINUED | OUTPATIENT
Start: 2025-04-03 | End: 2025-04-08 | Stop reason: HOSPADM

## 2025-04-03 RX ORDER — SODIUM CHLORIDE 9 MG/ML
INJECTION, SOLUTION INTRAVENOUS PRN
Status: DISCONTINUED | OUTPATIENT
Start: 2025-04-03 | End: 2025-04-08 | Stop reason: HOSPADM

## 2025-04-03 RX ORDER — GLUCAGON 1 MG/ML
1 KIT INJECTION PRN
Status: DISCONTINUED | OUTPATIENT
Start: 2025-04-03 | End: 2025-04-06 | Stop reason: SDUPTHER

## 2025-04-03 RX ORDER — TIMOLOL MALEATE 2.5 MG/ML
1 SOLUTION/ DROPS OPHTHALMIC 2 TIMES DAILY
Status: DISCONTINUED | OUTPATIENT
Start: 2025-04-03 | End: 2025-04-08 | Stop reason: HOSPADM

## 2025-04-03 RX ORDER — VANCOMYCIN 1.75 G/350ML
1250 INJECTION, SOLUTION INTRAVENOUS EVERY 24 HOURS
Status: DISCONTINUED | OUTPATIENT
Start: 2025-04-04 | End: 2025-04-06

## 2025-04-03 RX ORDER — OLANZAPINE 5 MG/1
2.5 TABLET ORAL NIGHTLY
Status: DISCONTINUED | OUTPATIENT
Start: 2025-04-03 | End: 2025-04-04

## 2025-04-03 RX ORDER — ACETAMINOPHEN 325 MG/1
650 TABLET ORAL EVERY 6 HOURS PRN
Status: DISCONTINUED | OUTPATIENT
Start: 2025-04-03 | End: 2025-04-08 | Stop reason: HOSPADM

## 2025-04-03 RX ORDER — LACTOBACILLUS RHAMNOSUS GG 10B CELL
1 CAPSULE ORAL
Status: DISCONTINUED | OUTPATIENT
Start: 2025-04-03 | End: 2025-04-08 | Stop reason: HOSPADM

## 2025-04-03 RX ORDER — TORSEMIDE 20 MG/1
5 TABLET ORAL DAILY
Status: DISCONTINUED | OUTPATIENT
Start: 2025-04-03 | End: 2025-04-08 | Stop reason: HOSPADM

## 2025-04-03 RX ORDER — DEXTROSE MONOHYDRATE 100 MG/ML
INJECTION, SOLUTION INTRAVENOUS CONTINUOUS PRN
Status: DISCONTINUED | OUTPATIENT
Start: 2025-04-03 | End: 2025-04-06 | Stop reason: SDUPTHER

## 2025-04-03 RX ADMIN — INSULIN LISPRO 1 UNITS: 100 INJECTION, SOLUTION INTRAVENOUS; SUBCUTANEOUS at 20:53

## 2025-04-03 RX ADMIN — INSULIN LISPRO 2 UNITS: 100 INJECTION, SOLUTION INTRAVENOUS; SUBCUTANEOUS at 18:41

## 2025-04-03 RX ADMIN — SODIUM CHLORIDE, PRESERVATIVE FREE 10 ML: 5 INJECTION INTRAVENOUS at 09:52

## 2025-04-03 RX ADMIN — PANTOPRAZOLE SODIUM 40 MG: 40 TABLET, DELAYED RELEASE ORAL at 20:34

## 2025-04-03 RX ADMIN — OLANZAPINE 2.5 MG: 5 TABLET, FILM COATED ORAL at 20:55

## 2025-04-03 RX ADMIN — SODIUM CHLORIDE, PRESERVATIVE FREE 10 ML: 5 INJECTION INTRAVENOUS at 20:53

## 2025-04-03 RX ADMIN — INSULIN LISPRO 1 UNITS: 100 INJECTION, SOLUTION INTRAVENOUS; SUBCUTANEOUS at 12:20

## 2025-04-03 RX ADMIN — TIMOLOL MALEATE 1 DROP: 2.5 SOLUTION OPHTHALMIC at 20:34

## 2025-04-03 RX ADMIN — Medication 3 MG: at 20:34

## 2025-04-03 RX ADMIN — WATER 5 MG: 1 INJECTION INTRAMUSCULAR; INTRAVENOUS; SUBCUTANEOUS at 02:27

## 2025-04-03 RX ADMIN — INSULIN GLARGINE 10 UNITS: 100 INJECTION, SOLUTION SUBCUTANEOUS at 20:36

## 2025-04-03 RX ADMIN — ACETAMINOPHEN 650 MG: 325 TABLET ORAL at 18:44

## 2025-04-03 RX ADMIN — INSULIN LISPRO 1 UNITS: 100 INJECTION, SOLUTION INTRAVENOUS; SUBCUTANEOUS at 02:28

## 2025-04-03 NOTE — CARE COORDINATION
Care Management Initial Assessment       RUR: 23% High Risk  Readmission? Yes   1st IM letter given? Yes   1st  letter given: No     Initial Assessment: Chart reviewed. CM spoke with pts spouse over the phone to introduce self and role. Verified contact information and demographics. Pt resides with spouse in a one level home. Pt PCP is Dr. Go. Preferred pharmacy is [facility]  in [place], [place]. Pt was recently admitted to [facility] and discharge to [facility] for SNF intervention. Pt recently needs assistance with ADL's and uses a an electric wheelchair at home. Pts spouse stated prior to the month of March pt was independent with ADL's. Pt is not an active . ?Pts has no transportation and will need BLS for discharge.?  Pts spouse stated if he feels upon discharge he can manage pt at home he would like to take her home however he would be agreeable to pt going to a SNF if recommended not [facility]. FOC list provided at the bedside. CM will continue to follow.    Plan A: SNF  Plan B: Home with HH  Full assessment below:          04/03/25 1312   Service Assessment   Patient Orientation Other (see comment)  (CM completed intial assessment with pts spouse)   Cognition Other (see comment)  (CM completed intial assessment with pts spouse)   History Provided By Spouse   Primary Caregiver Spouse   Support Systems Family Members;Spouse/Significant Other   Patient's Healthcare Decision Maker is: Legal Next of Kin   PCP Verified by CM Yes   Last Visit to PCP Within last 6 months   Prior Functional Level Independent in ADLs/IADLs   Current Functional Level Assistance with the following:   Can patient return to prior living arrangement Unknown at present   Ability to make needs known: Good   Family able to assist with home care needs: Yes   Would you like for me to discuss the discharge plan with any other family members/significant others, and if so, who? Yes  (Nhan Morillo (Spouse)  [phone] (Mobile))

## 2025-04-03 NOTE — WOUND CARE
Wound care consult for the lower legs:  Chart reviewed and patient assessed. Pt. Received a bilateral lower leg debridement last time she was here to exfoliate the old dead skin. Moist wound care was ordered for the wounds. She went to rehab and did not receive the antibiotic that she was supposed to get due to a PICC problem (?).   Assessment:  The legs are looking much better today then they did last time she was here about 10 days ago. The right leg has multiple scabs where the partial thickness scabs were last time. The open wounds on the backs of each calf need a dressing and a good cleansing.       The Right heel with eachar- healing under  It. Need to keep it dry with Betadine swab. This is an unstageable pressure injury.   Instructed the patient's son on the care of the legs and the heels as well.     Left heel pressure injury - unstageable with black eschar. Part of it is pink and may need a dressing. Betadine dry dressing would be ok to use on this wound.       Stage 2 pressure injury along with some friction wounds on the buttocks medially. Need to off load as often as possible with significant turns. Zinc oxide to treat the skin and leave open to air. Pt. Is incontinent.     Treatment deferred to nursing due to the fact that patient needs pain meds prior to her wound care. The wound care is simple and the supplies was left in the room with the appropriate dressings.     Plan: keep the heels floated at all times. Daily cleanse the legs and feet with the Dynahex soap and water - including the toes / in between the toes. Dry the skin and dress with the Therahoney - only the open wounds on the back of the calfs. Cover with the high drainage pack and wrap with mehran, tape and DATE the dressing. Float the heels.   Emi Schaeffer, RN, BSN, CWON

## 2025-04-03 NOTE — H&P
times daily    Provider, MD Rachel   atorvastatin (LIPITOR) 10 MG tablet Take 1 tablet by mouth daily    Automatic Reconciliation, Ar         Objective:   VITALS:    Patient Vitals for the past 24 hrs:   BP Temp Temp src Pulse SpO2   25 2327 (!) 146/74 97.7 °F (36.5 °C) Axillary (!) 116 98 %       Temp (24hrs), Av.7 °F (36.5 °C), Min:97.7 °F (36.5 °C), Max:97.7 °F (36.5 °C)        O2 Device: None (Room air)    Wt Readings from Last 12 Encounters:   25 97.5 kg (214 lb 15.2 oz)   25 98.9 kg (218 lb)   25 111 kg (244 lb 11.4 oz)   25 104.8 kg (231 lb)   24 103.4 kg (228 lb)   24 99.8 kg (220 lb)   24 105.7 kg (233 lb)   24 105.7 kg (233 lb)   23 96.2 kg (212 lb)   23 99.8 kg (220 lb)   22 101.6 kg (224 lb)   22 103.4 kg (228 lb)         PHYSICAL EXAM:  Physical Exam  Constitutional:       Comments: Patient is alert however confused, intermittently agitated    HENT:      Mouth/Throat:      Mouth: Mucous membranes are moist.   Cardiovascular:      Rate and Rhythm: Normal rate and regular rhythm.      Pulses: Normal pulses.      Heart sounds: No murmur heard.     No friction rub. No gallop.   Pulmonary:      Effort: Pulmonary effort is normal.      Breath sounds: Normal breath sounds. No wheezing or rhonchi.   Abdominal:      General: Abdomen is flat. There is no distension.      Palpations: Abdomen is soft.   Musculoskeletal:         General: Normal range of motion.   Skin:     Comments: Scattered wounds and excoriations to bilateral lower extremities  R > L erythema with increased warmth  1+ BLE pitting edema present  Scattered healed ulcerations with eschar present to BLE    Neurological:      Mental Status: She is alert.             LAB DATA REVIEWED:    No results found for this or any previous visit (from the past 12 hours).      CT head wo IV contrast  Result Date: 2025  STUDY: CT OF THE HEAD WITHOUT IV CONTRAST HISTORY:

## 2025-04-03 NOTE — CONSULTS
effusion with cardiac pacer. Electronically signed by ARJUN ALTMAN    XR CHEST PORTABLE  Result Date: 3/5/2025  EXAM:  XR CHEST PORTABLE INDICATION:   h/o CHF , coarse breath sounds on IVF COMPARISON: 2/27/2025 radiograph. TECHNIQUE: Portable frontal view radiograph of the chest was acquired. FINDINGS: Left anterior chest wall Implantable cardiac pacemaker partially obscures the left upper lung with leads projecting over the right right atrium and ventricle, similar to prior study. Lungs/Pleura: No obvious pneumothorax. At least mild to moderate pulmonary edema with scattered peribronchial cuffing and right lower lung hazy opacities, new since prior study. Obscured right costophrenic angles may suggest pleural effusion. Mediastinum: Cardiomegaly with right atrial enlargement again noted. Mediastinal silhouette is otherwise unremarkable. Upper abdomen/soft tissue: Unremarkable. MSK: No acute osseous abnormalities.     At least mild to moderate pulmonary edema. Probable right pleural effusion versus basilar atelectasis. Right lower lung consolidation not completely excluded. Electronically signed by GRAYSON SAMAYOA    CT HEAD WO CONTRAST  Result Date: 3/4/2025  EXAM: CT HEAD WO CONTRAST INDICATION:altered mental status. TECHNIQUE: Helical CT of the head was obtained without intravenous contrast. Axial, coronal and sagittal images were created. Dose reduction technique was used including one or more of the following: automated exposure control, adjustment of mA and kV according to patient size, and/or iterative reconstruction. COMPARISON: April 2023 Suboptimal technique degrades exam quality. FINDINGS: Quality: Average.  No unusual artifacts beyond the limitations of routine CT. INTRACRANIAL: Hemorrhage: No obvious acute hemorrhage. Mass effect: None. Brain parenchyma: Gray-white matter differentiation is grossly maintained. White matter hypoattenuation although nonspecific can be seen with chronic microvascular ischemic

## 2025-04-04 LAB
ALBUMIN SERPL-MCNC: 2.4 G/DL (ref 3.5–5)
ALBUMIN/GLOB SERPL: 0.5 (ref 1.1–2.2)
ALP SERPL-CCNC: 186 U/L (ref 45–117)
ALT SERPL-CCNC: 23 U/L (ref 12–78)
ANION GAP SERPL CALC-SCNC: 7 MMOL/L (ref 2–12)
AST SERPL-CCNC: 30 U/L (ref 15–37)
BACTERIA SPEC CULT: NORMAL
BASOPHILS # BLD: 0.07 K/UL (ref 0–0.1)
BASOPHILS NFR BLD: 1.1 % (ref 0–1)
BILIRUB SERPL-MCNC: 0.6 MG/DL (ref 0.2–1)
BUN SERPL-MCNC: 19 MG/DL (ref 6–20)
BUN/CREAT SERPL: 17 (ref 12–20)
CALCIUM SERPL-MCNC: 9 MG/DL (ref 8.5–10.1)
CHLORIDE SERPL-SCNC: 110 MMOL/L (ref 97–108)
CO2 SERPL-SCNC: 29 MMOL/L (ref 21–32)
CREAT SERPL-MCNC: 1.09 MG/DL (ref 0.55–1.02)
DIFFERENTIAL METHOD BLD: ABNORMAL
EOSINOPHIL # BLD: 0.14 K/UL (ref 0–0.4)
EOSINOPHIL NFR BLD: 2.2 % (ref 0–7)
ERYTHROCYTE [DISTWIDTH] IN BLOOD BY AUTOMATED COUNT: 25.1 % (ref 11.5–14.5)
GLOBULIN SER CALC-MCNC: 4.9 G/DL (ref 2–4)
GLUCOSE BLD STRIP.AUTO-MCNC: 136 MG/DL (ref 65–117)
GLUCOSE BLD STRIP.AUTO-MCNC: 167 MG/DL (ref 65–117)
GLUCOSE BLD STRIP.AUTO-MCNC: 169 MG/DL (ref 65–117)
GLUCOSE BLD STRIP.AUTO-MCNC: 184 MG/DL (ref 65–117)
GLUCOSE SERPL-MCNC: 135 MG/DL (ref 65–100)
HCT VFR BLD AUTO: 42.5 % (ref 35–47)
HGB BLD-MCNC: 13.1 G/DL (ref 11.5–16)
IMM GRANULOCYTES # BLD AUTO: 0.07 K/UL (ref 0–0.04)
IMM GRANULOCYTES NFR BLD AUTO: 1.1 % (ref 0–0.5)
LYMPHOCYTES # BLD: 1.53 K/UL (ref 0.8–3.5)
LYMPHOCYTES NFR BLD: 23.9 % (ref 12–49)
MAGNESIUM SERPL-MCNC: 1.9 MG/DL (ref 1.6–2.4)
MCH RBC QN AUTO: 25 PG (ref 26–34)
MCHC RBC AUTO-ENTMCNC: 30.8 G/DL (ref 30–36.5)
MCV RBC AUTO: 81.1 FL (ref 80–99)
MONOCYTES # BLD: 0.79 K/UL (ref 0–1)
MONOCYTES NFR BLD: 12.3 % (ref 5–13)
NEUTS SEG # BLD: 3.8 K/UL (ref 1.8–8)
NEUTS SEG NFR BLD: 59.4 % (ref 32–75)
NRBC # BLD: 0 K/UL (ref 0–0.01)
NRBC BLD-RTO: 0 PER 100 WBC
PLATELET # BLD AUTO: 157 K/UL (ref 150–400)
POTASSIUM SERPL-SCNC: 3.2 MMOL/L (ref 3.5–5.1)
PROT SERPL-MCNC: 7.3 G/DL (ref 6.4–8.2)
RBC # BLD AUTO: 5.24 M/UL (ref 3.8–5.2)
RBC MORPH BLD: ABNORMAL
SERVICE CMNT-IMP: ABNORMAL
SERVICE CMNT-IMP: NORMAL
SODIUM SERPL-SCNC: 146 MMOL/L (ref 136–145)
WBC # BLD AUTO: 6.4 K/UL (ref 3.6–11)

## 2025-04-04 PROCEDURE — 36415 COLL VENOUS BLD VENIPUNCTURE: CPT

## 2025-04-04 PROCEDURE — 85025 COMPLETE CBC W/AUTO DIFF WBC: CPT

## 2025-04-04 PROCEDURE — 99233 SBSQ HOSP IP/OBS HIGH 50: CPT | Performed by: INTERNAL MEDICINE

## 2025-04-04 PROCEDURE — 6360000002 HC RX W HCPCS: Performed by: STUDENT IN AN ORGANIZED HEALTH CARE EDUCATION/TRAINING PROGRAM

## 2025-04-04 PROCEDURE — 80053 COMPREHEN METABOLIC PANEL: CPT

## 2025-04-04 PROCEDURE — 83735 ASSAY OF MAGNESIUM: CPT

## 2025-04-04 PROCEDURE — 2500000003 HC RX 250 WO HCPCS: Performed by: STUDENT IN AN ORGANIZED HEALTH CARE EDUCATION/TRAINING PROGRAM

## 2025-04-04 PROCEDURE — 97530 THERAPEUTIC ACTIVITIES: CPT | Performed by: PHYSICAL THERAPIST

## 2025-04-04 PROCEDURE — 51702 INSERT TEMP BLADDER CATH: CPT

## 2025-04-04 PROCEDURE — 97161 PT EVAL LOW COMPLEX 20 MIN: CPT | Performed by: PHYSICAL THERAPIST

## 2025-04-04 PROCEDURE — 6370000000 HC RX 637 (ALT 250 FOR IP): Performed by: STUDENT IN AN ORGANIZED HEALTH CARE EDUCATION/TRAINING PROGRAM

## 2025-04-04 PROCEDURE — 1100000000 HC RM PRIVATE

## 2025-04-04 PROCEDURE — 82962 GLUCOSE BLOOD TEST: CPT

## 2025-04-04 PROCEDURE — 6370000000 HC RX 637 (ALT 250 FOR IP)

## 2025-04-04 RX ORDER — POTASSIUM CHLORIDE, DEXTROSE MONOHYDRATE 150; 5 MG/100ML; G/100ML
INJECTION, SOLUTION INTRAVENOUS CONTINUOUS
Status: DISCONTINUED | OUTPATIENT
Start: 2025-04-04 | End: 2025-04-04

## 2025-04-04 RX ORDER — POTASSIUM CHLORIDE 750 MG/1
10 TABLET, EXTENDED RELEASE ORAL 2 TIMES DAILY
Status: DISCONTINUED | OUTPATIENT
Start: 2025-04-04 | End: 2025-04-08 | Stop reason: HOSPADM

## 2025-04-04 RX ORDER — OLANZAPINE 5 MG/1
5 TABLET ORAL NIGHTLY
Status: DISCONTINUED | OUTPATIENT
Start: 2025-04-04 | End: 2025-04-08 | Stop reason: HOSPADM

## 2025-04-04 RX ORDER — POTASSIUM CHLORIDE, DEXTROSE MONOHYDRATE 150; 5 MG/100ML; G/100ML
INJECTION, SOLUTION INTRAVENOUS CONTINUOUS
Status: DISPENSED | OUTPATIENT
Start: 2025-04-04 | End: 2025-04-05

## 2025-04-04 RX ADMIN — MIDODRINE HYDROCHLORIDE 2.5 MG: 5 TABLET ORAL at 10:34

## 2025-04-04 RX ADMIN — PANTOPRAZOLE SODIUM 40 MG: 40 TABLET, DELAYED RELEASE ORAL at 10:29

## 2025-04-04 RX ADMIN — Medication 100 MG: at 10:29

## 2025-04-04 RX ADMIN — VANCOMYCIN 1250 MG: 1.75 INJECTION, SOLUTION INTRAVENOUS at 12:22

## 2025-04-04 RX ADMIN — CETIRIZINE HYDROCHLORIDE 10 MG: 10 TABLET, FILM COATED ORAL at 10:30

## 2025-04-04 RX ADMIN — SODIUM CHLORIDE, PRESERVATIVE FREE 10 ML: 5 INJECTION INTRAVENOUS at 10:38

## 2025-04-04 RX ADMIN — ENOXAPARIN SODIUM 40 MG: 100 INJECTION SUBCUTANEOUS at 10:31

## 2025-04-04 RX ADMIN — PANTOPRAZOLE SODIUM 40 MG: 40 TABLET, DELAYED RELEASE ORAL at 20:16

## 2025-04-04 RX ADMIN — Medication 1 CAPSULE: at 12:05

## 2025-04-04 RX ADMIN — MIDODRINE HYDROCHLORIDE 2.5 MG: 5 TABLET ORAL at 20:16

## 2025-04-04 RX ADMIN — POTASSIUM CHLORIDE AND DEXTROSE MONOHYDRATE: 150; 5 INJECTION, SOLUTION INTRAVENOUS at 14:13

## 2025-04-04 RX ADMIN — TIMOLOL MALEATE 1 DROP: 2.5 SOLUTION OPHTHALMIC at 10:33

## 2025-04-04 RX ADMIN — INSULIN GLARGINE 10 UNITS: 100 INJECTION, SOLUTION SUBCUTANEOUS at 20:16

## 2025-04-04 RX ADMIN — Medication 3 MG: at 20:16

## 2025-04-04 RX ADMIN — ATORVASTATIN CALCIUM 10 MG: 10 TABLET, FILM COATED ORAL at 10:29

## 2025-04-04 RX ADMIN — METOPROLOL SUCCINATE 12.5 MG: 25 TABLET, EXTENDED RELEASE ORAL at 10:34

## 2025-04-04 RX ADMIN — INSULIN GLARGINE 10 UNITS: 100 INJECTION, SOLUTION SUBCUTANEOUS at 10:32

## 2025-04-04 RX ADMIN — OLANZAPINE 5 MG: 5 TABLET, FILM COATED ORAL at 20:16

## 2025-04-04 RX ADMIN — POTASSIUM CHLORIDE 10 MEQ: 750 TABLET, FILM COATED, EXTENDED RELEASE ORAL at 14:13

## 2025-04-04 RX ADMIN — POTASSIUM CHLORIDE 10 MEQ: 750 TABLET, FILM COATED, EXTENDED RELEASE ORAL at 20:16

## 2025-04-04 RX ADMIN — TIMOLOL MALEATE 1 DROP: 2.5 SOLUTION OPHTHALMIC at 20:18

## 2025-04-05 LAB
ALBUMIN SERPL-MCNC: 2.1 G/DL (ref 3.5–5)
ALBUMIN/GLOB SERPL: 0.5 (ref 1.1–2.2)
ALP SERPL-CCNC: 168 U/L (ref 45–117)
ALT SERPL-CCNC: 29 U/L (ref 12–78)
ANION GAP SERPL CALC-SCNC: 3 MMOL/L (ref 2–12)
AST SERPL-CCNC: 38 U/L (ref 15–37)
BASOPHILS # BLD: 0.06 K/UL (ref 0–0.1)
BASOPHILS NFR BLD: 1.1 % (ref 0–1)
BILIRUB SERPL-MCNC: 0.6 MG/DL (ref 0.2–1)
BUN SERPL-MCNC: 14 MG/DL (ref 6–20)
BUN/CREAT SERPL: 15 (ref 12–20)
CALCIUM SERPL-MCNC: 8.2 MG/DL (ref 8.5–10.1)
CHLORIDE SERPL-SCNC: 107 MMOL/L (ref 97–108)
CO2 SERPL-SCNC: 31 MMOL/L (ref 21–32)
CREAT SERPL-MCNC: 0.93 MG/DL (ref 0.55–1.02)
DIFFERENTIAL METHOD BLD: ABNORMAL
EOSINOPHIL # BLD: 0.22 K/UL (ref 0–0.4)
EOSINOPHIL NFR BLD: 4 % (ref 0–7)
ERYTHROCYTE [DISTWIDTH] IN BLOOD BY AUTOMATED COUNT: 24.6 % (ref 11.5–14.5)
GLOBULIN SER CALC-MCNC: 4.1 G/DL (ref 2–4)
GLUCOSE BLD STRIP.AUTO-MCNC: 105 MG/DL (ref 65–117)
GLUCOSE BLD STRIP.AUTO-MCNC: 135 MG/DL (ref 65–117)
GLUCOSE BLD STRIP.AUTO-MCNC: 140 MG/DL (ref 65–117)
GLUCOSE BLD STRIP.AUTO-MCNC: 156 MG/DL (ref 65–117)
GLUCOSE SERPL-MCNC: 126 MG/DL (ref 65–100)
HCT VFR BLD AUTO: 38.7 % (ref 35–47)
HGB BLD-MCNC: 12 G/DL (ref 11.5–16)
IMM GRANULOCYTES # BLD AUTO: 0.03 K/UL (ref 0–0.04)
IMM GRANULOCYTES NFR BLD AUTO: 0.6 % (ref 0–0.5)
LYMPHOCYTES # BLD: 1.97 K/UL (ref 0.8–3.5)
LYMPHOCYTES NFR BLD: 35.8 % (ref 12–49)
MAGNESIUM SERPL-MCNC: 1.6 MG/DL (ref 1.6–2.4)
MCH RBC QN AUTO: 24.7 PG (ref 26–34)
MCHC RBC AUTO-ENTMCNC: 31 G/DL (ref 30–36.5)
MCV RBC AUTO: 79.8 FL (ref 80–99)
MONOCYTES # BLD: 0.8 K/UL (ref 0–1)
MONOCYTES NFR BLD: 14.5 % (ref 5–13)
NEUTS SEG # BLD: 2.42 K/UL (ref 1.8–8)
NEUTS SEG NFR BLD: 44 % (ref 32–75)
NRBC # BLD: 0 K/UL (ref 0–0.01)
NRBC BLD-RTO: 0 PER 100 WBC
PLATELET # BLD AUTO: 159 K/UL (ref 150–400)
PMV BLD AUTO: 10.2 FL (ref 8.9–12.9)
POTASSIUM SERPL-SCNC: 3 MMOL/L (ref 3.5–5.1)
PROT SERPL-MCNC: 6.2 G/DL (ref 6.4–8.2)
RBC # BLD AUTO: 4.85 M/UL (ref 3.8–5.2)
RBC MORPH BLD: ABNORMAL
SERVICE CMNT-IMP: ABNORMAL
SERVICE CMNT-IMP: NORMAL
SODIUM SERPL-SCNC: 141 MMOL/L (ref 136–145)
WBC # BLD AUTO: 5.5 K/UL (ref 3.6–11)

## 2025-04-05 PROCEDURE — 80053 COMPREHEN METABOLIC PANEL: CPT

## 2025-04-05 PROCEDURE — 36415 COLL VENOUS BLD VENIPUNCTURE: CPT

## 2025-04-05 PROCEDURE — 85025 COMPLETE CBC W/AUTO DIFF WBC: CPT

## 2025-04-05 PROCEDURE — 6370000000 HC RX 637 (ALT 250 FOR IP): Performed by: STUDENT IN AN ORGANIZED HEALTH CARE EDUCATION/TRAINING PROGRAM

## 2025-04-05 PROCEDURE — 51702 INSERT TEMP BLADDER CATH: CPT

## 2025-04-05 PROCEDURE — 2500000003 HC RX 250 WO HCPCS: Performed by: STUDENT IN AN ORGANIZED HEALTH CARE EDUCATION/TRAINING PROGRAM

## 2025-04-05 PROCEDURE — 83735 ASSAY OF MAGNESIUM: CPT

## 2025-04-05 PROCEDURE — 82962 GLUCOSE BLOOD TEST: CPT

## 2025-04-05 PROCEDURE — 1100000000 HC RM PRIVATE

## 2025-04-05 PROCEDURE — 6360000002 HC RX W HCPCS: Performed by: STUDENT IN AN ORGANIZED HEALTH CARE EDUCATION/TRAINING PROGRAM

## 2025-04-05 RX ORDER — POTASSIUM CHLORIDE 1500 MG/1
40 TABLET, EXTENDED RELEASE ORAL ONCE
Status: COMPLETED | OUTPATIENT
Start: 2025-04-05 | End: 2025-04-05

## 2025-04-05 RX ADMIN — MIDODRINE HYDROCHLORIDE 2.5 MG: 5 TABLET ORAL at 09:36

## 2025-04-05 RX ADMIN — MIDODRINE HYDROCHLORIDE 2.5 MG: 5 TABLET ORAL at 14:20

## 2025-04-05 RX ADMIN — POTASSIUM CHLORIDE 40 MEQ: 1500 TABLET, EXTENDED RELEASE ORAL at 09:37

## 2025-04-05 RX ADMIN — Medication 100 MG: at 09:36

## 2025-04-05 RX ADMIN — PANTOPRAZOLE SODIUM 40 MG: 40 TABLET, DELAYED RELEASE ORAL at 09:36

## 2025-04-05 RX ADMIN — ATORVASTATIN CALCIUM 10 MG: 10 TABLET, FILM COATED ORAL at 09:37

## 2025-04-05 RX ADMIN — PANTOPRAZOLE SODIUM 40 MG: 40 TABLET, DELAYED RELEASE ORAL at 21:04

## 2025-04-05 RX ADMIN — POTASSIUM CHLORIDE 10 MEQ: 750 TABLET, FILM COATED, EXTENDED RELEASE ORAL at 21:04

## 2025-04-05 RX ADMIN — CETIRIZINE HYDROCHLORIDE 10 MG: 10 TABLET, FILM COATED ORAL at 09:36

## 2025-04-05 RX ADMIN — INSULIN GLARGINE 10 UNITS: 100 INJECTION, SOLUTION SUBCUTANEOUS at 21:07

## 2025-04-05 RX ADMIN — METOPROLOL SUCCINATE 12.5 MG: 25 TABLET, EXTENDED RELEASE ORAL at 09:36

## 2025-04-05 RX ADMIN — SODIUM CHLORIDE, PRESERVATIVE FREE 10 ML: 5 INJECTION INTRAVENOUS at 21:07

## 2025-04-05 RX ADMIN — TIMOLOL MALEATE 1 DROP: 2.5 SOLUTION OPHTHALMIC at 21:09

## 2025-04-05 RX ADMIN — ENOXAPARIN SODIUM 40 MG: 100 INJECTION SUBCUTANEOUS at 09:37

## 2025-04-05 RX ADMIN — SODIUM CHLORIDE, PRESERVATIVE FREE 10 ML: 5 INJECTION INTRAVENOUS at 09:37

## 2025-04-05 RX ADMIN — POTASSIUM CHLORIDE 10 MEQ: 750 TABLET, FILM COATED, EXTENDED RELEASE ORAL at 09:36

## 2025-04-05 RX ADMIN — MIDODRINE HYDROCHLORIDE 2.5 MG: 5 TABLET ORAL at 21:04

## 2025-04-05 RX ADMIN — OLANZAPINE 5 MG: 5 TABLET, FILM COATED ORAL at 21:04

## 2025-04-05 RX ADMIN — TIMOLOL MALEATE 1 DROP: 2.5 SOLUTION OPHTHALMIC at 09:45

## 2025-04-05 RX ADMIN — VANCOMYCIN 1250 MG: 1.75 INJECTION, SOLUTION INTRAVENOUS at 13:03

## 2025-04-05 RX ADMIN — Medication 1 CAPSULE: at 09:37

## 2025-04-05 ASSESSMENT — PAIN SCALES - GENERAL
PAINLEVEL_OUTOF10: 0
PAINLEVEL_OUTOF10: 0

## 2025-04-06 LAB
ALBUMIN SERPL-MCNC: 2.2 G/DL (ref 3.5–5)
ALBUMIN/GLOB SERPL: 0.5 (ref 1.1–2.2)
ALP SERPL-CCNC: 214 U/L (ref 45–117)
ALT SERPL-CCNC: 49 U/L (ref 12–78)
ANION GAP SERPL CALC-SCNC: 5 MMOL/L (ref 2–12)
AST SERPL-CCNC: 71 U/L (ref 15–37)
BASOPHILS # BLD: 0.06 K/UL (ref 0–0.1)
BASOPHILS NFR BLD: 1 % (ref 0–1)
BILIRUB SERPL-MCNC: 0.7 MG/DL (ref 0.2–1)
BUN SERPL-MCNC: 14 MG/DL (ref 6–20)
BUN/CREAT SERPL: 13 (ref 12–20)
CALCIUM SERPL-MCNC: 8.2 MG/DL (ref 8.5–10.1)
CHLORIDE SERPL-SCNC: 104 MMOL/L (ref 97–108)
CO2 SERPL-SCNC: 27 MMOL/L (ref 21–32)
CREAT SERPL-MCNC: 1.06 MG/DL (ref 0.55–1.02)
DIFFERENTIAL METHOD BLD: ABNORMAL
EOSINOPHIL # BLD: 0.17 K/UL (ref 0–0.4)
EOSINOPHIL NFR BLD: 2.9 % (ref 0–7)
ERYTHROCYTE [DISTWIDTH] IN BLOOD BY AUTOMATED COUNT: 24.5 % (ref 11.5–14.5)
GLOBULIN SER CALC-MCNC: 4.3 G/DL (ref 2–4)
GLUCOSE BLD STRIP.AUTO-MCNC: 222 MG/DL (ref 65–117)
GLUCOSE BLD STRIP.AUTO-MCNC: 256 MG/DL (ref 65–117)
GLUCOSE BLD STRIP.AUTO-MCNC: 271 MG/DL (ref 65–117)
GLUCOSE BLD STRIP.AUTO-MCNC: 354 MG/DL (ref 65–117)
GLUCOSE SERPL-MCNC: 299 MG/DL (ref 65–100)
HCT VFR BLD AUTO: 40.4 % (ref 35–47)
HGB BLD-MCNC: 12.6 G/DL (ref 11.5–16)
IMM GRANULOCYTES # BLD AUTO: 0.05 K/UL (ref 0–0.04)
IMM GRANULOCYTES NFR BLD AUTO: 0.8 % (ref 0–0.5)
LYMPHOCYTES # BLD: 1.73 K/UL (ref 0.8–3.5)
LYMPHOCYTES NFR BLD: 29.3 % (ref 12–49)
MCH RBC QN AUTO: 24.9 PG (ref 26–34)
MCHC RBC AUTO-ENTMCNC: 31.2 G/DL (ref 30–36.5)
MCV RBC AUTO: 79.7 FL (ref 80–99)
MONOCYTES # BLD: 0.91 K/UL (ref 0–1)
MONOCYTES NFR BLD: 15.4 % (ref 5–13)
NEUTS SEG # BLD: 2.98 K/UL (ref 1.8–8)
NEUTS SEG NFR BLD: 50.6 % (ref 32–75)
NRBC # BLD: 0.02 K/UL (ref 0–0.01)
NRBC BLD-RTO: 0.3 PER 100 WBC
PLATELET # BLD AUTO: 136 K/UL (ref 150–400)
POTASSIUM SERPL-SCNC: 4.2 MMOL/L (ref 3.5–5.1)
PROT SERPL-MCNC: 6.5 G/DL (ref 6.4–8.2)
RBC # BLD AUTO: 5.07 M/UL (ref 3.8–5.2)
SERVICE CMNT-IMP: ABNORMAL
SODIUM SERPL-SCNC: 136 MMOL/L (ref 136–145)
VANCOMYCIN SERPL-MCNC: 21.4 UG/ML
WBC # BLD AUTO: 5.9 K/UL (ref 3.6–11)

## 2025-04-06 PROCEDURE — 2500000003 HC RX 250 WO HCPCS: Performed by: STUDENT IN AN ORGANIZED HEALTH CARE EDUCATION/TRAINING PROGRAM

## 2025-04-06 PROCEDURE — 82962 GLUCOSE BLOOD TEST: CPT

## 2025-04-06 PROCEDURE — 6370000000 HC RX 637 (ALT 250 FOR IP): Performed by: STUDENT IN AN ORGANIZED HEALTH CARE EDUCATION/TRAINING PROGRAM

## 2025-04-06 PROCEDURE — 80202 ASSAY OF VANCOMYCIN: CPT

## 2025-04-06 PROCEDURE — 36415 COLL VENOUS BLD VENIPUNCTURE: CPT

## 2025-04-06 PROCEDURE — 80053 COMPREHEN METABOLIC PANEL: CPT

## 2025-04-06 PROCEDURE — 2580000003 HC RX 258: Performed by: INTERNAL MEDICINE

## 2025-04-06 PROCEDURE — 6360000002 HC RX W HCPCS: Performed by: INTERNAL MEDICINE

## 2025-04-06 PROCEDURE — 85025 COMPLETE CBC W/AUTO DIFF WBC: CPT

## 2025-04-06 PROCEDURE — 1100000000 HC RM PRIVATE

## 2025-04-06 PROCEDURE — 6360000002 HC RX W HCPCS: Performed by: STUDENT IN AN ORGANIZED HEALTH CARE EDUCATION/TRAINING PROGRAM

## 2025-04-06 RX ORDER — INSULIN LISPRO 100 [IU]/ML
12 INJECTION, SOLUTION INTRAVENOUS; SUBCUTANEOUS ONCE
Status: COMPLETED | OUTPATIENT
Start: 2025-04-06 | End: 2025-04-06

## 2025-04-06 RX ADMIN — INSULIN LISPRO 2 UNITS: 100 INJECTION, SOLUTION INTRAVENOUS; SUBCUTANEOUS at 08:33

## 2025-04-06 RX ADMIN — INSULIN LISPRO 2 UNITS: 100 INJECTION, SOLUTION INTRAVENOUS; SUBCUTANEOUS at 13:40

## 2025-04-06 RX ADMIN — ENOXAPARIN SODIUM 40 MG: 100 INJECTION SUBCUTANEOUS at 08:33

## 2025-04-06 RX ADMIN — ATORVASTATIN CALCIUM 10 MG: 10 TABLET, FILM COATED ORAL at 08:32

## 2025-04-06 RX ADMIN — Medication 100 MG: at 08:34

## 2025-04-06 RX ADMIN — METOPROLOL SUCCINATE 12.5 MG: 25 TABLET, EXTENDED RELEASE ORAL at 08:32

## 2025-04-06 RX ADMIN — MIDODRINE HYDROCHLORIDE 2.5 MG: 5 TABLET ORAL at 21:09

## 2025-04-06 RX ADMIN — Medication 1 CAPSULE: at 08:32

## 2025-04-06 RX ADMIN — MIDODRINE HYDROCHLORIDE 2.5 MG: 5 TABLET ORAL at 13:39

## 2025-04-06 RX ADMIN — TIMOLOL MALEATE 1 DROP: 2.5 SOLUTION OPHTHALMIC at 08:35

## 2025-04-06 RX ADMIN — SODIUM CHLORIDE, PRESERVATIVE FREE 10 ML: 5 INJECTION INTRAVENOUS at 08:35

## 2025-04-06 RX ADMIN — INSULIN GLARGINE 10 UNITS: 100 INJECTION, SOLUTION SUBCUTANEOUS at 21:09

## 2025-04-06 RX ADMIN — PANTOPRAZOLE SODIUM 40 MG: 40 TABLET, DELAYED RELEASE ORAL at 21:09

## 2025-04-06 RX ADMIN — INSULIN LISPRO 1 UNITS: 100 INJECTION, SOLUTION INTRAVENOUS; SUBCUTANEOUS at 21:10

## 2025-04-06 RX ADMIN — VANCOMYCIN HYDROCHLORIDE 750 MG: 750 INJECTION, POWDER, LYOPHILIZED, FOR SOLUTION INTRAVENOUS at 13:46

## 2025-04-06 RX ADMIN — INSULIN GLARGINE 10 UNITS: 100 INJECTION, SOLUTION SUBCUTANEOUS at 08:33

## 2025-04-06 RX ADMIN — POTASSIUM CHLORIDE 10 MEQ: 750 TABLET, FILM COATED, EXTENDED RELEASE ORAL at 21:09

## 2025-04-06 RX ADMIN — INSULIN LISPRO 4 UNITS: 100 INJECTION, SOLUTION INTRAVENOUS; SUBCUTANEOUS at 18:09

## 2025-04-06 RX ADMIN — POTASSIUM CHLORIDE 10 MEQ: 750 TABLET, FILM COATED, EXTENDED RELEASE ORAL at 08:32

## 2025-04-06 RX ADMIN — PANTOPRAZOLE SODIUM 40 MG: 40 TABLET, DELAYED RELEASE ORAL at 08:32

## 2025-04-06 RX ADMIN — OLANZAPINE 5 MG: 5 TABLET, FILM COATED ORAL at 21:09

## 2025-04-06 RX ADMIN — TIMOLOL MALEATE 1 DROP: 2.5 SOLUTION OPHTHALMIC at 21:10

## 2025-04-06 RX ADMIN — INSULIN LISPRO 12 UNITS: 100 INJECTION, SOLUTION INTRAVENOUS; SUBCUTANEOUS at 18:09

## 2025-04-06 RX ADMIN — CETIRIZINE HYDROCHLORIDE 10 MG: 10 TABLET, FILM COATED ORAL at 08:32

## 2025-04-06 RX ADMIN — ONDANSETRON 4 MG: 2 INJECTION, SOLUTION INTRAMUSCULAR; INTRAVENOUS at 21:14

## 2025-04-06 RX ADMIN — SODIUM CHLORIDE, PRESERVATIVE FREE 10 ML: 5 INJECTION INTRAVENOUS at 21:10

## 2025-04-06 RX ADMIN — MIDODRINE HYDROCHLORIDE 2.5 MG: 5 TABLET ORAL at 08:32

## 2025-04-06 ASSESSMENT — PAIN SCALES - GENERAL
PAINLEVEL_OUTOF10: 0
PAINLEVEL_OUTOF10: 0

## 2025-04-06 NOTE — CARE COORDINATION
Transition of Care Plan:    RUR: 23%  Prior Level of Functioning:   Disposition: SNF  DENNISE:   If SNF or IPR: Date FOC offered:   Date FOC received:   Accepting facility:   Date authorization started with reference number:   Date authorization received and expires:   Follow up appointments:   DME needed: n/a  Transportation at discharge: [facility]  IM/IMM Medicare/ letter given: 2nd IM needed  Is patient a [facility] and connected with [facility]?    If yes, was [facility] transfer form completed and [facility] notified?   Caregiver Contact:   Discharge Caregiver contacted prior to discharge?   Care Conference needed?   Barriers to discharge:      would like pt to go to [facility].  CM unable to reach anyone on this Sunday.  Unit CM will continue to follow.    Xiao Mahan  Ext [phone]

## 2025-04-07 LAB
ANION GAP SERPL CALC-SCNC: 2 MMOL/L (ref 2–12)
BASOPHILS # BLD: 0.04 K/UL (ref 0–0.1)
BASOPHILS NFR BLD: 0.4 % (ref 0–1)
BUN SERPL-MCNC: 16 MG/DL (ref 6–20)
BUN/CREAT SERPL: 18 (ref 12–20)
CALCIUM SERPL-MCNC: 8.8 MG/DL (ref 8.5–10.1)
CHLORIDE SERPL-SCNC: 106 MMOL/L (ref 97–108)
CO2 SERPL-SCNC: 28 MMOL/L (ref 21–32)
CREAT SERPL-MCNC: 0.88 MG/DL (ref 0.55–1.02)
DIFFERENTIAL METHOD BLD: ABNORMAL
EOSINOPHIL # BLD: 0.15 K/UL (ref 0–0.4)
EOSINOPHIL NFR BLD: 1.6 % (ref 0–7)
ERYTHROCYTE [DISTWIDTH] IN BLOOD BY AUTOMATED COUNT: 23.9 % (ref 11.5–14.5)
GLUCOSE BLD STRIP.AUTO-MCNC: 125 MG/DL (ref 65–117)
GLUCOSE BLD STRIP.AUTO-MCNC: 202 MG/DL (ref 65–117)
GLUCOSE BLD STRIP.AUTO-MCNC: 271 MG/DL (ref 65–117)
GLUCOSE BLD STRIP.AUTO-MCNC: 279 MG/DL (ref 65–117)
GLUCOSE SERPL-MCNC: 116 MG/DL (ref 65–100)
HCT VFR BLD AUTO: 40.6 % (ref 35–47)
HGB BLD-MCNC: 12.8 G/DL (ref 11.5–16)
IMM GRANULOCYTES # BLD AUTO: 0.11 K/UL (ref 0–0.04)
IMM GRANULOCYTES NFR BLD AUTO: 1.2 % (ref 0–0.5)
LYMPHOCYTES # BLD: 1.49 K/UL (ref 0.8–3.5)
LYMPHOCYTES NFR BLD: 16.2 % (ref 12–49)
MCH RBC QN AUTO: 25.2 PG (ref 26–34)
MCHC RBC AUTO-ENTMCNC: 31.5 G/DL (ref 30–36.5)
MCV RBC AUTO: 79.9 FL (ref 80–99)
MONOCYTES # BLD: 1.08 K/UL (ref 0–1)
MONOCYTES NFR BLD: 11.7 % (ref 5–13)
NEUTS SEG # BLD: 6.33 K/UL (ref 1.8–8)
NEUTS SEG NFR BLD: 68.9 % (ref 32–75)
NRBC # BLD: 0 K/UL (ref 0–0.01)
NRBC BLD-RTO: 0 PER 100 WBC
PLATELET # BLD AUTO: 132 K/UL (ref 150–400)
PMV BLD AUTO: 10.2 FL (ref 8.9–12.9)
POTASSIUM SERPL-SCNC: 4 MMOL/L (ref 3.5–5.1)
RBC # BLD AUTO: 5.08 M/UL (ref 3.8–5.2)
SERVICE CMNT-IMP: ABNORMAL
SODIUM SERPL-SCNC: 136 MMOL/L (ref 136–145)
WBC # BLD AUTO: 9.2 K/UL (ref 3.6–11)

## 2025-04-07 PROCEDURE — 2580000003 HC RX 258: Performed by: INTERNAL MEDICINE

## 2025-04-07 PROCEDURE — 80048 BASIC METABOLIC PNL TOTAL CA: CPT

## 2025-04-07 PROCEDURE — 85025 COMPLETE CBC W/AUTO DIFF WBC: CPT

## 2025-04-07 PROCEDURE — 36415 COLL VENOUS BLD VENIPUNCTURE: CPT

## 2025-04-07 PROCEDURE — 6370000000 HC RX 637 (ALT 250 FOR IP): Performed by: STUDENT IN AN ORGANIZED HEALTH CARE EDUCATION/TRAINING PROGRAM

## 2025-04-07 PROCEDURE — 51702 INSERT TEMP BLADDER CATH: CPT

## 2025-04-07 PROCEDURE — 2500000003 HC RX 250 WO HCPCS: Performed by: STUDENT IN AN ORGANIZED HEALTH CARE EDUCATION/TRAINING PROGRAM

## 2025-04-07 PROCEDURE — 6360000002 HC RX W HCPCS: Performed by: INTERNAL MEDICINE

## 2025-04-07 PROCEDURE — 1100000000 HC RM PRIVATE

## 2025-04-07 PROCEDURE — 6360000002 HC RX W HCPCS: Performed by: STUDENT IN AN ORGANIZED HEALTH CARE EDUCATION/TRAINING PROGRAM

## 2025-04-07 PROCEDURE — 82962 GLUCOSE BLOOD TEST: CPT

## 2025-04-07 PROCEDURE — 97535 SELF CARE MNGMENT TRAINING: CPT

## 2025-04-07 RX ADMIN — INSULIN LISPRO 2 UNITS: 100 INJECTION, SOLUTION INTRAVENOUS; SUBCUTANEOUS at 22:29

## 2025-04-07 RX ADMIN — ENOXAPARIN SODIUM 40 MG: 100 INJECTION SUBCUTANEOUS at 10:28

## 2025-04-07 RX ADMIN — INSULIN LISPRO 2 UNITS: 100 INJECTION, SOLUTION INTRAVENOUS; SUBCUTANEOUS at 18:16

## 2025-04-07 RX ADMIN — INSULIN LISPRO 1 UNITS: 100 INJECTION, SOLUTION INTRAVENOUS; SUBCUTANEOUS at 13:40

## 2025-04-07 RX ADMIN — POTASSIUM CHLORIDE 10 MEQ: 750 TABLET, FILM COATED, EXTENDED RELEASE ORAL at 22:29

## 2025-04-07 RX ADMIN — PANTOPRAZOLE SODIUM 40 MG: 40 TABLET, DELAYED RELEASE ORAL at 10:28

## 2025-04-07 RX ADMIN — ATORVASTATIN CALCIUM 10 MG: 10 TABLET, FILM COATED ORAL at 10:27

## 2025-04-07 RX ADMIN — Medication 1 CAPSULE: at 10:40

## 2025-04-07 RX ADMIN — INSULIN GLARGINE 10 UNITS: 100 INJECTION, SOLUTION SUBCUTANEOUS at 22:29

## 2025-04-07 RX ADMIN — TIMOLOL MALEATE 1 DROP: 2.5 SOLUTION OPHTHALMIC at 10:38

## 2025-04-07 RX ADMIN — PANTOPRAZOLE SODIUM 40 MG: 40 TABLET, DELAYED RELEASE ORAL at 22:29

## 2025-04-07 RX ADMIN — POTASSIUM CHLORIDE 10 MEQ: 750 TABLET, FILM COATED, EXTENDED RELEASE ORAL at 10:27

## 2025-04-07 RX ADMIN — OLANZAPINE 5 MG: 5 TABLET, FILM COATED ORAL at 22:29

## 2025-04-07 RX ADMIN — METOPROLOL SUCCINATE 12.5 MG: 25 TABLET, EXTENDED RELEASE ORAL at 10:26

## 2025-04-07 RX ADMIN — Medication 100 MG: at 10:27

## 2025-04-07 RX ADMIN — MIDODRINE HYDROCHLORIDE 2.5 MG: 5 TABLET ORAL at 10:26

## 2025-04-07 RX ADMIN — TIMOLOL MALEATE 1 DROP: 2.5 SOLUTION OPHTHALMIC at 22:30

## 2025-04-07 RX ADMIN — MIDODRINE HYDROCHLORIDE 2.5 MG: 5 TABLET ORAL at 15:12

## 2025-04-07 RX ADMIN — MIDODRINE HYDROCHLORIDE 2.5 MG: 5 TABLET ORAL at 22:29

## 2025-04-07 RX ADMIN — CETIRIZINE HYDROCHLORIDE 10 MG: 10 TABLET, FILM COATED ORAL at 10:27

## 2025-04-07 RX ADMIN — INSULIN GLARGINE 10 UNITS: 100 INJECTION, SOLUTION SUBCUTANEOUS at 10:25

## 2025-04-07 RX ADMIN — SODIUM CHLORIDE, PRESERVATIVE FREE 10 ML: 5 INJECTION INTRAVENOUS at 10:42

## 2025-04-07 RX ADMIN — DALBAVANCIN 1500 MG: 500 INJECTION, POWDER, FOR SOLUTION INTRAVENOUS at 13:27

## 2025-04-07 RX ADMIN — SODIUM CHLORIDE, PRESERVATIVE FREE 10 ML: 5 INJECTION INTRAVENOUS at 22:30

## 2025-04-07 ASSESSMENT — PAIN SCALES - GENERAL
PAINLEVEL_OUTOF10: 0
PAINLEVEL_OUTOF10: 0

## 2025-04-07 NOTE — PLAN OF CARE
Problem: Chronic Conditions and Co-morbidities  Goal: Patient's chronic conditions and co-morbidity symptoms are monitored and maintained or improved  Outcome: Progressing  Flowsheets (Taken 4/3/2025 0428)  Care Plan - Patient's Chronic Conditions and Co-Morbidity Symptoms are Monitored and Maintained or Improved: Monitor and assess patient's chronic conditions and comorbid symptoms for stability, deterioration, or improvement     Problem: Discharge Planning  Goal: Discharge to home or other facility with appropriate resources  Outcome: Progressing  Flowsheets (Taken 4/3/2025 0428)  Discharge to home or other facility with appropriate resources: Identify barriers to discharge with patient and caregiver     Problem: Safety - Adult  Goal: Free from fall injury  Outcome: Progressing  Flowsheets (Taken 4/3/2025 0428)  Free From Fall Injury: Instruct family/caregiver on patient safety     Problem: Skin/Tissue Integrity  Goal: Skin integrity remains intact  Description: 1.  Monitor for areas of redness and/or skin breakdown  2.  Assess vascular access sites hourly  3.  Every 4-6 hours minimum:  Change oxygen saturation probe site  4.  Every 4-6 hours:  If on nasal continuous positive airway pressure, respiratory therapy assess nares and determine need for appliance change or resting period  Outcome: Progressing  Flowsheets (Taken 4/3/2025 0428)  Skin Integrity Remains Intact: Monitor for areas of redness and/or skin breakdown     Problem: Neurosensory - Adult  Goal: Achieves stable or improved neurological status  Outcome: Progressing  Flowsheets (Taken 4/3/2025 0428)  Achieves stable or improved neurological status: Assess for and report changes in neurological status     Problem: Respiratory - Adult  Goal: Achieves optimal ventilation and oxygenation  Outcome: Progressing  Flowsheets (Taken 4/3/2025 0428)  Achieves optimal ventilation and oxygenation: Assess for changes in respiratory status     Problem: Skin/Tissue 
  Problem: Chronic Conditions and Co-morbidities  Goal: Patient's chronic conditions and co-morbidity symptoms are monitored and maintained or improved  Outcome: Progressing  Flowsheets (Taken 4/4/2025 0014)  Care Plan - Patient's Chronic Conditions and Co-Morbidity Symptoms are Monitored and Maintained or Improved: Monitor and assess patient's chronic conditions and comorbid symptoms for stability, deterioration, or improvement     Problem: Discharge Planning  Goal: Discharge to home or other facility with appropriate resources  Outcome: Progressing  Flowsheets (Taken 4/4/2025 0014)  Discharge to home or other facility with appropriate resources: Identify barriers to discharge with patient and caregiver     Problem: Safety - Adult  Goal: Free from fall injury  Outcome: Progressing  Flowsheets (Taken 4/4/2025 0014)  Free From Fall Injury: Instruct family/caregiver on patient safety     Problem: Skin/Tissue Integrity  Goal: Skin integrity remains intact  Description: 1.  Monitor for areas of redness and/or skin breakdown  2.  Assess vascular access sites hourly  3.  Every 4-6 hours minimum:  Change oxygen saturation probe site  4.  Every 4-6 hours:  If on nasal continuous positive airway pressure, respiratory therapy assess nares and determine need for appliance change or resting period  Outcome: Progressing  Flowsheets (Taken 4/4/2025 0014)  Skin Integrity Remains Intact: Monitor for areas of redness and/or skin breakdown     Problem: Neurosensory - Adult  Goal: Achieves stable or improved neurological status  Outcome: Progressing     Problem: Skin/Tissue Integrity - Adult  Goal: Incisions, wounds, or drain sites healing without S/S of infection  Outcome: Progressing  Flowsheets (Taken 4/4/2025 0014)  Incisions, Wounds, or Drain Sites Healing Without Sign and Symptoms of Infection: ADMISSION and DAILY: Assess and document risk factors for pressure ulcer development     Problem: Genitourinary - Adult  Goal: Urinary 
  Problem: Occupational Therapy - Adult  Goal: By Discharge: Performs self-care activities at highest level of function for planned discharge setting.  See evaluation for individualized goals.  Description: FUNCTIONAL STATUS PRIOR TO ADMISSION:  pt admitted from SNF. Per spouse, pt was requiring assist for all ADLs, therapy was transferring pt to w/c with a lift, but pt unable to elaborate (may have been the nieves serna). Pt admitted to [facility]/27-3/14. Pt uses power w/c at baseline, was able to stand and step to/from w/c normally.   ,  ,  ,  ,  ,  ,  ,  ,  ,  , Active : No     HOME SUPPORT: Patient lived with spouse.    Occupational Therapy Goals:  Initiated 4/3/2025  1.  Patient will perform self-feeding with Stand by Assist within 7 day(s).  2.  Patient will perform upper body dressing with Minimal Assist within 7 day(s).  3.  Patient will perform lower body dressing with Maximal Assist within 7 day(s).  4.  Patient will perform toilet transfers with Maximal Assist and Assist x1  within 7 day(s).  5.  Patient will perform supine<>sit with Moderate Assist within 7 day(s).    Outcome: Progressing   OCCUPATIONAL THERAPY EVALUATION    Patient: Rosita Morillo (79 y.o. female)  Date: 4/3/2025  Primary Diagnosis: Cellulitis [L03.90]         Precautions: Modified Diet (pureed diet)                  ASSESSMENT :  The patient is limited by decreased functional mobility, independence in ADLs, ROM, strength, activity tolerance, endurance, safety awareness, cognition, command following, attention/concentration, coordination, balance, proprioception, posture, increased pain levels. Pt admitted from SNF with recurrent LLE cellulitis. Pt admitted 2/27-3/14 for BLE cellulitis and septic shock 2/2 bacteremia.     Based on the impairments listed above pt is requiring up to total assist for ADLs and max assist for functional mobility. Tolerated evaluation fair. Pt confused, disoriented, calling out in pain and yelling \"help me\" 
  Problem: Occupational Therapy - Adult  Goal: By Discharge: Performs self-care activities at highest level of function for planned discharge setting.  See evaluation for individualized goals.  Description: FUNCTIONAL STATUS PRIOR TO ADMISSION:  pt admitted from SNF. Per spouse, pt was requiring assist for all ADLs, therapy was transferring pt to w/c with a lift, but pt unable to elaborate (may have been the nieves serna). Pt admitted to [facility] 2/27-3/14. Pt uses power w/c at baseline, was able to stand and step to/from w/c normally.   ,  ,  ,  ,  ,  ,  ,  ,  ,  , Active : No     HOME SUPPORT: Patient lived with spouse.    Occupational Therapy Goals:  Initiated 4/3/2025  1.  Patient will perform self-feeding with Stand by Assist within 7 day(s).  2.  Patient will perform upper body dressing with Minimal Assist within 7 day(s).  3.  Patient will perform lower body dressing with Maximal Assist within 7 day(s).  4.  Patient will perform toilet transfers with Maximal Assist and Assist x1  within 7 day(s).  5.  Patient will perform supine<>sit with Moderate Assist within 7 day(s).    Outcome: Progressing     OCCUPATIONAL THERAPY TREATMENT  Patient: Rosita Morillo (79 y.o. female)  Date: 4/7/2025  Primary Diagnosis: Cellulitis [L03.90]       Precautions: Modified Diet, Fall Risk                Chart, occupational therapy assessment, plan of care, and goals were reviewed.    ASSESSMENT  Patient continues to benefit from skilled OT services and is slowly progressing towards goals. Pt remains pleasantly confused; however, good simple step direction following and procedural memory noted this date.  Pt with increased UE strength/endurance, evidenced by good hand to mouth and good functional grasp/release during self-feeding.   Continue skilled OT during acute hospitalization.       PLAN :  Patient continues to benefit from skilled intervention to address the above impairments.  Continue treatment per established plan of care 
  Problem: SLP Adult - Impaired Swallowing  Goal: By Discharge: Advance to least restrictive diet without signs or symptoms of aspiration for planned discharge setting.  See evaluation for individualized goals.  Description: Speech pathology goals initiated 4/3/2025   1. Patient will tolerate least restrictive diet free of s/s aspiration by d/c  Outcome: Progressing   Speech LAnguage Pathology EVALUATION    Patient: Rosiat Morillo (79 y.o. female)  Date: 4/3/2025  Primary Diagnosis: Cellulitis [L03.90]       Precautions:                     ASSESSMENT :  Patient known to this discipline from recent admission last month. Patient continues to be limited by AMS, hollering out intermittently. Prolonged mastication and minimal oral residue to chopped piece of chicken. Eventual oral clearance achieved; however, significant effort required. Timely posterior propulsion of pureed item. Intermittent cough noted during session; however, difficult to determine if baseline cough or related to thin liquids or GI in nature.    in agreement for pureed consistency for safety and efficiency of PO intake at this time. Slp will follow for continued re-assessment.     Patient will benefit from skilled intervention to address the above impairments.     PLAN :  Recommendations and Planned Interventions:  Diet: puree diet/ thin liquids. Single straw/cup sips- cues to reduce rate  Strict upright positioning   Crush meds     Recommend next SLP session: diet tolerance, possible advancement to minced/moist or soft and bite sized    Acute SLP Services: SLP Plan of Care: 2 times/week. Patient's rehabilitation potential is considered to be Fair.  Discharge Recommendations: Continue to assess pending progress     SUBJECTIVE:   Patient alert, confused.   Difficulty with repositioning given back pain    OBJECTIVE:     Past Medical History:   Diagnosis Date    Arthritis     CAD (coronary artery disease)     afib and chf    Congestive heart 
sit, patient remained in sitting demonstrating good static sitting balance. Attempted 3 times to stand requiring max A and patient unable to fully clear bottom from bed. She returned to bed with mod A of 2.    Patient unable to provide any PLOF but  present and reports that patient was ambulatory prior to admission in Feb. However, patient was only able to ambulate very short distances and used a lift chair to assist with standing. She slept in a recliner. Patient is below her functional baseline and would benefit form further rehab. She has experienced significant confusion since admission which was also present at SNF, per  report.            PLAN :  Recommendations and Planned Interventions:   bed mobility training, transfer training, gait training, therapeutic exercises, patient and family training/education, and therapeutic activities    Frequency/Duration: Patient will be followed by physical therapy to address goals, PT Plan of Care: 4 times/week to address goals.      Recommendation for discharge: (in order for the patient to meet his/her long term goals):   Moderate intensity short-term skilled physical therapy up to 5x/week    Other factors to consider for discharge: patient's current support system is unable to meet their requirements for physical assistance, poor safety awareness, impaired cognition, high risk for falls, not safe to be alone, and concern for safely navigating or managing the home environment    IF patient discharges home will need the following DME: hospital bed, mechanical lift, and wheelchair   Rosita Morillo was evaluated today and a DME order was entered for variable height hospital bed because she requires assistance for positioning needs not possible in an ordinary bed.  Patient needs variability of bed height to perform patient transfers.  Current body Weight - Scale: 90.4 kg (199 lb 4.7 oz).  The need for this equipment was discussed with the patient and she 
adequate nutrition, hydration, elimination, sleep and activity  6. If unable to ensure safety without constant attention obtain sitter and review sitter guidelines with assigned personnel  7. Initiate Psychosocial CNS and Spiritual Care consult, as indicated  4/6/2025 2221 by Philomena Rivera RN  Outcome: Not Progressing  4/6/2025 0956 by Felisa Maharaj LPN  Outcome: Progressing     
intervene to maintain adequate nutrition, hydration, elimination, sleep and activity  6. If unable to ensure safety without constant attention obtain sitter and review sitter guidelines with assigned personnel  7. Initiate Psychosocial CNS and Spiritual Care consult, as indicated  4/5/2025 1215 by Felisa Maharaj LPN  Outcome: Progressing     Problem: ABCDS Injury Assessment  Goal: Absence of physical injury  4/5/2025 2349 by Philomena Rivera, RN  Outcome: Progressing  4/5/2025 1215 by Felisa Maharaj LPN  Outcome: Progressing     Problem: Neurosensory - Adult  Goal: Achieves stable or improved neurological status  4/5/2025 2349 by Philomena Rivera, RN  Outcome: Not Progressing  4/5/2025 1215 by Felisa Maharaj LPN  Outcome: Progressing  Flowsheets (Taken 4/5/2025 0967)  Achieves stable or improved neurological status: Assess for and report changes in neurological status

## 2025-04-07 NOTE — DISCHARGE INSTRUCTIONS
All of your medications from before your hospitalization are the same.  Please take all of your medications as prescribed. If prescribed any medications, please read all pharmacy instructions and inserts. Inform your doctor and pharmacist about all other medications and alternative therapies.  Please follow up with your PCP in 1-2 weeks to be reassessed after your hospital stay.  Please also follow up with wound care and vascular surgery to see if there is any optimization that can be done regarding your chronic wounds.  If you start feeling any symptoms similar to what brought you into the hospital, please come back to the ED to be re-evaluated.    Antimicrobial orders for discharge  - Dalbavancin 1.5 g IV WAS ADMINISTERED 4/7/2025 prior to discharge from [facility]  - Pull line at end of therapy.    - CBC, CMP, blood cultures x 2 -1 week after dalbavancin  - Fax reports to [phone], call with critical labs  at [phone]  - Encourage adequate fluids, daily probiotic/yogurt   - ID follow-up -no follow-up required  - If persistent side effects occur stop antibiotic and call-ID/PCP.

## 2025-04-08 VITALS
TEMPERATURE: 97.3 F | SYSTOLIC BLOOD PRESSURE: 111 MMHG | BODY MASS INDEX: 35.3 KG/M2 | RESPIRATION RATE: 18 BRPM | HEART RATE: 85 BPM | OXYGEN SATURATION: 96 % | DIASTOLIC BLOOD PRESSURE: 59 MMHG | WEIGHT: 199.3 LBS

## 2025-04-08 LAB
ANION GAP SERPL CALC-SCNC: 6 MMOL/L (ref 2–12)
BACTERIA SPEC CULT: NORMAL
BACTERIA SPEC CULT: NORMAL
BASOPHILS # BLD: 0.06 K/UL (ref 0–0.1)
BASOPHILS NFR BLD: 0.7 % (ref 0–1)
BUN SERPL-MCNC: 16 MG/DL (ref 6–20)
BUN/CREAT SERPL: 19 (ref 12–20)
CALCIUM SERPL-MCNC: 8.6 MG/DL (ref 8.5–10.1)
CHLORIDE SERPL-SCNC: 106 MMOL/L (ref 97–108)
CO2 SERPL-SCNC: 26 MMOL/L (ref 21–32)
CREAT SERPL-MCNC: 0.85 MG/DL (ref 0.55–1.02)
DIFFERENTIAL METHOD BLD: ABNORMAL
EOSINOPHIL # BLD: 0.18 K/UL (ref 0–0.4)
EOSINOPHIL NFR BLD: 2.1 % (ref 0–7)
ERYTHROCYTE [DISTWIDTH] IN BLOOD BY AUTOMATED COUNT: 24.8 % (ref 11.5–14.5)
GLUCOSE BLD STRIP.AUTO-MCNC: 206 MG/DL (ref 65–117)
GLUCOSE BLD STRIP.AUTO-MCNC: 265 MG/DL (ref 65–117)
GLUCOSE SERPL-MCNC: 220 MG/DL (ref 65–100)
HCT VFR BLD AUTO: 37.4 % (ref 35–47)
HGB BLD-MCNC: 11.6 G/DL (ref 11.5–16)
IMM GRANULOCYTES # BLD AUTO: 0.14 K/UL (ref 0–0.04)
IMM GRANULOCYTES NFR BLD AUTO: 1.6 % (ref 0–0.5)
LYMPHOCYTES # BLD: 1.45 K/UL (ref 0.8–3.5)
LYMPHOCYTES NFR BLD: 16.7 % (ref 12–49)
MCH RBC QN AUTO: 24.9 PG (ref 26–34)
MCHC RBC AUTO-ENTMCNC: 31 G/DL (ref 30–36.5)
MCV RBC AUTO: 80.4 FL (ref 80–99)
MONOCYTES # BLD: 1.19 K/UL (ref 0–1)
MONOCYTES NFR BLD: 13.7 % (ref 5–13)
NEUTS SEG # BLD: 5.65 K/UL (ref 1.8–8)
NEUTS SEG NFR BLD: 65.2 % (ref 32–75)
NRBC # BLD: 0.02 K/UL (ref 0–0.01)
NRBC BLD-RTO: 0.2 PER 100 WBC
PLATELET # BLD AUTO: 115 K/UL (ref 150–400)
POTASSIUM SERPL-SCNC: 4.3 MMOL/L (ref 3.5–5.1)
RBC # BLD AUTO: 4.65 M/UL (ref 3.8–5.2)
SERVICE CMNT-IMP: ABNORMAL
SERVICE CMNT-IMP: ABNORMAL
SERVICE CMNT-IMP: NORMAL
SERVICE CMNT-IMP: NORMAL
SODIUM SERPL-SCNC: 138 MMOL/L (ref 136–145)
WBC # BLD AUTO: 8.7 K/UL (ref 3.6–11)

## 2025-04-08 PROCEDURE — 36415 COLL VENOUS BLD VENIPUNCTURE: CPT

## 2025-04-08 PROCEDURE — 2580000003 HC RX 258: Performed by: INTERNAL MEDICINE

## 2025-04-08 PROCEDURE — 6370000000 HC RX 637 (ALT 250 FOR IP): Performed by: STUDENT IN AN ORGANIZED HEALTH CARE EDUCATION/TRAINING PROGRAM

## 2025-04-08 PROCEDURE — 6360000002 HC RX W HCPCS: Performed by: INTERNAL MEDICINE

## 2025-04-08 PROCEDURE — 2500000003 HC RX 250 WO HCPCS: Performed by: STUDENT IN AN ORGANIZED HEALTH CARE EDUCATION/TRAINING PROGRAM

## 2025-04-08 PROCEDURE — 6360000002 HC RX W HCPCS: Performed by: STUDENT IN AN ORGANIZED HEALTH CARE EDUCATION/TRAINING PROGRAM

## 2025-04-08 PROCEDURE — 99232 SBSQ HOSP IP/OBS MODERATE 35: CPT | Performed by: INTERNAL MEDICINE

## 2025-04-08 PROCEDURE — 85025 COMPLETE CBC W/AUTO DIFF WBC: CPT

## 2025-04-08 PROCEDURE — 51702 INSERT TEMP BLADDER CATH: CPT

## 2025-04-08 PROCEDURE — 82962 GLUCOSE BLOOD TEST: CPT

## 2025-04-08 PROCEDURE — 80048 BASIC METABOLIC PNL TOTAL CA: CPT

## 2025-04-08 RX ORDER — OLANZAPINE 5 MG/1
5 TABLET ORAL NIGHTLY
Qty: 30 TABLET | Refills: 0 | Status: SHIPPED | OUTPATIENT
Start: 2025-04-08

## 2025-04-08 RX ADMIN — ATORVASTATIN CALCIUM 10 MG: 10 TABLET, FILM COATED ORAL at 09:51

## 2025-04-08 RX ADMIN — INSULIN GLARGINE 10 UNITS: 100 INJECTION, SOLUTION SUBCUTANEOUS at 09:59

## 2025-04-08 RX ADMIN — Medication 1 CAPSULE: at 09:51

## 2025-04-08 RX ADMIN — ENOXAPARIN SODIUM 40 MG: 100 INJECTION SUBCUTANEOUS at 09:51

## 2025-04-08 RX ADMIN — VANCOMYCIN HYDROCHLORIDE 1000 MG: 1 INJECTION, POWDER, LYOPHILIZED, FOR SOLUTION INTRAVENOUS at 14:00

## 2025-04-08 RX ADMIN — Medication 100 MG: at 09:51

## 2025-04-08 RX ADMIN — INSULIN LISPRO 2 UNITS: 100 INJECTION, SOLUTION INTRAVENOUS; SUBCUTANEOUS at 13:07

## 2025-04-08 RX ADMIN — CETIRIZINE HYDROCHLORIDE 10 MG: 10 TABLET, FILM COATED ORAL at 09:52

## 2025-04-08 RX ADMIN — INSULIN LISPRO 1 UNITS: 100 INJECTION, SOLUTION INTRAVENOUS; SUBCUTANEOUS at 10:00

## 2025-04-08 RX ADMIN — PANTOPRAZOLE SODIUM 40 MG: 40 TABLET, DELAYED RELEASE ORAL at 09:53

## 2025-04-08 RX ADMIN — TIMOLOL MALEATE 1 DROP: 2.5 SOLUTION OPHTHALMIC at 09:52

## 2025-04-08 RX ADMIN — MIDODRINE HYDROCHLORIDE 2.5 MG: 5 TABLET ORAL at 13:13

## 2025-04-08 RX ADMIN — POTASSIUM CHLORIDE 10 MEQ: 750 TABLET, FILM COATED, EXTENDED RELEASE ORAL at 09:53

## 2025-04-08 RX ADMIN — METOPROLOL SUCCINATE 12.5 MG: 25 TABLET, EXTENDED RELEASE ORAL at 09:51

## 2025-04-08 RX ADMIN — SODIUM CHLORIDE, PRESERVATIVE FREE 10 ML: 5 INJECTION INTRAVENOUS at 10:02

## 2025-04-08 RX ADMIN — MIDODRINE HYDROCHLORIDE 2.5 MG: 5 TABLET ORAL at 09:52

## 2025-04-08 ASSESSMENT — PAIN SCALES - GENERAL
PAINLEVEL_OUTOF10: 0

## 2025-04-08 NOTE — CARE COORDINATION
Labs    Equipment:  []CPAP/BiPAP  []Wound Vacuum  []Harvey or Urinary Device  []PICC/Central Line  []Nebulizer  []Ventilator    Treatment:  []Isolation (for MRSA, VRE, etc.)  []Surgical Drain Management  []Tracheostomy Care  []Dressing Changes  []Dialysis with transportation  []PEG Care  []Oxygen  []Daily Weights for Heart Failure    Dietary:  []Any diet limitations  []Tube Feedings   []Total Parenteral Management (TPN)    Financial Resources:  []Medicaid Application Completed    []UAI Completed and copy given to pt/family  and copy given to pt/family  []A screening has previously been completed.    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.     [] Individual refused to have screening conducted.     []Medicaid Application Completed    []The screening denied because it was determined individual did not need/did not qualify for nursing facility level of care.  [] Out of state residents seeking direct admission to a [facility] nursing facility.  [] Individuals who are inpatients of an out of state hospital, or in state or out of state veterans/ hospital and seek direct admission to a VA nursing facility  [] Individuals who are pateints or residents of a state owned/operated facility that is licensed by Department of Behavioral Services (DBHDS) and seek direct admission to VA nursing facility  [] A screening not required for enrollment in Medicaid Hospice services as set out in 12 VAC 30-  [] [facility] staff shall perform screenings of the [facility] clients.    Advanced Care Plan:  []Surrogate Decision Maker of Care  []POA  [x]Communicated Code Status (Full) and copy sent.    Other:              RODERICK Terrazas,CM  [phone]

## 2025-04-08 NOTE — PROGRESS NOTES
Hospitalist Progress Note    NAME:   Rosita Morillo   : 1945   MRN: [id number]     Date/Time: 2025 4:21 PM  Patient PCP: Juan Go MD    Estimated discharge date: -  Barriers: Clinical improvement, final abx plan, placement? Vs home with infusion center abx?      Assessment / Plan:    Recurrent lower extremity cellulitis   History of septic shock secondary to MRSE bacteremia, resolved - 2025  Discharged from [facility] to rehab 3/14 on IV vancomycin per ID recommendations. TTE was negative for vegetation. Plan was to continue until . However patient lost PICC line access x2 after pulled out due to agitation at facility.   Was attempted to change to PO linezolid however patient would not take this.   Transferred [facility] to [facility] for ID evaluation.    Repeat blood cultures NGTD  UA did reflex to culture but revealed no growth.  - Continue IV Vancomycin  - Repeat CBC and will send blood cultures   - ID consulted, appreciate assistance --tentative plans to use Dalbavancin with tentative plans through      Toxic metabolic encephalopathy  Hospital delirium    notes waxing waning severe delirium since last hospitalization where she was intubated.   Severely agitated on arrival; biting at hands and scratching herself causing several skin tears.  - Zyprexa nightly for now, monitor QTc  - Monitor closely   - Continue melatonin nightly, thiamine daily      History of a-fib  - Off of anticoagulation  - Monitor on telemetry      Hx of SSS s/p PPM  Congestive heart failure EF 50-55%  - Hold jardiance  - Consider Lasix if needed in the setting of IV fluids     Intermittent hypotension  - Continue home midodrine      T2DM  - Continue home lantus  - Continue SSI     Hypernatremia  - Likely secondary to decreased p.o. intake in the setting of delirium  - Gentle IV fluid for 24 hours     Class II Obesity - Patient with BMI of There is no height or weight on file to calculate BMI.    Medical 
      Hospitalist Progress Note    NAME:   Rosita Morillo   : 1945   MRN: [id number]     Date/Time: 2025 3:54 PM  Patient PCP: Juan Go MD    Estimated discharge date: -  Barriers: Clinical improvement,  placement      Assessment / Plan:    Recurrent lower extremity cellulitis   History of septic shock secondary to MRSE bacteremia, resolved - 2025  Discharged from [facility] to rehab 3/14 on IV vancomycin per ID recommendations. TTE was negative for vegetation. Plan was to continue until . However patient lost PICC line access x2 after pulled out due to agitation at facility.   Was attempted to change to PO linezolid however patient would not take this.   [facility]red [facility] to [facility] for ID evaluation.    Repeat blood cultures NGTD  UA did reflex to culture but revealed no growth.  - Continue IV Vancomycin  - Repeat CBC and will send blood cultures   - ID consulted, appreciate assistance --tentative plans to use Dalbavancin with tentative plans through --will need to coordinate with pharmacy and case management on Monday     Toxic metabolic encephalopathy  [facility] delirium    notes waxing waning severe delirium since last hospitalization where she was intubated.   Severely agitated on arrival; biting at hands and scratching herself causing several skin tears.  - Zyprexa nightly for now, monitor QTc  - Monitor closely   - Continue melatonin nightly, thiamine daily      History of a-fib  - Off of anticoagulation  - Monitor on telemetry      Hx of SSS s/p PPM  Congestive heart failure EF 50-55%  - Hold jardiance  - Consider Lasix if needed in the setting of IV fluids     Intermittent hypotension  - Continue home midodrine      T2DM  - Continue home lantus  - Continue SSI     Hypernatremia  - Likely secondary to decreased p.o. intake in the setting of delirium  - Gentle IV fluid for 24 hours     Class II Obesity - Patient with BMI of There is no height or weight on file to calculate 
      Hospitalist Progress Note    NAME:   Rosita Morillo   : 1945   MRN: [id number]     Date/Time: 2025 2:06 PM  Patient PCP: Juan Go MD    Estimated discharge date: -  Barriers: Clinical improvement, placement      Assessment / Plan:    Recurrent lower extremity cellulitis   History of septic shock secondary to MRSE bacteremia, resolved - 2025  Discharged from [facility] to rehab 3/14 on IV vancomycin per ID recommendations. TTE was negative for vegetation. Plan was to continue until . However patient lost PICC line access x2 after pulled out due to agitation at facility.   Was attempted to change to PO linezolid however patient would not take this.   [facility]red [facility] to [facility] for ID evaluation.    Repeat blood cultures NGTD  UA did reflex to culture but revealed no growth.  - Continue IV Vancomycin  - Repeat CBC and will send blood cultures   ID recs:   Antimicrobial orders for discharge  -Dalbavancin 1.5 g   IV x 1   -Pull line at end of therapy.    - CBC, CMP, blood cultures x 2 -1 week after dalbavancin  -Fax reports to [phone], call with critical labs  at [phone]  -Encourage adequate fluids, daily probiotic/yogurt  -If line malfunction occurs and home health cannot reposition  please send patient to ED immediately  - ID follow-up -no follow-up required  - If persistent side effects occur stop antibiotic and call-ID/PCP.     Toxic metabolic encephalopathy  [facility] delirium    notes waxing waning severe delirium since last hospitalization where she was intubated.   Severely agitated on arrival; biting at hands and scratching herself causing several skin tears.  - Zyprexa nightly for now, monitor QTc  - Monitor closely   - Continue melatonin nightly, thiamine daily      History of a-fib  - Off of anticoagulation  - Monitor on telemetry      Hx of SSS s/p PPM  Congestive heart failure EF 50-55%  - Hold jardiance  - Consider Lasix if needed in the setting of IV fluids   
  Physician Progress Note      PATIENT:               MIRI ACE  CSN #:                  [id number]  :                       1945  ADMIT DATE:       2025 10:54 PM  DISCH DATE:  RESPONDING  PROVIDER #:        Johanny Felder MD          QUERY TEXT:    Please document the relationship, if any, between the cellulitis and diabetes:    The clinical indicators include:  H&P note on  Recurrent lower extremity cellulitis  Diabetes type 2- Continue home Lantus  Glucose 217, 231, 244  Infectious Disease consult on  Diabetes Type 2, Elevated blood sugars  Multiple pressure  wounds healing R/heel eschar, L/heel eschar, buttock   wounds.  Hospitalist PN ON  Recurrent lower extremity cellulitis, T2DM Continue   home lantus    IV Vancomycin, ID consult, Lantus, SSI    Thank you,  Angus Cantu S CDS  Options provided:  -- lower extremity cellulitis related to Diabetes  -- lower extremity cellulitis unrelated to Diabetes  -- Other - I will add my own diagnosis  -- Disagree - Not applicable / Not valid  -- Disagree - Clinically unable to determine / Unknown  -- Refer to Clinical Documentation Reviewer    PROVIDER RESPONSE TEXT:    lower extremity cellulitis related to Diabetes.    Query created by: Angus Cantu on 2025 7:45 AM      Electronically signed by:  Johanny Felder MD 2025 8:31 AM          
End of Shift Note    Bedside shift change report given to ALEXANDRIA Quach (oncoming nurse) by Felisa Maharaj LPN (offgoing nurse).  Report included the following information SBAR    Shift worked:  7a-7p     Shift summary and any significant changes:    Pt rested in bed today, q2 turned. Pt denies any c/o pain. Pt was much more awake and alert today once family surrounded bedside. Pt tolerated diet. Pt disliked the puree food but tolerates puddings and apple sauce. Harvey intact and draining, uneventful day.     Concerns for physician to address:  none     Zone phone for oncoming shift:  [phone]         Felisa Maharaj LPN                            
End of Shift Note    Bedside shift change report given to ALEXANDRIA Quach (oncoming nurse) by Felisa Maharaj LPN (offgoing nurse).  Report included the following information SBAR    Shift worked:  7a-7p     Shift summary and any significant changes:    Pt rested in bed, q2 turned. Wound care completed. Pt tolerating diet.  Pt denies any c/o pain today. Harvey intact and draining.      Concerns for physician to address:  none     Zone phone for oncoming shift:   [phone]           Felisa Maharaj LPN                            
End of Shift Note    Bedside shift change report given to Cam (oncoming nurse) by Margaret Javier RN (offgoing nurse).  Report included the following information SBAR, ED Summary, Intake/Output, MAR, and Recent Results    Shift worked:  7p-7a     Shift summary and any significant changes:     Patient was DA from [facility]. Oriented to self and year, dominguez in place on arrival, RN contacted transferring ER who confirmed it was a chronic dominguez they did not change.  Dominguez changed, urine specimen collected. Blood cultures and labs sent with second RN assistance.  Patient yells out frequently, talks to people who are not in room, and pulls at lines/tubes and opens skin tears.  Multiple wound sites, pictures taken and skin assessed with second nurse. Heels elevated on heels up pillow, patient turned from side to side.      Concerns for physician to address:  Need wound care consult, speech eval for failed swallow screen.  Patient has DNR paperwork on chart.      Zone phone for oncoming shift:          Activity:     Number times ambulated in hallways past shift: 0  Number of times OOB to chair past shift: 0    Cardiac:   Cardiac Monitoring: Yes           Access:  Current line(s): PIV     Genitourinary:   Urinary status: dominguez    Respiratory:      Chronic home O2 use?: NO  Incentive spirometer at bedside: NO       GI:     Current diet:  ADULT DIET; Regular; 3 carb choices (45 gm/meal)  Passing flatus: YES  Tolerating current diet: unknown       Pain Management:   Patient states pain is manageable on current regimen: N/A    Skin:     Interventions: turn team, specialty bed, float heels, increase time out of bed, and foam dressing    Patient Safety:  Fall Score:    Interventions: bed/chair alarm, assistive device (walker, cane. etc), gripper socks, pt to call before getting OOB, and stay with me (per policy)       Length of Stay:  Expected LOS: 4  Actual LOS: 1      Margaret Javier, RN                            
End of Shift Note    Bedside shift change report given to Felisa (oncoming nurse) by Margaret Javier RN (offgoing nurse).  Report included the following information SBAR, MAR, and Recent Results    Shift worked:  7p-7a     Shift summary and any significant changes:     Vitals stable, wound dressings changed per order, zinc to buttocks and groin, meds with applesauce, slept well through night, dominguez patent       Concerns for physician to address:       Zone phone for oncoming shift:     ..       Activity:     Number times ambulated in hallways past shift: 0  Number of times OOB to chair past shift: 0    Cardiac:   Cardiac Monitoring: Yes           Access:  Current line(s): PIV     Genitourinary:   Urinary status: dominguez    Respiratory:      Chronic home O2 use?: NO  Incentive spirometer at bedside: NO       GI:     Current diet:  ADULT DIET; Dysphagia - Pureed; 3 carb choices (45 gm/meal)  Passing flatus: YES  Tolerating current diet: YES       Pain Management:   Patient states pain is manageable on current regimen: YES    Skin:     Interventions: turn team, float heels, increase time out of bed, and foam dressing    Patient Safety:  Fall Score:    Interventions: bed/chair alarm       Length of Stay:  Expected LOS: 6  Actual LOS: 3      Margaret Javier, RN                            
End of Shift Note    Bedside shift change report given to NAYELY Roberto  (oncoming nurse) by Philomena Rivera RN (offgoing nurse).  Report included the following information SBAR    Shift worked:  7P-7A     Shift summary and any significant changes:    Patient is very confused, tried to pull out the dominguez, will probably need [employer] telesitter.  Q2 turns and incontinence care completed  Tolerating diet well.  Dominguez patent and draining, output documented.     Concerns for physician to address:  Patient very confused.     Zone phone for oncoming shift:          Activity:  Level of Assistance: Dependent, patient does less than 25%  Number times ambulated in hallways past shift: 0  Number of times OOB to chair past shift: 0    Cardiac:   Cardiac Monitoring: No     Cardiac Rhythm: Sinus rhythm    Access:  Current line(s): PIV     Genitourinary:   Urinary Status: Dominguez, Draining    Respiratory:   O2 Device: None (Room air)  Chronic home O2 use?: NO  Incentive spirometer at bedside: NO    GI:  Last BM (including prior to admit): 04/05/25  Current diet:  ADULT DIET; Dysphagia - Pureed; 3 carb choices (45 gm/meal)  Passing flatus: YES    Pain Management:   Patient states pain is manageable on current regimen: YES    Skin:  Pool Scale Score: 12  Interventions: Wound Offloading (Prevention Methods): Turning, Pillows, Repositioning, Elevate heels    Patient Safety:  Fall Risk: Nursing Judgement-Fall Risk High(Add Comments): Yes  Fall Risk Interventions  Nursing Judgement-Fall Risk High(Add Comments): Yes  Toilet Every 2 Hours-In Advance of Need: Yes  Hourly Visual Checks: In bed, Awake, Confused  Fall Visual Posted: Fall sign posted, Armband  Room Door Open: Yes  Alarm On: Bed  Patient Moved Closer to Nursing Station: No    Active Consults:   IP CONSULT TO INFECTIOUS DISEASES  IP CONSULT TO PHARMACY  IP CONSULT TO PHARMACY  IP CONSULT TO PHARMACY  IP CONSULT TO CASE MANAGEMENT  IP CONSULT TO CASE MANAGEMENT    Length of 
End of Shift Note    Bedside shift change report given to NAYELY Tesfaye  (oncoming nurse) by Philomena Rivera RN (offgoing nurse).  Report included the following information SBAR    Shift worked:  7p-7a     Shift summary and any significant changes:    Patient confused.  Q2 turns and incontinence care completed.  Harvey patent and draining well.  Awaiting SNF placement.       Concerns for physician to address:       Zone phone for oncoming shift:          Activity:  Level of Assistance: Maximum assist, patient does 25-49%  Number times ambulated in hallways past shift: 0  Number of times OOB to chair past shift: 0    Cardiac:   Cardiac Monitoring: No      Cardiac Rhythm: Sinus rhythm    Access:  Current line(s): PIV     Genitourinary:   Urinary Status: Harvey, Draining    Respiratory:   O2 Device: None (Room air)  Chronic home O2 use?: NO  Incentive spirometer at bedside: NO    GI:  Last BM (including prior to admit): 04/07/25  Current diet:  ADULT DIET; Dysphagia - Pureed; 3 carb choices (45 gm/meal)  Passing flatus: YES    Pain Management:   Patient states pain is manageable on current regimen: YES    Skin:  Pool Scale Score: 12  Interventions: Wound Offloading (Prevention Methods): Repositioning, Pillows, Turning    Patient Safety:  Fall Risk: Nursing Judgement-Fall Risk High(Add Comments): Yes  Fall Risk Interventions  Nursing Judgement-Fall Risk High(Add Comments): Yes  Toilet Every 2 Hours-In Advance of Need: Yes  Hourly Visual Checks: In bed, Awake  Fall Visual Posted: Fall sign posted, Armband  Room Door Open: Yes  Alarm On: Bed  Patient Moved Closer to Nursing Station: No    Active Consults:   IP CONSULT TO INFECTIOUS DISEASES  IP CONSULT TO PHARMACY  IP CONSULT TO PHARMACY  IP CONSULT TO PHARMACY  IP CONSULT TO CASE MANAGEMENT  IP CONSULT TO CASE MANAGEMENT  IP CONSULT TO PHARMACY    Length of Stay:  Expected LOS: 6  Actual LOS: 5    Philomena Rivera, ALEXANDRIA                            
End of Shift Note    Bedside shift change report given to Vijaya (oncoming nurse) by Margaret Javier RN (offgoing nurse).  Report included the following information SBAR, MAR, and Recent Results    Shift worked:  7p-7a     Shift summary and any significant changes:     Patient continues with agitation and shouting, ordered medications given with applesauce, patient slept intermittently with spouse at bedside throughout shift. Wound care completed per order.      Concerns for physician to address:       Zone phone for oncoming shift:          Activity:     Number times ambulated in hallways past shift: 0  Number of times OOB to chair past shift: 0    Cardiac:   Cardiac Monitoring: Yes           Access:  Current line(s): PIV     Genitourinary:   Urinary status: dominguez    Respiratory:      Chronic home O2 use?: NO  Incentive spirometer at bedside: NO       GI:     Current diet:  ADULT DIET; Dysphagia - Pureed; 3 carb choices (45 gm/meal)  Passing flatus: YES  Tolerating current diet: YES       Pain Management:   Patient states pain is manageable on current regimen: YES    Skin:     Interventions: turn team, specialty bed, float heels, increase time out of bed, and PT/OT consult    Patient Safety:  Fall Score:    Interventions: bed/chair alarm, assistive device (walker, cane. etc), gripper socks, pt to call before getting OOB, and stay with me (per policy)       Length of Stay:  Expected LOS: 4  Actual LOS: 2      Margaret Javier RN                            
End of Shift Note    Bedside shift change report given to maylin (oncoming nurse) by Héctor Mcclain RN (offgoing nurse).  Report included the following information SBAR, Kardex, Intake/Output, MAR, and Cardiac Rhythm      Shift worked:  7a-7p     Shift summary and any significant changes:     VSS. BP hypertensive. Pt received prn tylenol at end of shift for pain. Woundcare and speech consult placed for patient aspirated down in ED. 1 bm noted during shift. uneventful     Concerns for physician to address:       Zone phone for oncoming shift:          Activity:  Level of Assistance: Dependent, patient does less than 25%  Number times ambulated in hallways past shift: 0  Number of times OOB to chair past shift: 0    Cardiac:   Cardiac Monitoring: Yes      Cardiac Rhythm: Atrial fib    Access:  Current line(s): PIV     Genitourinary:   Urinary Status: Harvey    Respiratory:   O2 Device: None (Room air)  Chronic home O2 use?: N/A  Incentive spirometer at bedside: N/A    GI:  Last BM (including prior to admit): 04/02/25  Current diet:  ADULT DIET; Dysphagia - Pureed; 3 carb choices (45 gm/meal)  Passing flatus: YES    Pain Management:   Patient states pain is manageable on current regimen:     Skin:  Pool Scale Score: 12  Interventions: Wound Offloading (Prevention Methods): Pillows, Repositioning, Turning, Elevate heels    Patient Safety:  Fall Risk: Nursing Judgement-Fall Risk High(Add Comments): Yes  Fall Risk Interventions  Nursing Judgement-Fall Risk High(Add Comments): Yes  Toilet Every 2 Hours-In Advance of Need: Yes  Hourly Visual Checks: Awake, Confused, In bed  Fall Visual Posted: Socks  Room Door Open: Yes  Alarm On: Bed  Patient Moved Closer to Nursing Station: No    Active Consults:   IP CONSULT TO INFECTIOUS DISEASES  IP CONSULT TO PHARMACY  IP CONSULT TO PHARMACY    Length of Stay:  Expected LOS: 4  Actual LOS: 1    Héctor Mcclain, RN                           
Nonbillable note.  Overnight admission.  Patient alert and oriented to self only on my assessment.  Fidgeting and shouting.  Spouse requested phone call from provider.  Several questions with regards to antibiotics and wanting to stay until she has been completely treated.  Discussed with patient's partner plan with ID, if blood cultures remain negative and legs remain under good infection control could consider discontinuation of antibiotics.  He is agreeable to nightly medication for delirium.  Will need to have continuing discussions regarding delirium, recovery, and rehabilitation.  Otherwise agree with overnight events/H&P.  
Pharmacy Antimicrobial Kinetic Dosing    Indication for Antimicrobials: bloodstream, SSTI    Current Regimen of Each Antimicrobial:  Vancomycin 2000 mg iv once then 1250 mg iv every 24 hr; Start Date ; Day # 5    Previous Antimicrobial Therapy:  Ceftriaxone 1 gm iv X 1 on  ~ 1800 given at OSH    Goal Level: Vancomycin -600    Date Dose & Interval Measured (mcg/mL) Predicted AUC 24-48 Predicted AUC 24,ss    0600 1250 q 24 21.4 716 763                   Significant Cultures:   4/3 - blood- NGTD  4/3 - urine - NG    Labs:  Recent Labs     Units 25  0552 25  0542 25  0327   CREATININE MG/DL 1.06* 0.93 1.09*   BUN MG/DL 14 14 19   WBC K/uL 5.9 5.5 6.4     Temp (24hrs), Av °F (36.7 °C), Min:97.7 °F (36.5 °C), Max:98.3 °F (36.8 °C)    Conditions for Dosing Consideration:     Creatinine Clearance (mL/min): Estimated Creatinine Clearance: 46 mL/min (A) (based on SCr of 1.06 mg/dL (H)).       Impression/Plan:   Vancomycin 1250 mg IV Q24H, will yield a predicted [id number] = 716, Predicted AUC24,ss = 763. Will adjust the last dose of vancomycin to be 750 mg today (yields /474) and will change it to dalbavancine on   Per MD request to give a one time dose of dalbavancine 1.5 g on  and will complete abx treatment on .  Antimicrobial stop date pending      Pharmacy will follow daily and adjust medications as appropriate for renal function and/or serum levels.    Thank you,  Jatinder Tolentino [facility]      
Pharmacy Antimicrobial Kinetic Dosing    Indication for Antimicrobials: bloodstream, SSTI    Current Regimen of Each Antimicrobial:  Vancomycin 2000 mg iv once then 1250 mg iv every 24 hr; Start Date ; Day # 7    Previous Antimicrobial Therapy:  Ceftriaxone 1 gm iv X 1 on  ~ 1800 given at OSH  Dalbavancin x 1 on 25    Goal Level: Vancomycin -600    Date Dose & Interval Measured (mcg/mL) Predicted AUC 24-48 Predicted AUC 24,ss    0600 1250 q 24 21.4 716 763                   Significant Cultures:   4/3 - blood- NGTD  4/3 - urine - NG    Labs:  Recent Labs     Units 25  0517 25  0435 25  0552   CREATININE MG/DL 0.85 0.88 1.06*   BUN MG/DL 16 16 14   WBC K/uL 8.7 9.2 5.9     Temp (24hrs), Av.3 °F (36.8 °C), Min:97.2 °F (36.2 °C), Max:99.3 °F (37.4 °C)    Conditions for Dosing Consideration:     Creatinine Clearance (mL/min): Estimated Creatinine Clearance: 57 mL/min (based on SCr of 0.85 mg/dL).       Impression/Plan:   Consult from ID to restart Vancomycin . Will give 1000 mg q24h for Predicted auc 457/504   Per MD request to give a one time dose of dalbavancine 1.5 g on   Vancomycin was not given on  while Dalvance was administered.  Consult from ID to restart Vancomycin . Will give 1000 mg q24h for Predicted auc 457/504   Antimicrobial stop date pending       Pharmacy will follow daily and adjust medications as appropriate for renal function and/or serum levels.    Thank you,  JC JHAVERI [facility]        
Report called to [facility]. Report called to NAYELY Townsend reviewed. Wound care instructions, and orders and plan of care. Discharged to facility via [facility] transportation. Son has patient belongings and copy of Discharge Instructions.  
Spiritual Health History and Assessment/Progress Note  [facility]    Initial Encounter,  ,  ,      Name: Rosita Morillo MRN: [id number]    Age: 79 y.o.     Sex: female   Language: English   Alevism: [employer]   Cellulitis     Date: 4/3/2025            Total Time Calculated: 19 min              Spiritual Assessment began in [facility] 3 SURG TELE        Referral/Consult From: Rounding   Encounter Overview/Reason: Initial Encounter  Service Provided For: Patient and family together    Suri, Belief, Meaning:   Patient identifies as spiritual, is connected with a suri tradition or spiritual practice, and has beliefs or practices that help with coping during difficult times  Family/Friends identify as spiritual, are connected with a suri tradition or spiritual practice, and have beliefs or practices that help with coping during difficult times      Importance and Influence:  Patient has no beliefs influential to healthcare decision-making identified during this visit  Family/Friends have no beliefs influential to healthcare decision-making identified during this visit    Community:  Patient feels well-supported. Support system includes: Spouse/Partner, Children, and Suri Community  Family/Friends feel well-supported. Support system includes: Children    Assessment and Plan of Care:     Patient Interventions include: Other: patient was quite confused; very little meaningful conversation   Family/Friends Interventions include: Facilitated expression of thoughts and feelings, Explored spiritual coping/struggle/distress, Affirmed coping skills/support systems, and Other: assurance of prayer    Patient Plan of Care: No spiritual needs identified for follow-up and Spiritual Care available upon further referral  Family/Friends Plan of Care: No spiritual needs identified for follow-up and Spiritual Care available upon further referral    Electronically signed by NARA STERLING 
Unable to complete admission database questions as patient is agitated and confused.  
Warm compress applied after PIV removed due to infiltration.   
PHARMACY  IP CONSULT TO CASE MANAGEMENT  IP CONSULT TO CASE MANAGEMENT  IP CONSULT TO PHARMACY  IP CONSULT TO CASE MANAGEMENT    Length of Stay:  Expected LOS: 6  Actual LOS: 5    ISABELLA MOORE LPN                            
procedures/Xrays and details which were not copied into this note but were reviewed prior to creation of Plan.      LABS:  I reviewed today's most current labs and imaging studies.  Pertinent labs include:  Recent Labs     04/05/25 0542 04/06/25 0552 04/07/25 0435   WBC 5.5 5.9 9.2   HGB 12.0 12.6 12.8   HCT 38.7 40.4 40.6    136* 132*     Recent Labs     04/05/25 0542 04/06/25 0552 04/07/25 0435    136 136   K 3.0* 4.2 4.0    104 106   CO2 31 27 28   GLUCOSE 126* 299* 116*   BUN 14 14 16   CREATININE 0.93 1.06* 0.88   CALCIUM 8.2* 8.2* 8.8   MG 1.6  --   --    BILITOT 0.6 0.7  --    AST 38* 71*  --    ALT 29 49  --        Signed: Johanny Felder MD         
ventricle size is normal. Mildly increased wall thickness. No focal WMA is noted.   Right Ventricle: Right ventricle is borderline dilated. Normal systolic function.   Mitral Valve: Mild to moderate regurgitation.   Tricuspid Valve: Moderate regurgitation. Severe pulmonary HTN by provided doppler.   Left Atrium: Left atrium is dilated.       Greater than 50% of the time was spent on the following:  Preparing for visit and chart review.  Obtaining and/or reviewing separately obtained history  Performing a medically appropriate exam and/or evaluation  Counseling and educating a patient/family/caregiver as noted above  Placing relevant orders  Referring and communicating with other professionals (not separately reported)  Independently interpreting results (not separately reported) and communicating results to the patient/family/caregiver  Care coordination (not separately reported) as noted above  Documenting clinical information in the electronic health records (e.g. problem list, visit note) on the day of the encounter       Stephanie Angel MD FACP              
silhouette.increased moderate to large right-sided effusion with cardiac pacer on the left. There is no focal consolidation.There is no pneumothorax.     Increased moderate to large right effusion with cardiac pacer. Electronically signed by ARJUN ALTMAN      Greater than 50% of the time was spent on the following:  Preparing for visit and chart review.  Obtaining and/or reviewing separately obtained history  Performing a medically appropriate exam and/or evaluation  Counseling and educating a patient/family/caregiver as noted above  Placing relevant orders  Referring and communicating with other professionals (not separately reported)  Independently interpreting results (not separately reported) and communicating results to the patient/family/caregiver  Care coordination (not separately reported) as noted above  Documenting clinical information in the electronic health records (e.g. problem list, visit note) on the day of the encounter       Stephanie Angel MD FACP

## 2025-04-08 NOTE — DISCHARGE SUMMARY
Discharge Summary    Name: Rosita Morillo  [id number]  YOB: 1945 (Age: 79 y.o.)   Date of Admission: 4/2/2025  Date of Discharge: 4/8/2025  Attending Physician: Johanny Felder MD    Discharge Diagnosis:     Recurrent lower extremity cellulitis   History of septic shock secondary to MRSE bacteremia, resolved - 2/2025  Toxic metabolic encephalopathy  [facility] delirium   History of a-fib  Hx of SSS s/p PPM  Congestive heart failure EF 50-55%  Intermittent hypotension  T2DM  Hypernatremia    Consultations:  IP CONSULT TO INFECTIOUS DISEASES  IP CONSULT TO PHARMACY  IP CONSULT TO PHARMACY  IP CONSULT TO CASE MANAGEMENT  IP CONSULT TO PHARMACY  IP CONSULT TO CASE MANAGEMENT  IP CONSULT TO CASE MANAGEMENT      Brief Admission History/Reason for Admission Per Noah Schilling MD:     Rosita Morillo is a 79 y.o.  female with PMHx significant for  has a past medical history of Arthritis, CAD (coronary artery disease), Congestive heart failure (HCC), Depression, Diabetes (HCC), Fibromyalgia, Gastrointestinal disorder, Heart failure (HCC), Infectious disease, Menopause, and Psychiatric disorder. who presents with the above chief complaint.      We were asked to admit for work up and further evaluation.      Patient is acutely agitated and confused, unable to provide history.      I called patient's , Nhan Morillo, over the phone. He notes patient was recently discharged from [facility] to a rehab facility. Notes that she was supposed to be on IV Vancomycin for bacteremia at facility however notes that this was not given at the facility due to problems with the PICC line. Reports that she was discharged around 3/14. PICC line was pulled out by patient once and she had this replaced on 3/25 however notes she again pulled the PICC line out the following day. Notes that because of this she missed approximately 6-7 days of IV antibiotics. He notes facility had attempted to switch her to

## 2025-06-02 ENCOUNTER — APPOINTMENT (OUTPATIENT)
Facility: HOSPITAL | Age: 80
End: 2025-06-02
Payer: OTHER GOVERNMENT

## 2025-06-02 ENCOUNTER — HOSPITAL ENCOUNTER (EMERGENCY)
Facility: HOSPITAL | Age: 80
Discharge: HOME | End: 2025-06-02
Payer: OTHER GOVERNMENT

## 2025-06-02 VITALS
HEIGHT: 63 IN | WEIGHT: 222 LBS | SYSTOLIC BLOOD PRESSURE: 132 MMHG | HEART RATE: 85 BPM | BODY MASS INDEX: 39.34 KG/M2 | DIASTOLIC BLOOD PRESSURE: 71 MMHG | RESPIRATION RATE: 13 BRPM | TEMPERATURE: 97.5 F | OXYGEN SATURATION: 92 %

## 2025-06-02 DIAGNOSIS — R60.9 DEPENDENT EDEMA: ICD-10-CM

## 2025-06-02 DIAGNOSIS — R60.0 LEG EDEMA: Primary | ICD-10-CM

## 2025-06-02 LAB
ALBUMIN SERPL-MCNC: 2.7 G/DL (ref 3.5–5)
ALBUMIN/GLOB SERPL: 0.5 (ref 1.1–2.2)
ALP SERPL-CCNC: 155 U/L (ref 45–117)
ALT SERPL-CCNC: 17 U/L (ref 12–78)
ANION GAP SERPL CALC-SCNC: 3 MMOL/L (ref 2–12)
AST SERPL-CCNC: 24 U/L (ref 15–37)
BASOPHILS # BLD: 0.05 K/UL (ref 0–0.1)
BASOPHILS NFR BLD: 0.7 % (ref 0–1)
BILIRUB SERPL-MCNC: 0.5 MG/DL (ref 0.2–1)
BUN SERPL-MCNC: 29 MG/DL (ref 6–20)
BUN/CREAT SERPL: 26 (ref 12–20)
CALCIUM SERPL-MCNC: 8.8 MG/DL (ref 8.5–10.1)
CHLORIDE SERPL-SCNC: 102 MMOL/L (ref 97–108)
CO2 SERPL-SCNC: 32 MMOL/L (ref 21–32)
CREAT SERPL-MCNC: 1.13 MG/DL (ref 0.55–1.02)
CREATININE, POC: 1.01 MG/DL (ref 0.51–1.19)
DIFFERENTIAL METHOD BLD: ABNORMAL
EOSINOPHIL # BLD: 0.16 K/UL (ref 0–0.4)
EOSINOPHIL NFR BLD: 2.2 % (ref 0–7)
ERYTHROCYTE [DISTWIDTH] IN BLOOD BY AUTOMATED COUNT: 18.6 % (ref 11.5–14.5)
GLOBULIN SER CALC-MCNC: 5 G/DL (ref 2–4)
GLUCOSE BLD STRIP.AUTO-MCNC: 107 MG/DL (ref 65–117)
GLUCOSE BLD STRIP.AUTO-MCNC: 90 MG/DL (ref 65–117)
GLUCOSE SERPL-MCNC: 99 MG/DL (ref 65–100)
HCT VFR BLD AUTO: 33.2 % (ref 35–47)
HGB BLD-MCNC: 9.8 G/DL (ref 11.5–16)
IMM GRANULOCYTES # BLD AUTO: 0.03 K/UL (ref 0–0.04)
IMM GRANULOCYTES NFR BLD AUTO: 0.4 % (ref 0–0.5)
LACTATE BLD-SCNC: 1.13 MMOL/L (ref 0.4–2)
LYMPHOCYTES # BLD: 1.22 K/UL (ref 0.8–3.5)
LYMPHOCYTES NFR BLD: 17 % (ref 12–49)
MCH RBC QN AUTO: 24.7 PG (ref 26–34)
MCHC RBC AUTO-ENTMCNC: 29.5 G/DL (ref 30–36.5)
MCV RBC AUTO: 83.6 FL (ref 80–99)
MONOCYTES # BLD: 0.75 K/UL (ref 0–1)
MONOCYTES NFR BLD: 10.4 % (ref 5–13)
NEUTS SEG # BLD: 4.97 K/UL (ref 1.8–8)
NEUTS SEG NFR BLD: 69.3 % (ref 32–75)
NRBC # BLD: 0 K/UL (ref 0–0.01)
NRBC BLD-RTO: 0 PER 100 WBC
NT PRO BNP: 2032 PG/ML
PLATELET # BLD AUTO: 452 K/UL (ref 150–400)
PMV BLD AUTO: 9.6 FL (ref 8.9–12.9)
POTASSIUM SERPL-SCNC: 4.1 MMOL/L (ref 3.5–5.1)
PROT SERPL-MCNC: 7.7 G/DL (ref 6.4–8.2)
RBC # BLD AUTO: 3.97 M/UL (ref 3.8–5.2)
SERVICE CMNT-IMP: NORMAL
SERVICE CMNT-IMP: NORMAL
SODIUM SERPL-SCNC: 137 MMOL/L (ref 136–145)
WBC # BLD AUTO: 7.2 K/UL (ref 3.6–11)

## 2025-06-02 PROCEDURE — 96375 TX/PRO/DX INJ NEW DRUG ADDON: CPT

## 2025-06-02 PROCEDURE — 75635 CT ANGIO ABDOMINAL ARTERIES: CPT

## 2025-06-02 PROCEDURE — 83880 ASSAY OF NATRIURETIC PEPTIDE: CPT

## 2025-06-02 PROCEDURE — 6360000002 HC RX W HCPCS

## 2025-06-02 PROCEDURE — 96374 THER/PROPH/DIAG INJ IV PUSH: CPT

## 2025-06-02 PROCEDURE — 83605 ASSAY OF LACTIC ACID: CPT

## 2025-06-02 PROCEDURE — 80053 COMPREHEN METABOLIC PANEL: CPT

## 2025-06-02 PROCEDURE — 6360000004 HC RX CONTRAST MEDICATION

## 2025-06-02 PROCEDURE — 2500000003 HC RX 250 WO HCPCS

## 2025-06-02 PROCEDURE — 85025 COMPLETE CBC W/AUTO DIFF WBC: CPT

## 2025-06-02 PROCEDURE — 99285 EMERGENCY DEPT VISIT HI MDM: CPT

## 2025-06-02 PROCEDURE — 93005 ELECTROCARDIOGRAM TRACING: CPT

## 2025-06-02 PROCEDURE — 36415 COLL VENOUS BLD VENIPUNCTURE: CPT

## 2025-06-02 PROCEDURE — 82962 GLUCOSE BLOOD TEST: CPT

## 2025-06-02 RX ORDER — IOPAMIDOL 755 MG/ML
100 INJECTION, SOLUTION INTRAVASCULAR
Status: COMPLETED | OUTPATIENT
Start: 2025-06-02 | End: 2025-06-02

## 2025-06-02 RX ORDER — HYDROMORPHONE HYDROCHLORIDE 1 MG/ML
1 INJECTION, SOLUTION INTRAMUSCULAR; INTRAVENOUS; SUBCUTANEOUS
Status: COMPLETED | OUTPATIENT
Start: 2025-06-02 | End: 2025-06-02

## 2025-06-02 RX ORDER — FUROSEMIDE 10 MG/ML
40 INJECTION INTRAMUSCULAR; INTRAVENOUS ONCE
Status: COMPLETED | OUTPATIENT
Start: 2025-06-02 | End: 2025-06-02

## 2025-06-02 RX ADMIN — IOPAMIDOL 100 ML: 755 INJECTION, SOLUTION INTRAVENOUS at 18:07

## 2025-06-02 RX ADMIN — HYDROMORPHONE HYDROCHLORIDE 1 MG: 1 INJECTION, SOLUTION INTRAMUSCULAR; INTRAVENOUS; SUBCUTANEOUS at 17:50

## 2025-06-02 RX ADMIN — FUROSEMIDE 40 MG: 10 INJECTION, SOLUTION INTRAMUSCULAR; INTRAVENOUS at 19:57

## 2025-06-02 ASSESSMENT — PAIN SCALES - GENERAL
PAINLEVEL_OUTOF10: 0
PAINLEVEL_OUTOF10: 2

## 2025-06-02 ASSESSMENT — PAIN DESCRIPTION - FREQUENCY: FREQUENCY: CONTINUOUS

## 2025-06-02 ASSESSMENT — PAIN DESCRIPTION - DESCRIPTORS: DESCRIPTORS: ACHING

## 2025-06-02 ASSESSMENT — PAIN DESCRIPTION - ORIENTATION: ORIENTATION: LEFT;RIGHT

## 2025-06-02 ASSESSMENT — PAIN - FUNCTIONAL ASSESSMENT: PAIN_FUNCTIONAL_ASSESSMENT: PREVENTS OR INTERFERES SOME ACTIVE ACTIVITIES AND ADLS

## 2025-06-02 ASSESSMENT — PAIN DESCRIPTION - PAIN TYPE: TYPE: ACUTE PAIN

## 2025-06-02 ASSESSMENT — PAIN DESCRIPTION - LOCATION: LOCATION: LEG;FOOT

## 2025-06-02 NOTE — ED NOTES
Bedside shift change report given to ALEXANDRIA Andersen (oncoming nurse) by ALEXANDRIA Stephenson (offgoing nurse). Report included the following information Nurse Handoff Report, ED Encounter Summary, ED SBAR, and MAR.

## 2025-06-03 NOTE — ED PROVIDER NOTES
procedure.  Against: No significant findings to refute this diagnosis.  Post-procedural vascular compromise (Arterial Clot):  Supporting: Recent lower-extremity vascular stenting, acute worsening of symptoms following procedure.  Against: No acute pain, normal pulses, and good capillary refill on exam, suggesting no acute arterial compromise or venous obstruction.  Cellulitis or infection:  Supporting: Significant fluid weeping increases risk of secondary skin infection.  Against: No erythema, warmth, fever, or clinical signs of infection on exam.  Assessment:  Most likely diagnosis is bilateral lower extremity edema secondary to discontinuation of diuretics, compounded by decreased mobility and failure to elevate legs post-procedure. Less likely is vascular obstruction or infection given reassuring physical exam findings and stable vital signs.  Management Plan:  Diagnostics:  Basic Labs to evaluate renal function, electrolytes, and rule out infection.  Will order CTA with runoff to eval patency of stent further  Therapeutics:  Restart patient on torsemide therapy to promote diuresis and reduce edema.  Initiate leg elevation and compression therapy for symptom management.  Skin care instructions provided to prevent secondary infection.  Patient Education:  Importance of adherence to diuretic medications.  Necessity of leg elevation, mobilization, and compression therapy to manage dependent edema.  Educate patient on signs of infection or worsening symptoms that would require urgent reevaluation (redness, warmth, fever, increased pain).  Disposition:  Anticipated discharge home with clear instructions to resume diuretics, elevate lower extremities, and improve mobility.  Ensure outpatient follow-up with vascular surgery and/or primary care physician for ongoing monitoring and edema management.      ADDITIONAL CONSIDERATIONS:  None  Procedures/Critical Care     Procedures    ED FINAL IMPRESSION     1. Leg edema    2.
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
## 2025-06-05 LAB
EKG DIAGNOSIS: NORMAL
EKG Q-T INTERVAL: 418 MS
EKG QRS DURATION: 106 MS
EKG QTC CALCULATION (BAZETT): 488 MS
EKG R AXIS: 188 DEGREES
EKG T AXIS: 24 DEGREES
EKG VENTRICULAR RATE: 82 BPM

## 2025-07-10 PROBLEM — D64.9 ANEMIA, UNSPECIFIED: Status: ACTIVE | Noted: 2025-07-10

## 2025-07-14 RX ORDER — SODIUM CHLORIDE 9 MG/ML
5-250 INJECTION, SOLUTION INTRAVENOUS PRN
OUTPATIENT
Start: 2025-07-14

## 2025-07-14 RX ORDER — ONDANSETRON 2 MG/ML
8 INJECTION INTRAMUSCULAR; INTRAVENOUS
OUTPATIENT
Start: 2025-07-14

## 2025-07-14 RX ORDER — EPINEPHRINE 1 MG/ML
0.3 INJECTION, SOLUTION, CONCENTRATE INTRAVENOUS PRN
OUTPATIENT
Start: 2025-07-14

## 2025-07-14 RX ORDER — HEPARIN 100 UNIT/ML
500 SYRINGE INTRAVENOUS PRN
OUTPATIENT
Start: 2025-07-14

## 2025-07-14 RX ORDER — HYDROCORTISONE SODIUM SUCCINATE 100 MG/2ML
100 INJECTION INTRAMUSCULAR; INTRAVENOUS
OUTPATIENT
Start: 2025-07-14

## 2025-07-14 RX ORDER — ACETAMINOPHEN 325 MG/1
650 TABLET ORAL
OUTPATIENT
Start: 2025-07-14

## 2025-07-14 RX ORDER — SODIUM CHLORIDE 0.9 % (FLUSH) 0.9 %
5-40 SYRINGE (ML) INJECTION PRN
OUTPATIENT
Start: 2025-07-14

## 2025-07-14 RX ORDER — DIPHENHYDRAMINE HYDROCHLORIDE 50 MG/ML
50 INJECTION, SOLUTION INTRAMUSCULAR; INTRAVENOUS
OUTPATIENT
Start: 2025-07-14

## 2025-07-14 RX ORDER — ALBUTEROL SULFATE 90 UG/1
4 INHALANT RESPIRATORY (INHALATION) PRN
OUTPATIENT
Start: 2025-07-14

## 2025-07-14 RX ORDER — SODIUM CHLORIDE 9 MG/ML
INJECTION, SOLUTION INTRAVENOUS PRN
OUTPATIENT
Start: 2025-07-14

## 2025-07-17 ENCOUNTER — HOSPITAL ENCOUNTER (OUTPATIENT)
Facility: HOSPITAL | Age: 80
Setting detail: INFUSION SERIES
End: 2025-07-17

## 2025-07-17 DIAGNOSIS — D64.9 ANEMIA, UNSPECIFIED TYPE: Primary | ICD-10-CM

## (undated) DEVICE — FORCEPS BX 240CM 2.4MM L NDL RAD JAW 4 M00513334

## (undated) DEVICE — ELECTRODE PT RET AD L9FT HI MOIST COND ADH HYDRGEL CORDED

## (undated) DEVICE — CANNULA NSL O2 AD 7 FT END-TIDAL CARBON DIOX VENTFLO

## (undated) DEVICE — MEDI-TRACE CADENCE ADULT, DEFIBRILLATION ELECTRODE -RTS  (10 PR/PK) - PHYSIO-CONTROL: Brand: MEDI-TRACE CADENCE

## (undated) DEVICE — KIT ENDOSCOPIC  PROC VIA

## (undated) DEVICE — FORCEPS BX L240CM JAW DIA2.2MM RAD JAW 4 HOT DISP

## (undated) DEVICE — ENDO CARRY-ON PROCEDURE KIT INCLUDES ENZYMATIC SPONGE, GAUZE, BIOHAZARD LABEL, TRAY, LUBRICANT, DIRTY SCOPE LABEL, WATER LABEL, TRAY, DRAWSTRING PAD, AND DEFENDO 4-PIECE KIT.: Brand: ENDO CARRY-ON PROCEDURE KIT

## (undated) DEVICE — SOLUTION IRRIG 1000ML STRL H2O USP PLAS POUR BTL

## (undated) DEVICE — KIT ELECTRD SURF FOR DISPLAYING THE 3D POS OF EP CATH